# Patient Record
Sex: MALE | Race: WHITE | NOT HISPANIC OR LATINO | Employment: OTHER | URBAN - METROPOLITAN AREA
[De-identification: names, ages, dates, MRNs, and addresses within clinical notes are randomized per-mention and may not be internally consistent; named-entity substitution may affect disease eponyms.]

---

## 2017-10-09 PROCEDURE — 99284 EMERGENCY DEPT VISIT MOD MDM: CPT | Mod: 25

## 2017-10-09 PROCEDURE — 94640 AIRWAY INHALATION TREATMENT: CPT

## 2017-10-10 ENCOUNTER — APPOINTMENT (OUTPATIENT)
Dept: GENERAL RADIOLOGY | Facility: CLINIC | Age: 77
End: 2017-10-10
Attending: EMERGENCY MEDICINE
Payer: MEDICARE

## 2017-10-10 ENCOUNTER — HOSPITAL ENCOUNTER (EMERGENCY)
Facility: CLINIC | Age: 77
Discharge: HOME | End: 2017-10-10
Attending: EMERGENCY MEDICINE | Admitting: EMERGENCY MEDICINE
Payer: MEDICARE

## 2017-10-10 VITALS
DIASTOLIC BLOOD PRESSURE: 64 MMHG | TEMPERATURE: 98.9 F | WEIGHT: 209.8 LBS | SYSTOLIC BLOOD PRESSURE: 142 MMHG | RESPIRATION RATE: 13 BRPM | BODY MASS INDEX: 32.93 KG/M2 | OXYGEN SATURATION: 92 % | HEIGHT: 67 IN | HEART RATE: 93 BPM

## 2017-10-10 DIAGNOSIS — J18.9 PNEUMONIA OF LEFT LUNG DUE TO INFECTIOUS ORGANISM, UNSPECIFIED PART OF LUNG: ICD-10-CM

## 2017-10-10 LAB
ANION GAP SERPL CALCULATED.3IONS-SCNC: 6 MMOL/L (ref 3–14)
BASOPHILS # BLD AUTO: 0 10E9/L (ref 0–0.2)
BASOPHILS NFR BLD AUTO: 0.1 %
BUN SERPL-MCNC: 23 MG/DL (ref 7–30)
CALCIUM SERPL-MCNC: 8.3 MG/DL (ref 8.5–10.1)
CHLORIDE SERPL-SCNC: 110 MMOL/L (ref 94–109)
CO2 SERPL-SCNC: 22 MMOL/L (ref 20–32)
CREAT SERPL-MCNC: 1.1 MG/DL (ref 0.66–1.25)
DIFFERENTIAL METHOD BLD: ABNORMAL
EOSINOPHIL # BLD AUTO: 0 10E9/L (ref 0–0.7)
EOSINOPHIL NFR BLD AUTO: 0.3 %
ERYTHROCYTE [DISTWIDTH] IN BLOOD BY AUTOMATED COUNT: 13.1 % (ref 10–15)
GFR SERPL CREATININE-BSD FRML MDRD: 65 ML/MIN/1.7M2
GLUCOSE SERPL-MCNC: 173 MG/DL (ref 70–99)
HCT VFR BLD AUTO: 40.1 % (ref 40–53)
HGB BLD-MCNC: 14.2 G/DL (ref 13.3–17.7)
IMM GRANULOCYTES # BLD: 0 10E9/L (ref 0–0.4)
IMM GRANULOCYTES NFR BLD: 0.1 %
LYMPHOCYTES # BLD AUTO: 0.4 10E9/L (ref 0.8–5.3)
LYMPHOCYTES NFR BLD AUTO: 3.9 %
MCH RBC QN AUTO: 32.1 PG (ref 26.5–33)
MCHC RBC AUTO-ENTMCNC: 35.4 G/DL (ref 31.5–36.5)
MCV RBC AUTO: 91 FL (ref 78–100)
MONOCYTES # BLD AUTO: 0.1 10E9/L (ref 0–1.3)
MONOCYTES NFR BLD AUTO: 1.3 %
NEUTROPHILS # BLD AUTO: 10.5 10E9/L (ref 1.6–8.3)
NEUTROPHILS NFR BLD AUTO: 94.3 %
NRBC # BLD AUTO: 0 10*3/UL
NRBC BLD AUTO-RTO: 0 /100
PLATELET # BLD AUTO: 136 10E9/L (ref 150–450)
POTASSIUM SERPL-SCNC: 4 MMOL/L (ref 3.4–5.3)
RBC # BLD AUTO: 4.43 10E12/L (ref 4.4–5.9)
SODIUM SERPL-SCNC: 138 MMOL/L (ref 133–144)
WBC # BLD AUTO: 11.1 10E9/L (ref 4–11)

## 2017-10-10 PROCEDURE — 25000132 ZZH RX MED GY IP 250 OP 250 PS 637: Mod: GY | Performed by: EMERGENCY MEDICINE

## 2017-10-10 PROCEDURE — 80048 BASIC METABOLIC PNL TOTAL CA: CPT | Performed by: EMERGENCY MEDICINE

## 2017-10-10 PROCEDURE — 71020 XR CHEST 2 VW: CPT

## 2017-10-10 PROCEDURE — 25000125 ZZHC RX 250: Performed by: EMERGENCY MEDICINE

## 2017-10-10 PROCEDURE — A9270 NON-COVERED ITEM OR SERVICE: HCPCS | Mod: GY | Performed by: EMERGENCY MEDICINE

## 2017-10-10 PROCEDURE — 85025 COMPLETE CBC W/AUTO DIFF WBC: CPT | Performed by: EMERGENCY MEDICINE

## 2017-10-10 RX ORDER — ACETAMINOPHEN 500 MG
1000 TABLET ORAL ONCE
Status: COMPLETED | OUTPATIENT
Start: 2017-10-10 | End: 2017-10-10

## 2017-10-10 RX ORDER — IPRATROPIUM BROMIDE AND ALBUTEROL SULFATE 2.5; .5 MG/3ML; MG/3ML
3 SOLUTION RESPIRATORY (INHALATION) ONCE
Status: COMPLETED | OUTPATIENT
Start: 2017-10-10 | End: 2017-10-10

## 2017-10-10 RX ORDER — DOXYCYCLINE 100 MG/1
100 CAPSULE ORAL 2 TIMES DAILY
Qty: 20 CAPSULE | Refills: 0 | Status: SHIPPED | OUTPATIENT
Start: 2017-10-10 | End: 2017-10-20

## 2017-10-10 RX ORDER — ALBUTEROL SULFATE 90 UG/1
2 AEROSOL, METERED RESPIRATORY (INHALATION) EVERY 6 HOURS PRN
Qty: 1 INHALER | Refills: 0 | Status: SHIPPED | OUTPATIENT
Start: 2017-10-10

## 2017-10-10 RX ADMIN — IPRATROPIUM BROMIDE AND ALBUTEROL SULFATE 3 ML: .5; 3 SOLUTION RESPIRATORY (INHALATION) at 01:01

## 2017-10-10 RX ADMIN — ACETAMINOPHEN 1000 MG: 500 TABLET, FILM COATED ORAL at 01:00

## 2017-10-10 ASSESSMENT — ENCOUNTER SYMPTOMS
SHORTNESS OF BREATH: 1
CHILLS: 1
FEVER: 1
COUGH: 1
RHINORRHEA: 1

## 2017-10-10 NOTE — ED NOTES
Patient states he took cold medicine at 5pm and nose started running after that, took tylenol at 9pm and states he feels like between the 2 his breathing started feeling better.  Patient has congested sound to voice. Patient has congested cough with yellowish mucus produced.

## 2017-10-10 NOTE — ED PROVIDER NOTES
"  History     Chief Complaint:  Shortness of Breath     HPI   Derrick Benitez is a 77 year old male with a history of hypertension who presents for evaluation of shortness of breath. Tonight around 1800, the patient started to develop a fever, chills, rhinorrhea, cough, and shortness of breath. He did take a dose of Tylenol around 2200 tonight with some improvement of his symptoms. Due to these new symptoms, the patient presents to the ED for evaluation. Otherwise, the patient reports that he has been feeling well recently. He is not on oxygen at baseline.     Allergies:  Losartan      Medications:    Ferrous Sulfate (IRON SUPPLEMENT PO)  FINASTERIDE PO  tamsulosin (FLOMAX) 0.4 MG 24 hr capsule  Tadalafil (CIALIS PO)  potassium gluconate 2.5 MEQ TABS  AMLODIPINE BESYLATE PO    Past Medical History:    CKD  Diabetes mellitus   Calcific tendonitis of left hand   Enlarged prostate  Hypertension     Past Surgical History:    History reviewed. No pertinent past surgical history.     Family History:    History reviewed. No pertinent family history.     Social History:  Tobacco use:    Former smoker - 3.00 packs / day for 40 years, quit 2004  Alcohol use:    Negative  Marital status:       Accompanied to ED by:  Alone     Review of Systems   Constitutional: Positive for chills and fever.   HENT: Positive for rhinorrhea.    Respiratory: Positive for cough and shortness of breath.    All other systems reviewed and are negative.    Physical Exam   First Vitals:  BP: 168/68  Pulse: 101  Heart Rate: 89 (placed on 2 L and increased to 92%)  Temp: 97.5  F (36.4  C)  Resp: 20  Height: 170.2 cm (5' 7\")  Weight: 95.2 kg (209 lb 12.8 oz)  SpO2: 90 %        Physical Exam  GENERAL: well developed, pleasant  HEAD: atraumatic  EYES: pupils reactive, extraocular muscles intact, conjunctivae normal  ENT:  mucus membranes moist  NECK:  trachea midline, normal range of motion  RESPIRATORY: no tachypnea, diminished lung sounds, " occasional wheezing   CVS: normal S1/S2, no murmurs, intact distal pulses  ABDOMEN: soft, nontender, nondistention  MUSCULOSKELETAL: no deformities  SKIN: warm and dry, no acute rashes or ulceration  NEURO: GCS 15, cranial nerves intact, alert and oriented x3  PSYCH:  Mood/affect normal    Emergency Department Course     Imaging:  Radiographic findings were communicated with the patient who voiced understanding of the findings.    XR Chest:  IMPRESSION: A small region of patchy opacities in the left lung base is nonspecific, but most likely represents pneumonia. A follow-up chest radiograph could be performed following treatment to ensure  Resolution.  Per radiology.     Laboratory:  CBC: WBC 11.1 high,  low, o/w WNL (HGB 14.2)    BMP: Chloride 110 high, Glucose 173 high, Calcium 8.3 low, o/w WNL (Creatinine 1.10)    Interventions:  0100 Tylenol 1,000 mg PO  0101 Duoneb 3 mL Nebulization     Emergency Department Course:  Nursing notes and vitals reviewed.  0022: I performed an exam of the patient as documented above.     0158: I updated and reassessed the patient.     Findings and plan explained to the Patient. Patient discharged home with instructions regarding supportive care, medications, and reasons to return. The importance of close follow-up was reviewed. The patient was prescribed Doxycycline and an Albuterol inhaler.      Impression & Plan      Medical Decision Making:  Derrick Benitez is a 77 year old male who presents with a cough for the last 1-2 days. Chest X-ray is read as possible pneumonia. Labs were fairly reassuring. Sats are 90-91. The patient was given a duoneb and is feeling improved and would like to go home.     Diagnosis:    ICD-10-CM   1. Pneumonia of left lung due to infectious organism, unspecified part of lung J18.9       Plan:   Albuterol, Vibramycin, and follow up with primary to assure resolution otherwise return if worse.    Discharge Medications:   Details   doxycycline  (VIBRAMYCIN) 100 MG capsule Take 1 capsule (100 mg) by mouth 2 times daily for 10 days, Disp-20 capsule, R-0, Local Print      albuterol (PROAIR HFA/PROVENTIL HFA/VENTOLIN HFA) 108 (90 BASE) MCG/ACT Inhaler Inhale 2 puffs into the lungs every 6 hours as needed for shortness of breath / dyspnea or wheezing, Disp-1 Inhaler, R-0, Local Print          Malik SEVILLA, am serving as a scribe at 12:22 AM on 10/10/2017 to document services personally performed by Dr. Rasheed, based on my observations and the provider's statements to me.     EMERGENCY DEPARTMENT       Stanton Rasheed MD  10/11/17 1584

## 2017-10-10 NOTE — DISCHARGE INSTRUCTIONS

## 2017-10-10 NOTE — ED AVS SNAPSHOT
Emergency Department    [address] [facility] [address]    Phone:  [phone]    Fax:  [phone]                                       Derrick Benitez   MRN: [id number]    Department:   Emergency Department   Date of Visit:  10/9/2017           After Visit Summary Signature Page     I have received my discharge instructions, and my questions have been answered. I have discussed any challenges I see with this plan with the nurse or doctor.    ..........................................................................................................................................  Patient/Patient Representative Signature      ..........................................................................................................................................  Patient Representative Print Name and Relationship to Patient    ..................................................               ................................................  Date                                            Time    ..........................................................................................................................................  Reviewed by Signature/Title    ...................................................              ..............................................  Date                                                            Time

## 2017-10-10 NOTE — ED AVS SNAPSHOT
Emergency Department    [address] [facility] [address]    Phone:  [phone]    Fax:  [phone]                                       Derrick Benitez   MRN: [id number]    Department:   Emergency Department   Date of Visit:  10/9/2017           Patient Information     Date Of Birth          1940        Your diagnoses for this visit were:     Pneumonia of left lung due to infectious organism, unspecified part of lung        You were seen by Stanton Rasheed MD.      Follow-up Information     Follow up with Clarisse Golden.    Specialty:  Family Practice    Contact information:    [facility]  [address]  [facility] [id number] [phone]          Follow up with  Emergency Department.    Specialty:  EMERGENCY MEDICINE    Why:  If symptoms worsen    Contact information:    [address] [facility] [address] [phone]        Discharge Instructions         Pneumonia (Adult)  Pneumonia is an infection deep within the lungs. It is in the small air sacs (alveoli). Pneumonia may be caused by a virus or bacteria. Pneumonia caused by bacteria is usually treated with an antibiotic. Severe cases may need to be treated in the hospital. Milder cases can be treated at home. Symptoms usually start to get better during the first 2 days of treatment.    Home care  Follow these guidelines when caring for yourself at home:    Rest at home for the first 2 to 3 days, or until you feel stronger. Don t let yourself get overly tired when you go back to your activities.    Stay away from cigarette smoke - yours or other people s.    You may use acetaminophen or ibuprofen to control fever or pain, unless another medicine was prescribed. If you have chronic liver or kidney disease, talk with your healthcare provider before using these medicines. Also talk with your provider if you ve had a stomach ulcer or gastrointestinal bleeding. Don t give aspirin to anyone younger than  18 years of age who is ill with a fever. It may cause severe liver damage.    Your appetite may be poor, so a light diet is fine.    Drink 6 to 8 glasses of fluids every day to make sure you are getting enough fluids. Beverages can include water, sport drinks, sodas without caffeine, juices, tea, or soup. Fluids will help loosen secretions in the lung. This will make it easier for you to cough up the phlegm (sputum). If you also have heart or kidney disease, check with your healthcare provider before you drink extra fluids.    Take antibiotic medicine prescribed until it is all gone, even if you are feeling better after a few days.  Follow-up care  Follow up with your healthcare provider in the next 2 to 3 days, or as advised. This is to be sure the medicine is helping you get better.  If you are 65 or older, you should get a pneumococcal vaccine and a yearly flu (influenza) shot. You should also get these vaccines if you have chronic lung disease like asthma, emphysema, or COPD. Recently, a second type of pneumonia vaccine has become available for everyone over 65 years old. This is in addition to the previous vaccine. Ask your provider about this.  When to seek medical advice  Call your healthcare provider right away if any of these occur:    You don t get better within the first 48 hours of treatment    Shortness of breath gets worse    Rapid breathing (more than 25 breaths per minute)    Coughing up blood    Chest pain gets worse with breathing    Fever of 100.4 F (38 C) or higher that doesn t get better with fever medicine    Weakness, dizziness, or fainting that gets worse    Thirst or dry mouth that gets worse    Sinus pain, headache, or a stiff neck    Chest pain not caused by coughing  Date Last Reviewed: 1/1/2017 2000-2017 [employer]. [address] [place] [address], [place] [address]. All rights reserved. This information is not intended as a substitute for professional medical care. Always  follow your healthcare professional's instructions.          24 Hour Appointment Hotline       To make an appointment at any [facility], call [phone] ([phone]). If you don't have a family doctor or clinic, we will help you find one. [facility] are conveniently located to serve the needs of you and your family.             Review of your medicines      START taking        Dose / Directions Last dose taken    albuterol 108 (90 BASE) MCG/ACT Inhaler   Commonly known as:  PROAIR HFA/PROVENTIL HFA/VENTOLIN HFA   Dose:  2 puff   Quantity:  1 Inhaler        Inhale 2 puffs into the lungs every 6 hours as needed for shortness of breath / dyspnea or wheezing   Refills:  0        doxycycline 100 MG capsule   Commonly known as:  VIBRAMYCIN   Dose:  100 mg   Quantity:  20 capsule        Take 1 capsule (100 mg) by mouth 2 times daily for 10 days   Refills:  0          Our records show that you are taking the medicines listed below. If these are incorrect, please call your family doctor or clinic.        Dose / Directions Last dose taken    AMLODIPINE BESYLATE PO   Dose:  10 mg        Take 10 mg by mouth every evening   Refills:  0        CIALIS PO        Take by mouth as needed for erectile dysfunction   Refills:  0        FINASTERIDE PO   Dose:  5 mg        Take 5 mg by mouth every evening   Refills:  0        FLOMAX 0.4 MG capsule   Dose:  0.8 mg   Generic drug:  tamsulosin        Take 0.8 mg by mouth every evening   Refills:  0        IRON SUPPLEMENT PO        Refills:  0        potassium gluconate 2.5 MEQ Tabs   Dose:  2.5 mEq        Take 2.5 mEq by mouth every evening   Refills:  0                Prescriptions were sent or printed at these locations (2 Prescriptions)                   Other Prescriptions                Printed at Department/Unit printer (2 of 2)         doxycycline (VIBRAMYCIN) 100 MG capsule               albuterol (PROAIR HFA/PROVENTIL HFA/VENTOLIN HFA) 108 (90 BASE) MCG/ACT  Inhaler                Procedures and tests performed during your visit     Basic metabolic panel    CBC with platelets differential    XR Chest 2 Views      Orders Needing Specimen Collection     None      Pending Results     No orders found from 10/8/2017 to 10/11/2017.            Pending Culture Results     No orders found from 10/8/2017 to 10/11/2017.            Pending Results Instructions     If you had any lab results that were not finalized at the time of your Discharge, you can call the ED Lab Result RN at [phone]. You will be contacted by this team for any positive Lab results or changes in treatment. The nurses are available 7 days a week from 10A to 6:30P.  You can leave a message 24 hours per day and they will return your call.        Test Results From Your Hospital Stay        10/10/2017 12:56 AM      Component Results     Component Value Ref Range & Units Status    WBC 11.1 (H) 4.0 - 11.0 10e9/L Final    RBC Count 4.43 4.4 - 5.9 10e12/L Final    Hemoglobin 14.2 13.3 - 17.7 g/dL Final    Hematocrit 40.1 40.0 - 53.0 % Final    MCV 91 78 - 100 fl Final    MCH 32.1 26.5 - 33.0 pg Final    MCHC 35.4 31.5 - 36.5 g/dL Final    RDW 13.1 10.0 - 15.0 % Final    Platelet Count 136 (L) 150 - 450 10e9/L Final    Diff Method Automated Method  Final    % Neutrophils 94.3 % Final    % Lymphocytes 3.9 % Final    % Monocytes 1.3 % Final    % Eosinophils 0.3 % Final    % Basophils 0.1 % Final    % Immature Granulocytes 0.1 % Final    Nucleated RBCs 0 0 /100 Final    Absolute Neutrophil 10.5 (H) 1.6 - 8.3 10e9/L Final    Absolute Lymphocytes 0.4 (L) 0.8 - 5.3 10e9/L Final    Absolute Monocytes 0.1 0.0 - 1.3 10e9/L Final    Absolute Eosinophils 0.0 0.0 - 0.7 10e9/L Final    Absolute Basophils 0.0 0.0 - 0.2 10e9/L Final    Abs Immature Granulocytes 0.0 0 - 0.4 10e9/L Final    Absolute Nucleated RBC 0.0  Final         10/10/2017  1:59 AM      Narrative     CHEST 2 VIEWS  10/10/2017 12:53 AM     HISTORY: Cough.  Fever.    COMPARISON: 10/28/2014.    FINDINGS: A small region of ill-defined patchy opacities in the left  lung base. The lungs are otherwise clear. Normal-sized cardiac  silhouette.        Impression     IMPRESSION: A small region of patchy opacities in the left lung base  is nonspecific, but most likely represents pneumonia. A follow-up  chest radiograph could be performed following treatment to ensure  resolution.    ZUNILDA HDEZ MD         10/10/2017  1:06 AM      Component Results     Component Value Ref Range & Units Status    Sodium 138 133 - 144 mmol/L Final    Potassium 4.0 3.4 - 5.3 mmol/L Final    Chloride 110 (H) 94 - 109 mmol/L Final    Carbon Dioxide 22 20 - 32 mmol/L Final    Anion Gap 6 3 - 14 mmol/L Final    Glucose 173 (H) 70 - 99 mg/dL Final    Urea Nitrogen 23 7 - 30 mg/dL Final    Creatinine 1.10 0.66 - 1.25 mg/dL Final    GFR Estimate 65 >60 mL/min/1.7m2 Final    Non  GFR Calc    GFR Estimate If Black 78 >60 mL/min/1.7m2 Final    African American GFR Calc    Calcium 8.3 (L) 8.5 - 10.1 mg/dL Final                Clinical Quality Measure: Blood Pressure Screening     Your blood pressure was checked while you were in the emergency department today. The last reading we obtained was  BP: 142/64 . Please read the guidelines below about what these numbers mean and what you should do about them.  If your systolic blood pressure (the top number) is less than 120 and your diastolic blood pressure (the bottom number) is less than 80, then your blood pressure is normal. There is nothing more that you need to do about it.  If your systolic blood pressure (the top number) is 120-139 or your diastolic blood pressure (the bottom number) is 80-89, your blood pressure may be higher than it should be. You should have your blood pressure rechecked within a year by a primary care provider.  If your systolic blood pressure (the top number) is 140 or greater or your diastolic blood pressure (the  "bottom number) is 90 or greater, you may have high blood pressure. High blood pressure is treatable, but if left untreated over time it can put you at risk for heart attack, stroke, or kidney failure. You should have your blood pressure rechecked by a primary care provider within the next 4 weeks.  If your provider in the emergency department today gave you specific instructions to follow-up with your doctor or provider even sooner than that, you should follow that instruction and not wait for up to 4 weeks for your follow-up visit.        Thank you for choosing [facility]       Thank you for choosing [facility] for your care. Our goal is always to provide you with excellent care. Hearing back from our patients is one way we can continue to improve our services. Please take a few minutes to complete the written survey that you may receive in the mail after you visit with us. Thank you!        [employer] Information     [employer] lets you send messages to your doctor, view your test results, renew your prescriptions, schedule appointments and more. To sign up, go to [email or web address] . Click on \"Log in\" on the left side of the screen, which will take you to the Welcome page. Then click on \"Sign up Now\" on the right side of the page.     You will be asked to enter the access code listed below, as well as some personal information. Please follow the directions to create your username and password.     Your access code is: [email or web address]  Expires: 2018  3:21 AM     Your access code will  in 90 days. If you need help or a new code, please call your [facility] clinic or [phone].        Care EveryWhere ID     This is your Care EveryWhere ID. This could be used by other organizations to access your [facility] medical records  [id number]        Equal Access to Services     HARPER GREGORY : devendra Richards, yannick howe. So sharona " [phone].    ATENCIÓN: Si habla español, tiene a helton disposición servicios gratuitos de asistencia lingüística. Llame al [phone].    We comply with applicable federal civil rights laws and [place] laws. We do not discriminate on the basis of race, color, national origin, age, disability, sex, sexual orientation, or gender identity.            After Visit Summary       This is your record. Keep this with you and show to your community pharmacist(s) and doctor(s) at your next visit.

## 2017-10-10 NOTE — ED NOTES
Patient noticed little change in breathing quality after nebulizer, no change in breath sounds noted.

## 2020-09-07 ENCOUNTER — APPOINTMENT (OUTPATIENT)
Dept: CT IMAGING | Facility: CLINIC | Age: 80
End: 2020-09-07
Attending: EMERGENCY MEDICINE
Payer: MEDICARE

## 2020-09-07 ENCOUNTER — HOSPITAL ENCOUNTER (EMERGENCY)
Facility: CLINIC | Age: 80
Discharge: HOME | End: 2020-09-07
Attending: EMERGENCY MEDICINE | Admitting: EMERGENCY MEDICINE
Payer: MEDICARE

## 2020-09-07 VITALS
TEMPERATURE: 97.9 F | OXYGEN SATURATION: 92 % | HEIGHT: 67 IN | DIASTOLIC BLOOD PRESSURE: 55 MMHG | BODY MASS INDEX: 32.86 KG/M2 | HEART RATE: 57 BPM | SYSTOLIC BLOOD PRESSURE: 147 MMHG

## 2020-09-07 DIAGNOSIS — R91.8 MASS OF RIGHT LUNG: ICD-10-CM

## 2020-09-07 LAB
ALBUMIN SERPL-MCNC: 2.9 G/DL (ref 3.4–5)
ALP SERPL-CCNC: 91 U/L (ref 40–150)
ALT SERPL W P-5'-P-CCNC: 16 U/L (ref 0–70)
ANION GAP SERPL CALCULATED.3IONS-SCNC: 7 MMOL/L (ref 3–14)
AST SERPL W P-5'-P-CCNC: 17 U/L (ref 0–45)
BASOPHILS # BLD AUTO: 0 10E9/L (ref 0–0.2)
BASOPHILS NFR BLD AUTO: 0.1 %
BILIRUB SERPL-MCNC: 0.7 MG/DL (ref 0.2–1.3)
BUN SERPL-MCNC: 17 MG/DL (ref 7–30)
CALCIUM SERPL-MCNC: 8 MG/DL (ref 8.5–10.1)
CHLORIDE SERPL-SCNC: 108 MMOL/L (ref 94–109)
CO2 SERPL-SCNC: 22 MMOL/L (ref 20–32)
CREAT SERPL-MCNC: 1.12 MG/DL (ref 0.66–1.25)
D DIMER PPP FEU-MCNC: 1.3 UG/ML FEU (ref 0–0.5)
DIFFERENTIAL METHOD BLD: ABNORMAL
EOSINOPHIL # BLD AUTO: 0.1 10E9/L (ref 0–0.7)
EOSINOPHIL NFR BLD AUTO: 0.7 %
ERYTHROCYTE [DISTWIDTH] IN BLOOD BY AUTOMATED COUNT: 13.3 % (ref 10–15)
GFR SERPL CREATININE-BSD FRML MDRD: 61 ML/MIN/{1.73_M2}
GLUCOSE SERPL-MCNC: 173 MG/DL (ref 70–99)
HCT VFR BLD AUTO: 42.1 % (ref 40–53)
HGB BLD-MCNC: 14.2 G/DL (ref 13.3–17.7)
IMM GRANULOCYTES # BLD: 0 10E9/L (ref 0–0.4)
IMM GRANULOCYTES NFR BLD: 0.3 %
LYMPHOCYTES # BLD AUTO: 0.7 10E9/L (ref 0.8–5.3)
LYMPHOCYTES NFR BLD AUTO: 10.2 %
MCH RBC QN AUTO: 30.6 PG (ref 26.5–33)
MCHC RBC AUTO-ENTMCNC: 33.7 G/DL (ref 31.5–36.5)
MCV RBC AUTO: 91 FL (ref 78–100)
MONOCYTES # BLD AUTO: 0.5 10E9/L (ref 0–1.3)
MONOCYTES NFR BLD AUTO: 6.3 %
NEUTROPHILS # BLD AUTO: 5.9 10E9/L (ref 1.6–8.3)
NEUTROPHILS NFR BLD AUTO: 82.4 %
NRBC # BLD AUTO: 0 10*3/UL
NRBC BLD AUTO-RTO: 0 /100
PLATELET # BLD AUTO: 161 10E9/L (ref 150–450)
POTASSIUM SERPL-SCNC: 4 MMOL/L (ref 3.4–5.3)
PROT SERPL-MCNC: 6.8 G/DL (ref 6.8–8.8)
RBC # BLD AUTO: 4.64 10E12/L (ref 4.4–5.9)
SARS-COV-2 RNA SPEC QL NAA+PROBE: NOT DETECTED
SODIUM SERPL-SCNC: 137 MMOL/L (ref 133–144)
SPECIMEN SOURCE: NORMAL
WBC # BLD AUTO: 7.2 10E9/L (ref 4–11)

## 2020-09-07 PROCEDURE — 25000128 H RX IP 250 OP 636: Performed by: EMERGENCY MEDICINE

## 2020-09-07 PROCEDURE — 85025 COMPLETE CBC W/AUTO DIFF WBC: CPT | Performed by: EMERGENCY MEDICINE

## 2020-09-07 PROCEDURE — 25000132 ZZH RX MED GY IP 250 OP 250 PS 637: Mod: GY | Performed by: EMERGENCY MEDICINE

## 2020-09-07 PROCEDURE — 99285 EMERGENCY DEPT VISIT HI MDM: CPT | Mod: 25

## 2020-09-07 PROCEDURE — C9803 HOPD COVID-19 SPEC COLLECT: HCPCS

## 2020-09-07 PROCEDURE — 85379 FIBRIN DEGRADATION QUANT: CPT | Performed by: EMERGENCY MEDICINE

## 2020-09-07 PROCEDURE — 71275 CT ANGIOGRAPHY CHEST: CPT

## 2020-09-07 PROCEDURE — U0003 INFECTIOUS AGENT DETECTION BY NUCLEIC ACID (DNA OR RNA); SEVERE ACUTE RESPIRATORY SYNDROME CORONAVIRUS 2 (SARS-COV-2) (CORONAVIRUS DISEASE [COVID-19]), AMPLIFIED PROBE TECHNIQUE, MAKING USE OF HIGH THROUGHPUT TECHNOLOGIES AS DESCRIBED BY CMS-2020-01-R: HCPCS | Performed by: EMERGENCY MEDICINE

## 2020-09-07 PROCEDURE — 25000125 ZZHC RX 250: Performed by: EMERGENCY MEDICINE

## 2020-09-07 PROCEDURE — 80053 COMPREHEN METABOLIC PANEL: CPT | Performed by: EMERGENCY MEDICINE

## 2020-09-07 RX ORDER — IBUPROFEN 600 MG/1
600 TABLET, FILM COATED ORAL ONCE
Status: COMPLETED | OUTPATIENT
Start: 2020-09-07 | End: 2020-09-07

## 2020-09-07 RX ORDER — IOPAMIDOL 755 MG/ML
75 INJECTION, SOLUTION INTRAVASCULAR ONCE
Status: COMPLETED | OUTPATIENT
Start: 2020-09-07 | End: 2020-09-07

## 2020-09-07 RX ORDER — ACETAMINOPHEN 325 MG/1
975 TABLET ORAL ONCE
Status: COMPLETED | OUTPATIENT
Start: 2020-09-07 | End: 2020-09-07

## 2020-09-07 RX ORDER — BENZONATATE 100 MG/1
100 CAPSULE ORAL 3 TIMES DAILY PRN
Qty: 30 CAPSULE | Refills: 0 | Status: SHIPPED | OUTPATIENT
Start: 2020-09-07 | End: 2020-10-07

## 2020-09-07 RX ORDER — HYDROCODONE BITARTRATE AND ACETAMINOPHEN 5; 325 MG/1; MG/1
1 TABLET ORAL EVERY 6 HOURS PRN
Qty: 10 TABLET | Refills: 0 | Status: SHIPPED | OUTPATIENT
Start: 2020-09-07 | End: 2020-09-11

## 2020-09-07 RX ADMIN — ACETAMINOPHEN 975 MG: 325 TABLET, FILM COATED ORAL at 10:16

## 2020-09-07 RX ADMIN — IOPAMIDOL 75 ML: 755 INJECTION, SOLUTION INTRAVENOUS at 11:18

## 2020-09-07 RX ADMIN — SODIUM CHLORIDE 98 ML: 9 INJECTION, SOLUTION INTRAVENOUS at 11:20

## 2020-09-07 RX ADMIN — IBUPROFEN 600 MG: 600 TABLET ORAL at 10:16

## 2020-09-07 ASSESSMENT — ENCOUNTER SYMPTOMS
DIARRHEA: 0
SHORTNESS OF BREATH: 0
FEVER: 0
BACK PAIN: 1
COUGH: 1

## 2020-09-07 NOTE — ED AVS SNAPSHOT
Emergency Department  [address] [facility] [address]  Phone:  [phone]  Fax:  [phone]                                    Derrick Benitez   MRN: [id number]    Department:   Emergency Department   Date of Visit:  9/7/2020           After Visit Summary Signature Page    I have received my discharge instructions, and my questions have been answered. I have discussed any challenges I see with this plan with the nurse or doctor.    ..........................................................................................................................................  Patient/Patient Representative Signature      ..........................................................................................................................................  Patient Representative Print Name and Relationship to Patient    ..................................................               ................................................  Date                                   Time    ..........................................................................................................................................  Reviewed by Signature/Title    ...................................................              ..............................................  Date                                               Time          22EPIC Rev 08/18

## 2020-09-07 NOTE — ED TRIAGE NOTES
Right mid back pain since Saturday. Pain increases with inspiration and coughing. Denies injury. C/o mild and infrequent productive cough.

## 2020-09-07 NOTE — ED PROVIDER NOTES
History     Chief Complaint:  Back Pain and Cough     HPI   Derrick Benitez is a 80 year old male with a history of hypertension and type II diabetes who presents for evaluation of back pain and a cough. The patient reports that he has a somewhat productive cough at baseline due to a longstanding history of previously smoking and frequent sneezing in the morning due to seasonal allergies, although this sneezing has improved somewhat within the last week as the weather has cooled. On 9/5/2020 the patient started to develop new right upper back pain that is worse with coughing. Due to concern for his persistent pain today, the patient came into the ED for evaluation. He denies any recent fever, shortness of breath, chest pain, or diarrhea. His sense of taste and smell are at their baseline. He has no known COVID-19 exposures or sick contacts. He has no personal or family history of blood clots and has not traveled recently.      PE/DVT Risk Factors:   Hx of PE/DVT:   Negative   Hx of clotting disorder:  Negative   Hx of cancer:    Negative   Tobacco use:    Former smoker   Hormone therapy:   Negative   Prolonged immobilization:  Negative   Recent surgery:   Negative   Recent travel:    Negative   Familial Hx of PE/DVT:  Negative      Allergies:  Losartan      Medications:    Albuterol inhaler   Amlodipine besylate   Iron supplement  Finasteride   Potassium gluconate   Cialis  Flomax      Past Medical History:    Diabetes mellitus, type II   Chronic kidney disease   Hypertension     Past Surgical History:    History reviewed. No pertinent past surgical history.     Family History:    Hypertension - Father      Social History:  Tobacco use:    Former smoker - 3.00 packs / day for 40 years, quit 2004   Alcohol use:    Negative   Drug use:    Negative   Marital status:       Accompanied to ED by:  Alone      Review of Systems   Constitutional: Negative for fever.   HENT: Positive for sneezing.    Respiratory:  "Positive for cough. Negative for shortness of breath.    Cardiovascular: Negative for chest pain.   Gastrointestinal: Negative for diarrhea.   Musculoskeletal: Positive for back pain.   All other systems reviewed and are negative.      Physical Exam   First Vitals:  BP: (!) 147/55  Pulse: 57  Temp: 97.9  F (36.6  C)  Height: 170.2 cm (5' 7\")  SpO2: 92 %      Physical Exam  General: Alert, interactive in mild distress  Head:  Scalp is atraumatic  Eyes:  The pupils are equal, round, and reactive to light    EOM's intact    No scleral icterus  ENT:      Nose:  The external nose is normal  Ears:  External ears are normal  Mouth/Throat: The oropharynx is normal    Mucus membranes are moist       Neck:  Normal range of motion.      There is no rigidity.    Trachea is in the midline         CV:  Regular rate and rhythm    No murmur   Resp:  Coarse breath sounds bilaterally.     Non-labored, no retractions or accessory muscle use      GI:  Abdomen is soft, no distension, no tenderness.       MS:  Normal strength in all 4 extremities    No peripheral edema.   Skin:  Warm and dry, No rash or lesions noted.  Neuro: Strength 5/5 x4.  Psych:  Awake. Alert.  Normal affect.      Appropriate interactions.      Emergency Department Course      Imaging:  Radiographic findings were communicated with the patient who voiced understanding of the findings.    CT Chest Pulmonary Embolism w Contrast:  IMPRESSION:   1.  No evidence of pulmonary embolism. Thoracic aorta is unremarkable.   2.  Right lower lobe lung mass causing narrowing of a segmental branch of the right lower lobe pulmonary artery without occlusion. No enlarged lymph nodes.   Per radiology.     Laboratory:  CBC: WNL (WBC 7.2, HGB 14.2, )   CMP: Glucose 173 high, Calcium 8.0 low, Albumin 2.9 low, o/w WNL (Creatinine 1.12)   D dimer quantitative: 1.3 high   Symptomatic COVID-19 Virus by PCR: Pending      Interventions:  1016 Tylenol 975 mg PO   1016 Ibuprofen 600 mg PO "      Emergency Department Course:  Nursing notes and vitals reviewed.  0946: I performed an exam of the patient as documented above.      1155: I updated and reassessed the patient.     Findings and plan explained to the Patient. Patient discharged home with instructions regarding supportive care, medications, and reasons to return. The importance of close follow-up was reviewed. The patient was prescribed Tessalon and [place].       Impression & Plan       Medical Decision Making:  Derrick Benitez is a 80 year old male who was seen and evaluated. The above workup was undertaken. Given his pleuritic right-sided pain, a broad differential including pulmonary embolism, aortic dissection, pneumonia, pneumothorax, and tumor were considered. With his elevated D dimer, CT imaging was performed, unfortunately demonstrating a right lung mass. Given his smoking history, this is very concerning for malignancy. I discussed this at length with the patient and he states understanding and will follow up with [facility] to discuss biopsy for definitive diagnosis. I have also contacted our care coordinator to help facilitate this evaluation. In the meantime, I have placed him on Norco for both pain and cough suppression as well as Tessalon for cough suppression. There is no signs of secondary infection. I think COVID is unlikely but a swab is out pending at this time. The patient was informed of all the findings, was in agreement with this plan of action, and was subsequently discharged to home.     Covid-19  Derrick Benitez was evaluated during a global COVID-19 pandemic, which necessitated consideration that the patient might be at risk for infection with the SARS-CoV-2 virus that causes COVID-19.   Applicable protocols for evaluation were followed during the patient's care.   COVID-19 was considered as part of the patient's evaluation. The plan for testing is:  a test was obtained during this visit.     Impression:      ICD-10-CM   1. Mass of right lung  R91.8      Plan:   Discharged to home with Tessalon and Norco.     Discharge Medications:  New Prescriptions    BENZONATATE (TESSALON) 100 MG CAPSULE    Take 1 capsule (100 mg) by mouth 3 times daily as needed for cough    HYDROCODONE-ACETAMINOPHEN (NORCO) 5-325 MG TABLET    Take 1 tablet by mouth every 6 hours as needed for severe pain         IMalik, am serving as a scribe at 9:46 AM on 9/7/2020 to document services personally performed by Dr. Rafat Mckinley, based on my observations and the provider's statements to me.       EMERGENCY DEPARTMENT       Rafat Mckinley MD  09/07/20 [id number]

## 2020-09-08 ENCOUNTER — TELEPHONE (OUTPATIENT)
Dept: ONCOLOGY | Facility: CLINIC | Age: 80
End: 2020-09-08

## 2020-09-08 NOTE — ED NOTES
I was asked to assist this patient with an Oncology follow up.  I contacted [facility] and spoke with Hallie who will follow up with this patient and get him into the clinic either this week or early next week for new Lung Cancer diagnosis.  I will update the ER provider.

## 2020-09-08 NOTE — TELEPHONE ENCOUNTER
In-person visit scheduled for tomorrow 9-9-20 at 1:00pm with Dr Herlinda Monsivais for New Lung Cancer, follow up from [facility]. Detailed message left for patient and asked patient call back to confirm appointment.

## 2020-09-08 NOTE — ED NOTES
I was asked to assist this patient with Oncology f/u for a possible new lung CA.   I contacted the [facility] clinic and they will see this patient tomorrow 9/9/2020 at 1 pm.   I updated the ER provider.

## 2020-09-08 NOTE — TELEPHONE ENCOUNTER
RECORDS STATUS - ALL OTHER DIAGNOSIS      RECORDS RECEIVED FROM: [facility]   DATE RECEIVED: 9/9/2020    NOTES STATUS DETAILS   OFFICE NOTE from referring provider     OFFICE NOTE from medical oncologist N/A    DISCHARGE SUMMARY from hospital     DISCHARGE REPORT from the ER Complete [facility]- 9/7/2020 Lung Mass   OPERATIVE REPORT N.A    MEDICATION LIST Complete [facility]   CLINICAL TRIAL TREATMENTS TO DATE     LABS     PATHOLOGY REPORTS N/A    ANYTHING RELATED TO DIAGNOSIS Complete Labs last updated on 9/7/2020    GENONOMIC TESTING     TYPE:     IMAGING (NEED IMAGES & REPORT)     CT SCANS Complete CT Chest Pulmonary 9/7/2020    Xray Chest Complete 10/10/2017, 10/28/2014    MRI     MAMMO     ULTRASOUND     PET       Action    Action Taken 9/8/2020 2:16pm     I called pt Derrick -no outside records per pt.

## 2020-09-09 ENCOUNTER — PRE VISIT (OUTPATIENT)
Dept: ONCOLOGY | Facility: CLINIC | Age: 80
End: 2020-09-09

## 2020-09-09 ENCOUNTER — ONCOLOGY VISIT (OUTPATIENT)
Dept: ONCOLOGY | Facility: CLINIC | Age: 80
End: 2020-09-09
Attending: INTERNAL MEDICINE
Payer: MEDICARE

## 2020-09-09 VITALS
SYSTOLIC BLOOD PRESSURE: 127 MMHG | BODY MASS INDEX: 31.77 KG/M2 | TEMPERATURE: 97.9 F | RESPIRATION RATE: 16 BRPM | OXYGEN SATURATION: 95 % | HEART RATE: 81 BPM | HEIGHT: 67 IN | WEIGHT: 202.4 LBS | DIASTOLIC BLOOD PRESSURE: 63 MMHG

## 2020-09-09 DIAGNOSIS — R91.8 RIGHT LOWER LOBE LUNG MASS: Primary | ICD-10-CM

## 2020-09-09 PROCEDURE — G0463 HOSPITAL OUTPT CLINIC VISIT: HCPCS

## 2020-09-09 PROCEDURE — 99204 OFFICE O/P NEW MOD 45 MIN: CPT | Performed by: INTERNAL MEDICINE

## 2020-09-09 ASSESSMENT — MIFFLIN-ST. JEOR: SCORE: 1581.71

## 2020-09-09 ASSESSMENT — PAIN SCALES - GENERAL: PAINLEVEL: SEVERE PAIN (6)

## 2020-09-09 NOTE — PROGRESS NOTES
[facility] PATIENT ONCOLOGY CONSULT        REQUESTING PROVIDER/SERVICE: Rafat Chang from ER    PATIENT NAME: Derrick Benitez   MRN# [id number]     Date of Visit: Sep 9, 2020     YOB: 1940       REASON FOR CONSULTATION/CC:    Newly found RLL mass 4-5 cm in 9/2020 at age 80      HISTORY OF ONCOLOGY ILLNESS:    At age 80, on 9/7/2020 he presented to ER for new right upper back pain for a few days, wheezing on and off for years (has allergy), chronic coughing for years.   The pain is 5-6/10, the pain is on and off.   Denies weight loss, or hemoptysis.     CT in ER found large right lower lobe lung mass 5.0 x 4.1 cm causing narrowing of a segmental branch  of the right lower lobe pulmonary artery without occlusion. No  enlarged lymph nodes.    He is offered Tessalon and Norco.  He has much improved pain and cough.      PAST MEDICAL HISTORY  Hypertension  type II diabetes   CKD    MEDICATIONS/ALLERY:  Reviewed in Epic system.        SOCIAL HISTORY:    He quit smoking 17 yrs ago, he smoked 3 pack per day for 40 years. Deny ETOY use      FAMILY HISTORY:    Denies any FH of cancer.       REVIEW OF SYSTEMS:   A 10-point review of systems was performed. Pertinent findings are noted in the HPI.    GENERAL: pt is in usual state of health.  No B symptoms  NEURO:   No headache, double vision, or focal weakness.  No neuropathy.   SKIN:  No chronic skin rash or skin infection.   CARDIOVASCULAR:  + High blood pressure, no hyperlipidemia. NO WEST  PULMONARY: see HPI  GI:  No abdominal pain, nausea, vomiting, constipation.  No diarrhea.  No bright red blood per rectum.   :  No urgency, frequency.  No recurrent urinary tract infection.  No kidney problems.   RHEUMATOLOGY/MUSCULOSKELETAL SYSTEM:  no arthritic pain, or muscle pain. No muscle ache.   ENDOCRINE:  + diabetes, no thyroid problem.  No complaints of hot flashes.   HEMATOLOGY:  No history of bleeding or thrombosis episode.   Oncology: no hx of  "cancers  IMMUNOLOGY:  No recurrent fever or chills episode.  No recurrent infectious episode.   PSYCHIATRY:  No anxiety or depression.          PHYSICAL EXAMINATION:   VITAL SIGNS:  Blood pressure 127/63, pulse 81, temperature 97.9  F (36.6  C), temperature source Oral, resp. rate 16, height 1.702 m (5' 7\"), weight 91.8 kg (202 lb 6.4 oz), SpO2 95 %.      GENERAL APPEARANCE:  looks like stated age, very pleasant, not in acute distress.     ECO    ENT, MOUTH: Pupils are equally reactive to light.  Sclerae are anicteric.  Moist oral mucosa without lesion or ulcer.   Negative pharynx.  No oral thrush.   NECK:  Supple.  No jugular venous distention.  Thyroid is not palpable.   LYMPH NODES:  Superficial lymphadenopathy is not appreciable in the bilateral cervical, supraclavicular, axillary or inguinal areas.   CARDIOVASCULAR:  S1, S2 regular with no murmurs or gallops.  No carotid or abdominal bruits.   PULMONARY:  Lungs are clear to auscultation and percussion bilaterally.  There is no wheezing or rhonchi.   GASTROINTESTINAL:  Abdomen is soft, nontender.  No hepatosplenomegaly.  No signs of ascites.  No mass appreciable.   MUSCULOSKELETAL/EXTREMITIES:  No edema.  No cyanotic changes.  No signs of joint deformity.  No lymphedema.   NEUROLOGIC:  Cranial nerves II-XII are grossly intact.  Sensation intact.  Muscle strength and muscle tone symmetrical, 5/5 throughout.   BACK  No spinal or paraspinal tenderness.  No CVA tenderness.   SKIN:  No petechiae.  No rash.  No signs of cellulitis.    PSYCHIATRIC: Oriented to time, person, and places. Normal mood and affect. Good memory. Proper insight and judgement.       CURRENT LAB DATA REVIEWED TODAY WITH PATIENT DURING VISIT:  cmp and cbc diff in 2020 are fine        CURRENT IMAGING REVIEWED TODAY WITH PATIENT DURING VISIT:  CT chest 2020 found no PE. Right lower lobe lung mass 5.0 x 4.1 cm causing narrowing of a segmental branch  of the right lower lobe pulmonary " artery without occlusion. No  enlarged lymph nodes.         OLD DATA REVIEWED TODAY WITH SUMMARY  CXR 2017 - A small region of patchy opacities in the left lung base is nonspecific      ASSESSMENT AND PLAN DISCUSSED WITH PATIENT TODAY DURING VISIT   At age 80, he is found to have RLL mass 4-5 cm.  CT findings is very suspicious for primary lung cancer.    He is seen oncology first time today for consultation for his abnormal CT scan.    I dependently reviewed reviewed the films with the patient, indicating he has a very suspicious looking CT scan for lung cancer.    I discussed his CT with radiologist Dr. Carey, who felt CT-guided biopsy is durable.    We need to see him after the biopsy result is available to discuss next step of actions.    Final oncologic recommendation will be based on the above further intervention and result.

## 2020-09-09 NOTE — PATIENT INSTRUCTIONS
Radiology Scheduling contacted for CT Guided Biopsy. They will contact pt once appointment date is determined/Janice

## 2020-09-10 ENCOUNTER — TELEPHONE (OUTPATIENT)
Dept: ONCOLOGY | Facility: CLINIC | Age: 80
End: 2020-09-10

## 2020-09-10 NOTE — TELEPHONE ENCOUNTER
Social Work Progress Note      Data/Intervention:  Patient Name:  Derrick Benitez  /Age:  1940 (80 year old)    Reason for Follow-Up:  Derrick is an 80-year-old gentleman who had consultation with Dr. Monsivais yesterday for newly found RLL mass 4-5 cm. This clinician received referral from schedulers for emotional support.    Intervention:   This clinician called Derrick this morning with goal of introducing psychosocial services and support. Derrick reported that wife had heart valve placed yesterday, and is anticipating that she will be back home today. Derrick reports that it has been an overwhelming few months, but that overall he feels as though he and wife are coping well. Derrick denies resource or emotional concern, and acknowledges that his only concern at present is pain management, which he reports he has been in discussion with PCP about. Derrick appreciative of knowing about SW support available if needed.     Resources Provided:  Onc SW contact information    Plan:  Provided patient/family with writer's contact information and availability. Will continue to be available for ongoing psychosocial support.     Please call or page if needs or concerns arise.     SHAYE Hogue, [facility]view General HospitalSW  Direct Phone: [phone]  Pager: [phone]

## 2020-09-11 ENCOUNTER — TELEPHONE (OUTPATIENT)
Dept: INTERVENTIONAL RADIOLOGY/VASCULAR | Facility: CLINIC | Age: 80
End: 2020-09-11

## 2020-09-11 NOTE — TELEPHONE ENCOUNTER
Patient is approved for CT guided right lower lobe lung biopsy by Dr. Luz.  This is a sedation procedure.  History and physical were done by Dr Monsivais on 9/9/2020.  Patient does need lab, INR, Platelets were 161 on 9/7/2020.  Patient is NOT on blood thinners or aspirin.  Patient needs COVID test to be coordinated by central scheduling.  Chico Culp, RN, BSN

## 2020-09-12 DIAGNOSIS — Z11.59 ENCOUNTER FOR SCREENING FOR OTHER VIRAL DISEASES: Primary | ICD-10-CM

## 2020-09-14 DIAGNOSIS — Z11.59 ENCOUNTER FOR SCREENING FOR OTHER VIRAL DISEASES: ICD-10-CM

## 2020-09-14 PROCEDURE — U0003 INFECTIOUS AGENT DETECTION BY NUCLEIC ACID (DNA OR RNA); SEVERE ACUTE RESPIRATORY SYNDROME CORONAVIRUS 2 (SARS-COV-2) (CORONAVIRUS DISEASE [COVID-19]), AMPLIFIED PROBE TECHNIQUE, MAKING USE OF HIGH THROUGHPUT TECHNOLOGIES AS DESCRIBED BY CMS-2020-01-R: HCPCS | Performed by: INTERNAL MEDICINE

## 2020-09-15 LAB
SARS-COV-2 RNA SPEC QL NAA+PROBE: NOT DETECTED
SPECIMEN SOURCE: NORMAL

## 2020-09-17 ENCOUNTER — TELEPHONE (OUTPATIENT)
Dept: MEDSURG UNIT | Facility: CLINIC | Age: 80
End: 2020-09-17

## 2020-09-17 NOTE — TELEPHONE ENCOUNTER
Pre-Procedure Negative COVID Test     Step 1 Patient Notification  Patient notified of the negative COVID test result    Step 2 Patient Information  Verified the patient remains symptom free   Patient informed to contact the ordering provider if any of the symptoms develop prior to the procedure    Fever/Chills   Cough   Shortness of breath   New loss of taste or smell  Sore throat  Muscle or body aches  Headaches  Fatigue  Vomiting or diarrhea    Step 3 Review Visitor Policy  Patient informed of the updated visitor policy     1 visitor allowed per patient    Visitor name: None   Visitor must screen negative for COVID symptoms   Visitor must wear a mask  Waiting rooms continue to be closed to visitors    Nita Nair RN

## 2020-09-18 ENCOUNTER — APPOINTMENT (OUTPATIENT)
Dept: GENERAL RADIOLOGY | Facility: CLINIC | Age: 80
End: 2020-09-18
Attending: RADIOLOGY
Payer: MEDICARE

## 2020-09-18 ENCOUNTER — HOSPITAL ENCOUNTER (OUTPATIENT)
Facility: CLINIC | Age: 80
Discharge: HOME | End: 2020-09-18
Attending: INTERNAL MEDICINE | Admitting: RADIOLOGY
Payer: MEDICARE

## 2020-09-18 ENCOUNTER — APPOINTMENT (OUTPATIENT)
Dept: GENERAL RADIOLOGY | Facility: CLINIC | Age: 80
End: 2020-09-18
Attending: PHYSICIAN ASSISTANT
Payer: MEDICARE

## 2020-09-18 ENCOUNTER — HOSPITAL ENCOUNTER (OUTPATIENT)
Dept: CT IMAGING | Facility: CLINIC | Age: 80
Discharge: HOME | End: 2020-09-18
Attending: INTERNAL MEDICINE | Admitting: INTERNAL MEDICINE
Payer: MEDICARE

## 2020-09-18 VITALS
TEMPERATURE: 97.9 F | OXYGEN SATURATION: 94 % | SYSTOLIC BLOOD PRESSURE: 144 MMHG | DIASTOLIC BLOOD PRESSURE: 48 MMHG | HEART RATE: 64 BPM | RESPIRATION RATE: 18 BRPM

## 2020-09-18 DIAGNOSIS — R91.8 RIGHT LOWER LOBE LUNG MASS: ICD-10-CM

## 2020-09-18 LAB
INR PPP: 1.13 (ref 0.86–1.14)
PLATELET # BLD AUTO: 192 10E9/L (ref 150–450)

## 2020-09-18 PROCEDURE — 71045 X-RAY EXAM CHEST 1 VIEW: CPT

## 2020-09-18 PROCEDURE — 99152 MOD SED SAME PHYS/QHP 5/>YRS: CPT

## 2020-09-18 PROCEDURE — 88305 TISSUE EXAM BY PATHOLOGIST: CPT | Mod: 26 | Performed by: INTERNAL MEDICINE

## 2020-09-18 PROCEDURE — 85610 PROTHROMBIN TIME: CPT | Performed by: INTERNAL MEDICINE

## 2020-09-18 PROCEDURE — 00000159 ZZHCL STATISTIC H-SEND OUTS PREP: Performed by: INTERNAL MEDICINE

## 2020-09-18 PROCEDURE — 88341 IMHCHEM/IMCYTCHM EA ADD ANTB: CPT | Performed by: INTERNAL MEDICINE

## 2020-09-18 PROCEDURE — 25000128 H RX IP 250 OP 636: Performed by: PHYSICIAN ASSISTANT

## 2020-09-18 PROCEDURE — 85049 AUTOMATED PLATELET COUNT: CPT | Performed by: INTERNAL MEDICINE

## 2020-09-18 PROCEDURE — 40000986 XR CHEST 1 VW

## 2020-09-18 PROCEDURE — 88342 IMHCHEM/IMCYTCHM 1ST ANTB: CPT | Performed by: INTERNAL MEDICINE

## 2020-09-18 PROCEDURE — 88342 IMHCHEM/IMCYTCHM 1ST ANTB: CPT | Mod: 26 | Performed by: INTERNAL MEDICINE

## 2020-09-18 PROCEDURE — 27211108 CT LUNG MEDIASTINUM BIOPSY

## 2020-09-18 PROCEDURE — 88305 TISSUE EXAM BY PATHOLOGIST: CPT | Performed by: INTERNAL MEDICINE

## 2020-09-18 PROCEDURE — 88173 CYTOPATH EVAL FNA REPORT: CPT | Performed by: INTERNAL MEDICINE

## 2020-09-18 PROCEDURE — 88341 IMHCHEM/IMCYTCHM EA ADD ANTB: CPT | Mod: 26,76 | Performed by: INTERNAL MEDICINE

## 2020-09-18 PROCEDURE — 25000125 ZZHC RX 250: Performed by: INTERNAL MEDICINE

## 2020-09-18 PROCEDURE — 40000863 ZZH STATISTIC RADIOLOGY XRAY, US, CT, MAR, NM

## 2020-09-18 PROCEDURE — 88173 CYTOPATH EVAL FNA REPORT: CPT | Mod: 26 | Performed by: INTERNAL MEDICINE

## 2020-09-18 RX ORDER — FENTANYL CITRATE 50 UG/ML
25-50 INJECTION, SOLUTION INTRAMUSCULAR; INTRAVENOUS EVERY 5 MIN PRN
Status: DISCONTINUED | OUTPATIENT
Start: 2020-09-18 | End: 2020-09-18 | Stop reason: HOSPADM

## 2020-09-18 RX ORDER — ACETAMINOPHEN 325 MG/1
650 TABLET ORAL EVERY 4 HOURS PRN
Status: DISCONTINUED | OUTPATIENT
Start: 2020-09-18 | End: 2020-09-18 | Stop reason: HOSPADM

## 2020-09-18 RX ORDER — ACETAMINOPHEN 500 MG
500-1000 TABLET ORAL EVERY 6 HOURS PRN
COMMUNITY
End: 2022-11-29

## 2020-09-18 RX ORDER — NALOXONE HYDROCHLORIDE 0.4 MG/ML
.1-.4 INJECTION, SOLUTION INTRAMUSCULAR; INTRAVENOUS; SUBCUTANEOUS
Status: DISCONTINUED | OUTPATIENT
Start: 2020-09-18 | End: 2020-09-18 | Stop reason: HOSPADM

## 2020-09-18 RX ORDER — LIDOCAINE HYDROCHLORIDE 10 MG/ML
10 INJECTION, SOLUTION EPIDURAL; INFILTRATION; INTRACAUDAL; PERINEURAL ONCE
Status: COMPLETED | OUTPATIENT
Start: 2020-09-18 | End: 2020-09-18

## 2020-09-18 RX ORDER — FLUMAZENIL 0.1 MG/ML
0.2 INJECTION, SOLUTION INTRAVENOUS
Status: DISCONTINUED | OUTPATIENT
Start: 2020-09-18 | End: 2020-09-18 | Stop reason: HOSPADM

## 2020-09-18 RX ORDER — NICOTINE POLACRILEX 4 MG
15-30 LOZENGE BUCCAL
Status: DISCONTINUED | OUTPATIENT
Start: 2020-09-18 | End: 2020-09-18 | Stop reason: HOSPADM

## 2020-09-18 RX ORDER — DEXTROSE MONOHYDRATE 25 G/50ML
25-50 INJECTION, SOLUTION INTRAVENOUS
Status: DISCONTINUED | OUTPATIENT
Start: 2020-09-18 | End: 2020-09-18 | Stop reason: HOSPADM

## 2020-09-18 RX ORDER — LIDOCAINE 40 MG/G
CREAM TOPICAL
Status: DISCONTINUED | OUTPATIENT
Start: 2020-09-18 | End: 2020-09-18 | Stop reason: HOSPADM

## 2020-09-18 RX ADMIN — MIDAZOLAM HYDROCHLORIDE 0.5 MG: 1 INJECTION, SOLUTION INTRAMUSCULAR; INTRAVENOUS at 09:43

## 2020-09-18 RX ADMIN — LIDOCAINE HYDROCHLORIDE 4 ML: 10 INJECTION, SOLUTION EPIDURAL; INFILTRATION; INTRACAUDAL; PERINEURAL at 10:05

## 2020-09-18 RX ADMIN — FENTANYL CITRATE 25 MCG: 50 INJECTION, SOLUTION INTRAMUSCULAR; INTRAVENOUS at 09:43

## 2020-09-18 NOTE — PROGRESS NOTES
Care Suites Procedure Nursing Note    Patient Information  Name: Derrick Benitez  Age: 80 year old    Procedure  Procedure: Lung Biosy  Procedure start time: 0945  Procedure complete time: 1015  Concerns/abnormal assessment: none  If abnormal assessment, provider notified: N/A  Plan/Other: per procedural plan of care    Rebecca Montesinos RN

## 2020-09-18 NOTE — PROGRESS NOTES
Care Suites Admission Nursing Note    Patient Information  Name: Derrick Benitez  Age: 80 year old  Reason for admission: lung biopsy  Care Suites arrival time: 0745        Patient Admission/Assessment   Pre-procedure assessment complete: Yes  If abnormal assessment/labs, provider notified: pending  NPO: Yes  Medications held per instructions/orders: N/A  Consent: MD to obtian  If applicable, pregnancy test status: N/a  Patient oriented to room: Yes  Education/questions answered: Yes  Plan/other: Per procedural plan of care    Discharge Planning  Discharge name/phone number: pt will call neighbor for ride to home  Overnight post sedation caregiver: yes  Discharge location: home    Rebecca Montesinos RN

## 2020-09-18 NOTE — SEDATION DOCUMENTATION
Pt to CT via cart for lung biopsy. Dr Bedoya in to assess and consent pt pre procedure and pre sedation.

## 2020-09-18 NOTE — PROGRESS NOTES
PATIENT/VISITOR WELLNESS SCREENING    Step 1 Patient Screening    1. In the last month, have you been in contact with someone who was confirmed or suspected to have Coronavirus/COVID-19? No    2. Do you have the following symptoms?  Fever/Chills? No   Cough? No   Shortness of breath? No   New loss of taste or smell? No  Sore throat? No  Muscle or body aches? No  Headaches? No  Fatigue? No  Vomiting or diarrhea? No

## 2020-09-18 NOTE — DISCHARGE INSTRUCTIONS
Lung Biopsy Discharge Instructions     After you go home:      You may resume your normal diet    Have an adult stay with you for 6 hours if you received sedation       For 24 hours - due to the sedation you received:    Relax and take it easy    Do NOT make any important or legal decisions    Do NOT drive or operate machines at home or at work    Do NOT drink alcohol    Care of Puncture Site:      For the first 48 hrs, check your puncture site every couple hours while you are awake     You may remove/change the bandaid tomorrow    You may shower tomorrow    No tub baths, whirlpools or swimming until your puncture site has fully healed    Activity       You may go back to normal activity in 24 hours     Wait 48 hours before lifting, straining, exercise or other strenuous activity    Medicines:      You may resume all medications    For minor pain, you may take Acetaminophen (Tylenol) or Ibuprofen (Advil)            Call the provider who ordered this test if:      Increased pain or a large or growing hard lump around the site    Blood or fluid is draining from the site    The site is red, swollen, hot or tender    Chills or a fever greater than 101 F (38 C)    Pain that is getting worse    Shortness of breath    Any questions or concerns    Call  911 or go to the Emergency Room if:      Severe chest pain or trouble breathing    Increased blood in your sputum (phlegm)    Bleeding that you cannot control        If you have questions call:          [facility] Radiology Dept @ [phone]      The provider who performed your procedure was ________Dr Bedoya_________.

## 2020-09-18 NOTE — PROGRESS NOTES
Returned to [facility] at 1020 post biopsy.  Dr Bedoya called and no pneumo reported on first chest  Xray.  Another xray ordered per MD for 1115. Pt stable upon return. No bloody sputum during biopsy.

## 2020-09-18 NOTE — PROCEDURES
[facility]    Procedure: Imaging Procedure Note    Date/Time: 9/18/2020 10:28 AM  Performed by: No Bedoya MD  Authorized by: No Bedoya MD     UNIVERSAL PROTOCOL   Site Marked: Yes  Prior Images Obtained and Reviewed:  Yes  Required items: Required blood products, implants, devices and special equipment available    Patient identity confirmed:  Verbally with patient  Patient was reevaluated immediately before administering moderate or deep sedation or anesthesia  Confirmation Checklist:  Patient's identity using two indicators, relevant allergies, procedure was appropriate and matched the consent or emergent situation and correct equipment/implants were available  Time out: Immediately prior to the procedure a time out was called    Universal Protocol: the Joint Commission Universal Protocol was followed    Preparation: Patient was prepped and draped in usual sterile fashion    ESBL (mL):  10         ANESTHESIA    Anesthesia: Local infiltration      SEDATION    Patient Sedated: Yes    Sedation Type:  Moderate (conscious) sedation  Vital signs: Vital signs monitored during sedation    See dictated procedure note for full details.  PROCEDURE   Patient Tolerance:  Patient tolerated the procedure well with no immediate complications  Describe Procedure: CT-guided right lower lobe lung mass biopsy  Length of time physician/provider present for 1:1 monitoring during sedation: 25

## 2020-09-18 NOTE — DISCHARGE SUMMARY
Care Suites Discharge Nursing Note    Patient Information  Name: Derrick Benitez  Age: 80 year old    Discharge Education:  Discharge instructions reviewed: Yes  Additional education/resources provided: avs  Patient/patient representative verbalizes understanding: Yes  Patient discharging on new medications: N/A  Medication education completed: N/A    Discharge Plans:   Discharge location: home  Discharge ride contacted: Yes  Approximate discharge time: 1:20 pm    Discharge Criteria:  Discharge criteria met and vital signs stable: Yes    Patient Belongs:  Patient belongings returned to patient: Yes    Juan Manuel Yates RN

## 2020-09-22 ENCOUNTER — TELEPHONE (OUTPATIENT)
Dept: ONCOLOGY | Facility: CLINIC | Age: 80
End: 2020-09-22

## 2020-09-22 DIAGNOSIS — C34.31 MALIGNANT NEOPLASM OF LOWER LOBE OF RIGHT LUNG (H): ICD-10-CM

## 2020-09-22 DIAGNOSIS — C34.91 NSCLC OF RIGHT LUNG (H): Primary | ICD-10-CM

## 2020-09-22 NOTE — TELEPHONE ENCOUNTER
Per Dr. Monsivais called patient left message on his voice mail that Dr. Monsivais would like a Pet scan and Brain MRI done prior to his appointment 9/30/20 with Dr. Monsivais. Left message on his voice mail, will send to scheduling and instructed Derrick to call clinic with any questions. Laquita Richards RN,BSN,OCN

## 2020-09-26 ENCOUNTER — HOSPITAL ENCOUNTER (OUTPATIENT)
Dept: MRI IMAGING | Facility: CLINIC | Age: 80
Discharge: HOME | End: 2020-09-26
Attending: INTERNAL MEDICINE | Admitting: INTERNAL MEDICINE
Payer: MEDICARE

## 2020-09-26 PROCEDURE — A9585 GADOBUTROL INJECTION: HCPCS | Performed by: INTERNAL MEDICINE

## 2020-09-26 PROCEDURE — 70553 MRI BRAIN STEM W/O & W/DYE: CPT

## 2020-09-26 PROCEDURE — 25500064 ZZH RX 255 OP 636: Performed by: INTERNAL MEDICINE

## 2020-09-26 RX ORDER — GADOBUTROL 604.72 MG/ML
9 INJECTION INTRAVENOUS ONCE
Status: COMPLETED | OUTPATIENT
Start: 2020-09-26 | End: 2020-09-26

## 2020-09-26 RX ADMIN — GADOBUTROL 9 ML: 604.72 INJECTION INTRAVENOUS at 07:07

## 2020-09-29 NOTE — PROGRESS NOTES
"ONCOLOGY FOLLOW UP VISIT        PATIENT NAME: Derrick Benitez   MRN# [id number]     Date of Visit: Oct 7, 2020     YOB: 1940       REASON FOR VISIT/CC:    Dx RLL NSCLC 2020 at age 80    HISTORY OF ONCOLOGY ILLNESS:    At age 80, on 2020 he presented to ER for new right upper back pain for a few days, wheezing on and off for years (has allergy), chronic coughing for years.   The pain is 5-6/10, the pain is on and off.   Denies weight loss, or hemoptysis.     CT in ER found large right lower lobe lung mass 5.0 x 4.1 cm causing narrowing of a segmental branch of the right lower lobe pulmonary artery without occlusion. No enlarged lymph nodes.    He is offered Tessalon and Norco.  He has much improved pain and cough.      INTERVAL HISTORY:  2020 FNA on RLL mass is Positive for malignancy, favor non-small cell carcinoma, The few remaining tumor   cells in the histologic sections preferentially stain positively for p40, a finding which favors squamous cell carcinoma, although this sampling may not be representative.  Brain MRI 2020 is negative.   PET found bone mets. MRI brain is negative.     He has stage cIV disease.         PAST MEDICAL HISTORY  Hypertension  type II diabetes   CKD    MEDICATIONS/ALLERY:  Reviewed in Epic system.        SOCIAL HISTORY:    He quit smoking 17 yrs ago, he smoked 3 pack per day for 40 years. Deny ETOY use      FAMILY HISTORY:    Denies any FH of cancer.       REVIEW OF SYSTEMS:   He is bearing using cough med, and not on pain med other tylenol prn.  Reports reflux symptoms are more.         PHYSICAL EXAMINATION:   VITAL SIGNS: Blood pressure (!) 144/63, pulse 85, temperature 98.7  F (37.1  C), temperature source Oral, resp. rate 16, height 1.702 m (5' 7\"), weight 90.3 kg (199 lb), SpO2 95 %.        GENERAL APPEARANCE:  looks like stated age, very pleasant, not in acute distress.     ECO    ENT, MOUTH: Pupils are equally reactive to light.  Sclerae are " anicteric.  Moist oral mucosa without lesion or ulcer.   Negative pharynx.  No oral thrush.   NECK:  Supple.  No jugular venous distention.  Thyroid is not palpable.   LYMPH NODES:  Superficial lymphadenopathy is not appreciable in the bilateral cervical, supraclavicular, axillary or inguinal areas.   CARDIOVASCULAR:  S1, S2 regular with no murmurs or gallops.  No carotid or abdominal bruits.   PULMONARY:  Lungs are clear to auscultation and percussion bilaterally.  There is no wheezing or rhonchi.   GASTROINTESTINAL:  Abdomen is soft, nontender.  No hepatosplenomegaly.  No signs of ascites.  No mass appreciable.   MUSCULOSKELETAL/EXTREMITIES:  No edema.  No cyanotic changes.  No signs of joint deformity.  No lymphedema.   NEUROLOGIC:  Cranial nerves II-XII are grossly intact.  Sensation intact.  Muscle strength and muscle tone symmetrical, 5/5 throughout.   BACK  No spinal or paraspinal tenderness.  No CVA tenderness.   SKIN:  No petechiae.  No rash.  No signs of cellulitis.    PSYCHIATRIC: Oriented to time, person, and places. Normal mood and affect. Good memory. Proper insight and judgement.       CURRENT LAB DATA REVIEWED TODAY:  9/18/2020 FNA ON RLL mass is Positive for malignancy, favor non-small cell carcinoma, The few remaining tumor   cells in the histologic sections preferentially stain positively for p40, a finding which favors squamous cell carcinoma, although this sampling may not be representative.    cmp and cbc diff in 9/2020 are fine        CURRENT IMAGING REVIEWED TODAY:  PET 9/2020   1. Hypermetabolic mass in the right lower lobe measures 4.7 x 3.9 cm (series 5, image 210) and demonstrates a maximum SUV of 14.6. No lymphadenopathy.  2. Bone mets: Focal hypermetabolism associated with  subtle area of lysis in the left scapula has a maximum SUV of 14.9. Hypermetabolic right seventh rib activity in a lytic bone lesion posterior to the right lower lobe lung mass (series 5, image 201) demonstrates a  maximum SUV of 12.7 and is associated with fracture. Lytic hypermetabolic lesion at the inferior tip of the right scapula. Another small hypermetabolic lesion present in the superior aspect of the left iliac bone    Brain MRI 9/2020 is negative.    CT chest 9/7/2020 found no PE. Right lower lobe lung mass 5.0 x 4.1 cm causing narrowing of a segmental branch  of the right lower lobe pulmonary artery without occlusion. No  enlarged lymph nodes.         OLD DATA REVIEWED TODAY WITH SUMMARY  CXR 2017 - A small region of patchy opacities in the left lung base is nonspecific      ASSESSMENT AND PLAN:  At age 80, he is found to have RLL mass 4-5 cm.  CT findings is very suspicious for primary lung cancer.    9/18/2020 FNA on RLL mass is Positive for malignancy, favor non-small cell carcinoma, The few remaining tumor   cells in the histologic sections preferentially stain positively for p40, a finding which favors squamous cell carcinoma, although this sampling may not be representative.  Brain MRI 9/2020 is negative.   PET found bone mets. MRI brain is negative.     He has stage cIV disease.  He is informed this is a disease.  For his stage IV is for palliation.  We discussed the treatment options for stage IV non-small cell lung cancer.  The goal of treatment, overall survival without treatment with treatment.  He is open to try first-line systemic treatment.    Commend further discuss tissue testing on the biopsy sample for NexGen sequencing, PD-L1, and MMR.     While the above testings are ongoing, recommend him to consider first-line systemic chemotherapy with carbotaxol plus Keytruda immunotherapy.  We discussed the chemotherapy side effect, which include but not limited to GI toxicity nausea vomiting, lower immunity and bleeding risk from cytopenia, neurotoxicity, cardica toxicity,  infusion related reaction, mortality, etc.  We discussed the side effect from immunotherapy, which include autoimmune associated organ  dysfunction, e.g. skin toxicity, GI toxicity, kidney damage, pneumonitis, endocrinopathy et .    He refuses port placement at this point.    He made informed decision to proceed with the above treatment recommendation.    CODE STATUS is discussed, recommend DNR/DNI.  I recommend patient to creat a Health Care Directive/advanced Directive.    2. Bone meta.   Advice IV zometa on D1 of chemo/immuno therapy.       3. Mild right scapular pain.   He is not no narcotic. We discussed the option of alternating tylenol and NSAID.      4. Cough.   Advice Tessalon capsule.       5. Reflux symptoms.   Advice to start noreen.

## 2020-10-05 ENCOUNTER — HOSPITAL ENCOUNTER (OUTPATIENT)
Dept: PET IMAGING | Facility: CLINIC | Age: 80
Discharge: HOME | End: 2020-10-05
Attending: INTERNAL MEDICINE | Admitting: INTERNAL MEDICINE
Payer: MEDICARE

## 2020-10-05 PROCEDURE — 78816 PET IMAGE W/CT FULL BODY: CPT | Mod: PI

## 2020-10-05 PROCEDURE — 343N000001 HC RX 343: Performed by: INTERNAL MEDICINE

## 2020-10-05 PROCEDURE — A9552 F18 FDG: HCPCS | Performed by: INTERNAL MEDICINE

## 2020-10-05 RX ADMIN — FLUDEOXYGLUCOSE F-18 14.8 MCI.: 500 INJECTION, SOLUTION INTRAVENOUS at 09:56

## 2020-10-07 ENCOUNTER — HOSPITAL ENCOUNTER (OUTPATIENT)
Facility: CLINIC | Age: 80
Setting detail: SPECIMEN
Discharge: HOME | End: 2020-10-07
Attending: INTERNAL MEDICINE | Admitting: INTERNAL MEDICINE
Payer: MEDICARE

## 2020-10-07 ENCOUNTER — ONCOLOGY VISIT (OUTPATIENT)
Dept: ONCOLOGY | Facility: CLINIC | Age: 80
End: 2020-10-07
Attending: INTERNAL MEDICINE
Payer: MEDICARE

## 2020-10-07 ENCOUNTER — TELEPHONE (OUTPATIENT)
Dept: ONCOLOGY | Facility: CLINIC | Age: 80
End: 2020-10-07

## 2020-10-07 VITALS
TEMPERATURE: 98.7 F | SYSTOLIC BLOOD PRESSURE: 144 MMHG | OXYGEN SATURATION: 95 % | HEIGHT: 67 IN | WEIGHT: 199 LBS | RESPIRATION RATE: 16 BRPM | DIASTOLIC BLOOD PRESSURE: 63 MMHG | HEART RATE: 85 BPM | BODY MASS INDEX: 31.23 KG/M2

## 2020-10-07 DIAGNOSIS — C79.51 BONE METASTASES: ICD-10-CM

## 2020-10-07 DIAGNOSIS — Z51.11 ENCOUNTER FOR ANTINEOPLASTIC CHEMOTHERAPY: ICD-10-CM

## 2020-10-07 DIAGNOSIS — K21.9 GASTROESOPHAGEAL REFLUX DISEASE WITHOUT ESOPHAGITIS: ICD-10-CM

## 2020-10-07 DIAGNOSIS — R05.9 COUGH: ICD-10-CM

## 2020-10-07 DIAGNOSIS — C34.31 MALIGNANT NEOPLASM OF LOWER LOBE OF RIGHT LUNG (H): Primary | ICD-10-CM

## 2020-10-07 PROCEDURE — 81445 SO NEO GSAP 5-50DNA/DNA&RNA: CPT | Performed by: INTERNAL MEDICINE

## 2020-10-07 PROCEDURE — 99215 OFFICE O/P EST HI 40 MIN: CPT | Performed by: INTERNAL MEDICINE

## 2020-10-07 PROCEDURE — G0463 HOSPITAL OUTPT CLINIC VISIT: HCPCS

## 2020-10-07 RX ORDER — BENZONATATE 100 MG/1
100 CAPSULE ORAL 3 TIMES DAILY PRN
Qty: 30 CAPSULE | Refills: 0 | Status: SHIPPED | OUTPATIENT
Start: 2020-10-07 | End: 2021-01-14

## 2020-10-07 ASSESSMENT — MIFFLIN-ST. JEOR: SCORE: 1566.29

## 2020-10-07 NOTE — TELEPHONE ENCOUNTER
Left voicemail message for patient requesting a return call regarding scheduling future appointments. Patient needs lab draw prior to chemotherapy infusions also.

## 2020-10-07 NOTE — PROGRESS NOTES
"Oncology Rooming Note    October 7, 2020 8:07 AM   Derrick Benitez is a 80 year old male who presents for:    Chief Complaint   Patient presents with     Oncology Clinic Visit     Initial Vitals: BP (!) 144/63 (BP Location: Left arm, Patient Position: Sitting, Cuff Size: Adult Regular)   Pulse 85   Temp 98.7  F (37.1  C) (Oral)   Resp 16   Ht 1.702 m (5' 7\")   Wt 90.3 kg (199 lb)   SpO2 95%   BMI 31.17 kg/m   Estimated body mass index is 31.17 kg/m  as calculated from the following:    Height as of this encounter: 1.702 m (5' 7\").    Weight as of this encounter: 90.3 kg (199 lb). Body surface area is 2.07 meters squared.  Data Unavailable Comment: Data Unavailable   No LMP for male patient.  Allergies reviewed: Yes  Medications reviewed: Yes    Medications: Medication refills not needed today.  Pharmacy name entered into EPIC:    [place], [place] - [address]  [facility] [address] [place], [place] - [address] [place] [address]    Clinical concerns: no        Zuleyma Dong CMA              "

## 2020-10-07 NOTE — LETTER
10/7/2020         RE: Derrick Benitez  [address]  [facility] [address]        Dear Colleague,    Thank you for referring your patient, Derrick Benitez, to the [facility] [place]. Please see a copy of my visit note below.    ONCOLOGY FOLLOW UP VISIT        PATIENT NAME: Derrick Benitez   MRN# [id number]     Date of Visit: Oct 7, 2020     YOB: 1940       REASON FOR VISIT/CC:    Dx RLL NSCLC 9/2020 at age 80    HISTORY OF ONCOLOGY ILLNESS:    At age 80, on 9/7/2020 he presented to ER for new right upper back pain for a few days, wheezing on and off for years (has allergy), chronic coughing for years.   The pain is 5-6/10, the pain is on and off.   Denies weight loss, or hemoptysis.     CT in ER found large right lower lobe lung mass 5.0 x 4.1 cm causing narrowing of a segmental branch of the right lower lobe pulmonary artery without occlusion. No enlarged lymph nodes.    He is offered Tessalon and Norco.  He has much improved pain and cough.      INTERVAL HISTORY:  9/18/2020 FNA on RLL mass is Positive for malignancy, favor non-small cell carcinoma, The few remaining tumor   cells in the histologic sections preferentially stain positively for p40, a finding which favors squamous cell carcinoma, although this sampling may not be representative.  Brain MRI 9/2020 is negative.   PET found bone mets. MRI brain is negative.     He has stage cIV disease.         PAST MEDICAL HISTORY  Hypertension  type II diabetes   CKD    MEDICATIONS/ALLERY:  Reviewed in Epic system.        SOCIAL HISTORY:    He quit smoking 17 yrs ago, he smoked 3 pack per day for 40 years. Deny ETOY use      FAMILY HISTORY:    Denies any FH of cancer.       REVIEW OF SYSTEMS:   He is bearing using cough med, and not on pain med other tylenol prn.  Reports reflux symptoms are more.         PHYSICAL EXAMINATION:   VITAL SIGNS: Blood pressure (!) 144/63, pulse 85, temperature 98.7  F (37.1  C), temperature  "source Oral, resp. rate 16, height 1.702 m (5' 7\"), weight 90.3 kg (199 lb), SpO2 95 %.        GENERAL APPEARANCE:  looks like stated age, very pleasant, not in acute distress.     ECO    ENT, MOUTH: Pupils are equally reactive to light.  Sclerae are anicteric.  Moist oral mucosa without lesion or ulcer.   Negative pharynx.  No oral thrush.   NECK:  Supple.  No jugular venous distention.  Thyroid is not palpable.   LYMPH NODES:  Superficial lymphadenopathy is not appreciable in the bilateral cervical, supraclavicular, axillary or inguinal areas.   CARDIOVASCULAR:  S1, S2 regular with no murmurs or gallops.  No carotid or abdominal bruits.   PULMONARY:  Lungs are clear to auscultation and percussion bilaterally.  There is no wheezing or rhonchi.   GASTROINTESTINAL:  Abdomen is soft, nontender.  No hepatosplenomegaly.  No signs of ascites.  No mass appreciable.   MUSCULOSKELETAL/EXTREMITIES:  No edema.  No cyanotic changes.  No signs of joint deformity.  No lymphedema.   NEUROLOGIC:  Cranial nerves II-XII are grossly intact.  Sensation intact.  Muscle strength and muscle tone symmetrical, 5/5 throughout.   BACK  No spinal or paraspinal tenderness.  No CVA tenderness.   SKIN:  No petechiae.  No rash.  No signs of cellulitis.    PSYCHIATRIC: Oriented to time, person, and places. Normal mood and affect. Good memory. Proper insight and judgement.       CURRENT LAB DATA REVIEWED TODAY:  2020 FNA ON RLL mass is Positive for malignancy, favor non-small cell carcinoma, The few remaining tumor   cells in the histologic sections preferentially stain positively for p40, a finding which favors squamous cell carcinoma, although this sampling may not be representative.    cmp and cbc diff in 2020 are fine        CURRENT IMAGING REVIEWED TODAY:  PET 2020   1. Hypermetabolic mass in the right lower lobe measures 4.7 x 3.9 cm (series 5, image 210) and demonstrates a maximum SUV of 14.6. No lymphadenopathy.  2. Bone mets: " Focal hypermetabolism associated with  subtle area of lysis in the left scapula has a maximum SUV of 14.9. Hypermetabolic right seventh rib activity in a lytic bone lesion posterior to the right lower lobe lung mass (series 5, image 201) demonstrates a maximum SUV of 12.7 and is associated with fracture. Lytic hypermetabolic lesion at the inferior tip of the right scapula. Another small hypermetabolic lesion present in the superior aspect of the left iliac bone    Brain MRI 9/2020 is negative.    CT chest 9/7/2020 found no PE. Right lower lobe lung mass 5.0 x 4.1 cm causing narrowing of a segmental branch  of the right lower lobe pulmonary artery without occlusion. No  enlarged lymph nodes.         OLD DATA REVIEWED TODAY WITH SUMMARY  CXR 2017 - A small region of patchy opacities in the left lung base is nonspecific      ASSESSMENT AND PLAN:  At age 80, he is found to have RLL mass 4-5 cm.  CT findings is very suspicious for primary lung cancer.    9/18/2020 FNA on RLL mass is Positive for malignancy, favor non-small cell carcinoma, The few remaining tumor   cells in the histologic sections preferentially stain positively for p40, a finding which favors squamous cell carcinoma, although this sampling may not be representative.  Brain MRI 9/2020 is negative.   PET found bone mets. MRI brain is negative.     He has stage cIV disease.  He is informed this is a disease.  For his stage IV is for palliation.  We discussed the treatment options for stage IV non-small cell lung cancer.  The goal of treatment, overall survival without treatment with treatment.  He is open to try first-line systemic treatment.    Commend further discuss tissue testing on the biopsy sample for NexGen sequencing, PD-L1, and MMR.     While the above testings are ongoing, recommend him to consider first-line systemic chemotherapy with carbotaxol plus Keytruda immunotherapy.  We discussed the chemotherapy side effect, which include but not  "limited to GI toxicity nausea vomiting, lower immunity and bleeding risk from cytopenia, neurotoxicity, cardica toxicity,  infusion related reaction, mortality, etc.  We discussed the side effect from immunotherapy, which include autoimmune associated organ dysfunction, e.g. skin toxicity, GI toxicity, kidney damage, pneumonitis, endocrinopathy et .    He refuses port placement at this point.    He made informed decision to proceed with the above treatment recommendation.    CODE STATUS is discussed, recommend DNR/DNI.  I recommend patient to creat a Health Care Directive/advanced Directive.    2. Bone meta.   Advice IV zometa on D1 of chemo/immuno therapy.       3. Mild right scapular pain.   He is not no narcotic. We discussed the option of alternating tylenol and NSAID.      4. Cough.   Advice Tessalon capsule.       5. Reflux symptoms.   Advice to start noreen.         Oncology Rooming Note    October 7, 2020 8:07 AM   Derrick Benitez is a 80 year old male who presents for:    Chief Complaint   Patient presents with     Oncology Clinic Visit     Initial Vitals: BP (!) 144/63 (BP Location: Left arm, Patient Position: Sitting, Cuff Size: Adult Regular)   Pulse 85   Temp 98.7  F (37.1  C) (Oral)   Resp 16   Ht 1.702 m (5' 7\")   Wt 90.3 kg (199 lb)   SpO2 95%   BMI 31.17 kg/m   Estimated body mass index is 31.17 kg/m  as calculated from the following:    Height as of this encounter: 1.702 m (5' 7\").    Weight as of this encounter: 90.3 kg (199 lb). Body surface area is 2.07 meters squared.  Data Unavailable Comment: Data Unavailable   No LMP for male patient.  Allergies reviewed: Yes  Medications reviewed: Yes    Medications: Medication refills not needed today.  Pharmacy name entered into EPIC:    [place], [place] - [address]  [facility] [id number] [place], [place] - [address] [place] [address]    Clinical concerns: no        Zuleyma Dong [facility]                  Again, thank you for " allowing me to participate in the care of your patient.        Sincerely,        Juliana Monsivais MD, MD

## 2020-10-08 PROBLEM — Z51.11 ENCOUNTER FOR ANTINEOPLASTIC CHEMOTHERAPY: Status: ACTIVE | Noted: 2020-10-08

## 2020-10-08 NOTE — PROGRESS NOTES
Chemotherapy Education    Discussed with pt and daughter information regarding Taxol, carboplatin & Keytruda infusions.   Went over side affects of medications, as self care while on chemotherapy.   Informed patient and family when he should call the triage nurse at clinic or seek medical attention if you have chills and/or temperature greater than or equal to 100.2, uncontrolled nausea/vomiting, diarrhea, constipation, dizziness, shortness of breath, chest pain, heart palpitations, weakness or any other new or concerning symptoms, questions or concerns.     If you have an upcoming surgery, medical procedure or dental procedure during treatment, this should be discussed with your ordering physician and your surgeon/dentist.      Derrick did mention he had not been to the dentist in 20 plus years and has several rotting teeth.  Advised that he see a dentist if possible before we begin chemotherapy, Zometa is also in the supportive plan.  Dr. Monsivais updated about this.

## 2020-10-09 ENCOUNTER — TELEPHONE (OUTPATIENT)
Dept: ONCOLOGY | Facility: CLINIC | Age: 80
End: 2020-10-09

## 2020-10-09 LAB — COPATH REPORT: NORMAL

## 2020-10-09 NOTE — TELEPHONE ENCOUNTER
Received a call from Po in lab regarding next generation sequencing lab order. Explained to Po that this is done on pathology not blood sampling. Writer will have RNCC contact pathology for clarification. Laquita Richards RN,BSN,OCN

## 2020-10-09 NOTE — TELEPHONE ENCOUNTER
Called and spoke with Derrick. He had a dental visit Wed at [facility]/ Dr. Ingrid Plascencia- [phone].  Derrick states he needs all his teeth extracted and has an appointment on Tuesday 10/13/20 For an Oral surgery consultation at 3pm.  Plan is to follow up with Derrick on 10/14/20 in the morning to see what the plan/dates are for his oral surgery.  Dr. Monsivais was updated with this.  Chemotherapy appointment previously arranged for next week cancelled for now.

## 2020-10-12 ENCOUNTER — APPOINTMENT (OUTPATIENT)
Dept: LAB | Facility: CLINIC | Age: 80
End: 2020-10-12
Attending: INTERNAL MEDICINE
Payer: MEDICARE

## 2020-10-14 LAB
COPATH REPORT: NORMAL
COPATH REPORT: NORMAL

## 2020-10-14 NOTE — TELEPHONE ENCOUNTER
Juliana Monsivais MD Woods, Carrie, RN             I spoke with oral surgeon.   He will have 2 rounds of teeth extraction in the next 2-3 wks.     Please arrange follow-up visit tel visit Nov 11th to check the status. Likely chemo won't start till late Nov.       Thanks,     LG

## 2020-10-16 NOTE — TELEPHONE ENCOUNTER
Derrick called to update us on oral surgery dates-    1.) 10/19/20  2.) 10/28/20    Will forward to Cassie to arrange an appt for 11/11/20 Virtual with Dr. Monsivais per below note.

## 2020-10-24 NOTE — LETTER
9/9/2020         RE: Derrick Benitez  [address]  [facility] [address]        Dear Colleague,    Thank you for referring your patient, Derrick Benitez, to the [facility]. Please see a copy of my visit note below.    luNEW PATIENT ONCOLOGY CONSULT        REQUESTING PROVIDER/SERVICE: Rafat Chang from ER    PATIENT NAME: Derrick Benitez   MRN# [id number]     Date of Visit: Sep 9, 2020     YOB: 1940       REASON FOR CONSULTATION/CC:    Newly found RLL mass 4-5 cm in 9/2020 at age 80      HISTORY OF ONCOLOGY ILLNESS:    At age 80, on 9/7/2020 he presented to ER for new right upper back pain for a few days, wheezing on and off for years (has allergy), chronic coughing for years.   The pain is 5-6/10, the pain is on and off.   Denies weight loss, or hemoptysis.     CT in ER found large right lower lobe lung mass 5.0 x 4.1 cm causing narrowing of a segmental branch  of the right lower lobe pulmonary artery without occlusion. No  enlarged lymph nodes.    He is offered Tessalon and Norco.  He has much improved pain and cough.      PAST MEDICAL HISTORY  Hypertension  type II diabetes   CKD    MEDICATIONS/ALLERY:  Reviewed in Epic system.        SOCIAL HISTORY:    He quit smoking 17 yrs ago, he smoked 3 pack per day for 40 years. Deny ETOY use      FAMILY HISTORY:    Denies any FH of cancer.       REVIEW OF SYSTEMS:   A 10-point review of systems was performed. Pertinent findings are noted in the HPI.    GENERAL: pt is in usual state of health.  No B symptoms  NEURO:   No headache, double vision, or focal weakness.  No neuropathy.   SKIN:  No chronic skin rash or skin infection.   CARDIOVASCULAR:  + High blood pressure, no hyperlipidemia. NO WEST  PULMONARY: see HPI  GI:  No abdominal pain, nausea, vomiting, constipation.  No diarrhea.  No bright red blood per rectum.   :  No urgency, frequency.  No recurrent urinary tract infection.  No kidney problems.  "  RHEUMATOLOGY/MUSCULOSKELETAL SYSTEM:  no arthritic pain, or muscle pain. No muscle ache.   ENDOCRINE:  + diabetes, no thyroid problem.  No complaints of hot flashes.   HEMATOLOGY:  No history of bleeding or thrombosis episode.   Oncology: no hx of cancers  IMMUNOLOGY:  No recurrent fever or chills episode.  No recurrent infectious episode.   PSYCHIATRY:  No anxiety or depression.          PHYSICAL EXAMINATION:   VITAL SIGNS:  Blood pressure 127/63, pulse 81, temperature 97.9  F (36.6  C), temperature source Oral, resp. rate 16, height 1.702 m (5' 7\"), weight 91.8 kg (202 lb 6.4 oz), SpO2 95 %.      GENERAL APPEARANCE:  looks like stated age, very pleasant, not in acute distress.     ECO    ENT, MOUTH: Pupils are equally reactive to light.  Sclerae are anicteric.  Moist oral mucosa without lesion or ulcer.   Negative pharynx.  No oral thrush.   NECK:  Supple.  No jugular venous distention.  Thyroid is not palpable.   LYMPH NODES:  Superficial lymphadenopathy is not appreciable in the bilateral cervical, supraclavicular, axillary or inguinal areas.   CARDIOVASCULAR:  S1, S2 regular with no murmurs or gallops.  No carotid or abdominal bruits.   PULMONARY:  Lungs are clear to auscultation and percussion bilaterally.  There is no wheezing or rhonchi.   GASTROINTESTINAL:  Abdomen is soft, nontender.  No hepatosplenomegaly.  No signs of ascites.  No mass appreciable.   MUSCULOSKELETAL/EXTREMITIES:  No edema.  No cyanotic changes.  No signs of joint deformity.  No lymphedema.   NEUROLOGIC:  Cranial nerves II-XII are grossly intact.  Sensation intact.  Muscle strength and muscle tone symmetrical, 5/5 throughout.   BACK  No spinal or paraspinal tenderness.  No CVA tenderness.   SKIN:  No petechiae.  No rash.  No signs of cellulitis.    PSYCHIATRIC: Oriented to time, person, and places. Normal mood and affect. Good memory. Proper insight and judgement.       CURRENT LAB DATA REVIEWED TODAY WITH PATIENT DURING VISIT:  josep " and cbc diff in 9/2020 are fine        CURRENT IMAGING REVIEWED TODAY WITH PATIENT DURING VISIT:  CT chest 9/7/2020 found no PE. Right lower lobe lung mass 5.0 x 4.1 cm causing narrowing of a segmental branch  of the right lower lobe pulmonary artery without occlusion. No  enlarged lymph nodes.         OLD DATA REVIEWED TODAY WITH SUMMARY  CXR 2017 - A small region of patchy opacities in the left lung base is nonspecific      ASSESSMENT AND PLAN DISCUSSED WITH PATIENT TODAY DURING VISIT   At age 80, he is found to have RLL mass 4-5 cm.  CT findings is very suspicious for primary lung cancer.    He is seen oncology first time today for consultation for his abnormal CT scan.    I dependently reviewed reviewed the films with the patient, indicating he has a very suspicious looking CT scan for lung cancer.    I discussed his CT with radiologist Dr. Carey, who felt CT-guided biopsy is durable.    We need to see him after the biopsy result is available to discuss next step of actions.    Final oncologic recommendation will be based on the above further intervention and result.            Again, thank you for allowing me to participate in the care of your patient.        Sincerely,        Juliana Monsivais MD, MD     regular

## 2020-11-11 ENCOUNTER — TELEPHONE (OUTPATIENT)
Dept: ONCOLOGY | Facility: CLINIC | Age: 80
End: 2020-11-11

## 2020-11-11 ENCOUNTER — VIRTUAL VISIT (OUTPATIENT)
Dept: ONCOLOGY | Facility: CLINIC | Age: 80
End: 2020-11-11
Attending: INTERNAL MEDICINE
Payer: MEDICARE

## 2020-11-11 DIAGNOSIS — C79.51 BONE METASTASES: ICD-10-CM

## 2020-11-11 DIAGNOSIS — K21.9 GASTROESOPHAGEAL REFLUX DISEASE WITHOUT ESOPHAGITIS: ICD-10-CM

## 2020-11-11 DIAGNOSIS — C34.31 MALIGNANT NEOPLASM OF LOWER LOBE OF RIGHT LUNG (H): Primary | ICD-10-CM

## 2020-11-11 DIAGNOSIS — R05.9 COUGH: ICD-10-CM

## 2020-11-11 PROCEDURE — 99215 OFFICE O/P EST HI 40 MIN: CPT | Mod: 95 | Performed by: INTERNAL MEDICINE

## 2020-11-11 PROCEDURE — 999N001193 HC VIDEO/TELEPHONE VISIT; NO CHARGE

## 2020-11-11 NOTE — PROGRESS NOTES
"Derrick Benitez is a 80 year old male who is being evaluated via a billable telephone visit.      The patient has been notified of following:     \"This telephone visit will be conducted via a call between you and your physician/provider. We have found that certain health care needs can be provided without the need for a physical exam.  This service lets us provide the care you need with a short phone conversation.  If a prescription is necessary we can send it directly to your pharmacy.  If lab work is needed we can place an order for that and you can then stop by our lab to have the test done at a later time.    Telephone visits are billed at different rates depending on your insurance coverage. During this emergency period, for some insurers they may be billed the same as an in-person visit.  Please reach out to your insurance provider with any questions.    If during the course of the call the physician/provider feels a telephone visit is not appropriate, you will not be charged for this service.\"    Patient has given verbal consent for Telephone visit?  Yes    What phone number would you like to be contacted at? [phone]    How would you like to obtain your AVS? Mail a copy    Phone call duration:          Zuleyma Dong CMA        "

## 2020-11-11 NOTE — LETTER
"    11/11/2020         RE: Derrick Benitez  [address]  [facility] [address]        Dear Colleague,    Thank you for referring your patient, Derrick Benitez, to the [facility] [place]. Please see a copy of my visit note below.    Derrick Benitez is a 80 year old male who is being evaluated via a billable telephone visit.      The patient has been notified of following:     \"This telephone visit will be conducted via a call between you and your physician/provider. We have found that certain health care needs can be provided without the need for a physical exam.  This service lets us provide the care you need with a short phone conversation.  If a prescription is necessary we can send it directly to your pharmacy.  If lab work is needed we can place an order for that and you can then stop by our lab to have the test done at a later time.    Telephone visits are billed at different rates depending on your insurance coverage. During this emergency period, for some insurers they may be billed the same as an in-person visit.  Please reach out to your insurance provider with any questions.    If during the course of the call the physician/provider feels a telephone visit is not appropriate, you will not be charged for this service.\"    Patient has given verbal consent for Telephone visit?  Yes    What phone number would you like to be contacted at? [phone]    How would you like to obtain your AVS? Mail a copy    Phone call duration:          Zuleyma Dong [facility]          ONCOLOGY FOLLOW UP VISIT    Tel     PATIENT NAME: Derrick Benitez   MRN# [id number]     Date of Visit: Nov 11, 2020     YOB: 1940       REASON FOR VISIT/CC:    Dx RLL NSCLC 9/2020 at age 80    HISTORY OF ONCOLOGY ILLNESS:    At age 80, on 9/7/2020 he presented to ER for new right upper back pain for a few days, wheezing on and off for years (has allergy), chronic coughing for years.   The pain is 5-6/10, " the pain is on and off.   Denies weight loss, or hemoptysis.     CT in ER found large right lower lobe lung mass 5.0 x 4.1 cm causing narrowing of a segmental branch of the right lower lobe pulmonary artery without occlusion. No enlarged lymph nodes.    He is offered Tessalon and Norco.  He has much improved pain and cough.      INTERVAL HISTORY:  9/18/2020 FNA on RLL mass is Positive for malignancy, favor non-small cell carcinoma, The few remaining tumor   cells in the histologic sections preferentially stain positively for p40, a finding which favors squamous cell carcinoma, although this sampling may not be representative.  Not enough tissue for NGS or further study.       Brain MRI 9/2020 is negative.   PET found bone mets. MRI brain is negative.     He has stage c stage IV disease.       INTERVAL HISTORY:   He had 17 teeth extraction, which delayed the chemo start.       PAST MEDICAL HISTORY  Hypertension  type II diabetes   CKD    MEDICATIONS/ALLERY:  Reviewed in Epic system.        SOCIAL HISTORY:    He quit smoking 17 yrs ago, he smoked 3 pack per day for 40 years. Deny ETOY use      FAMILY HISTORY:    Denies any FH of cancer.       REVIEW OF SYSTEMS:   He recovered smoothly after 17 teeth extraction        PHYSICAL EXAMINATION ATTAINABLE DURING TELEPHONE VISIT:  CONTSTITUAIONL: Sounded  pleasant, not in acute distress.   NEURO: Oriented to time, person, and places. Good memory   RESPIRATORY: talk nl, no sob during conversation. No cough.  PSYCHIATRIC: Normal mood and affect. Good memory. Proper insight and judgement.   THE REST OF A COMPREHENSIVE PHYSICIAL EXAM IS DEFERRED DUE TO COVID-19 PUBLIC HEALTH EMERGENCY RELATED PHONE VISIT RESCTRICTION.      CURRENT LAB DATA REVIEWED TODAY:  9/18/2020 FNA ON RLL mass is Positive for malignancy, favor non-small cell carcinoma, The few remaining tumor   cells in the histologic sections preferentially stain positively for p40, a finding which favors squamous cell  carcinoma, although this sampling may not be representative.    cmp and cbc diff in 9/2020 are fine        CURRENT IMAGING REVIEWED TODAY:  PET 9/2020   1. Hypermetabolic mass in the right lower lobe measures 4.7 x 3.9 cm (series 5, image 210) and demonstrates a maximum SUV of 14.6. No lymphadenopathy.  2. Bone mets: Focal hypermetabolism associated with  subtle area of lysis in the left scapula has a maximum SUV of 14.9. Hypermetabolic right seventh rib activity in a lytic bone lesion posterior to the right lower lobe lung mass (series 5, image 201) demonstrates a maximum SUV of 12.7 and is associated with fracture. Lytic hypermetabolic lesion at the inferior tip of the right scapula. Another small hypermetabolic lesion present in the superior aspect of the left iliac bone    Brain MRI 9/2020 is negative.    CT chest 9/7/2020 found no PE. Right lower lobe lung mass 5.0 x 4.1 cm causing narrowing of a segmental branch  of the right lower lobe pulmonary artery without occlusion. No  enlarged lymph nodes.         OLD DATA REVIEWED TODAY WITH SUMMARY  CXR 2017 - A small region of patchy opacities in the left lung base is nonspecific      ASSESSMENT AND PLAN:  At age 80, he is found to have RLL mass 4-5 cm.  CT findings is very suspicious for primary lung cancer.    9/18/2020 FNA on RLL mass is Positive for malignancy, favor non-small cell carcinoma, The few remaining tumor   cells in the histologic sections preferentially stain positively for p40, a finding which favors squamous cell carcinoma, although this sampling may not be representative.  Brain MRI 9/2020 is negative.   PET found bone mets. MRI brain is negative.     He has stage cIV disease.  Tx if for palliation not for cure.   We discussed the treatment options for stage IV non-small cell lung cancer.  The goal of treatment, overall survival without treatment with treatment.  He is open to try first-line systemic treatment.    Recommend him to consider  first-line systemic chemotherapy with carbotaxol plus Keytruda immunotherapy.  We discussed the chemotherapy side effect, which include but not limited to GI toxicity nausea vomiting, lower immunity and bleeding risk from cytopenia, neurotoxicity, cardica toxicity,  infusion related reaction, mortality, etc.  We discussed the side effect from immunotherapy, which include autoimmune associated organ dysfunction, e.g. skin toxicity, GI toxicity, kidney damage, pneumonitis, endocrinopathy et .    He refuses port placement at this point.    He made informed decision to proceed with the above treatment recommendation after recover from his teeth extraction.       2. Bone meta.   Advice IV zometa on D1 of chemo/immuno therapy.       3. Mild right scapular pain.   He is not no narcotic. We discussed the option of alternating tylenol and NSAID.      4. Cough.   Advice Tessalon capsule.       5. Reflux symptoms.   Advice to start noreen.           Again, thank you for allowing me to participate in the care of your patient.        Sincerely,        Juliana Monsivais MD, MD

## 2020-11-11 NOTE — PROGRESS NOTES
ONCOLOGY FOLLOW UP VISIT    Tel     PATIENT NAME: Derrick Benitez   MRN# [id number]     Date of Visit: Nov 11, 2020     YOB: 1940       REASON FOR VISIT/CC:    Dx RLL NSCLC 9/2020 at age 80    HISTORY OF ONCOLOGY ILLNESS:    At age 80, on 9/7/2020 he presented to ER for new right upper back pain for a few days, wheezing on and off for years (has allergy), chronic coughing for years.   The pain is 5-6/10, the pain is on and off.   Denies weight loss, or hemoptysis.     CT in ER found large right lower lobe lung mass 5.0 x 4.1 cm causing narrowing of a segmental branch of the right lower lobe pulmonary artery without occlusion. No enlarged lymph nodes.    He is offered Tessalon and Norco.  He has much improved pain and cough.      INTERVAL HISTORY:  9/18/2020 FNA on RLL mass is Positive for malignancy, favor non-small cell carcinoma, The few remaining tumor   cells in the histologic sections preferentially stain positively for p40, a finding which favors squamous cell carcinoma, although this sampling may not be representative.  Not enough tissue for NGS or further study.       Brain MRI 9/2020 is negative.   PET found bone mets. MRI brain is negative.     He has stage c stage IV disease.       INTERVAL HISTORY:   He had 17 teeth extraction, which delayed the chemo start.       PAST MEDICAL HISTORY  Hypertension  type II diabetes   CKD    MEDICATIONS/ALLERY:  Reviewed in Epic system.        SOCIAL HISTORY:    He quit smoking 17 yrs ago, he smoked 3 pack per day for 40 years. Deny ETOY use      FAMILY HISTORY:    Denies any FH of cancer.       REVIEW OF SYSTEMS:   He recovered smoothly after 17 teeth extraction        PHYSICAL EXAMINATION ATTAINABLE DURING TELEPHONE VISIT:  CONTSTITUAIONL: Sounded  pleasant, not in acute distress.   NEURO: Oriented to time, person, and places. Good memory   RESPIRATORY: talk nl, no sob during conversation. No cough.  PSYCHIATRIC: Normal mood and affect. Good memory.  Proper insight and judgement.   THE REST OF A COMPREHENSIVE PHYSICIAL EXAM IS DEFERRED DUE TO COVID-19 PUBLIC HEALTH EMERGENCY RELATED PHONE VISIT RESCTRICTION.      CURRENT LAB DATA REVIEWED TODAY:  9/18/2020 FNA ON RLL mass is Positive for malignancy, favor non-small cell carcinoma, The few remaining tumor   cells in the histologic sections preferentially stain positively for p40, a finding which favors squamous cell carcinoma, although this sampling may not be representative.    cmp and cbc diff in 9/2020 are fine        CURRENT IMAGING REVIEWED TODAY:  PET 9/2020   1. Hypermetabolic mass in the right lower lobe measures 4.7 x 3.9 cm (series 5, image 210) and demonstrates a maximum SUV of 14.6. No lymphadenopathy.  2. Bone mets: Focal hypermetabolism associated with  subtle area of lysis in the left scapula has a maximum SUV of 14.9. Hypermetabolic right seventh rib activity in a lytic bone lesion posterior to the right lower lobe lung mass (series 5, image 201) demonstrates a maximum SUV of 12.7 and is associated with fracture. Lytic hypermetabolic lesion at the inferior tip of the right scapula. Another small hypermetabolic lesion present in the superior aspect of the left iliac bone    Brain MRI 9/2020 is negative.    CT chest 9/7/2020 found no PE. Right lower lobe lung mass 5.0 x 4.1 cm causing narrowing of a segmental branch  of the right lower lobe pulmonary artery without occlusion. No  enlarged lymph nodes.         OLD DATA REVIEWED TODAY WITH SUMMARY  CXR 2017 - A small region of patchy opacities in the left lung base is nonspecific      ASSESSMENT AND PLAN:  At age 80, he is found to have RLL mass 4-5 cm.  CT findings is very suspicious for primary lung cancer.    9/18/2020 FNA on RLL mass is Positive for malignancy, favor non-small cell carcinoma, The few remaining tumor   cells in the histologic sections preferentially stain positively for p40, a finding which favors squamous cell carcinoma, although  this sampling may not be representative.  Brain MRI 9/2020 is negative.   PET found bone mets. MRI brain is negative.     He has stage cIV disease.  Tx if for palliation not for cure.   We discussed the treatment options for stage IV non-small cell lung cancer.  The goal of treatment, overall survival without treatment with treatment.  He is open to try first-line systemic treatment.    Recommend him to consider first-line systemic chemotherapy with carbotaxol plus Keytruda immunotherapy.  We discussed the chemotherapy side effect, which include but not limited to GI toxicity nausea vomiting, lower immunity and bleeding risk from cytopenia, neurotoxicity, cardica toxicity,  infusion related reaction, mortality, etc.  We discussed the side effect from immunotherapy, which include autoimmune associated organ dysfunction, e.g. skin toxicity, GI toxicity, kidney damage, pneumonitis, endocrinopathy et .    He refuses port placement at this point.    He made informed decision to proceed with the above treatment recommendation after recover from his teeth extraction.       2. Bone meta.   Advice IV zometa on D1 of chemo/immuno therapy.       3. Mild right scapular pain.   He is not no narcotic. We discussed the option of alternating tylenol and NSAID.      4. Cough.   Advice Tessalon capsule.       5. Reflux symptoms.   Advice to start noreen.

## 2020-11-11 NOTE — TELEPHONE ENCOUNTER
Left voicemail message for patient requesting a return call regarding scheduled future appointments.

## 2020-11-11 NOTE — LETTER
"    11/11/2020         RE: Derrick Benitez  [address]  [facility] [address]        Dear Colleague,    Thank you for referring your patient, Derrick Benitez, to the [facility] [place]. Please see a copy of my visit note below.    Derrick Benitez is a 80 year old male who is being evaluated via a billable telephone visit.      The patient has been notified of following:     \"This telephone visit will be conducted via a call between you and your physician/provider. We have found that certain health care needs can be provided without the need for a physical exam.  This service lets us provide the care you need with a short phone conversation.  If a prescription is necessary we can send it directly to your pharmacy.  If lab work is needed we can place an order for that and you can then stop by our lab to have the test done at a later time.    Telephone visits are billed at different rates depending on your insurance coverage. During this emergency period, for some insurers they may be billed the same as an in-person visit.  Please reach out to your insurance provider with any questions.    If during the course of the call the physician/provider feels a telephone visit is not appropriate, you will not be charged for this service.\"    Patient has given verbal consent for Telephone visit?  Yes    What phone number would you like to be contacted at? [phone]    How would you like to obtain your AVS? Mail a copy    Phone call duration:          Zuleyma Dong [facility]          ONCOLOGY FOLLOW UP VISIT    Tel     PATIENT NAME: Derrick Benitez   MRN# [id number]     Date of Visit: Nov 11, 2020     YOB: 1940       REASON FOR VISIT/CC:    Dx RLL NSCLC 9/2020 at age 80    HISTORY OF ONCOLOGY ILLNESS:    At age 80, on 9/7/2020 he presented to ER for new right upper back pain for a few days, wheezing on and off for years (has allergy), chronic coughing for years.   The pain is 5-6/10, " the pain is on and off.   Denies weight loss, or hemoptysis.     CT in ER found large right lower lobe lung mass 5.0 x 4.1 cm causing narrowing of a segmental branch of the right lower lobe pulmonary artery without occlusion. No enlarged lymph nodes.    He is offered Tessalon and Norco.  He has much improved pain and cough.      INTERVAL HISTORY:  9/18/2020 FNA on RLL mass is Positive for malignancy, favor non-small cell carcinoma, The few remaining tumor   cells in the histologic sections preferentially stain positively for p40, a finding which favors squamous cell carcinoma, although this sampling may not be representative.  Not enough tissue for NGS or further study.       Brain MRI 9/2020 is negative.   PET found bone mets. MRI brain is negative.     He has stage c stage IV disease.       INTERVAL HISTORY:   He had 17 teeth extraction, which delayed the chemo start.       PAST MEDICAL HISTORY  Hypertension  type II diabetes   CKD    MEDICATIONS/ALLERY:  Reviewed in Epic system.        SOCIAL HISTORY:    He quit smoking 17 yrs ago, he smoked 3 pack per day for 40 years. Deny ETOY use      FAMILY HISTORY:    Denies any FH of cancer.       REVIEW OF SYSTEMS:   He recovered smoothly after 17 teeth extraction        PHYSICAL EXAMINATION ATTAINABLE DURING TELEPHONE VISIT:  CONTSTITUAIONL: Sounded  pleasant, not in acute distress.   NEURO: Oriented to time, person, and places. Good memory   RESPIRATORY: talk nl, no sob during conversation. No cough.  PSYCHIATRIC: Normal mood and affect. Good memory. Proper insight and judgement.   THE REST OF A COMPREHENSIVE PHYSICIAL EXAM IS DEFERRED DUE TO COVID-19 PUBLIC HEALTH EMERGENCY RELATED PHONE VISIT RESCTRICTION.      CURRENT LAB DATA REVIEWED TODAY:  9/18/2020 FNA ON RLL mass is Positive for malignancy, favor non-small cell carcinoma, The few remaining tumor   cells in the histologic sections preferentially stain positively for p40, a finding which favors squamous cell  carcinoma, although this sampling may not be representative.    cmp and cbc diff in 9/2020 are fine        CURRENT IMAGING REVIEWED TODAY:  PET 9/2020   1. Hypermetabolic mass in the right lower lobe measures 4.7 x 3.9 cm (series 5, image 210) and demonstrates a maximum SUV of 14.6. No lymphadenopathy.  2. Bone mets: Focal hypermetabolism associated with  subtle area of lysis in the left scapula has a maximum SUV of 14.9. Hypermetabolic right seventh rib activity in a lytic bone lesion posterior to the right lower lobe lung mass (series 5, image 201) demonstrates a maximum SUV of 12.7 and is associated with fracture. Lytic hypermetabolic lesion at the inferior tip of the right scapula. Another small hypermetabolic lesion present in the superior aspect of the left iliac bone    Brain MRI 9/2020 is negative.    CT chest 9/7/2020 found no PE. Right lower lobe lung mass 5.0 x 4.1 cm causing narrowing of a segmental branch  of the right lower lobe pulmonary artery without occlusion. No  enlarged lymph nodes.         OLD DATA REVIEWED TODAY WITH SUMMARY  CXR 2017 - A small region of patchy opacities in the left lung base is nonspecific      ASSESSMENT AND PLAN:  At age 80, he is found to have RLL mass 4-5 cm.  CT findings is very suspicious for primary lung cancer.    9/18/2020 FNA on RLL mass is Positive for malignancy, favor non-small cell carcinoma, The few remaining tumor   cells in the histologic sections preferentially stain positively for p40, a finding which favors squamous cell carcinoma, although this sampling may not be representative.  Brain MRI 9/2020 is negative.   PET found bone mets. MRI brain is negative.     He has stage cIV disease.  Tx if for palliation not for cure.   We discussed the treatment options for stage IV non-small cell lung cancer.  The goal of treatment, overall survival without treatment with treatment.  He is open to try first-line systemic treatment.    Recommend him to consider  first-line systemic chemotherapy with carbotaxol plus Keytruda immunotherapy.  We discussed the chemotherapy side effect, which include but not limited to GI toxicity nausea vomiting, lower immunity and bleeding risk from cytopenia, neurotoxicity, cardica toxicity,  infusion related reaction, mortality, etc.  We discussed the side effect from immunotherapy, which include autoimmune associated organ dysfunction, e.g. skin toxicity, GI toxicity, kidney damage, pneumonitis, endocrinopathy et .    He refuses port placement at this point.    He made informed decision to proceed with the above treatment recommendation after recover from his teeth extraction.       2. Bone meta.   Advice IV zometa on D1 of chemo/immuno therapy.       3. Mild right scapular pain.   He is not no narcotic. We discussed the option of alternating tylenol and NSAID.      4. Cough.   Advice Tessalon capsule.       5. Reflux symptoms.   Advice to start noreen.           Again, thank you for allowing me to participate in the care of your patient.        Sincerely,        Juliana Monsivais MD, MD

## 2020-11-13 ENCOUNTER — TELEPHONE (OUTPATIENT)
Dept: ONCOLOGY | Facility: CLINIC | Age: 80
End: 2020-11-13

## 2020-11-13 DIAGNOSIS — C34.31 MALIGNANT NEOPLASM OF LOWER LOBE OF RIGHT LUNG (H): ICD-10-CM

## 2020-11-13 DIAGNOSIS — Z51.11 ENCOUNTER FOR ANTINEOPLASTIC CHEMOTHERAPY: Primary | ICD-10-CM

## 2020-11-13 RX ORDER — DEXAMETHASONE 4 MG/1
TABLET ORAL
Qty: 40 TABLET | Refills: 1 | Status: CANCELLED | OUTPATIENT
Start: 2020-11-13

## 2020-11-13 RX ORDER — DEXAMETHASONE 4 MG/1
TABLET ORAL
Qty: 40 TABLET | Refills: 1 | Status: SHIPPED | OUTPATIENT
Start: 2020-11-13 | End: 2021-01-06

## 2020-11-13 RX ORDER — PROCHLORPERAZINE MALEATE 10 MG
5 TABLET ORAL EVERY 6 HOURS PRN
Qty: 30 TABLET | Refills: 3 | Status: SHIPPED | OUTPATIENT
Start: 2020-11-13 | End: 2021-03-31

## 2020-11-13 RX ORDER — LORAZEPAM 0.5 MG/1
0.5 TABLET ORAL EVERY 4 HOURS PRN
Qty: 30 TABLET | Refills: 3 | Status: SHIPPED | OUTPATIENT
Start: 2020-11-13 | End: 2021-03-31

## 2020-11-13 RX ORDER — ONDANSETRON 8 MG/1
8 TABLET, FILM COATED ORAL EVERY 8 HOURS PRN
Qty: 10 TABLET | Refills: 3 | Status: SHIPPED | OUTPATIENT
Start: 2020-11-13 | End: 2021-01-27

## 2020-11-13 NOTE — TELEPHONE ENCOUNTER
Spoke with Derrick. Will plan for 11/23 at 1030 for a chemo refresh, Miedcations released to [facility].   He would like a port placement. Will route to Dr. Monsivais for fyi and place order and pre procedure covid test

## 2020-11-17 ENCOUNTER — TRANSFERRED RECORDS (OUTPATIENT)
Dept: HEALTH INFORMATION MANAGEMENT | Facility: CLINIC | Age: 80
End: 2020-11-17

## 2020-11-17 ENCOUNTER — PATIENT OUTREACH (OUTPATIENT)
Dept: ONCOLOGY | Facility: CLINIC | Age: 80
End: 2020-11-17

## 2020-11-17 NOTE — PROGRESS NOTES
Writer received a message from oral surgeon Bossman Perea [phone] that  Approximately 3 weeks ago patient had remaining teeth extracted in preparation for Zometa.  Dr. Perea reports that patient is healing fine and he approves of startign treatment and will fax a letter of his dictation.     If Dr. Monsivais has any questions she can call Dr. Perea at [phone].    Message routed.    Liz Flores RN

## 2020-11-18 ENCOUNTER — TELEPHONE (OUTPATIENT)
Dept: ONCOLOGY | Facility: CLINIC | Age: 80
End: 2020-11-18
Payer: MEDICARE

## 2020-11-18 ENCOUNTER — TELEPHONE (OUTPATIENT)
Dept: ONCOLOGY | Facility: CLINIC | Age: 80
End: 2020-11-18

## 2020-11-18 DIAGNOSIS — Z13.29 SCREENING FOR THYROID DISORDER: ICD-10-CM

## 2020-11-18 DIAGNOSIS — C34.31 MALIGNANT NEOPLASM OF LOWER LOBE OF RIGHT LUNG (H): Primary | ICD-10-CM

## 2020-11-18 DIAGNOSIS — Z11.59 ENCOUNTER FOR SCREENING FOR OTHER VIRAL DISEASES: Primary | ICD-10-CM

## 2020-11-18 NOTE — TELEPHONE ENCOUNTER
Patient called stating that he hadn't heard about when he should have his port placed, and covid testing.  Also, states he did not receive 2 of his prescriptions.  Orders placed for port.   able to schedule appointment for port placement 11/25/2020.  Orders for labs placed.   Decadron and lorazapam prescriptions called to [facility] in [place], [place].

## 2020-11-18 NOTE — TELEPHONE ENCOUNTER
Spoke to patient regarding scheduled Port Placement on 11/24/20. Instructed patient to arrive at 10:30am [facility] for procedure at 12:00pm. Patient verbalized understanding of fasting instructions and need for .  Patient instructed to call clinic with any questions.

## 2020-11-21 DIAGNOSIS — Z11.59 ENCOUNTER FOR SCREENING FOR OTHER VIRAL DISEASES: ICD-10-CM

## 2020-11-21 PROCEDURE — U0003 INFECTIOUS AGENT DETECTION BY NUCLEIC ACID (DNA OR RNA); SEVERE ACUTE RESPIRATORY SYNDROME CORONAVIRUS 2 (SARS-COV-2) (CORONAVIRUS DISEASE [COVID-19]), AMPLIFIED PROBE TECHNIQUE, MAKING USE OF HIGH THROUGHPUT TECHNOLOGIES AS DESCRIBED BY CMS-2020-01-R: HCPCS | Performed by: INTERNAL MEDICINE

## 2020-11-22 LAB
LABORATORY COMMENT REPORT: NORMAL
SARS-COV-2 RNA SPEC QL NAA+PROBE: NEGATIVE
SARS-COV-2 RNA SPEC QL NAA+PROBE: NORMAL
SPECIMEN SOURCE: NORMAL
SPECIMEN SOURCE: NORMAL

## 2020-11-23 ENCOUNTER — DOCUMENTATION ONLY (OUTPATIENT)
Dept: PHARMACY | Facility: CLINIC | Age: 80
End: 2020-11-23

## 2020-11-23 ENCOUNTER — TELEPHONE (OUTPATIENT)
Dept: ONCOLOGY | Facility: CLINIC | Age: 80
End: 2020-11-23

## 2020-11-23 ENCOUNTER — TELEPHONE (OUTPATIENT)
Dept: INTERVENTIONAL RADIOLOGY/VASCULAR | Facility: CLINIC | Age: 80
End: 2020-11-23

## 2020-11-23 ENCOUNTER — HOSPITAL ENCOUNTER (OUTPATIENT)
Facility: CLINIC | Age: 80
Setting detail: SPECIMEN
End: 2020-11-23
Attending: NURSE PRACTITIONER | Admitting: RADIOLOGY
Payer: MEDICARE

## 2020-11-23 ENCOUNTER — OFFICE VISIT (OUTPATIENT)
Dept: INFUSION THERAPY | Facility: CLINIC | Age: 80
End: 2020-11-23
Attending: NURSE PRACTITIONER
Payer: MEDICARE

## 2020-11-23 ENCOUNTER — HOSPITAL ENCOUNTER (OUTPATIENT)
Facility: CLINIC | Age: 80
Setting detail: SPECIMEN
Discharge: HOME | End: 2020-11-23
Attending: NURSE PRACTITIONER | Admitting: INTERNAL MEDICINE
Payer: MEDICARE

## 2020-11-23 DIAGNOSIS — C34.31 MALIGNANT NEOPLASM OF LOWER LOBE OF RIGHT LUNG (H): ICD-10-CM

## 2020-11-23 DIAGNOSIS — Z13.29 SCREENING FOR THYROID DISORDER: ICD-10-CM

## 2020-11-23 LAB
ALBUMIN SERPL-MCNC: 3.1 G/DL (ref 3.4–5)
ALP SERPL-CCNC: 81 U/L (ref 40–150)
ALT SERPL W P-5'-P-CCNC: 18 U/L (ref 0–70)
ANION GAP SERPL CALCULATED.3IONS-SCNC: 3 MMOL/L (ref 3–14)
AST SERPL W P-5'-P-CCNC: 19 U/L (ref 0–45)
BASOPHILS # BLD AUTO: 0 10E9/L (ref 0–0.2)
BASOPHILS NFR BLD AUTO: 0.2 %
BILIRUB SERPL-MCNC: 0.4 MG/DL (ref 0.2–1.3)
BUN SERPL-MCNC: 13 MG/DL (ref 7–30)
CALCIUM SERPL-MCNC: 8.5 MG/DL (ref 8.5–10.1)
CHLORIDE SERPL-SCNC: 114 MMOL/L (ref 94–109)
CO2 SERPL-SCNC: 24 MMOL/L (ref 20–32)
CREAT SERPL-MCNC: 0.87 MG/DL (ref 0.66–1.25)
DIFFERENTIAL METHOD BLD: NORMAL
EOSINOPHIL # BLD AUTO: 0.1 10E9/L (ref 0–0.7)
EOSINOPHIL NFR BLD AUTO: 2.3 %
ERYTHROCYTE [DISTWIDTH] IN BLOOD BY AUTOMATED COUNT: 13.4 % (ref 10–15)
GFR SERPL CREATININE-BSD FRML MDRD: 81 ML/MIN/{1.73_M2}
GLUCOSE SERPL-MCNC: 162 MG/DL (ref 70–99)
HCT VFR BLD AUTO: 41.8 % (ref 40–53)
HGB BLD-MCNC: 14.5 G/DL (ref 13.3–17.7)
IMM GRANULOCYTES # BLD: 0 10E9/L (ref 0–0.4)
IMM GRANULOCYTES NFR BLD: 0.3 %
LYMPHOCYTES # BLD AUTO: 1.4 10E9/L (ref 0.8–5.3)
LYMPHOCYTES NFR BLD AUTO: 22.8 %
MCH RBC QN AUTO: 31.5 PG (ref 26.5–33)
MCHC RBC AUTO-ENTMCNC: 34.7 G/DL (ref 31.5–36.5)
MCV RBC AUTO: 91 FL (ref 78–100)
MONOCYTES # BLD AUTO: 0.4 10E9/L (ref 0–1.3)
MONOCYTES NFR BLD AUTO: 6.7 %
NEUTROPHILS # BLD AUTO: 4.2 10E9/L (ref 1.6–8.3)
NEUTROPHILS NFR BLD AUTO: 67.7 %
NRBC # BLD AUTO: 0 10*3/UL
NRBC BLD AUTO-RTO: 0 /100
PLATELET # BLD AUTO: 162 10E9/L (ref 150–450)
POTASSIUM SERPL-SCNC: 4 MMOL/L (ref 3.4–5.3)
PROT SERPL-MCNC: 6.8 G/DL (ref 6.8–8.8)
RBC # BLD AUTO: 4.6 10E12/L (ref 4.4–5.9)
SODIUM SERPL-SCNC: 141 MMOL/L (ref 133–144)
T4 FREE SERPL-MCNC: 1.21 NG/DL (ref 0.76–1.46)
TSH SERPL DL<=0.005 MIU/L-ACNC: 0.3 MU/L (ref 0.4–4)
WBC # BLD AUTO: 6.1 10E9/L (ref 4–11)

## 2020-11-23 PROCEDURE — 80053 COMPREHEN METABOLIC PANEL: CPT | Performed by: INTERNAL MEDICINE

## 2020-11-23 PROCEDURE — 84443 ASSAY THYROID STIM HORMONE: CPT | Mod: GZ | Performed by: INTERNAL MEDICINE

## 2020-11-23 PROCEDURE — 85025 COMPLETE CBC W/AUTO DIFF WBC: CPT | Performed by: INTERNAL MEDICINE

## 2020-11-23 PROCEDURE — 84439 ASSAY OF FREE THYROXINE: CPT | Performed by: INTERNAL MEDICINE

## 2020-11-23 PROCEDURE — 36415 COLL VENOUS BLD VENIPUNCTURE: CPT

## 2020-11-23 NOTE — LETTER
11/23/2020         RE: Derrick Benitez  [address]  [facility] [address]        Dear Colleague,    Thank you for referring your patient, Derrick Benitez, to the [facility]. Please see a copy of my visit note below.    Medical Assistant Note:  Derrick Benitez presents today for blood draw.    Patient seen by provider today: No.   present during visit today: Not Applicable.    Concerns: No Concerns.    Procedure:  Lab draw site: lac, Needle type: bf, Gauge: 23.    Post Assessment:  Labs drawn without difficulty: Yes.    Discharge Plan:  Departure Mode: Ambulatory.    Face to Face Time: 5 min  .    Corie Vieira [facility]              Again, thank you for allowing me to participate in the care of your patient.        Sincerely,         Lab Draw

## 2020-11-23 NOTE — TELEPHONE ENCOUNTER
Called todd for a chemo refresh today.   He had mentioned since his flu shot about 3 weeks ago hes had left arm pain.   Reviewed with him that any muscular pain from an im injection should be gone by now.     He occasionally has this in is right arm as well and questioned if it is bone pain from his cancer. Denies any other symptoms with this( chest pain Sob)  He is aware to go to the ER if he has chest pain, heart palpitations or Shortness of breath.    Mentions that when he does extra work, movements he is a little more sore in his arms.    He currently takes ibuprofen ( with relief) for any discomfort, not Tylenol and want to know which would be better for him.   Reviewed that if he is currently tasking ibuprofen with relief to take that and if needed can be re- evaluated.,     Will route to Dr. Monsivais on the question about pain in his arms being from his disease.  Starts treatment on Wed.

## 2020-11-23 NOTE — TELEPHONE ENCOUNTER
IR Pre-Call/COVID Results    Called patient with pre-call questions and information. Patients only question was if he could bring his inhaler, this writer told him that was just fine. Wife will be dropping off and picking patient up. COVID-19 test negative. No further questions.    Mabel Rubio RN

## 2020-11-23 NOTE — PROGRESS NOTES
Medical Assistant Note:  Derrick Benitez presents today for blood draw.    Patient seen by provider today: No.   present during visit today: Not Applicable.    Concerns: No Concerns.    Procedure:  Lab draw site: lac, Needle type: bf, Gauge: 23.    Post Assessment:  Labs drawn without difficulty: Yes.    Discharge Plan:  Departure Mode: Ambulatory.    Face to Face Time: 5 min  .    Corie Vieira, CMA

## 2020-11-24 ENCOUNTER — TELEPHONE (OUTPATIENT)
Dept: ONCOLOGY | Facility: CLINIC | Age: 80
End: 2020-11-24

## 2020-11-24 ENCOUNTER — APPOINTMENT (OUTPATIENT)
Dept: INTERVENTIONAL RADIOLOGY/VASCULAR | Facility: CLINIC | Age: 80
End: 2020-11-24
Attending: INTERNAL MEDICINE
Payer: MEDICARE

## 2020-11-24 ENCOUNTER — HOSPITAL ENCOUNTER (OUTPATIENT)
Facility: CLINIC | Age: 80
Discharge: HOME | End: 2020-11-24
Attending: RADIOLOGY | Admitting: RADIOLOGY
Payer: MEDICARE

## 2020-11-24 VITALS
OXYGEN SATURATION: 96 % | SYSTOLIC BLOOD PRESSURE: 159 MMHG | BODY MASS INDEX: 29.82 KG/M2 | HEIGHT: 67 IN | DIASTOLIC BLOOD PRESSURE: 69 MMHG | HEART RATE: 72 BPM | WEIGHT: 190 LBS | RESPIRATION RATE: 18 BRPM | TEMPERATURE: 97.9 F

## 2020-11-24 DIAGNOSIS — Z51.11 ENCOUNTER FOR ANTINEOPLASTIC CHEMOTHERAPY: ICD-10-CM

## 2020-11-24 DIAGNOSIS — C34.31 MALIGNANT NEOPLASM OF LOWER LOBE OF RIGHT LUNG (H): Primary | ICD-10-CM

## 2020-11-24 DIAGNOSIS — C34.31 MALIGNANT NEOPLASM OF LOWER LOBE OF RIGHT LUNG (H): ICD-10-CM

## 2020-11-24 LAB — INR PPP: 1.08 (ref 0.86–1.14)

## 2020-11-24 PROCEDURE — 272N000602 HC WOUND GLUE CR1

## 2020-11-24 PROCEDURE — 250N000011 HC RX IP 250 OP 636: Performed by: NURSE PRACTITIONER

## 2020-11-24 PROCEDURE — 250N000009 HC RX 250: Performed by: RADIOLOGY

## 2020-11-24 PROCEDURE — C1788 PORT, INDWELLING, IMP: HCPCS

## 2020-11-24 PROCEDURE — 250N000009 HC RX 250: Performed by: NURSE PRACTITIONER

## 2020-11-24 PROCEDURE — 36415 COLL VENOUS BLD VENIPUNCTURE: CPT

## 2020-11-24 PROCEDURE — 85610 PROTHROMBIN TIME: CPT | Mod: GZ | Performed by: RADIOLOGY

## 2020-11-24 PROCEDURE — 272N000196 HC ACCESSORY CR5

## 2020-11-24 PROCEDURE — 99152 MOD SED SAME PHYS/QHP 5/>YRS: CPT

## 2020-11-24 PROCEDURE — 999N000163 HC STATISTIC SIMPLE TUBE INSERTION/CHARGE, PORT, CATH, FISTULOGRAM

## 2020-11-24 RX ORDER — ALBUTEROL SULFATE 90 UG/1
1-2 AEROSOL, METERED RESPIRATORY (INHALATION)
Status: CANCELLED
Start: 2020-11-25

## 2020-11-24 RX ORDER — EPINEPHRINE 1 MG/ML
0.3 INJECTION, SOLUTION, CONCENTRATE INTRAVENOUS EVERY 5 MIN PRN
Status: CANCELLED | OUTPATIENT
Start: 2020-11-25

## 2020-11-24 RX ORDER — DIPHENHYDRAMINE HYDROCHLORIDE 50 MG/ML
50 INJECTION INTRAMUSCULAR; INTRAVENOUS
Status: CANCELLED
Start: 2020-11-25

## 2020-11-24 RX ORDER — SODIUM CHLORIDE 9 MG/ML
1000 INJECTION, SOLUTION INTRAVENOUS CONTINUOUS PRN
Status: CANCELLED
Start: 2020-11-25

## 2020-11-24 RX ORDER — NALOXONE HYDROCHLORIDE 0.4 MG/ML
.1-.4 INJECTION, SOLUTION INTRAMUSCULAR; INTRAVENOUS; SUBCUTANEOUS
Status: DISCONTINUED | OUTPATIENT
Start: 2020-11-24 | End: 2020-11-24 | Stop reason: HOSPADM

## 2020-11-24 RX ORDER — LORAZEPAM 2 MG/ML
0.5 INJECTION INTRAMUSCULAR EVERY 4 HOURS PRN
Status: CANCELLED
Start: 2020-11-25

## 2020-11-24 RX ORDER — CEFAZOLIN SODIUM 2 G/100ML
2 INJECTION, SOLUTION INTRAVENOUS
Status: COMPLETED | OUTPATIENT
Start: 2020-11-24 | End: 2020-11-24

## 2020-11-24 RX ORDER — HEPARIN SODIUM,PORCINE 10 UNIT/ML
5 VIAL (ML) INTRAVENOUS
Status: CANCELLED | OUTPATIENT
Start: 2020-11-25

## 2020-11-24 RX ORDER — METHYLPREDNISOLONE SODIUM SUCCINATE 125 MG/2ML
125 INJECTION, POWDER, LYOPHILIZED, FOR SOLUTION INTRAMUSCULAR; INTRAVENOUS
Status: CANCELLED
Start: 2020-11-25

## 2020-11-24 RX ORDER — FLUMAZENIL 0.1 MG/ML
0.2 INJECTION, SOLUTION INTRAVENOUS
Status: DISCONTINUED | OUTPATIENT
Start: 2020-11-24 | End: 2020-11-24 | Stop reason: HOSPADM

## 2020-11-24 RX ORDER — ACETAMINOPHEN 325 MG/1
650-975 TABLET ORAL
Status: DISCONTINUED | OUTPATIENT
Start: 2020-11-24 | End: 2020-11-24 | Stop reason: HOSPADM

## 2020-11-24 RX ORDER — DIPHENHYDRAMINE HCL 50 MG
50 CAPSULE ORAL ONCE
Status: CANCELLED
Start: 2020-11-25

## 2020-11-24 RX ORDER — FENTANYL CITRATE 50 UG/ML
25-50 INJECTION, SOLUTION INTRAMUSCULAR; INTRAVENOUS EVERY 5 MIN PRN
Status: DISCONTINUED | OUTPATIENT
Start: 2020-11-24 | End: 2020-11-24 | Stop reason: HOSPADM

## 2020-11-24 RX ORDER — HEPARIN SODIUM (PORCINE) LOCK FLUSH IV SOLN 100 UNIT/ML 100 UNIT/ML
5 SOLUTION INTRAVENOUS
Status: CANCELLED | OUTPATIENT
Start: 2020-11-25

## 2020-11-24 RX ORDER — DEXAMETHASONE 4 MG/1
TABLET ORAL
Qty: 40 TABLET | Refills: 1 | Status: SHIPPED | OUTPATIENT
Start: 2020-11-24 | End: 2021-05-12

## 2020-11-24 RX ORDER — MEPERIDINE HYDROCHLORIDE 25 MG/ML
25 INJECTION INTRAMUSCULAR; INTRAVENOUS; SUBCUTANEOUS EVERY 30 MIN PRN
Status: CANCELLED | OUTPATIENT
Start: 2020-11-25

## 2020-11-24 RX ORDER — HEPARIN SODIUM (PORCINE) LOCK FLUSH IV SOLN 100 UNIT/ML 100 UNIT/ML
5 SOLUTION INTRAVENOUS
Status: DISCONTINUED | OUTPATIENT
Start: 2020-11-24 | End: 2020-11-24 | Stop reason: HOSPADM

## 2020-11-24 RX ORDER — ALBUTEROL SULFATE 0.83 MG/ML
2.5 SOLUTION RESPIRATORY (INHALATION)
Status: CANCELLED | OUTPATIENT
Start: 2020-11-25

## 2020-11-24 RX ORDER — NALOXONE HYDROCHLORIDE 0.4 MG/ML
.1-.4 INJECTION, SOLUTION INTRAMUSCULAR; INTRAVENOUS; SUBCUTANEOUS
Status: CANCELLED | OUTPATIENT
Start: 2020-11-25

## 2020-11-24 RX ORDER — HEPARIN SODIUM,PORCINE 10 UNIT/ML
5 VIAL (ML) INTRAVENOUS EVERY 24 HOURS
Status: DISCONTINUED | OUTPATIENT
Start: 2020-11-24 | End: 2020-11-24 | Stop reason: HOSPADM

## 2020-11-24 RX ADMIN — LIDOCAINE HYDROCHLORIDE 10 ML: 10 INJECTION, SOLUTION INFILTRATION; PERINEURAL at 13:04

## 2020-11-24 RX ADMIN — LIDOCAINE HYDROCHLORIDE 9 ML: 10; .005 INJECTION, SOLUTION EPIDURAL; INFILTRATION; INTRACAUDAL; PERINEURAL at 13:04

## 2020-11-24 RX ADMIN — CEFAZOLIN SODIUM 2 G: 2 INJECTION, SOLUTION INTRAVENOUS at 12:31

## 2020-11-24 RX ADMIN — MIDAZOLAM HYDROCHLORIDE 0.5 MG: 1 INJECTION, SOLUTION INTRAMUSCULAR; INTRAVENOUS at 13:01

## 2020-11-24 RX ADMIN — FENTANYL CITRATE 25 MCG: 50 INJECTION, SOLUTION INTRAMUSCULAR; INTRAVENOUS at 13:02

## 2020-11-24 RX ADMIN — FENTANYL CITRATE 25 MCG: 50 INJECTION, SOLUTION INTRAMUSCULAR; INTRAVENOUS at 12:54

## 2020-11-24 RX ADMIN — MIDAZOLAM HYDROCHLORIDE 0.5 MG: 1 INJECTION, SOLUTION INTRAMUSCULAR; INTRAVENOUS at 12:54

## 2020-11-24 ASSESSMENT — MIFFLIN-ST. JEOR: SCORE: 1525.46

## 2020-11-24 NOTE — IR NOTE
Interventional Radiology Intra-procedural Nursing Note    Patient Name: Derrick Benitez  Medical Record Number: [id number]  Today's Date: November 24, 2020    Start Time: 1300  End of procedure time: 1322  Procedure: Port placement R chest  Report given to: GREGORIO Disla, Care Suites  Time pt departs:  1329    Other Notes: Pt into IR suite 2 via cart With IVF infusing. Pt awake and alert. To table in supine position prepped and drapped with 2%. VSS. Tele SR. Dr. Angelo in room. Time out and procedure started. Pt tolerated procedure well. Debrief with Dr. Ortega. No complications. Pt transferred back to Care Suites. Report given at bedside    Medications:    Versed 1mg  Fentanyl 50mcg  Lidocaine 1% 10ml  Lidocaine with Epi 9ml  Heparin 500units    Brigitte Bolaños RN

## 2020-11-24 NOTE — TELEPHONE ENCOUNTER
If the arm pain is the site of flu shot injection, no other recommendation other than lukewarm head pad, NSAID, massage.     If it is different site, get a x ray to the area of pain,

## 2020-11-24 NOTE — PRE-PROCEDURE
GENERAL PRE-PROCEDURE:   Procedure:  Port a catheter placement with intravenous moderate sedation   Date/Time:  11/24/2020 12:00 PM    Written consent obtained?: Yes    Risks and benefits: Risks, benefits and alternatives were discussed    Consent given by:  Patient  Patient states understanding of procedure being performed: Yes    Patient's understanding of procedure matches consent: Yes    Procedure consent matches procedure scheduled: Yes    Expected level of sedation:  Moderate  Appropriately NPO:  Yes  ASA Class:  Class 3- Severe systemic disease, definite functional limitations  Mallampati  :  Grade 3- soft palate visible, posterior pharyngeal wall not visible  Lungs:  Lungs clear with good breath sounds bilaterally  Heart:  Normal heart sounds and rate  History & Physical reviewed:  History and physical reviewed and no updates needed  Statement of review:  I have reviewed the lab findings, diagnostic data, medications, and the plan for sedation

## 2020-11-24 NOTE — PROGRESS NOTES
Care Suites Discharge Nursing Note    Patient Information  Name: Derrick Benitez  Age: 80 year old    Discharge Education:  Discharge instructions reviewed: Yes  Additional education/resources provided: given port book and card  Patient/patient representative verbalizes understanding: Yes  Patient discharging on new medications: No  Medication education completed: Yes    Discharge Plans:   Discharge location: home  Discharge ride contacted: Yes  Approximate discharge time: [id number]    Discharge Criteria:  Discharge criteria met and vital signs stable: Yes    Patient Belongs:  Patient belongings returned to patient: Yes    Nighat Carlisle RN

## 2020-11-24 NOTE — DISCHARGE INSTRUCTIONS
Port Insertion Discharge Instructions     After you go home:      Have an adult stay with you for the first 6 hours    You may resume your normal diet       For 24 hours - due to the sedation you received:    Relax and take it easy    Do NOT make any important or legal decisions    Do NOT drive or operate machines at home or at work    Do NOT drink alcohol    Care of Puncture Sites:      Keep the dressings on your sites clean & dry for 3 days. Change it only if it gets wet or dirty.    You may shower after the dressing comes off in 3 days    Do not remove the small white strips of tape, if present. Allow them to fall off on their own.     You may cover the wound with a bandaid after the dressing is removed if needed for comfort      Activity       Avoid heavy lifting (greater than 10 pounds) or the overuse of your shoulder for 3 days    Bleeding:      If you start bleeding from the incision sites in your chest or neck - or have swelling in your neck, sit down and press on the site for 5-10 minutes.     If bleeding has not stopped after 10 minutes, call your provider.        Call 911 right away if you have heavy bleeding or bleeding that does not stop.      Medicines:      You may resume all medications    Resume your Warfarin/Coumadin at your regular dose today. Follow up with your provider to have your INR rechecked    Resume your Platelet Inhibitors and Aspirin tomorrow at your regular dose    For minor pain, you may take Acetaminophen (Tylenol) or Ibuprofen (Advil)    Call the provider who ordered this procedure if:      You have swelling in your neck or over your port site    The incision area is red, swollen, hot or tender    You have chills or a fever greater than 101 F (38 C)    Any questions or concerns    Call  911 or go to the Emergency Room if you have:      Severe chest pain or trouble breathing    Bleeding that you cannot control    Additional Information:      Your port may be accessed right away.      You will need to have your port flushed every 30 days or after each use.      If you have questions call:          [facility] Radiology Dept @ [phone]      The provider who performed your procedure was

## 2020-11-24 NOTE — PROGRESS NOTES
PATIENT/VISITOR WELLNESS SCREENING    Step 1 Patient Screening    1. In the last month, have you been in contact with someone who was confirmed or suspected to have Coronavirus/COVID-19? No    2. Do you have the following symptoms?  Fever/Chills? No   Cough? Yes chronic   Shortness of breath? No   New loss of taste or smell? No  Sore throat? No  Muscle or body aches? No  Headaches? No  Fatigue? No  Vomiting or diarrhea? No    Step 2 Visitor Screening    1. Name of Visitor (1 visitor per patient): none    2. I  NO SYMPTOM(S)    ACTIONS:  1. Standard rooming process  2. Provider to assess per normal protocol  3. Implement precautions as needed and per guidelines     POSITIVE SYMPTOM(S)  If positive for ANY of the following symptoms: fever, cough, shortness of breath, rash    ACTION:  1. Continue to have the patient wear a mask   2. Room patient as soon as possible  3. Don appropriate PPE when entering room  4. Provider evaluation

## 2020-11-24 NOTE — TELEPHONE ENCOUNTER
Called Derrick regarding Dr. Monsivais's staff message :  Please ask him to do oral dex 2 doses, prior to chemo.   Med sent to [facility].     Order signed, C1 no keytruda.     Derrick is aware to take Decadron 20 mg evening prior to chemotherapy and morning of chemotherapy. He stated that he already picked up medication from [facility].Laquita Richards RN,BSN,OCN

## 2020-11-24 NOTE — PROGRESS NOTES
Care Suites Post Procedure Note    Patient Information  Name: Derrick Benitez  Age: 80 year old    Post Procedure  Time patient returned to Care Suites: 1330  Concerns/abnormal assessment: VSS. No pain. Site CDI drsg.  If abnormal assessment, provider notified: N/A  Plan/Other: per orders    Nighat Carlisle RN

## 2020-11-24 NOTE — PROGRESS NOTES
Care Suites Admission Nursing Note    Patient Information  Name: Derrick Benitez  Age: 80 year old  Reason for admission: port  Care Suites arrival time: 1020    Visitor Information  Name: none  Informed of visitor restrictions: N/A  1 visitor allowed per patient   Visitor must screen negative for COVID symptoms   Visitor must wear a mask  Waiting rooms closed to visitors    Patient Admission/Assessment   Pre-procedure assessment complete: Yes  If abnormal assessment/labs, provider notified: N/A  NPO: Yes  Medications held per instructions/orders: Yes  Consent: obtained  If applicable, pregnancy test status: declined  Patient oriented to room: Yes  Education/questions answered: Yes reviewed avs discharge instructions and copy given  Plan/other: per orders    Discharge Planning  Discharge name/phone number: devi clemente [phone]  Overnight post sedation caregiver: bryn quinonez  Discharge location: home    Nighat Carlisle RN

## 2020-11-25 ENCOUNTER — INFUSION THERAPY VISIT (OUTPATIENT)
Dept: INFUSION THERAPY | Facility: CLINIC | Age: 80
End: 2020-11-25
Attending: INTERNAL MEDICINE
Payer: MEDICARE

## 2020-11-25 VITALS
DIASTOLIC BLOOD PRESSURE: 62 MMHG | OXYGEN SATURATION: 96 % | RESPIRATION RATE: 18 BRPM | WEIGHT: 195 LBS | SYSTOLIC BLOOD PRESSURE: 139 MMHG | HEART RATE: 77 BPM | TEMPERATURE: 97.9 F | BODY MASS INDEX: 30.54 KG/M2

## 2020-11-25 DIAGNOSIS — C34.31 MALIGNANT NEOPLASM OF LOWER LOBE OF RIGHT LUNG (H): ICD-10-CM

## 2020-11-25 DIAGNOSIS — Z51.11 ENCOUNTER FOR ANTINEOPLASTIC CHEMOTHERAPY: Primary | ICD-10-CM

## 2020-11-25 PROCEDURE — 96415 CHEMO IV INFUSION ADDL HR: CPT

## 2020-11-25 PROCEDURE — 258N000003 HC RX IP 258 OP 636: Performed by: INTERNAL MEDICINE

## 2020-11-25 PROCEDURE — 250N000011 HC RX IP 250 OP 636: Performed by: INTERNAL MEDICINE

## 2020-11-25 PROCEDURE — 96413 CHEMO IV INFUSION 1 HR: CPT

## 2020-11-25 PROCEDURE — 96367 TX/PROPH/DG ADDL SEQ IV INF: CPT

## 2020-11-25 PROCEDURE — 96377 APPLICATON ON-BODY INJECTOR: CPT | Performed by: INTERNAL MEDICINE

## 2020-11-25 PROCEDURE — 96375 TX/PRO/DX INJ NEW DRUG ADDON: CPT

## 2020-11-25 PROCEDURE — 96417 CHEMO IV INFUS EACH ADDL SEQ: CPT

## 2020-11-25 PROCEDURE — 96372 THER/PROPH/DIAG INJ SC/IM: CPT | Performed by: INTERNAL MEDICINE

## 2020-11-25 RX ORDER — ZOLEDRONIC ACID 0.04 MG/ML
4 INJECTION, SOLUTION INTRAVENOUS ONCE
Status: COMPLETED | OUTPATIENT
Start: 2020-11-25 | End: 2020-11-25

## 2020-11-25 RX ORDER — HEPARIN SODIUM (PORCINE) LOCK FLUSH IV SOLN 100 UNIT/ML 100 UNIT/ML
5 SOLUTION INTRAVENOUS
Status: DISCONTINUED | OUTPATIENT
Start: 2020-11-25 | End: 2020-11-25 | Stop reason: HOSPADM

## 2020-11-25 RX ADMIN — ZOLEDRONIC ACID 4 MG: 0.04 INJECTION, SOLUTION INTRAVENOUS at 08:09

## 2020-11-25 RX ADMIN — FAMOTIDINE 20 MG: 20 INJECTION, SOLUTION INTRAVENOUS at 09:04

## 2020-11-25 RX ADMIN — SODIUM CHLORIDE 250 ML: 9 INJECTION, SOLUTION INTRAVENOUS at 08:11

## 2020-11-25 RX ADMIN — CARBOPLATIN 400 MG: 10 INJECTION, SOLUTION INTRAVENOUS at 12:31

## 2020-11-25 RX ADMIN — DIPHENHYDRAMINE HYDROCHLORIDE 50 MG: 50 INJECTION INTRAMUSCULAR; INTRAVENOUS at 08:49

## 2020-11-25 RX ADMIN — DEXAMETHASONE SODIUM PHOSPHATE: 10 INJECTION, SOLUTION INTRAMUSCULAR; INTRAVENOUS at 08:28

## 2020-11-25 RX ADMIN — PEGFILGRASTIM 6 MG: KIT SUBCUTANEOUS at 13:06

## 2020-11-25 RX ADMIN — PACLITAXEL 311 MG: 6 INJECTION, SOLUTION INTRAVENOUS at 09:30

## 2020-11-25 RX ADMIN — Medication 5 ML: at 13:11

## 2020-11-25 ASSESSMENT — PAIN SCALES - GENERAL: PAINLEVEL: MILD PAIN (3)

## 2020-11-25 NOTE — PATIENT INSTRUCTIONS
Your On-body Neulasta Injector was applied to your abdomen at 1pm.  At approximately 4pm on Thursday, your On-body Injector will beep to let you know your dose delivery will begin in 2 minutes.  Your medication will be delivered over the next 45 minutes.  You can remove your Injector at 5pm.  Please make sure your Injector has a solid green light or has turned off prior to removing the device.  Please contact your provider at [phone] with questions or concerns.

## 2020-11-25 NOTE — PROGRESS NOTES
Infusion Nursing Note:  Derrick Benitez presents today for C1D1 carbo taxol, zometa  Patient seen by provider today: No   present during visit today: Not Applicable.    Note: N/A.    Intravenous Access:  Implanted Port.    Treatment Conditions:  Lab Results   Component Value Date    HGB 14.5 11/23/2020     Lab Results   Component Value Date    WBC 6.1 11/23/2020      Lab Results   Component Value Date    ANEU 4.2 11/23/2020     Lab Results   Component Value Date     11/23/2020      Lab Results   Component Value Date     11/23/2020                   Lab Results   Component Value Date    POTASSIUM 4.0 11/23/2020           Lab Results   Component Value Date    MAG 2.1 10/30/2014            Lab Results   Component Value Date    CR 0.87 11/23/2020                   Lab Results   Component Value Date    ANTHONY 8.5 11/23/2020                Lab Results   Component Value Date    BILITOTAL 0.4 11/23/2020           Lab Results   Component Value Date    ALBUMIN 3.1 11/23/2020                    Lab Results   Component Value Date    ALT 18 11/23/2020           Lab Results   Component Value Date    AST 19 11/23/2020       Results reviewed, labs MET treatment parameters, ok to proceed with treatment.      Post Infusion Assessment:  Patient tolerated infusion without incident.  Blood return noted pre and post infusion.  Site patent and intact, free from redness, edema or discomfort.  No evidence of extravasations.  Access discontinued per protocol.     ONPRO  Was placed on patient's: left side of abdomen.    Was placed at 1 PM    ONPRO injector device Lot number: [id number]    Patient education included: what patient can expect after application, what colored lights mean on the device, when to remove device, when and where to call with questions or issues, all patients questions answered and that Neulasta administration will occur at 4pm11/26.    Patient tolerated administration well.    Discharge Plan:   Discharge  instructions reviewed with: Patient.  Patient and/or family verbalized understanding of discharge instructions and all questions answered.  Patient discharged in stable condition accompanied by: self.  Departure Mode: Ambulatory.    Safia Simms RN

## 2020-11-27 ENCOUNTER — CARE COORDINATION (OUTPATIENT)
Dept: ONCOLOGY | Facility: CLINIC | Age: 80
End: 2020-11-27

## 2020-11-30 ENCOUNTER — TELEPHONE (OUTPATIENT)
Dept: ONCOLOGY | Facility: CLINIC | Age: 80
End: 2020-11-30

## 2020-11-30 NOTE — TELEPHONE ENCOUNTER
No good solution for neuropathy, he can try vit b6 and cryo therapy during future infusion.     Try ca/Mag intake, fluid and massage as start for muscle spasm.

## 2020-11-30 NOTE — TELEPHONE ENCOUNTER
He does cryotherapy.  Reviewed magnesium/ Calcium rich foods with him to help with Muscle cramps, massage and hydration.   He verbalizes understanding.

## 2020-11-30 NOTE — TELEPHONE ENCOUNTER
Derrick called this morning stating he had a rough weekend and started to feel Crummy on Friday evening.   He had a lot of pain allover  Especially in his feet, knees and hands.  Also has muscle spasms in back and thighs.    He had a difficult time sleeping  Thursday and Friday and over the weekend due to muscle spasms.    He is also experiencing neuropathy in his hands and feet. He did ice hands/ feet during Taxol infusion.    He has some constipation. Currently taking 1 docusate a day. Advised him he can take 2 in the am and 2 in the pm and taper back when he is regular. Also advised miralax.    Will route to Dr. Monsivais to advise on neuropathy and muscle spasms.

## 2020-11-30 NOTE — TELEPHONE ENCOUNTER
Social Work Progress Note      Data/Intervention:  Patient Name:  Derrick Benitez  /Age:  1940 (80 year old)    Reason for Follow-Up:  Derrick is an 80-year-old gentleman who is followed by Dr. Monsivais at [facility] [place]. This clinician called Derrick today to follow-up after he received C1D1 carbo taxol, zometa 20.     Intervention:   This clinician called Derrick this morning with goal of re-introducing psychosocial services and support. Derrick reports that he has a call into the [facility] for symptom support at present, denies resource or emotional concerns, but expresses appreciation of knowing resources are available if needed. Knows to reach out in case of distress or need. No concerns reported at present.     Resources Provided:  Onc SW contact information    Plan:  Provided patient/family with writer's contact information and availability. Will continue to be available for ongoing psychosocial support.     Please call or page if needs or concerns arise.     SHAYE Hogue, LICSW  Direct Phone: [phone]  Pager: [phone]

## 2020-12-15 ENCOUNTER — HOSPITAL ENCOUNTER (OUTPATIENT)
Facility: CLINIC | Age: 80
Setting detail: SPECIMEN
Discharge: HOME | End: 2020-12-15
Attending: INTERNAL MEDICINE | Admitting: INTERNAL MEDICINE
Payer: MEDICARE

## 2020-12-15 ENCOUNTER — TELEPHONE (OUTPATIENT)
Dept: ONCOLOGY | Facility: CLINIC | Age: 80
End: 2020-12-15

## 2020-12-15 ENCOUNTER — INFUSION THERAPY VISIT (OUTPATIENT)
Dept: INFUSION THERAPY | Facility: CLINIC | Age: 80
End: 2020-12-15
Attending: INTERNAL MEDICINE
Payer: MEDICARE

## 2020-12-15 DIAGNOSIS — C34.31 MALIGNANT NEOPLASM OF LOWER LOBE OF RIGHT LUNG (H): ICD-10-CM

## 2020-12-15 DIAGNOSIS — Z51.11 ENCOUNTER FOR ANTINEOPLASTIC CHEMOTHERAPY: Primary | ICD-10-CM

## 2020-12-15 LAB
ALBUMIN SERPL-MCNC: 3 G/DL (ref 3.4–5)
ALP SERPL-CCNC: 107 U/L (ref 40–150)
ALT SERPL W P-5'-P-CCNC: 17 U/L (ref 0–70)
ANION GAP SERPL CALCULATED.3IONS-SCNC: 7 MMOL/L (ref 3–14)
AST SERPL W P-5'-P-CCNC: 13 U/L (ref 0–45)
BASOPHILS # BLD AUTO: 0 10E9/L (ref 0–0.2)
BASOPHILS NFR BLD AUTO: 0.5 %
BILIRUB SERPL-MCNC: 0.4 MG/DL (ref 0.2–1.3)
BUN SERPL-MCNC: 13 MG/DL (ref 7–30)
CALCIUM SERPL-MCNC: 8.2 MG/DL (ref 8.5–10.1)
CHLORIDE SERPL-SCNC: 113 MMOL/L (ref 94–109)
CO2 SERPL-SCNC: 23 MMOL/L (ref 20–32)
CREAT SERPL-MCNC: 0.91 MG/DL (ref 0.66–1.25)
DIFFERENTIAL METHOD BLD: ABNORMAL
EOSINOPHIL # BLD AUTO: 0 10E9/L (ref 0–0.7)
EOSINOPHIL NFR BLD AUTO: 0.7 %
ERYTHROCYTE [DISTWIDTH] IN BLOOD BY AUTOMATED COUNT: 13.5 % (ref 10–15)
GFR SERPL CREATININE-BSD FRML MDRD: 78 ML/MIN/{1.73_M2}
GLUCOSE SERPL-MCNC: 111 MG/DL (ref 70–99)
HCT VFR BLD AUTO: 38.5 % (ref 40–53)
HGB BLD-MCNC: 13.1 G/DL (ref 13.3–17.7)
IMM GRANULOCYTES # BLD: 0.1 10E9/L (ref 0–0.4)
IMM GRANULOCYTES NFR BLD: 1 %
LYMPHOCYTES # BLD AUTO: 1.1 10E9/L (ref 0.8–5.3)
LYMPHOCYTES NFR BLD AUTO: 17.9 %
MCH RBC QN AUTO: 31 PG (ref 26.5–33)
MCHC RBC AUTO-ENTMCNC: 34 G/DL (ref 31.5–36.5)
MCV RBC AUTO: 91 FL (ref 78–100)
MONOCYTES # BLD AUTO: 0.6 10E9/L (ref 0–1.3)
MONOCYTES NFR BLD AUTO: 10.4 %
NEUTROPHILS # BLD AUTO: 4.2 10E9/L (ref 1.6–8.3)
NEUTROPHILS NFR BLD AUTO: 69.5 %
NRBC # BLD AUTO: 0 10*3/UL
NRBC BLD AUTO-RTO: 0 /100
PLATELET # BLD AUTO: 232 10E9/L (ref 150–450)
POTASSIUM SERPL-SCNC: 4.3 MMOL/L (ref 3.4–5.3)
PROT SERPL-MCNC: 6.9 G/DL (ref 6.8–8.8)
RBC # BLD AUTO: 4.22 10E12/L (ref 4.4–5.9)
SODIUM SERPL-SCNC: 143 MMOL/L (ref 133–144)
T4 FREE SERPL-MCNC: 1.22 NG/DL (ref 0.76–1.46)
TSH SERPL DL<=0.005 MIU/L-ACNC: 0.33 MU/L (ref 0.4–4)
WBC # BLD AUTO: 6 10E9/L (ref 4–11)

## 2020-12-15 PROCEDURE — 85025 COMPLETE CBC W/AUTO DIFF WBC: CPT | Performed by: INTERNAL MEDICINE

## 2020-12-15 PROCEDURE — 36591 DRAW BLOOD OFF VENOUS DEVICE: CPT

## 2020-12-15 PROCEDURE — 84443 ASSAY THYROID STIM HORMONE: CPT | Performed by: INTERNAL MEDICINE

## 2020-12-15 PROCEDURE — 80053 COMPREHEN METABOLIC PANEL: CPT | Performed by: INTERNAL MEDICINE

## 2020-12-15 PROCEDURE — 84439 ASSAY OF FREE THYROXINE: CPT | Performed by: INTERNAL MEDICINE

## 2020-12-15 PROCEDURE — 250N000011 HC RX IP 250 OP 636: Performed by: INTERNAL MEDICINE

## 2020-12-15 RX ORDER — HEPARIN SODIUM (PORCINE) LOCK FLUSH IV SOLN 100 UNIT/ML 100 UNIT/ML
5 SOLUTION INTRAVENOUS
Status: CANCELLED | OUTPATIENT
Start: 2020-12-15

## 2020-12-15 RX ORDER — HEPARIN SODIUM (PORCINE) LOCK FLUSH IV SOLN 100 UNIT/ML 100 UNIT/ML
5 SOLUTION INTRAVENOUS
Status: DISCONTINUED | OUTPATIENT
Start: 2020-12-15 | End: 2020-12-15 | Stop reason: HOSPADM

## 2020-12-15 RX ORDER — HEPARIN SODIUM,PORCINE 10 UNIT/ML
5 VIAL (ML) INTRAVENOUS
Status: CANCELLED | OUTPATIENT
Start: 2020-12-15

## 2020-12-15 RX ORDER — HEPARIN SODIUM,PORCINE 10 UNIT/ML
5 VIAL (ML) INTRAVENOUS
Status: DISCONTINUED | OUTPATIENT
Start: 2020-12-15 | End: 2020-12-15 | Stop reason: HOSPADM

## 2020-12-15 RX ADMIN — Medication 5 ML: at 15:08

## 2020-12-15 NOTE — TELEPHONE ENCOUNTER
Spoke with Derrick today.   He will take his Decadron at home, Has a supply.   Reviewed with him to take tonight with dinner and tomorrow morning with breakfast.     We spoke about a wig for him. Will review resources with him tomorrow while he is here for treatment.     He is also aware that Dr. Monsivais is leaving the practice. Will transition him to either [facility] or [facility]

## 2020-12-15 NOTE — PROGRESS NOTES
Nursing Note:  Derrick Benitez presents today for port labs.    Patient seen by provider today: No   present during visit today: Not Applicable.    Note: N/A.    Intravenous Access:  Labs drawn without difficulty.  Implanted Port.    Discharge Plan:   Patient was sent to home for tomorrow's appointment.    Archana Hall RN

## 2020-12-16 ENCOUNTER — VIRTUAL VISIT (OUTPATIENT)
Dept: ONCOLOGY | Facility: CLINIC | Age: 80
End: 2020-12-16
Attending: INTERNAL MEDICINE
Payer: MEDICARE

## 2020-12-16 ENCOUNTER — INFUSION THERAPY VISIT (OUTPATIENT)
Dept: INFUSION THERAPY | Facility: CLINIC | Age: 80
End: 2020-12-16
Attending: INTERNAL MEDICINE
Payer: MEDICARE

## 2020-12-16 VITALS
TEMPERATURE: 98.2 F | WEIGHT: 197.6 LBS | RESPIRATION RATE: 18 BRPM | SYSTOLIC BLOOD PRESSURE: 158 MMHG | BODY MASS INDEX: 30.95 KG/M2 | HEART RATE: 96 BPM | OXYGEN SATURATION: 99 % | DIASTOLIC BLOOD PRESSURE: 70 MMHG

## 2020-12-16 VITALS — WEIGHT: 195 LBS | BODY MASS INDEX: 30.54 KG/M2

## 2020-12-16 DIAGNOSIS — C34.31 MALIGNANT NEOPLASM OF LOWER LOBE OF RIGHT LUNG (H): Primary | ICD-10-CM

## 2020-12-16 DIAGNOSIS — R05.9 COUGH: ICD-10-CM

## 2020-12-16 DIAGNOSIS — Z51.11 ENCOUNTER FOR ANTINEOPLASTIC CHEMOTHERAPY: Primary | ICD-10-CM

## 2020-12-16 DIAGNOSIS — C34.31 MALIGNANT NEOPLASM OF LOWER LOBE OF RIGHT LUNG (H): ICD-10-CM

## 2020-12-16 DIAGNOSIS — Z51.11 ENCOUNTER FOR ANTINEOPLASTIC CHEMOTHERAPY: ICD-10-CM

## 2020-12-16 DIAGNOSIS — C79.51 BONE METASTASES: ICD-10-CM

## 2020-12-16 DIAGNOSIS — K21.9 GASTROESOPHAGEAL REFLUX DISEASE WITHOUT ESOPHAGITIS: ICD-10-CM

## 2020-12-16 PROCEDURE — 96367 TX/PROPH/DG ADDL SEQ IV INF: CPT

## 2020-12-16 PROCEDURE — 96415 CHEMO IV INFUSION ADDL HR: CPT

## 2020-12-16 PROCEDURE — 258N000003 HC RX IP 258 OP 636: Performed by: INTERNAL MEDICINE

## 2020-12-16 PROCEDURE — 96413 CHEMO IV INFUSION 1 HR: CPT

## 2020-12-16 PROCEDURE — 96377 APPLICATON ON-BODY INJECTOR: CPT | Mod: XS | Performed by: INTERNAL MEDICINE

## 2020-12-16 PROCEDURE — 96417 CHEMO IV INFUS EACH ADDL SEQ: CPT

## 2020-12-16 PROCEDURE — G0463 HOSPITAL OUTPT CLINIC VISIT: HCPCS | Mod: 25

## 2020-12-16 PROCEDURE — 96375 TX/PRO/DX INJ NEW DRUG ADDON: CPT

## 2020-12-16 PROCEDURE — 96372 THER/PROPH/DIAG INJ SC/IM: CPT | Performed by: INTERNAL MEDICINE

## 2020-12-16 PROCEDURE — 250N000011 HC RX IP 250 OP 636: Performed by: INTERNAL MEDICINE

## 2020-12-16 PROCEDURE — 99443 PR PHYSICIAN TELEPHONE EVALUATION 21-30 MIN: CPT | Mod: 95 | Performed by: INTERNAL MEDICINE

## 2020-12-16 RX ORDER — SODIUM CHLORIDE 9 MG/ML
1000 INJECTION, SOLUTION INTRAVENOUS CONTINUOUS PRN
Status: CANCELLED
Start: 2020-12-16

## 2020-12-16 RX ORDER — HEPARIN SODIUM (PORCINE) LOCK FLUSH IV SOLN 100 UNIT/ML 100 UNIT/ML
5 SOLUTION INTRAVENOUS
Status: DISCONTINUED | OUTPATIENT
Start: 2020-12-16 | End: 2020-12-16 | Stop reason: HOSPADM

## 2020-12-16 RX ORDER — DIPHENHYDRAMINE HYDROCHLORIDE 50 MG/ML
50 INJECTION INTRAMUSCULAR; INTRAVENOUS
Status: CANCELLED
Start: 2020-12-16

## 2020-12-16 RX ORDER — ALBUTEROL SULFATE 0.83 MG/ML
2.5 SOLUTION RESPIRATORY (INHALATION)
Status: CANCELLED | OUTPATIENT
Start: 2020-12-16

## 2020-12-16 RX ORDER — LORAZEPAM 2 MG/ML
0.5 INJECTION INTRAMUSCULAR EVERY 4 HOURS PRN
Status: CANCELLED
Start: 2020-12-16

## 2020-12-16 RX ORDER — EPINEPHRINE 1 MG/ML
0.3 INJECTION, SOLUTION INTRAMUSCULAR; SUBCUTANEOUS EVERY 5 MIN PRN
Status: CANCELLED | OUTPATIENT
Start: 2020-12-16

## 2020-12-16 RX ORDER — HEPARIN SODIUM (PORCINE) LOCK FLUSH IV SOLN 100 UNIT/ML 100 UNIT/ML
5 SOLUTION INTRAVENOUS
Status: CANCELLED | OUTPATIENT
Start: 2020-12-16

## 2020-12-16 RX ORDER — DIPHENHYDRAMINE HCL 25 MG
50 CAPSULE ORAL ONCE
Status: CANCELLED
Start: 2020-12-16

## 2020-12-16 RX ORDER — NALOXONE HYDROCHLORIDE 0.4 MG/ML
.1-.4 INJECTION, SOLUTION INTRAMUSCULAR; INTRAVENOUS; SUBCUTANEOUS
Status: CANCELLED | OUTPATIENT
Start: 2020-12-16

## 2020-12-16 RX ORDER — MEPERIDINE HYDROCHLORIDE 25 MG/ML
25 INJECTION INTRAMUSCULAR; INTRAVENOUS; SUBCUTANEOUS EVERY 30 MIN PRN
Status: CANCELLED | OUTPATIENT
Start: 2020-12-16

## 2020-12-16 RX ORDER — METHYLPREDNISOLONE SODIUM SUCCINATE 125 MG/2ML
125 INJECTION, POWDER, LYOPHILIZED, FOR SOLUTION INTRAMUSCULAR; INTRAVENOUS
Status: CANCELLED
Start: 2020-12-16

## 2020-12-16 RX ORDER — ALBUTEROL SULFATE 90 UG/1
1-2 AEROSOL, METERED RESPIRATORY (INHALATION)
Status: CANCELLED
Start: 2020-12-16

## 2020-12-16 RX ORDER — HEPARIN SODIUM,PORCINE 10 UNIT/ML
5 VIAL (ML) INTRAVENOUS
Status: CANCELLED | OUTPATIENT
Start: 2020-12-16

## 2020-12-16 RX ADMIN — PEGFILGRASTIM 6 MG: KIT SUBCUTANEOUS at 15:49

## 2020-12-16 RX ADMIN — PACLITAXEL 331 MG: 6 INJECTION, SOLUTION INTRAVENOUS at 12:40

## 2020-12-16 RX ADMIN — DIPHENHYDRAMINE HYDROCHLORIDE 50 MG: 50 INJECTION INTRAMUSCULAR; INTRAVENOUS at 11:39

## 2020-12-16 RX ADMIN — DEXAMETHASONE SODIUM PHOSPHATE: 10 INJECTION, SOLUTION INTRAMUSCULAR; INTRAVENOUS at 10:57

## 2020-12-16 RX ADMIN — FAMOTIDINE 20 MG: 20 INJECTION, SOLUTION INTRAVENOUS at 11:20

## 2020-12-16 RX ADMIN — SODIUM CHLORIDE 200 MG: 9 INJECTION, SOLUTION INTRAVENOUS at 11:55

## 2020-12-16 RX ADMIN — Medication 5 ML: at 16:27

## 2020-12-16 RX ADMIN — SODIUM CHLORIDE 250 ML: 9 INJECTION, SOLUTION INTRAVENOUS at 10:57

## 2020-12-16 RX ADMIN — CARBOPLATIN 450 MG: 10 INJECTION, SOLUTION INTRAVENOUS at 15:46

## 2020-12-16 ASSESSMENT — PAIN SCALES - GENERAL
PAINLEVEL: NO PAIN (0)
PAINLEVEL: NO PAIN (0)

## 2020-12-16 NOTE — PROGRESS NOTES
Infusion Nursing Note:  Derrick Benitez presents today for C2D1 Keytruday, Taxol, Carboplatin.    Patient seen by provider today: No   present during visit today: Not Applicable.    Note: See Dr Monsivais note from today-pt will have dose increase with treatment today. .    Intravenous Access:  Implanted Port.    Treatment Conditions:  Lab Results   Component Value Date    HGB 13.1 12/15/2020     Lab Results   Component Value Date    WBC 6.0 12/15/2020      Lab Results   Component Value Date    ANEU 4.2 12/15/2020     Lab Results   Component Value Date     12/15/2020      Lab Results   Component Value Date     12/15/2020                   Lab Results   Component Value Date    POTASSIUM 4.3 12/15/2020           Lab Results   Component Value Date    MAG 2.1 10/30/2014            Lab Results   Component Value Date    CR 0.91 12/15/2020                   Lab Results   Component Value Date    ANTHONY 8.2 12/15/2020                Lab Results   Component Value Date    BILITOTAL 0.4 12/15/2020           Lab Results   Component Value Date    ALBUMIN 3.0 12/15/2020                    Lab Results   Component Value Date    ALT 17 12/15/2020           Lab Results   Component Value Date    AST 13 12/15/2020       Results reviewed, labs MET treatment parameters, ok to proceed with treatment.  BSA 2.06.  ONPRO  Was placed on patient's: left side of abdomen.    Was placed at 4 PM    ONPRO injector device Lot number: [id number]    Patient education included: what patient can expect after application, what colored lights mean on the device, when to remove device, when and where to call with questions or issues and all patients questions answered.    Patient tolerated administration well.      Post Infusion Assessment:  Patient tolerated infusion without incident.  Blood return noted pre and post infusion.  Site patent and intact, free from redness, edema or discomfort.  No evidence of extravasations.  Access discontinued per  protocol.       Discharge Plan:   Discharge instructions reviewed with: Patient.  Patient and/or family verbalized understanding of discharge instructions and all questions answered.  Copy of AVS reviewed with patient and/or family.  Patient will return as scheduled for next appointment.  Patient discharged in stable condition accompanied by: self.  Departure Mode: Ambulatory.    Zohreh Edward RN

## 2020-12-16 NOTE — LETTER
"    12/16/2020         RE: Derrick Benitez  [address]  [place] [address]        Dear Colleague,    Thank you for referring your patient, Derrick Benitez, to the [facility] [place]. Please see a copy of my visit note below.    Derrick Benitez is a 80 year old male who is being evaluated via a billable telephone visit.      The patient has been notified of following:     \"This telephone visit will be conducted via a call between you and your physician/provider. We have found that certain health care needs can be provided without the need for a physical exam.  This service lets us provide the care you need with a short phone conversation.  If a prescription is necessary we can send it directly to your pharmacy.  If lab work is needed we can place an order for that and you can then stop by our lab to have the test done at a later time.    Telephone visits are billed at different rates depending on your insurance coverage. During this emergency period, for some insurers they may be billed the same as an in-person visit.  Please reach out to your insurance provider with any questions.    If during the course of the call the physician/provider feels a telephone visit is not appropriate, you will not be charged for this service.\"    Patient has given verbal consent for Telephone visit?  Yes    What phone number would you like to be contacted at? # [phone]    How [facility]    Phone call duration:        Zuleyma Dong CMA          Tel Visit  Virtual VISIT time is 25 minutes, spent in dicussion and review patient's cancer  and treatment history, labs and images results, symptom managment, further treatment recommendations, follow up plan.           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   The pain is 5-6/10, the pain is on and off.   Denies weight loss, or hemoptysis.     CT in ER found large right lower lobe lung mass 5.0 x 4.1 cm causing narrowing of a segmental branch of the right lower lobe pulmonary artery without occlusion. No enlarged lymph nodes.    He was offered Tessalon and Norco.  He has much improved pain and cough.    9/18/2020 FNA on RLL mass is Positive for malignancy, favor non-small cell carcinoma, The few remaining tumor cells in the histologic sections preferentially stain positively for p40, a finding which favors squamous cell carcinoma, although this sampling may not be representative.  Not enough tissue for NGS or further study.     Brain MRI 9/2020 is negative.   PET found bone mets. MRI brain is negative.     He has stage c stage IV disease.         INTERVAL HISTORY:   He had 17 teeth extraction, which delayed the chemo start. He made informed decision to proceed with first line chemo immno therapy with carbo /taxol/jkeytruda end of Nov 2020.       PAST MEDICAL HISTORY  Hypertension  type II diabetes   CKD    MEDICATIONS/ALLERY:  Reviewed in Epic system.        SOCIAL HISTORY:    He quit smoking 17 yrs ago, he smoked 3 pack per day for 40 years. Deny ETOY use      FAMILY HISTORY:    Denies any FH of cancer.       REVIEW OF SYSTEMS:   He has minor heart burn post C1.  Wheezing is less, off inhaler, for which he is happy about.   Scapular pain, no not narcotic.   Minimal cough with clear secretion.        PHYSICAL EXAMINATION ATTAINABLE DURING TELEPHONE VISIT:  CONTSTITUAIONL: Sounded  pleasant, not in acute distress.   NEURO: Oriented to time, person, and places. Good memory   RESPIRATORY: talk nl, no sob during conversation. No cough.  PSYCHIATRIC: Normal mood and affect. Good memory. Proper  insight and judgement.   THE REST OF A COMPREHENSIVE PHYSICIAL EXAM IS DEFERRED DUE TO COVID-19 PUBLIC HEALTH EMERGENCY RELATED PHONE VISIT RESCTRICTION.      CURRENT LAB DATA REVIEWED TODAY:  Cbc diff/cmp are fine. TSH slight high, T4 nl.     9/18/2020 FNA ON RLL mass is Positive for malignancy, favor non-small cell carcinoma, The few remaining tumor   cells in the histologic sections preferentially stain positively for p40, a finding which favors squamous cell carcinoma, although this sampling may not be representative.    cmp and cbc diff in 9/2020 are fine        CURRENT IMAGING REVIEWED TODAY:  PET 9/2020   1. Hypermetabolic mass in the right lower lobe measures 4.7 x 3.9 cm (series 5, image 210) and demonstrates a maximum SUV of 14.6. No lymphadenopathy.  2. Bone mets: Focal hypermetabolism associated with  subtle area of lysis in the left scapula has a maximum SUV of 14.9. Hypermetabolic right seventh rib activity in a lytic bone lesion posterior to the right lower lobe lung mass (series 5, image 201) demonstrates a maximum SUV of 12.7 and is associated with fracture. Lytic hypermetabolic lesion at the inferior tip of the right scapula. Another small hypermetabolic lesion present in the superior aspect of the left iliac bone    Brain MRI 9/2020 is negative.    CT chest 9/7/2020 found no PE. Right lower lobe lung mass 5.0 x 4.1 cm causing narrowing of a segmental branch  of the right lower lobe pulmonary artery without occlusion. No  enlarged lymph nodes.         OLD DATA REVIEWED TODAY WITH SUMMARY  CXR 2017 - A small region of patchy opacities in the left lung base is nonspecific      ASSESSMENT AND PLAN:  1. At age 80, he is found to have RLL mass 4-5 cm.  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  Tx if for palliation not for cure.     He had 17 teeth extraction, which delayed the chemo start. He made informed decision to proceed with first line chemo immno therapy with carbo /taxol/jkeytruda end of Nov 2020. C1 started  out lower dose due to age to test tolerability.   Today C2 will dose up carbo to AUC 4.5, and taxol to 160 mg/m2    He needs to be monitored closely during this intense tx.     Plan 4 cycles of chemo and restaging, likely immuno maintenance therapy after.     He is informed to follow-up with Dr. Reed after. He would like to keep Sabi as his nurse.       2. Bone meta.   Due to his extensive dental issues with recent 17 teeth extraction, will hold off zometa.        3. Mild right scapular pain.   He is not no narcotic. We discussed the option of alternating tylenol and NSAID.      4. Cough.   Advice Tessalon capsule. He rarely uses it.   He uses claritin at night.       5. Reflux symptoms.   Advice to start noreen.           Again, thank you for allowing me to participate in the care of your patient.        Sincerely,        Juliana Monsivais MD, MD

## 2020-12-16 NOTE — PROGRESS NOTES
"Derrick Benitez is a 80 year old male who is being evaluated via a billable telephone visit.      The patient has been notified of following:     \"This telephone visit will be conducted via a call between you and your physician/provider. We have found that certain health care needs can be provided without the need for a physical exam.  This service lets us provide the care you need with a short phone conversation.  If a prescription is necessary we can send it directly to your pharmacy.  If lab work is needed we can place an order for that and you can then stop by our lab to have the test done at a later time.    Telephone visits are billed at different rates depending on your insurance coverage. During this emergency period, for some insurers they may be billed the same as an in-person visit.  Please reach out to your insurance provider with any questions.    If during the course of the call the physician/provider feels a telephone visit is not appropriate, you will not be charged for this service.\"    Patient has given verbal consent for Telephone visit?  Yes    What phone number would you like to be contacted at? # [phone]    How would you like to obtain your AVS? MyChart    Phone call duration:        Zuleyma Dong CMA        "

## 2020-12-16 NOTE — PROGRESS NOTES
Tel Visit  Virtual VISIT time is 25 minutes, spent in dicussion and review patient's cancer  and treatment history, labs and images results, symptom managment, further treatment recommendations, follow up plan.           ONCOLOGY FOLLOW UP VISIT      PATIENT NAME: Derrick Benitez   MRN# [id number]     Date of Visit: Dec 16, 2020     YOB: 1940       REASON FOR VISIT/CC:    Dx RLL NSCLC 9/2020 at age 80    HISTORY OF ONCOLOGY ILLNESS:    At age 80, on 9/7/2020 he presented to ER for new right upper back pain for a few days, wheezing on and off for years (has allergy), chronic coughing for years.   The pain is 5-6/10, the pain is on and off.   Denies weight loss, or hemoptysis.     CT in ER found large right lower lobe lung mass 5.0 x 4.1 cm causing narrowing of a segmental branch of the right lower lobe pulmonary artery without occlusion. No enlarged lymph nodes.    He was offered Tessalon and Norco.  He has much improved pain and cough.    9/18/2020 FNA on RLL mass is Positive for malignancy, favor non-small cell carcinoma, The few remaining tumor cells in the histologic sections preferentially stain positively for p40, a finding which favors squamous cell carcinoma, although this sampling may not be representative.  Not enough tissue for NGS or further study.     Brain MRI 9/2020 is negative.   PET found bone mets. MRI brain is negative.     He has stage c stage IV disease.         INTERVAL HISTORY:   He had 17 teeth extraction, which delayed the chemo start. He made informed decision to proceed with first line chemo immno therapy with carbo /taxol/jkeytruda end of Nov 2020.       PAST MEDICAL HISTORY  Hypertension  type II diabetes   CKD    MEDICATIONS/ALLERY:  Reviewed in Epic system.        SOCIAL HISTORY:    He quit smoking 17 yrs ago, he smoked 3 pack per day for 40 years. Deny ETOY use      FAMILY HISTORY:    Denies any FH of cancer.       REVIEW OF SYSTEMS:   He has minor heart burn post  C1.  Wheezing is less, off inhaler, for which he is happy about.   Scapular pain, no not narcotic.   Minimal cough with clear secretion.        PHYSICAL EXAMINATION ATTAINABLE DURING TELEPHONE VISIT:  CONTSTITUAIONL: Sounded  pleasant, not in acute distress.   NEURO: Oriented to time, person, and places. Good memory   RESPIRATORY: talk nl, no sob during conversation. No cough.  PSYCHIATRIC: Normal mood and affect. Good memory. Proper insight and judgement.   THE REST OF A COMPREHENSIVE PHYSICIAL EXAM IS DEFERRED DUE TO COVID-19 PUBLIC HEALTH EMERGENCY RELATED PHONE VISIT RESCTRICTION.      CURRENT LAB DATA REVIEWED TODAY:  Cbc diff/cmp are fine. TSH slight high, T4 nl.     9/18/2020 FNA ON RLL mass is Positive for malignancy, favor non-small cell carcinoma, The few remaining tumor   cells in the histologic sections preferentially stain positively for p40, a finding which favors squamous cell carcinoma, although this sampling may not be representative.    cmp and cbc diff in 9/2020 are fine        CURRENT IMAGING REVIEWED TODAY:  PET 9/2020   1. Hypermetabolic mass in the right lower lobe measures 4.7 x 3.9 cm (series 5, image 210) and demonstrates a maximum SUV of 14.6. No lymphadenopathy.  2. Bone mets: Focal hypermetabolism associated with  subtle area of lysis in the left scapula has a maximum SUV of 14.9. Hypermetabolic right seventh rib activity in a lytic bone lesion posterior to the right lower lobe lung mass (series 5, image 201) demonstrates a maximum SUV of 12.7 and is associated with fracture. Lytic hypermetabolic lesion at the inferior tip of the right scapula. Another small hypermetabolic lesion present in the superior aspect of the left iliac bone    Brain MRI 9/2020 is negative.    CT chest 9/7/2020 found no PE. Right lower lobe lung mass 5.0 x 4.1 cm causing narrowing of a segmental branch  of the right lower lobe pulmonary artery without occlusion. No  enlarged lymph nodes.         OLD DATA REVIEWED  TODAY WITH SUMMARY  CXR 2017 - A small region of patchy opacities in the left lung base is nonspecific      ASSESSMENT AND PLAN:  1. At age 80, he is found to have RLL mass 4-5 cm.  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  Tx if for palliation not for cure.     He had 17 teeth extraction, which delayed the chemo start. He made informed decision to proceed with first line chemo immno therapy with carbo /taxol/jkeytruda end of Nov 2020. C1 started out lower dose due to age to test tolerability.   Today C2 will dose up carbo to AUC 4.5, and taxol to 160 mg/m2    He needs to be monitored closely during this intense tx.     Plan 4 cycles of chemo and restaging, likely immuno maintenance therapy after.     He is informed to follow-up with Dr. Reed after. He would like to keep Sabi as his nurse.       2. Bone meta.   Due to his extensive dental issues with recent 17 teeth extraction, will hold off zometa.        3. Mild right scapular pain.   He is not no narcotic. We discussed the option of alternating tylenol and NSAID.      4. Cough.   Advice Tessalon capsule. He rarely uses it.   He uses claritin at night.       5. Reflux symptoms.   Advice to start noreen.

## 2020-12-16 NOTE — PATIENT INSTRUCTIONS
Your On-body Neulasta Injector was applied to your abdomen at 4:00pm.  At approximately 7:00pm on 12/17, your On-body Injector will beep to let you know your dose delivery will begin in 2 minutes.  Your medication will be delivered over the next 45 minutes.  You can remove your Injector at 8:00pm.  Please make sure your Injector has a solid green light or has turned off prior to removing the device.  Please contact your provider at [phone] with questions or concerns.

## 2020-12-16 NOTE — LETTER
"    12/16/2020         RE: Derrick Benitez  [address]  [facility] [address]        Dear Colleague,    Thank you for referring your patient, Derrick Benitez, to the [facility] [place]. Please see a copy of my visit note below.    Derrick Benitez is a 80 year old male who is being evaluated via a billable telephone visit.      The patient has been notified of following:     \"This telephone visit will be conducted via a call between you and your physician/provider. We have found that certain health care needs can be provided without the need for a physical exam.  This service lets us provide the care you need with a short phone conversation.  If a prescription is necessary we can send it directly to your pharmacy.  If lab work is needed we can place an order for that and you can then stop by our lab to have the test done at a later time.    Telephone visits are billed at different rates depending on your insurance coverage. During this emergency period, for some insurers they may be billed the same as an in-person visit.  Please reach out to your insurance provider with any questions.    If during the course of the call the physician/provider feels a telephone visit is not appropriate, you will not be charged for this service.\"    Patient has given verbal consent for Telephone visit?  Yes    What phone number would you like to be contacted at? # [phone]    How would you like to obtain your [facility]? [facility]    Phone call duration:        Zuleyma Dong CMA          Tel Visit  Virtual VISIT time is 25 minutes, spent in dicussion and review patient's cancer  and treatment history, labs and images results, symptom managment, further treatment recommendations, follow up plan.           ONCOLOGY FOLLOW UP VISIT      PATIENT NAME: Derrick Benitez   MRN# [id number]     Date of Visit: Dec 16, 2020     YOB: 1940       REASON FOR VISIT/CC:    Dx RLL NSCLC 9/2020 at age 80    HISTORY OF " ONCOLOGY ILLNESS:    At age 80, on 9/7/2020 he presented to ER for new right upper back pain for a few days, wheezing on and off for years (has allergy), chronic coughing for years.   The pain is 5-6/10, the pain is on and off.   Denies weight loss, or hemoptysis.     CT in ER found large right lower lobe lung mass 5.0 x 4.1 cm causing narrowing of a segmental branch of the right lower lobe pulmonary artery without occlusion. No enlarged lymph nodes.    He was offered Tessalon and Norco.  He has much improved pain and cough.    9/18/2020 FNA on RLL mass is Positive for malignancy, favor non-small cell carcinoma, The few remaining tumor cells in the histologic sections preferentially stain positively for p40, a finding which favors squamous cell carcinoma, although this sampling may not be representative.  Not enough tissue for NGS or further study.     Brain MRI 9/2020 is negative.   PET found bone mets. MRI brain is negative.     He has stage c stage IV disease.         INTERVAL HISTORY:   He had 17 teeth extraction, which delayed the chemo start. He made informed decision to proceed with first line chemo immno therapy with carbo /taxol/jkeytruda end of Nov 2020.       PAST MEDICAL HISTORY  Hypertension  type II diabetes   CKD    MEDICATIONS/ALLERY:  Reviewed in Epic system.        SOCIAL HISTORY:    He quit smoking 17 yrs ago, he smoked 3 pack per day for 40 years. Deny ETOY use      FAMILY HISTORY:    Denies any FH of cancer.       REVIEW OF SYSTEMS:   He has minor heart burn post C1.  Wheezing is less, off inhaler, for which he is happy about.   Scapular pain, no not narcotic.   Minimal cough with clear secretion.        PHYSICAL EXAMINATION ATTAINABLE DURING TELEPHONE VISIT:  CONTSTITUAIONL: Sounded  pleasant, not in acute distress.   NEURO: Oriented to time, person, and places. Good memory   RESPIRATORY: talk nl, no sob during conversation. No cough.  PSYCHIATRIC: Normal mood and affect. Good memory. Proper  insight and judgement.   THE REST OF A COMPREHENSIVE PHYSICIAL EXAM IS DEFERRED DUE TO COVID-19 PUBLIC HEALTH EMERGENCY RELATED PHONE VISIT RESCTRICTION.      CURRENT LAB DATA REVIEWED TODAY:  Cbc diff/cmp are fine. TSH slight high, T4 nl.     9/18/2020 FNA ON RLL mass is Positive for malignancy, favor non-small cell carcinoma, The few remaining tumor   cells in the histologic sections preferentially stain positively for p40, a finding which favors squamous cell carcinoma, although this sampling may not be representative.    cmp and cbc diff in 9/2020 are fine        CURRENT IMAGING REVIEWED TODAY:  PET 9/2020   1. Hypermetabolic mass in the right lower lobe measures 4.7 x 3.9 cm (series 5, image 210) and demonstrates a maximum SUV of 14.6. No lymphadenopathy.  2. Bone mets: Focal hypermetabolism associated with  subtle area of lysis in the left scapula has a maximum SUV of 14.9. Hypermetabolic right seventh rib activity in a lytic bone lesion posterior to the right lower lobe lung mass (series 5, image 201) demonstrates a maximum SUV of 12.7 and is associated with fracture. Lytic hypermetabolic lesion at the inferior tip of the right scapula. Another small hypermetabolic lesion present in the superior aspect of the left iliac bone    Brain MRI 9/2020 is negative.    CT chest 9/7/2020 found no PE. Right lower lobe lung mass 5.0 x 4.1 cm causing narrowing of a segmental branch  of the right lower lobe pulmonary artery without occlusion. No  enlarged lymph nodes.         OLD DATA REVIEWED TODAY WITH SUMMARY  CXR 2017 - A small region of patchy opacities in the left lung base is nonspecific      ASSESSMENT AND PLAN:  1. At age 80, he is found to have RLL mass 4-5 cm.  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  Tx if for palliation not for cure.     He had 17 teeth extraction, which delayed the chemo start. He made informed decision to proceed with first line chemo immno therapy with carbo /taxol/jkeytruda end of Nov 2020. C1 started  out lower dose due to age to test tolerability.   Today C2 will dose up carbo to AUC 4.5, and taxol to 160 mg/m2    He needs to be monitored closely during this intense tx.     Plan 4 cycles of chemo and restaging, likely immuno maintenance therapy after.     He is informed to follow-up with Dr. Reed after. He would like to keep Sabi as his nurse.       2. Bone meta.   Due to his extensive dental issues with recent 17 teeth extraction, will hold off zometa.        3. Mild right scapular pain.   He is not no narcotic. We discussed the option of alternating tylenol and NSAID.      4. Cough.   Advice Tessalon capsule. He rarely uses it.   He uses claritin at night.       5. Reflux symptoms.   Advice to start noreen.           Again, thank you for allowing me to participate in the care of your patient.        Sincerely,        Juliana Monsivais MD, MD

## 2020-12-17 ENCOUNTER — CARE COORDINATION (OUTPATIENT)
Dept: ONCOLOGY | Facility: CLINIC | Age: 80
End: 2020-12-17

## 2020-12-17 NOTE — PROGRESS NOTES
Late Entry-  Met with Derrick 12/16/20 During chemotherapy infusion and discussed options for Hair pieces for him.   Provided him a printed list of options for him from Susie in [place].

## 2020-12-18 ENCOUNTER — PATIENT OUTREACH (OUTPATIENT)
Dept: ONCOLOGY | Facility: CLINIC | Age: 80
End: 2020-12-18

## 2020-12-18 NOTE — PROGRESS NOTES
Pt was in clinic 12/16 for C2D1 Keytruday, Taxol, Carboplatin.     Writer contacted patient to see how he's feeling after his infusion appointment.     Patient reports he was drinking too much Gatorade and that was causing elevated BG. He decreased Gatorade intake, but then felt dizzy. He is now feeling good with BG within normal range.     Encouraged patient to continue good fluid intake. Denies fever, N/V, or any other symptoms. Will call if any new questions or concerns.     Maddison Hutchison, DADAN, RN, PHN, OCN  Oncology Care Coordinator  [facility]

## 2020-12-22 ENCOUNTER — TELEPHONE (OUTPATIENT)
Dept: ONCOLOGY | Facility: CLINIC | Age: 80
End: 2020-12-22

## 2020-12-22 DIAGNOSIS — C34.31 MALIGNANT NEOPLASM OF LOWER LOBE OF RIGHT LUNG (H): Primary | ICD-10-CM

## 2020-12-22 RX ORDER — OXYCODONE HYDROCHLORIDE 5 MG/1
5 TABLET ORAL EVERY 6 HOURS PRN
Qty: 12 TABLET | Refills: 0 | Status: SHIPPED | OUTPATIENT
Start: 2020-12-22 | End: 2020-12-25

## 2020-12-22 NOTE — TELEPHONE ENCOUNTER
Reviewed with Derrick. Palliative care order placed for pain control.   Message sent to schedulers, palliative care RNCC to help arrange.

## 2020-12-22 NOTE — TELEPHONE ENCOUNTER
"Derrick called stating after his chemo last Wednesday he had a tougher time with Neuropathy and bone/ muscle aches and pains.   Pain control- We had previously spoken about Alternating tylenol / Advil. He stated that did not help this time around and he took 5 mg oxycodone that he had from when he had all of his teeth extracted.   This worked well and his wanting a refill.\"5 mg oxycodone - 1/2-1 tab every 6 hours as needed for pain\"  Per Bottle instructions.    Constipation- He is currently taking 1 stool softener / day. Advised him to take 1-2 in the morning and 1-2 in the evening and taper back when he has some regularity. Also advised him on Miralax.     Will route to Dr. Kc to see if he can fill the oxycodone until he establishes care with Dr. Larsen on 1/6/21.  [facility] [employer]/ Alex  "

## 2020-12-23 ENCOUNTER — TELEPHONE (OUTPATIENT)
Dept: ONCOLOGY | Facility: CLINIC | Age: 80
End: 2020-12-23

## 2020-12-23 NOTE — TELEPHONE ENCOUNTER
----- Message from Sabi Tan RN sent at 12/22/2020  4:06 PM CST -----  Regarding: palliative  Hello ,  I placed an order for palliative care ( pain control) for Derrick. - Per De    He is aware.   Can someone please help him arrange this?  Thanks  Sabi

## 2020-12-28 NOTE — TELEPHONE ENCOUNTER
RECORDS STATUS - ALL OTHER DIAGNOSIS      RECORDS RECEIVED FROM: [facility]   DATE RECEIVED: 1/14/2021   NOTES STATUS DETAILS   OFFICE NOTE from referring provider     OFFICE NOTE from medical oncologist Complete 12/16/2020, 11/11/2020 Virtual Visit Malignant Neoplasm of lower lobe of lungs    Oncology Visit 10/7/2020   Visit Malignant Neoplasm of lower lobe of lungs    Oncology Visit- Right Lower Lung Mass   DISCHARGE SUMMARY from hospital Complete Epic- 11/24/2020 Malignant Neoplasm of lower lobe of right lung     DISCHARGE REPORT from the ER     OPERATIVE REPORT Complete 9/18/2020 Lung Biopsy (See notes below)    MEDICATION LIST Complete [facility]   CLINICAL TRIAL TREATMENTS TO DATE     LABS     PATHOLOGY REPORTS Complete Internal Lung Biopsy in [facility] 9/18/2020   A,B: Lung, right, fine needle aspiration (FNA):   - Positive for malignancy, favor non-small cell carcinoma.    ANYTHING RELATED TO DIAGNOSIS Complete Labs last updated on 12/15/2020     Next Generation Sequencing 10/7/2020    GENONOMIC TESTING     TYPE:     IMAGING (NEED IMAGES & REPORT)     CT SCANS Complete CT Lung 9/18/2020  CT Chest Pulmonary 9/7/2020    MRI Complete MRI Brain 9/26/2020   Xray Chest Complete 9/18/2020, 10/10/2017, 10/28/2014   IR Chest Complete 11/24/2020   MAMMO     ULTRASOUND     PET Complete PET Oncology 10/5/2020

## 2021-01-05 ENCOUNTER — OFFICE VISIT (OUTPATIENT)
Dept: INFUSION THERAPY | Facility: CLINIC | Age: 81
End: 2021-01-05
Attending: INTERNAL MEDICINE
Payer: MEDICARE

## 2021-01-05 ENCOUNTER — HOSPITAL ENCOUNTER (OUTPATIENT)
Facility: CLINIC | Age: 81
Setting detail: SPECIMEN
Discharge: HOME | End: 2021-01-05
Attending: INTERNAL MEDICINE | Admitting: INTERNAL MEDICINE
Payer: MEDICARE

## 2021-01-05 DIAGNOSIS — Z51.11 ENCOUNTER FOR ANTINEOPLASTIC CHEMOTHERAPY: Primary | ICD-10-CM

## 2021-01-05 DIAGNOSIS — C34.31 MALIGNANT NEOPLASM OF LOWER LOBE OF RIGHT LUNG (H): ICD-10-CM

## 2021-01-05 LAB
ALBUMIN SERPL-MCNC: 3.1 G/DL (ref 3.4–5)
ALP SERPL-CCNC: 128 U/L (ref 40–150)
ALT SERPL W P-5'-P-CCNC: 19 U/L (ref 0–70)
ANION GAP SERPL CALCULATED.3IONS-SCNC: 4 MMOL/L (ref 3–14)
AST SERPL W P-5'-P-CCNC: 14 U/L (ref 0–45)
BASOPHILS # BLD AUTO: 0 10E9/L (ref 0–0.2)
BASOPHILS NFR BLD AUTO: 0.3 %
BILIRUB SERPL-MCNC: 0.4 MG/DL (ref 0.2–1.3)
BUN SERPL-MCNC: 12 MG/DL (ref 7–30)
CALCIUM SERPL-MCNC: 7.6 MG/DL (ref 8.5–10.1)
CHLORIDE SERPL-SCNC: 111 MMOL/L (ref 94–109)
CO2 SERPL-SCNC: 23 MMOL/L (ref 20–32)
CREAT SERPL-MCNC: 0.8 MG/DL (ref 0.66–1.25)
DIFFERENTIAL METHOD BLD: ABNORMAL
EOSINOPHIL # BLD AUTO: 0.1 10E9/L (ref 0–0.7)
EOSINOPHIL NFR BLD AUTO: 0.8 %
ERYTHROCYTE [DISTWIDTH] IN BLOOD BY AUTOMATED COUNT: 14.4 % (ref 10–15)
GFR SERPL CREATININE-BSD FRML MDRD: 84 ML/MIN/{1.73_M2}
GLUCOSE SERPL-MCNC: 111 MG/DL (ref 70–99)
HCT VFR BLD AUTO: 37.9 % (ref 40–53)
HGB BLD-MCNC: 12.9 G/DL (ref 13.3–17.7)
IMM GRANULOCYTES # BLD: 0.1 10E9/L (ref 0–0.4)
IMM GRANULOCYTES NFR BLD: 0.8 %
LYMPHOCYTES # BLD AUTO: 1.1 10E9/L (ref 0.8–5.3)
LYMPHOCYTES NFR BLD AUTO: 16.7 %
MCH RBC QN AUTO: 31.2 PG (ref 26.5–33)
MCHC RBC AUTO-ENTMCNC: 34 G/DL (ref 31.5–36.5)
MCV RBC AUTO: 92 FL (ref 78–100)
MONOCYTES # BLD AUTO: 0.5 10E9/L (ref 0–1.3)
MONOCYTES NFR BLD AUTO: 8.3 %
NEUTROPHILS # BLD AUTO: 4.6 10E9/L (ref 1.6–8.3)
NEUTROPHILS NFR BLD AUTO: 73.1 %
NRBC # BLD AUTO: 0 10*3/UL
NRBC BLD AUTO-RTO: 0 /100
PLATELET # BLD AUTO: 132 10E9/L (ref 150–450)
POTASSIUM SERPL-SCNC: 4.3 MMOL/L (ref 3.4–5.3)
PROT SERPL-MCNC: 6.7 G/DL (ref 6.8–8.8)
RBC # BLD AUTO: 4.14 10E12/L (ref 4.4–5.9)
SODIUM SERPL-SCNC: 138 MMOL/L (ref 133–144)
TSH SERPL DL<=0.005 MIU/L-ACNC: 0.56 MU/L (ref 0.4–4)
WBC # BLD AUTO: 6.3 10E9/L (ref 4–11)

## 2021-01-05 PROCEDURE — 250N000011 HC RX IP 250 OP 636: Performed by: INTERNAL MEDICINE

## 2021-01-05 PROCEDURE — 36591 DRAW BLOOD OFF VENOUS DEVICE: CPT

## 2021-01-05 PROCEDURE — 80053 COMPREHEN METABOLIC PANEL: CPT | Performed by: INTERNAL MEDICINE

## 2021-01-05 PROCEDURE — 85025 COMPLETE CBC W/AUTO DIFF WBC: CPT | Performed by: INTERNAL MEDICINE

## 2021-01-05 PROCEDURE — 84443 ASSAY THYROID STIM HORMONE: CPT | Performed by: INTERNAL MEDICINE

## 2021-01-05 RX ORDER — HEPARIN SODIUM (PORCINE) LOCK FLUSH IV SOLN 100 UNIT/ML 100 UNIT/ML
5 SOLUTION INTRAVENOUS
Status: CANCELLED | OUTPATIENT
Start: 2021-01-05

## 2021-01-05 RX ORDER — HEPARIN SODIUM (PORCINE) LOCK FLUSH IV SOLN 100 UNIT/ML 100 UNIT/ML
5 SOLUTION INTRAVENOUS
Status: DISCONTINUED | OUTPATIENT
Start: 2021-01-05 | End: 2021-01-05 | Stop reason: HOSPADM

## 2021-01-05 RX ORDER — HEPARIN SODIUM,PORCINE 10 UNIT/ML
5 VIAL (ML) INTRAVENOUS
Status: CANCELLED | OUTPATIENT
Start: 2021-01-05

## 2021-01-05 RX ADMIN — Medication 5 ML: at 14:42

## 2021-01-05 NOTE — PROGRESS NOTES
Nursing Note:  Derrick Benitez presents today for port labs  Patient seen by provider today: No   present during visit today: Not Applicable.    Note: N/A.    Intravenous Access:  Labs drawn without difficulty.  Implanted Port.    Discharge Plan:   Patient was sent to lobby for discharge.    Alberto Merida RN

## 2021-01-05 NOTE — LETTER
1/5/2021         RE: Derrick Benietz  [address]  [facility] [address]        Dear Colleague,    Thank you for referring your patient, Derrick Benitez, to the [facility]. Please see a copy of my visit note below.    Nursing Note:  Derrick Benitez presents today for port labs  Patient seen by provider today: No   present during visit today: Not Applicable.    Note: N/A.    Intravenous Access:  Labs drawn without difficulty.  Implanted Port.    Discharge Plan:   Patient was sent to [facility] for discharge.    Alberto Merida, GREGORIO                Again, thank you for allowing me to participate in the care of your patient.        Sincerely,         [facility]

## 2021-01-06 ENCOUNTER — ONCOLOGY VISIT (OUTPATIENT)
Dept: ONCOLOGY | Facility: CLINIC | Age: 81
End: 2021-01-06
Attending: INTERNAL MEDICINE
Payer: MEDICARE

## 2021-01-06 ENCOUNTER — HOSPITAL ENCOUNTER (OUTPATIENT)
Facility: CLINIC | Age: 81
Setting detail: SPECIMEN
Discharge: HOME | End: 2021-01-06
Attending: INTERNAL MEDICINE | Admitting: INTERNAL MEDICINE
Payer: MEDICARE

## 2021-01-06 ENCOUNTER — INFUSION THERAPY VISIT (OUTPATIENT)
Dept: INFUSION THERAPY | Facility: CLINIC | Age: 81
End: 2021-01-06
Attending: INTERNAL MEDICINE
Payer: MEDICARE

## 2021-01-06 VITALS
RESPIRATION RATE: 16 BRPM | HEIGHT: 67 IN | DIASTOLIC BLOOD PRESSURE: 66 MMHG | WEIGHT: 195.38 LBS | SYSTOLIC BLOOD PRESSURE: 112 MMHG | HEART RATE: 92 BPM | OXYGEN SATURATION: 96 % | TEMPERATURE: 97.5 F | BODY MASS INDEX: 30.66 KG/M2

## 2021-01-06 VITALS
RESPIRATION RATE: 16 BRPM | WEIGHT: 195.38 LBS | TEMPERATURE: 97.5 F | OXYGEN SATURATION: 96 % | BODY MASS INDEX: 30.6 KG/M2 | HEART RATE: 92 BPM | SYSTOLIC BLOOD PRESSURE: 112 MMHG | DIASTOLIC BLOOD PRESSURE: 66 MMHG

## 2021-01-06 DIAGNOSIS — Z51.11 ENCOUNTER FOR ANTINEOPLASTIC CHEMOTHERAPY: ICD-10-CM

## 2021-01-06 DIAGNOSIS — C34.31 MALIGNANT NEOPLASM OF LOWER LOBE OF RIGHT LUNG (H): Primary | ICD-10-CM

## 2021-01-06 DIAGNOSIS — C34.31 MALIGNANT NEOPLASM OF LOWER LOBE OF RIGHT LUNG (H): ICD-10-CM

## 2021-01-06 DIAGNOSIS — Z51.11 ENCOUNTER FOR ANTINEOPLASTIC CHEMOTHERAPY: Primary | ICD-10-CM

## 2021-01-06 PROCEDURE — 96413 CHEMO IV INFUSION 1 HR: CPT

## 2021-01-06 PROCEDURE — 999N001149 HC STATISTIC GUARDANT360 TUMOR SEQ: Performed by: INTERNAL MEDICINE

## 2021-01-06 PROCEDURE — 96372 THER/PROPH/DIAG INJ SC/IM: CPT | Performed by: INTERNAL MEDICINE

## 2021-01-06 PROCEDURE — 96415 CHEMO IV INFUSION ADDL HR: CPT

## 2021-01-06 PROCEDURE — 250N000011 HC RX IP 250 OP 636: Performed by: INTERNAL MEDICINE

## 2021-01-06 PROCEDURE — U0003 INFECTIOUS AGENT DETECTION BY NUCLEIC ACID (DNA OR RNA); SEVERE ACUTE RESPIRATORY SYNDROME CORONAVIRUS 2 (SARS-COV-2) (CORONAVIRUS DISEASE [COVID-19]), AMPLIFIED PROBE TECHNIQUE, MAKING USE OF HIGH THROUGHPUT TECHNOLOGIES AS DESCRIBED BY CMS-2020-01-R: HCPCS | Performed by: INTERNAL MEDICINE

## 2021-01-06 PROCEDURE — U0005 INFEC AGEN DETEC AMPLI PROBE: HCPCS | Performed by: INTERNAL MEDICINE

## 2021-01-06 PROCEDURE — 258N000003 HC RX IP 258 OP 636: Performed by: INTERNAL MEDICINE

## 2021-01-06 PROCEDURE — 96417 CHEMO IV INFUS EACH ADDL SEQ: CPT

## 2021-01-06 PROCEDURE — 96377 APPLICATON ON-BODY INJECTOR: CPT | Mod: XS | Performed by: INTERNAL MEDICINE

## 2021-01-06 PROCEDURE — 99215 OFFICE O/P EST HI 40 MIN: CPT | Performed by: INTERNAL MEDICINE

## 2021-01-06 PROCEDURE — G0463 HOSPITAL OUTPT CLINIC VISIT: HCPCS | Mod: 25

## 2021-01-06 PROCEDURE — 96367 TX/PROPH/DG ADDL SEQ IV INF: CPT

## 2021-01-06 RX ORDER — ZOLEDRONIC ACID 0.04 MG/ML
4 INJECTION, SOLUTION INTRAVENOUS ONCE
Status: COMPLETED | OUTPATIENT
Start: 2021-01-06 | End: 2021-01-06

## 2021-01-06 RX ORDER — ZOLEDRONIC ACID 0.04 MG/ML
4 INJECTION, SOLUTION INTRAVENOUS ONCE
Status: CANCELLED
Start: 2021-01-06 | End: 2021-01-06

## 2021-01-06 RX ORDER — DIPHENHYDRAMINE HCL 25 MG
50 CAPSULE ORAL ONCE
Status: CANCELLED
Start: 2021-01-06

## 2021-01-06 RX ORDER — ALBUTEROL SULFATE 90 UG/1
1-2 AEROSOL, METERED RESPIRATORY (INHALATION)
Status: CANCELLED
Start: 2021-01-06

## 2021-01-06 RX ORDER — HEPARIN SODIUM (PORCINE) LOCK FLUSH IV SOLN 100 UNIT/ML 100 UNIT/ML
5 SOLUTION INTRAVENOUS
Status: DISCONTINUED | OUTPATIENT
Start: 2021-01-06 | End: 2021-01-06 | Stop reason: HOSPADM

## 2021-01-06 RX ORDER — METHYLPREDNISOLONE SODIUM SUCCINATE 125 MG/2ML
125 INJECTION, POWDER, LYOPHILIZED, FOR SOLUTION INTRAMUSCULAR; INTRAVENOUS
Status: CANCELLED
Start: 2021-01-06

## 2021-01-06 RX ORDER — DIPHENHYDRAMINE HYDROCHLORIDE 50 MG/ML
50 INJECTION INTRAMUSCULAR; INTRAVENOUS
Status: CANCELLED
Start: 2021-01-06

## 2021-01-06 RX ORDER — ALBUTEROL SULFATE 0.83 MG/ML
2.5 SOLUTION RESPIRATORY (INHALATION)
Status: CANCELLED | OUTPATIENT
Start: 2021-01-06

## 2021-01-06 RX ORDER — EPINEPHRINE 1 MG/ML
0.3 INJECTION, SOLUTION INTRAMUSCULAR; SUBCUTANEOUS EVERY 5 MIN PRN
Status: CANCELLED | OUTPATIENT
Start: 2021-01-06

## 2021-01-06 RX ORDER — HEPARIN SODIUM,PORCINE 10 UNIT/ML
5 VIAL (ML) INTRAVENOUS
Status: CANCELLED | OUTPATIENT
Start: 2021-01-06

## 2021-01-06 RX ORDER — MEPERIDINE HYDROCHLORIDE 25 MG/ML
25 INJECTION INTRAMUSCULAR; INTRAVENOUS; SUBCUTANEOUS EVERY 30 MIN PRN
Status: CANCELLED | OUTPATIENT
Start: 2021-01-06

## 2021-01-06 RX ORDER — LORAZEPAM 2 MG/ML
0.5 INJECTION INTRAMUSCULAR EVERY 4 HOURS PRN
Status: CANCELLED
Start: 2021-01-06

## 2021-01-06 RX ORDER — HEPARIN SODIUM (PORCINE) LOCK FLUSH IV SOLN 100 UNIT/ML 100 UNIT/ML
5 SOLUTION INTRAVENOUS
Status: CANCELLED | OUTPATIENT
Start: 2021-01-06

## 2021-01-06 RX ORDER — SODIUM CHLORIDE 9 MG/ML
1000 INJECTION, SOLUTION INTRAVENOUS CONTINUOUS PRN
Status: CANCELLED
Start: 2021-01-06

## 2021-01-06 RX ORDER — NALOXONE HYDROCHLORIDE 0.4 MG/ML
.1-.4 INJECTION, SOLUTION INTRAMUSCULAR; INTRAVENOUS; SUBCUTANEOUS
Status: CANCELLED | OUTPATIENT
Start: 2021-01-06

## 2021-01-06 RX ADMIN — DEXAMETHASONE SODIUM PHOSPHATE: 10 INJECTION, SOLUTION INTRAMUSCULAR; INTRAVENOUS at 10:15

## 2021-01-06 RX ADMIN — PEGFILGRASTIM 6 MG: KIT SUBCUTANEOUS at 14:56

## 2021-01-06 RX ADMIN — FAMOTIDINE 20 MG: 20 INJECTION, SOLUTION INTRAVENOUS at 10:32

## 2021-01-06 RX ADMIN — DIPHENHYDRAMINE HYDROCHLORIDE 50 MG: 50 INJECTION INTRAMUSCULAR; INTRAVENOUS at 10:49

## 2021-01-06 RX ADMIN — ZOLEDRONIC ACID 4 MG: 0.04 INJECTION, SOLUTION INTRAVENOUS at 09:56

## 2021-01-06 RX ADMIN — CARBOPLATIN 400 MG: 10 INJECTION, SOLUTION INTRAVENOUS at 14:52

## 2021-01-06 RX ADMIN — PACLITAXEL 311 MG: 6 INJECTION, SOLUTION INTRAVENOUS at 11:48

## 2021-01-06 RX ADMIN — SODIUM CHLORIDE 200 MG: 9 INJECTION, SOLUTION INTRAVENOUS at 11:09

## 2021-01-06 RX ADMIN — Medication 5 ML: at 15:27

## 2021-01-06 RX ADMIN — SODIUM CHLORIDE 250 ML: 9 INJECTION, SOLUTION INTRAVENOUS at 09:56

## 2021-01-06 ASSESSMENT — PAIN SCALES - GENERAL
PAINLEVEL: NO PAIN (0)
PAINLEVEL: NO PAIN (0)

## 2021-01-06 ASSESSMENT — MIFFLIN-ST. JEOR: SCORE: 1549.99

## 2021-01-06 NOTE — PROGRESS NOTES
Infusion Nursing Note:  Derrick Benitez presents today for C3D1 keytruda, zometa, taxol and carboplatin  Patient seen by provider today: Yes: Dr. Larsen   present during visit today: Not Applicable.    Note: N/A.    Intravenous Access:  Implanted Port.    Treatment Conditions:  Lab Results   Component Value Date    HGB 12.9 01/05/2021     Lab Results   Component Value Date    WBC 6.3 01/05/2021      Lab Results   Component Value Date    ANEU 4.6 01/05/2021     Lab Results   Component Value Date     01/05/2021      Lab Results   Component Value Date     01/05/2021                   Lab Results   Component Value Date    POTASSIUM 4.3 01/05/2021           Lab Results   Component Value Date    MAG 2.1 10/30/2014            Lab Results   Component Value Date    CR 0.80 01/05/2021                   Lab Results   Component Value Date    ANTHONY 7.6 01/05/2021                Lab Results   Component Value Date    BILITOTAL 0.4 01/05/2021           Lab Results   Component Value Date    ALBUMIN 3.1 01/05/2021                    Lab Results   Component Value Date    ALT 19 01/05/2021           Lab Results   Component Value Date    AST 14 01/05/2021       Results reviewed, labs MET treatment parameters, ok to proceed with treatment.      Post Infusion Assessment:  Patient tolerated infusion without incident.  Blood return noted pre and post infusion.  Site patent and intact, free from redness, edema or discomfort.  No evidence of extravasations.  Access discontinued per protocol.     ONPRO  Was placed on patient's: left side of abdomen.    Was placed at 3 PM    ONPRO injector device Lot number:     Patient education included: all patients questions answered and that Neulasta administration will occur at 6pm on 1/7.    Patient tolerated administration well.        Discharge Plan:   Copy of AVS reviewed with patient and/or family.  Patient will return as prev ginette for next appointment.  Patient discharged in  stable condition accompanied by: self.  Departure Mode: Ambulatory.    Alberto Merida RN

## 2021-01-06 NOTE — PATIENT INSTRUCTIONS
Your On-body Neulasta Injector was applied to your left upper abdomen at 3:00 pm.  At approximately 6:00 pm on 1/7, your On-body Injector will beep to let you know your dose delivery will begin in 2 minutes.  Your medication will be delivered over the next 45 minutes.  You can remove your Injector at 7:00 pm.  Please make sure your Injector has a solid green light or has turned off prior to removing the device.  Please contact your provider at [phone] with questions or concerns.

## 2021-01-06 NOTE — LETTER
"    1/6/2021         RE: Derrick Benitez  [address]  [facility] [address]        Dear Colleague,    Thank you for referring your patient, Derrick Benitez, to the [facility]. Please see a copy of my visit note below.    Oncology Rooming Note    January 6, 2021 8:56 AM   Derrick Benitez is a 80 year old male who presents for:    Chief Complaint   Patient presents with     Oncology Clinic Visit     Initial Vitals: /66   Pulse 92   Temp 97.5  F (36.4  C) (Oral)   Resp 16   Wt 88.6 kg (195 lb 6.1 oz)   SpO2 96%   BMI 30.60 kg/m   Estimated body mass index is 30.6 kg/m  as calculated from the following:    Height as of 11/24/20: 1.702 m (5' 7\").    Weight as of this encounter: 88.6 kg (195 lb 6.1 oz). Body surface area is 2.05 meters squared.  No Pain (0) Comment: Data Unavailable   No LMP for male patient.  Allergies reviewed: Yes  Medications reviewed: Yes    Medications: Medication refills not needed today.  Pharmacy name entered into EPIC:    [facility] [place], [place] - [address]  [facility] [address] [place], [place] - [address] [place] [address]    Clinical concerns: no      Elke Bravo, [facility]              Visit Date:   01/06/2021      ONCOLOGIC HISTORY:  Mr. Benitez is a gentleman with non-small cell lung cancer, favor squamous cell carcinoma.   -NGS and PDL staining could not be done because of small sample.   1.  CT chest angiogram on 09/07/2020 revealed right lower lobe mass.   2.  CT-guided biopsy was done on 09/18/2020.  Pathology revealed non-small cell lung cancer, favor squamous cell carcinoma.  Sample size was small.  PDL staining and NGS panel could not be done.   3.  Brain MRI on 09/26/2020 did not reveal any brain metastasis.   4.  PET scan on 10/05/2020 revealed hypermetabolic right lung mass and multiple hypermetabolic bone lesions.   5.  The patient started on carboplatin and Taxol on 11/25/2020.   -Pembrolizumab added with cycle 2.    "   SUBJECTIVE:  Mr. Benitez is an 80-year-old gentleman with metastatic non-small cell lung cancer.  He is here for cycle 3 of carboplatin, Taxol and pembrolizumab.      Overall, he is tolerating treatment well.  Denies any headache.  No dizziness.  No neck pain.  No chest pain.  No shortness of breath at rest.  Gets short of breath on exertion.  No cough.  No hemoptysis.  No abdominal pain.  No nausea or vomiting.  Appetite has been good.  No urinary complaints.  No bowel problem.  No diarrhea.  No fever or chills.  No night sweats.  After each chemo, he gets some pain in his feet, which resolves within a few days.      All other review of systems negative.      PHYSICAL EXAMINATION:   GENERAL:  Alert and oriented x 3.   VITAL SIGNS:  Reviewed.  ECOG PS of 1.     EYES:  No icterus.   THROAT:  No ulcer. No thrush.   NECK:  Supple. No lymphadenopathy. No thyromegaly.   AXILLAE:  No lymphadenopathy.   LUNGS:  Good air entry bilaterally.  No crackles or wheezing.   HEART:  Regular.  No murmur.   GI: Abdomen is soft.  Nontender. No mass.   EXTREMITIES:  No pedal edema.  No calf swelling or tenderness.   SKIN:  No rash.      LABORATORY DATA:  Reviewed.      ASSESSMENT:   1.  An 80-year-old gentleman with metastatic non-small cell lung cancer.   2.  Thrombocytopenia from chemotherapy.   3.  Normocytic anemia from chemotherapy.   4.  Low protein and albumin.   5.  Hypocalcemia from Zometa   6.  Pain in the feet after chemotherapy. It is secondary to chemotherapy.      PLAN:   1. Patient had been seeing Dr. Monsivais.  He is transferring care to me.       I had a long discussion with the patient.  All the investigations including CT scan, MRI and PET scan reviewed.  Biopsy report was reviewed in detail.  I explained to him that he has metastatic non-small cell lung cancer.  Because of the small size biopsy specimen, further testing could not be done.      2.  Discussed regarding doing a [id number] on the blood.  He is agreeable for  it.       3.  Discussed regarding treatment.  Clinically, he is responding.  The patient says that his shortness of breath is better.  He does not have any pain.  He will continue on carboplatin, Taxol and pembrolizumab.  Side effects reviewed.      Discussed regarding neuropathy.  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  He will let us know if it gets worse.  At this time, it does not bother him.  He can walk without any problem.      4.  The patient will continue on Zometa for bone metastasis.  He is tolerating it well.  He has been seen by a dentist      5.  CBC reveals anemia and thrombocytopenia.  I told him it can get worse with further chemotherapy. We will continue to monitor it.      6.  I will see him with next cycle of chemotherapy.  Advised him to call with any questions or concerns.  We will plan on getting CT scan after cycle 4.         ANETA MCKEON MD             D: 2021   T: 2021   MT: ALLISON      Name:     ASAF WRIGHT   MRN:      [id number]        Account:      [id number]   :      1940           Visit Date:   2021      Document: [id number]      This office note has been dictated.          Again, thank you for allowing me to participate in the care of your patient.        Sincerely,        Aneta Mckeon MD

## 2021-01-06 NOTE — PATIENT INSTRUCTIONS
1. Continue chemotherapy, immunotherapy and zometa.  2. Send blood for guardant 360.  3. See me with next chemotherapy.

## 2021-01-06 NOTE — PROGRESS NOTES
"Oncology Rooming Note    January 6, 2021 8:56 AM   Derrick Benitez is a 80 year old male who presents for:    Chief Complaint   Patient presents with     Oncology Clinic Visit     Initial Vitals: /66   Pulse 92   Temp 97.5  F (36.4  C) (Oral)   Resp 16   Wt 88.6 kg (195 lb 6.1 oz)   SpO2 96%   BMI 30.60 kg/m   Estimated body mass index is 30.6 kg/m  as calculated from the following:    Height as of 11/24/20: 1.702 m (5' 7\").    Weight as of this encounter: 88.6 kg (195 lb 6.1 oz). Body surface area is 2.05 meters squared.  No Pain (0) Comment: Data Unavailable   No LMP for male patient.  Allergies reviewed: Yes  Medications reviewed: Yes    Medications: Medication refills not needed today.  Pharmacy name entered into EPIC:    [place], [place] - [address]  [facility] [address] [place] [facility] [address] [place] [address]    Clinical concerns: no      Elke Bravo CMA            "

## 2021-01-07 LAB
SARS-COV-2 RNA RESP QL NAA+PROBE: NOT DETECTED
SPECIMEN SOURCE: NORMAL

## 2021-01-10 ENCOUNTER — HEALTH MAINTENANCE LETTER (OUTPATIENT)
Age: 81
End: 2021-01-10

## 2021-01-10 NOTE — PROGRESS NOTES
Visit Date:   01/06/2021      ONCOLOGIC HISTORY:  Mr. Benitez is a gentleman with non-small cell lung cancer, favor squamous cell carcinoma.   -NGS and PDL staining could not be done because of small sample.   1.  CT chest angiogram on 09/07/2020 revealed right lower lobe mass.   2.  CT-guided biopsy was done on 09/18/2020.  Pathology revealed non-small cell lung cancer, favor squamous cell carcinoma.  Sample size was small.  PDL staining and NGS panel could not be done.   3.  Brain MRI on 09/26/2020 did not reveal any brain metastasis.   4.  PET scan on 10/05/2020 revealed hypermetabolic right lung mass and multiple hypermetabolic bone lesions.   5.  The patient started on carboplatin and Taxol on 11/25/2020.   -Pembrolizumab added with cycle 2.      SUBJECTIVE:  Mr. Benitez is an 80-year-old gentleman with metastatic non-small cell lung cancer.  He is here for cycle 3 of carboplatin, Taxol and pembrolizumab.      Overall, he is tolerating treatment well.  Denies any headache.  No dizziness.  No neck pain.  No chest pain.  No shortness of breath at rest.  Gets short of breath on exertion.  No cough.  No hemoptysis.  No abdominal pain.  No nausea or vomiting.  Appetite has been good.  No urinary complaints.  No bowel problem.  No diarrhea.  No fever or chills.  No night sweats.  After each chemo, he gets some pain in his feet, which resolves within a few days.      All other review of systems negative.      PHYSICAL EXAMINATION:   GENERAL:  Alert and oriented x 3.   VITAL SIGNS:  Reviewed.  ECOG PS of 1.     EYES:  No icterus.   THROAT:  No ulcer. No thrush.   NECK:  Supple. No lymphadenopathy. No thyromegaly.   AXILLAE:  No lymphadenopathy.   LUNGS:  Good air entry bilaterally.  No crackles or wheezing.   HEART:  Regular.  No murmur.   GI: Abdomen is soft.  Nontender. No mass.   EXTREMITIES:  No pedal edema.  No calf swelling or tenderness.   SKIN:  No rash.      LABORATORY DATA:  Reviewed.      ASSESSMENT:   1.  An  80-year-old gentleman with metastatic non-small cell lung cancer.   2.  Thrombocytopenia from chemotherapy.   3.  Normocytic anemia from chemotherapy.   4.  Low protein and albumin.   5.  Hypocalcemia from Zometa   6.  Pain in the feet after chemotherapy. It is secondary to chemotherapy.      PLAN:   1. Patient had been seeing Dr. Monsivais.  He is transferring care to me.       I had a long discussion with the patient.  All the investigations including CT scan, MRI and PET scan reviewed.  Biopsy report was reviewed in detail.  I explained to him that he has metastatic non-small cell lung cancer.  Because of the small size biopsy specimen, further testing could not be done.      2.  Discussed regarding doing a [id number] on the blood.  He is agreeable for it.       3.  Discussed regarding treatment.  Clinically, he is responding.  The patient says that his shortness of breath is better.  He does not have any pain.  He will continue on carboplatin, Taxol and pembrolizumab.  Side effects reviewed.      Discussed regarding neuropathy.  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  He will let us know if it gets worse.  At this time, it does not bother him.  He can walk without any problem.      4.  The patient will continue on Zometa for bone metastasis.  He is tolerating it well.  He has been seen by a dentist      5.  CBC reveals anemia and thrombocytopenia.  I told him it can get worse with further chemotherapy. We will continue to monitor it.      6.  I will see him with next cycle of chemotherapy.  Advised him to call with any questions or concerns.  We will plan on getting CT scan after cycle 4.         ANETA MCKEON MD             D: 2021   T: 2021   MT: ALLISON      Name:     ASAF WRIGHT   MRN:      [id number]        Account:      [id number]   :      1940           Visit Date:   2021      Document: [id number]

## 2021-01-14 ENCOUNTER — ONCOLOGY VISIT (OUTPATIENT)
Dept: ONCOLOGY | Facility: CLINIC | Age: 81
End: 2021-01-14
Attending: HOSPITALIST
Payer: MEDICARE

## 2021-01-14 ENCOUNTER — PRE VISIT (OUTPATIENT)
Dept: ONCOLOGY | Facility: CLINIC | Age: 81
End: 2021-01-14

## 2021-01-14 ENCOUNTER — DOCUMENTATION ONLY (OUTPATIENT)
Dept: ONCOLOGY | Facility: CLINIC | Age: 81
End: 2021-01-14

## 2021-01-14 VITALS
BODY MASS INDEX: 30.3 KG/M2 | WEIGHT: 193.5 LBS | SYSTOLIC BLOOD PRESSURE: 131 MMHG | OXYGEN SATURATION: 95 % | RESPIRATION RATE: 16 BRPM | HEART RATE: 93 BPM | DIASTOLIC BLOOD PRESSURE: 68 MMHG | TEMPERATURE: 98.1 F

## 2021-01-14 DIAGNOSIS — C34.31 MALIGNANT NEOPLASM OF LOWER LOBE OF RIGHT LUNG (H): Primary | ICD-10-CM

## 2021-01-14 DIAGNOSIS — Z71.89 ADVANCE CARE PLANNING: ICD-10-CM

## 2021-01-14 DIAGNOSIS — G89.3 NEOPLASM RELATED PAIN: ICD-10-CM

## 2021-01-14 PROCEDURE — G0463 HOSPITAL OUTPT CLINIC VISIT: HCPCS

## 2021-01-14 PROCEDURE — 99205 OFFICE O/P NEW HI 60 MIN: CPT | Performed by: INTERNAL MEDICINE

## 2021-01-14 RX ORDER — CALCIUM CARBONATE 750 MG/1
750 TABLET, CHEWABLE ORAL DAILY
COMMUNITY
End: 2021-10-06

## 2021-01-14 ASSESSMENT — PAIN SCALES - GENERAL: PAINLEVEL: NO PAIN (0)

## 2021-01-14 NOTE — PROGRESS NOTES
"Oncology Rooming Note    January 14, 2021 9:22 AM   Derrick Benitez is a 80 year old male who presents for:    Chief Complaint   Patient presents with     Oncology Clinic Visit     Initial Vitals: /68   Pulse 93   Temp 98.1  F (36.7  C) (Oral)   Resp 16   Wt 87.8 kg (193 lb 8 oz)   SpO2 95%   BMI 30.30 kg/m   Estimated body mass index is 30.3 kg/m  as calculated from the following:    Height as of 1/6/21: 1.702 m (5' 7.01\").    Weight as of this encounter: 87.8 kg (193 lb 8 oz). Body surface area is 2.04 meters squared.  No Pain (0) Comment: Data Unavailable   No LMP for male patient.  Allergies reviewed: Yes  Medications reviewed: Yes    Medications: Medication refills not needed today.  Pharmacy name entered into [employer]:    [facility] [place] [address]    Clinical concerns: sleeping issues, eating issues      Elke Bravo, [facility]            "

## 2021-01-14 NOTE — LETTER
1/14/2021         RE: Derrick Benitez  [address]  [facility] [address]        Dear Colleague,    Thank you for referring your patient, Derrick Benitez, to the [facility] [place]. Please see a copy of my visit note below.    Palliative Care Outpatient Clinic    (This note was transcribed using voice recognition software. While I review and edit the transcription, I may miss errors, and the software sometimes does unexpected capitalizations and formatting that I miss. Please let me know of any serious mistranscriptions and I will addend this note.)    Patient ID:  Medical - He has NSCLC  - RLL mass dx 9/20, no PDL staining could be done on bx. Bone mets at time of dx.  Carbo, taxol, pembro x 11/2020.     Social - Lives with wife. Adult son in area; dtr out state; grandson in area. 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 No ANGIE hx. Low risk for harm from opioid therapy (rORT zero).     Care Planning - Reports he has HCD documents, not on our chart. Discussed prognosis and care planning 1/14/21 palliative visit.       History:    Hx from patient, Dr Larsen notes, Dr Monsivais notes, chart review    His major concern is pain    Pain: Has minimal tumor related pain.  Has though jonatan days 3-7 post chemo infusion painful cold neuropathy sx on bottom of feet going to knees bilat; feelings of tightness in knees. Has had some widespread joint pains but not after the 2nd cycle.   Tylenol - no help  Takes advil 1-2 x a day on the bad days which helps  Takes oxyIR 5mg at HS days ~3-5 post chemo which helps.     No side effects from opioids except constipation; now ok if he takes PEG which he does.     Reviewed other sx, all pretty mild. Long term poor sleep he reports, lifelong.     Mood and spirits good.    Lives with his wife who has chronic medical problems, frailty it sounds like.  He is indepdent in ADLs and IADLs. Has a sort of 'hobby' lawn mowing and clearing sidewalks of  snow which he still keeps up but has realized this season he can't lift the blower into his truck anymore.     He wants to discuss prognosis and reports not knowing the goals of his chemotherapy.     PE: /68   Pulse 93   Temp 98.1  F (36.7  C) (Oral)   Resp 16   Wt 87.8 kg (193 lb 8 oz)   SpO2 95%   BMI 30.30 kg/m     Wt Readings from Last 3 Encounters:   01/14/21 87.8 kg (193 lb 8 oz)   01/06/21 88.6 kg (195 lb 6.1 oz)   01/06/21 88.6 kg (195 lb 6.1 oz)     Gen alert, comfortable appearing, NAD.   Head NCAT.  Eyes anicteric without injection  Face symmetric, eyes conjugate  Mouth pink, moist appearing  Lungs unlabored, no cough, speaking full sentences  Skin no rashes or lesions evident on face/neck  Neuro Face symmetric, eyes conjugate; speech fluent.  Neuropsych exam normal including affect, sensorium, gross memory, thought processes, and fund of knowledge.       Data reviewed:  I reviewed recent labs and imaging, my comments:  Cr 0.8, Alb 3.1, Hgb 13    PET Oct - 5cm RLL mass, rib and bone mets; images reviewed today     database reviewed: as expected. #12 oxyIR on 12/22 last rx.      Impression & Recommendations:    81 yo man with metastatic NSCLCA complicated by pain, mostly CIPN/acute taxane pain syndrome.     He is managing pain adequately with advil and occasional oxycodone. Continue this. Dw him it may worsen and he needs to let Dr Larsen know if it's worsening. If it does worsen we can try SNRIs but holding off now given the general mildness of his sx and advanced age. I am happy to give him refills if he wants and gave him our number.    We discussed ACP a lot.  He asked about prognosis: I dw him treatments for NSCLC have improved significantly the last decade and there are now some patients living many years, however most still survive in the 1-2 year range and from a practical basis he should make preparations for that probability, although there's some chance he's in the group that  "respond to the newer treatments well and live for several years. He has completed a will, HCD, etc and does not think he needs to do anymore with that and feels his family is prepared, etc.     Follow-up 2 months. I let him know I am at [facility] covering for a colleague who's on leave; he wants to keep his care consolidated here and he will continue pal care follow-up at [facility] regardless of who he sees. He needs in person visits.     65 minutes spent on the date of the encounter doing chart review, history and exam, documentation and further activities as noted above.    Thank you for involving us in the patient's care.   Williams Kent MD / Palliative Medicine / Text me via [facility].        Oncology Rooming Note    January 14, 2021 9:22 AM   Derrick Benitez is a 80 year old male who presents for:    Chief Complaint   Patient presents with     Oncology Clinic Visit     Initial Vitals: /68   Pulse 93   Temp 98.1  F (36.7  C) (Oral)   Resp 16   Wt 87.8 kg (193 lb 8 oz)   SpO2 95%   BMI 30.30 kg/m   Estimated body mass index is 30.3 kg/m  as calculated from the following:    Height as of 1/6/21: 1.702 m (5' 7.01\").    Weight as of this encounter: 87.8 kg (193 lb 8 oz). Body surface area is 2.04 meters squared.  No Pain (0) Comment: Data Unavailable   No LMP for male patient.  Allergies reviewed: Yes  Medications reviewed: Yes    Medications: Medication refills not needed today.  Pharmacy name entered into [employer]:    [facility] [place] [address]    Clinical concerns: sleeping issues, eating issues      Elke Bravo, [facility]                Again, thank you for allowing me to participate in the care of your patient.        Sincerely,        Williams Kent MD    "

## 2021-01-14 NOTE — PROGRESS NOTES
Palliative Care Outpatient Clinic    (This note was transcribed using voice recognition software. While I review and edit the transcription, I may miss errors, and the software sometimes does unexpected capitalizations and formatting that I miss. Please let me know of any serious mistranscriptions and I will addend this note.)    Patient ID:  Medical - He has NSCLC  - RLL mass dx 9/20, no PDL staining could be done on bx. Bone mets at time of dx.  Carbo, taxol, pembro x 11/2020.     Social - Lives with wife. Adult son in area; dtr out state; grandson in area. 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 No ANGIE hx. Low risk for harm from opioid therapy (rORT zero).     Care Planning - Reports he has HCD documents, not on our chart. Discussed prognosis and care planning 1/14/21 palliative visit.       History:    Hx from patient, Dr Larsen notes, Dr Monsivais notes, chart review    His major concern is pain    Pain: Has minimal tumor related pain.  Has though jonatan days 3-7 post chemo infusion painful cold neuropathy sx on bottom of feet going to knees bilat; feelings of tightness in knees. Has had some widespread joint pains but not after the 2nd cycle.   Tylenol - no help  Takes advil 1-2 x a day on the bad days which helps  Takes oxyIR 5mg at HS days ~3-5 post chemo which helps.     No side effects from opioids except constipation; now ok if he takes PEG which he does.     Reviewed other sx, all pretty mild. Long term poor sleep he reports, lifelong.     Mood and spirits good.    Lives with his wife who has chronic medical problems, frailty it sounds like.  He is indepdent in ADLs and IADLs. 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     He wants to discuss prognosis and reports not knowing the goals of his chemotherapy.     PE: /68   Pulse 93   Temp 98.1  F (36.7  C)  (Oral)   Resp 16   Wt 87.8 kg (193 lb 8 oz)   SpO2 95%   BMI 30.30 kg/m     Wt Readings from Last 3 Encounters:   01/14/21 87.8 kg (193 lb 8 oz)   01/06/21 88.6 kg (195 lb 6.1 oz)   01/06/21 88.6 kg (195 lb 6.1 oz)     Gen alert, comfortable appearing, NAD.   Head NCAT.  Eyes anicteric without injection  Face symmetric, eyes conjugate  Mouth pink, moist appearing  Lungs unlabored, no cough, speaking full sentences  Skin no rashes or lesions evident on face/neck  Neuro Face symmetric, eyes conjugate; speech fluent.  Neuropsych exam normal including affect, sensorium, gross memory, thought processes, and fund of knowledge.       Data reviewed:  I reviewed recent labs and imaging, my comments:  Cr 0.8, Alb 3.1, Hgb 13    PET Oct - 5cm RLL mass, rib and bone mets; images reviewed today     database reviewed: as expected. #12 oxyIR on 12/22 last rx.      Impression & Recommendations:    79 yo man with metastatic NSCLCA complicated by pain, mostly CIPN/acute taxane pain syndrome.     He is managing pain adequately with advil and occasional oxycodone. Continue this. Dw him it may worsen and he needs to let Dr Larsen know if it's worsening. If it does worsen we can try SNRIs but holding off now given the general mildness of his sx and advanced age. I am happy to give him refills if he wants and gave him our number.    We discussed ACP a lot.  He asked about prognosis: I dw him treatments for NSCLC have improved significantly the last decade and there are now some patients living many years, however most still survive in the 1-2 year range and from a practical basis he should make preparations for that probability, although there's some chance he's in the group that respond to the newer treatments well and live for several years. He has completed a will, HCD, etc and does not think he needs to do anymore with that and feels his family is prepared, etc.     Follow-up 2 months. I let him know I am at [facility]  covering for a colleague who's on leave; he wants to keep his care consolidated here and he will continue pal care follow-up at [facility] regardless of who he sees. He needs in person visits.     65 minutes spent on the date of the encounter doing chart review, history and exam, documentation and further activities as noted above.    Thank you for involving us in the patient's care.   Williams Kent MD / Palliative Medicine / Text me via [facility].

## 2021-01-20 ENCOUNTER — TELEPHONE (OUTPATIENT)
Dept: ONCOLOGY | Facility: CLINIC | Age: 81
End: 2021-01-20

## 2021-01-20 NOTE — TELEPHONE ENCOUNTER
Derrick called wanting to know if he can get the covid shot at his pcp, wanted Dr. Araujo in put.     Also he did not take the dex pre-med before his last chemo. He did just fine. Wondering if he can continue without taking them in the future .   Will route to Dr. Trent and RNCC to get back to Derrick

## 2021-01-21 NOTE — TELEPHONE ENCOUNTER
Writer called and spoke with patient and informed him to take the dexamethasone evening prior to Taxotere and that it was ok to have the COVID19 vaccine.    Patient verbalized understanding.     Liz Flores RN

## 2021-01-25 LAB — LAB SCANNED RESULT: NORMAL

## 2021-01-26 ENCOUNTER — HOSPITAL ENCOUNTER (OUTPATIENT)
Facility: CLINIC | Age: 81
Setting detail: SPECIMEN
Discharge: HOME | End: 2021-01-26
Attending: INTERNAL MEDICINE | Admitting: INTERNAL MEDICINE
Payer: MEDICARE

## 2021-01-26 ENCOUNTER — OFFICE VISIT (OUTPATIENT)
Dept: INFUSION THERAPY | Facility: CLINIC | Age: 81
End: 2021-01-26
Attending: INTERNAL MEDICINE
Payer: MEDICARE

## 2021-01-26 DIAGNOSIS — C34.31 MALIGNANT NEOPLASM OF LOWER LOBE OF RIGHT LUNG (H): ICD-10-CM

## 2021-01-26 DIAGNOSIS — Z51.11 ENCOUNTER FOR ANTINEOPLASTIC CHEMOTHERAPY: Primary | ICD-10-CM

## 2021-01-26 LAB
ALBUMIN SERPL-MCNC: 3.1 G/DL (ref 3.4–5)
ALP SERPL-CCNC: 94 U/L (ref 40–150)
ALT SERPL W P-5'-P-CCNC: 22 U/L (ref 0–70)
ANION GAP SERPL CALCULATED.3IONS-SCNC: 3 MMOL/L (ref 3–14)
AST SERPL W P-5'-P-CCNC: 20 U/L (ref 0–45)
BASOPHILS # BLD AUTO: 0 10E9/L (ref 0–0.2)
BASOPHILS NFR BLD AUTO: 0.2 %
BILIRUB SERPL-MCNC: 0.3 MG/DL (ref 0.2–1.3)
BUN SERPL-MCNC: 15 MG/DL (ref 7–30)
CALCIUM SERPL-MCNC: 7.3 MG/DL (ref 8.5–10.1)
CHLORIDE SERPL-SCNC: 113 MMOL/L (ref 94–109)
CO2 SERPL-SCNC: 25 MMOL/L (ref 20–32)
CREAT SERPL-MCNC: 1.01 MG/DL (ref 0.66–1.25)
DIFFERENTIAL METHOD BLD: ABNORMAL
EOSINOPHIL # BLD AUTO: 0 10E9/L (ref 0–0.7)
EOSINOPHIL NFR BLD AUTO: 0.5 %
ERYTHROCYTE [DISTWIDTH] IN BLOOD BY AUTOMATED COUNT: 15.2 % (ref 10–15)
GFR SERPL CREATININE-BSD FRML MDRD: 69 ML/MIN/{1.73_M2}
GLUCOSE SERPL-MCNC: 108 MG/DL (ref 70–99)
HCT VFR BLD AUTO: 36.5 % (ref 40–53)
HGB BLD-MCNC: 12.2 G/DL (ref 13.3–17.7)
IMM GRANULOCYTES # BLD: 0 10E9/L (ref 0–0.4)
IMM GRANULOCYTES NFR BLD: 0.7 %
LYMPHOCYTES # BLD AUTO: 1.1 10E9/L (ref 0.8–5.3)
LYMPHOCYTES NFR BLD AUTO: 24.8 %
MCH RBC QN AUTO: 31.1 PG (ref 26.5–33)
MCHC RBC AUTO-ENTMCNC: 33.4 G/DL (ref 31.5–36.5)
MCV RBC AUTO: 93 FL (ref 78–100)
MONOCYTES # BLD AUTO: 0.4 10E9/L (ref 0–1.3)
MONOCYTES NFR BLD AUTO: 10 %
NEUTROPHILS # BLD AUTO: 2.8 10E9/L (ref 1.6–8.3)
NEUTROPHILS NFR BLD AUTO: 63.8 %
NRBC # BLD AUTO: 0 10*3/UL
NRBC BLD AUTO-RTO: 0 /100
PLATELET # BLD AUTO: 154 10E9/L (ref 150–450)
POTASSIUM SERPL-SCNC: 4.4 MMOL/L (ref 3.4–5.3)
PROT SERPL-MCNC: 6.3 G/DL (ref 6.8–8.8)
RBC # BLD AUTO: 3.92 10E12/L (ref 4.4–5.9)
SODIUM SERPL-SCNC: 141 MMOL/L (ref 133–144)
TSH SERPL DL<=0.005 MIU/L-ACNC: 0.44 MU/L (ref 0.4–4)
WBC # BLD AUTO: 4.3 10E9/L (ref 4–11)

## 2021-01-26 PROCEDURE — 80053 COMPREHEN METABOLIC PANEL: CPT | Performed by: INTERNAL MEDICINE

## 2021-01-26 PROCEDURE — 84443 ASSAY THYROID STIM HORMONE: CPT | Mod: GZ | Performed by: INTERNAL MEDICINE

## 2021-01-26 PROCEDURE — 36591 DRAW BLOOD OFF VENOUS DEVICE: CPT

## 2021-01-26 PROCEDURE — 250N000011 HC RX IP 250 OP 636: Performed by: INTERNAL MEDICINE

## 2021-01-26 PROCEDURE — 85025 COMPLETE CBC W/AUTO DIFF WBC: CPT | Performed by: INTERNAL MEDICINE

## 2021-01-26 RX ORDER — HEPARIN SODIUM,PORCINE 10 UNIT/ML
5 VIAL (ML) INTRAVENOUS
Status: CANCELLED | OUTPATIENT
Start: 2021-01-26

## 2021-01-26 RX ORDER — HEPARIN SODIUM (PORCINE) LOCK FLUSH IV SOLN 100 UNIT/ML 100 UNIT/ML
5 SOLUTION INTRAVENOUS
Status: CANCELLED | OUTPATIENT
Start: 2021-01-26

## 2021-01-26 RX ORDER — HEPARIN SODIUM (PORCINE) LOCK FLUSH IV SOLN 100 UNIT/ML 100 UNIT/ML
5 SOLUTION INTRAVENOUS
Status: DISCONTINUED | OUTPATIENT
Start: 2021-01-26 | End: 2021-01-26 | Stop reason: HOSPADM

## 2021-01-26 RX ADMIN — Medication 5 ML: at 14:26

## 2021-01-26 NOTE — PROGRESS NOTES
Infusion Nursing Note:  Derrick Benitez presents today for port labs.    Patient seen by provider today: No   present during visit today: Not Applicable.    Note: N/A.    Intravenous Access:  Labs drawn without difficulty.  Implanted Port.    Treatment Conditions:  Not Applicable. Labs pending at time of discharge.      Post Infusion Assessment:  Site patent and intact, free from redness, edema or discomfort.  No evidence of extravasations.  Access discontinued per protocol.       Discharge Plan:   Patient discharged in stable condition accompanied by: self.  Departure Mode: Ambulatory.    Jenny Manzo RN

## 2021-01-26 NOTE — LETTER
1/26/2021         RE: Derrick Benitez  [address]  [facility] [address]        Dear Colleague,    Thank you for referring your patient, Derrick Benitez, to the [facility]. Please see a copy of my visit note below.    Infusion Nursing Note:  Derrick Benitez presents today for port labs.    Patient seen by provider today: No   present during visit today: Not Applicable.    Note: N/A.    Intravenous Access:  Labs drawn without difficulty.  Implanted Port.    Treatment Conditions:  Not Applicable. Labs pending at time of discharge.      Post Infusion Assessment:  Site patent and intact, free from redness, edema or discomfort.  No evidence of extravasations.  Access discontinued per protocol.       Discharge Plan:   Patient discharged in stable condition accompanied by: self.  Departure Mode: Ambulatory.    Jenny Manzo RN          Again, thank you for allowing me to participate in the care of your patient.        Sincerely,         [facility]

## 2021-01-27 ENCOUNTER — ONCOLOGY VISIT (OUTPATIENT)
Dept: ONCOLOGY | Facility: CLINIC | Age: 81
End: 2021-01-27
Attending: INTERNAL MEDICINE
Payer: MEDICARE

## 2021-01-27 ENCOUNTER — INFUSION THERAPY VISIT (OUTPATIENT)
Dept: INFUSION THERAPY | Facility: CLINIC | Age: 81
End: 2021-01-27
Attending: INTERNAL MEDICINE
Payer: MEDICARE

## 2021-01-27 VITALS
OXYGEN SATURATION: 97 % | BODY MASS INDEX: 30.41 KG/M2 | DIASTOLIC BLOOD PRESSURE: 72 MMHG | TEMPERATURE: 97.7 F | SYSTOLIC BLOOD PRESSURE: 137 MMHG | RESPIRATION RATE: 16 BRPM | WEIGHT: 194.2 LBS | HEART RATE: 96 BPM

## 2021-01-27 DIAGNOSIS — C34.31 MALIGNANT NEOPLASM OF LOWER LOBE OF RIGHT LUNG (H): ICD-10-CM

## 2021-01-27 DIAGNOSIS — Z51.11 ENCOUNTER FOR ANTINEOPLASTIC CHEMOTHERAPY: Primary | ICD-10-CM

## 2021-01-27 PROCEDURE — 99215 OFFICE O/P EST HI 40 MIN: CPT | Performed by: INTERNAL MEDICINE

## 2021-01-27 PROCEDURE — 96377 APPLICATON ON-BODY INJECTOR: CPT | Mod: XS | Performed by: INTERNAL MEDICINE

## 2021-01-27 PROCEDURE — 96413 CHEMO IV INFUSION 1 HR: CPT

## 2021-01-27 PROCEDURE — 96372 THER/PROPH/DIAG INJ SC/IM: CPT | Performed by: INTERNAL MEDICINE

## 2021-01-27 PROCEDURE — 258N000003 HC RX IP 258 OP 636: Performed by: INTERNAL MEDICINE

## 2021-01-27 PROCEDURE — 96367 TX/PROPH/DG ADDL SEQ IV INF: CPT

## 2021-01-27 PROCEDURE — 250N000011 HC RX IP 250 OP 636: Performed by: INTERNAL MEDICINE

## 2021-01-27 PROCEDURE — 96375 TX/PRO/DX INJ NEW DRUG ADDON: CPT

## 2021-01-27 PROCEDURE — 96417 CHEMO IV INFUS EACH ADDL SEQ: CPT

## 2021-01-27 PROCEDURE — G0463 HOSPITAL OUTPT CLINIC VISIT: HCPCS | Mod: 25

## 2021-01-27 PROCEDURE — 96415 CHEMO IV INFUSION ADDL HR: CPT

## 2021-01-27 RX ORDER — HEPARIN SODIUM (PORCINE) LOCK FLUSH IV SOLN 100 UNIT/ML 100 UNIT/ML
5 SOLUTION INTRAVENOUS
Status: DISCONTINUED | OUTPATIENT
Start: 2021-01-27 | End: 2021-01-27 | Stop reason: HOSPADM

## 2021-01-27 RX ORDER — OXYCODONE HYDROCHLORIDE 5 MG/1
5 TABLET ORAL EVERY 6 HOURS PRN
Qty: 12 TABLET | Refills: 0 | COMMUNITY
Start: 2021-01-27 | End: 2021-02-10

## 2021-01-27 RX ORDER — MEPERIDINE HYDROCHLORIDE 25 MG/ML
25 INJECTION INTRAMUSCULAR; INTRAVENOUS; SUBCUTANEOUS EVERY 30 MIN PRN
Status: CANCELLED | OUTPATIENT
Start: 2021-01-27

## 2021-01-27 RX ORDER — HEPARIN SODIUM (PORCINE) LOCK FLUSH IV SOLN 100 UNIT/ML 100 UNIT/ML
5 SOLUTION INTRAVENOUS
Status: CANCELLED | OUTPATIENT
Start: 2021-01-27

## 2021-01-27 RX ORDER — EPINEPHRINE 1 MG/ML
0.3 INJECTION, SOLUTION INTRAMUSCULAR; SUBCUTANEOUS EVERY 5 MIN PRN
Status: CANCELLED | OUTPATIENT
Start: 2021-01-27

## 2021-01-27 RX ORDER — DIPHENHYDRAMINE HYDROCHLORIDE 50 MG/ML
50 INJECTION INTRAMUSCULAR; INTRAVENOUS
Status: CANCELLED
Start: 2021-01-27

## 2021-01-27 RX ORDER — LORAZEPAM 2 MG/ML
0.5 INJECTION INTRAMUSCULAR EVERY 4 HOURS PRN
Status: CANCELLED
Start: 2021-01-27

## 2021-01-27 RX ORDER — METHYLPREDNISOLONE SODIUM SUCCINATE 125 MG/2ML
125 INJECTION, POWDER, LYOPHILIZED, FOR SOLUTION INTRAMUSCULAR; INTRAVENOUS
Status: CANCELLED
Start: 2021-01-27

## 2021-01-27 RX ORDER — ALBUTEROL SULFATE 90 UG/1
1-2 AEROSOL, METERED RESPIRATORY (INHALATION)
Status: CANCELLED
Start: 2021-01-27

## 2021-01-27 RX ORDER — DIPHENHYDRAMINE HCL 25 MG
50 CAPSULE ORAL ONCE
Status: CANCELLED
Start: 2021-01-27

## 2021-01-27 RX ORDER — HEPARIN SODIUM,PORCINE 10 UNIT/ML
5 VIAL (ML) INTRAVENOUS
Status: CANCELLED | OUTPATIENT
Start: 2021-01-27

## 2021-01-27 RX ORDER — NALOXONE HYDROCHLORIDE 0.4 MG/ML
.1-.4 INJECTION, SOLUTION INTRAMUSCULAR; INTRAVENOUS; SUBCUTANEOUS
Status: CANCELLED | OUTPATIENT
Start: 2021-01-27

## 2021-01-27 RX ORDER — ONDANSETRON 8 MG/1
8 TABLET, FILM COATED ORAL EVERY 8 HOURS PRN
Qty: 20 TABLET | Refills: 3 | Status: SHIPPED | OUTPATIENT
Start: 2021-01-27 | End: 2021-03-31

## 2021-01-27 RX ORDER — DIPHENHYDRAMINE HCL 25 MG
50 CAPSULE ORAL ONCE
Status: DISCONTINUED | OUTPATIENT
Start: 2021-01-27 | End: 2021-01-27 | Stop reason: CLARIF

## 2021-01-27 RX ORDER — SODIUM CHLORIDE 9 MG/ML
1000 INJECTION, SOLUTION INTRAVENOUS CONTINUOUS PRN
Status: CANCELLED
Start: 2021-01-27

## 2021-01-27 RX ORDER — ALBUTEROL SULFATE 0.83 MG/ML
2.5 SOLUTION RESPIRATORY (INHALATION)
Status: CANCELLED | OUTPATIENT
Start: 2021-01-27

## 2021-01-27 RX ADMIN — CARBOPLATIN 350 MG: 10 INJECTION, SOLUTION INTRAVENOUS at 15:57

## 2021-01-27 RX ADMIN — DEXAMETHASONE SODIUM PHOSPHATE: 10 INJECTION, SOLUTION INTRAMUSCULAR; INTRAVENOUS at 11:04

## 2021-01-27 RX ADMIN — SODIUM CHLORIDE 200 MG: 9 INJECTION, SOLUTION INTRAVENOUS at 12:12

## 2021-01-27 RX ADMIN — FAMOTIDINE 20 MG: 20 INJECTION, SOLUTION INTRAVENOUS at 11:44

## 2021-01-27 RX ADMIN — Medication 5 ML: at 16:36

## 2021-01-27 RX ADMIN — DIPHENHYDRAMINE HYDROCHLORIDE 50 MG: 50 INJECTION INTRAMUSCULAR; INTRAVENOUS at 11:27

## 2021-01-27 RX ADMIN — SODIUM CHLORIDE 250 ML: 9 INJECTION, SOLUTION INTRAVENOUS at 11:04

## 2021-01-27 RX ADMIN — PACLITAXEL 311 MG: 6 INJECTION, SOLUTION INTRAVENOUS at 12:54

## 2021-01-27 RX ADMIN — PEGFILGRASTIM 6 MG: KIT SUBCUTANEOUS at 16:01

## 2021-01-27 ASSESSMENT — PAIN SCALES - GENERAL: PAINLEVEL: NO PAIN (0)

## 2021-01-27 NOTE — PATIENT INSTRUCTIONS
1. Continue chemotherapy, immunotherapy and zometa.   2. CT scan in 2 weeks.  3. See me with next chemotherapy.        No CT Order Yet, 1/27/21. Tm    No Ct order as of 1/28/21 tm

## 2021-01-27 NOTE — PROGRESS NOTES
"Oncology Rooming Note    January 27, 2021 9:55 AM   Derrick Benitez is a 80 year old male who presents for:    Chief Complaint   Patient presents with     Oncology Clinic Visit     Initial Vitals: /72   Pulse 96   Temp 97.7  F (36.5  C) (Oral)   Resp 16   Wt 88.1 kg (194 lb 3.2 oz)   SpO2 97%   BMI 30.41 kg/m   Estimated body mass index is 30.41 kg/m  as calculated from the following:    Height as of 1/6/21: 1.702 m (5' 7.01\").    Weight as of this encounter: 88.1 kg (194 lb 3.2 oz). Body surface area is 2.04 meters squared.  No Pain (0) Comment: Data Unavailable   No LMP for male patient.  Allergies reviewed: Yes  Medications reviewed: Yes    Medications: Medication refills not needed today.  Pharmacy name entered into EPIC:    [place], [place] - [address]  [facility] [address] [place], [place] - [address] [place], [place] [address]Y    Clinical concerns: no      Elke Bravo CMA            "

## 2021-01-27 NOTE — PROGRESS NOTES
Infusion Nursing Note:  Derrick Benitez presents today for C4D1 Keytruda, Taxol, Carboplatin.    Patient seen by provider today: Yes: Dr Larsen   present during visit today: Not Applicable.    Note: NA    Intravenous Access:  Implanted Port.    Treatment Conditions:  Lab Results   Component Value Date    HGB 12.2 01/26/2021     Lab Results   Component Value Date    WBC 4.3 01/26/2021      Lab Results   Component Value Date    ANEU 2.8 01/26/2021     Lab Results   Component Value Date     01/26/2021      Lab Results   Component Value Date     01/26/2021                   Lab Results   Component Value Date    POTASSIUM 4.4 01/26/2021           Lab Results   Component Value Date    MAG 2.1 10/30/2014            Lab Results   Component Value Date    CR 1.01 01/26/2021                   Lab Results   Component Value Date    ANTHONY 7.3 01/26/2021                Lab Results   Component Value Date    BILITOTAL 0.3 01/26/2021           Lab Results   Component Value Date    ALBUMIN 3.1 01/26/2021                    Lab Results   Component Value Date    ALT 22 01/26/2021           Lab Results   Component Value Date    AST 20 01/26/2021       Results reviewed, labs MET treatment parameters, ok to proceed with treatment.  2.04 BSA  Zometa due at next visit    Post Infusion Assessment:  Patient tolerated infusion without incident.  Blood return noted pre and post infusion.  Site patent and intact, free from redness, edema or discomfort.  No evidence of extravasations.  Access discontinued per protocol.     ONPRO  Was placed on patient's: left side of abdomen.    Was placed at 405 PM    ONPRO injector device Lot number: [id number]    Patient education included: what patient can expect after application, what colored lights mean on the device, when to remove device, when and where to call with questions or issues and all patients questions answered.    Patient tolerated administration well.    Discharge Plan:    Discharge instructions reviewed with: Patient.  Patient and/or family verbalized understanding of discharge instructions and all questions answered.  Copy of AVS reviewed with patient and/or family.  Patient will return as scheduled for next appointment.  Patient discharged in stable condition accompanied by: self.  Departure Mode: Ambulatory.    Zohreh Edward RN

## 2021-01-27 NOTE — PATIENT INSTRUCTIONS
Your On-body Neulasta Injector was applied to your abdomen at 4:05pm.  At approximately 7:05pm on 1/28/21, your On-body Injector will beep to let you know your dose delivery will begin in 2 minutes.  Your medication will be delivered over the next 45 minutes.  You can remove your Injector at 8:05pm.  Please make sure your Injector has a solid green light or has turned off prior to removing the device.  Please contact your provider at [phone] with questions or concerns.

## 2021-01-27 NOTE — Clinical Note
"    1/27/2021         RE: Derrick Benitez  [address]  [facility] [address]        Dear Colleague,    Thank you for referring your patient, Derrick Benitez, to the [facility]CER [facility]. Please see a copy of my visit note below.    Oncology Rooming Note    January 27, 2021 9:55 AM   Derrick Benitez is a 80 year old male who presents for:    Chief Complaint   Patient presents with     Oncology Clinic Visit     Initial Vitals: /72   Pulse 96   Temp 97.7  F (36.5  C) (Oral)   Resp 16   Wt 88.1 kg (194 lb 3.2 oz)   SpO2 97%   BMI 30.41 kg/m   Estimated body mass index is 30.41 kg/m  as calculated from the following:    Height as of 1/6/21: 1.702 m (5' 7.01\").    Weight as of this encounter: 88.1 kg (194 lb 3.2 oz). Body surface area is 2.04 meters squared.  No Pain (0) Comment: Data Unavailable   No LMP for male patient.  Allergies reviewed: Yes  Medications reviewed: Yes    Medications: Medication refills not needed today.  Pharmacy name entered into EPIC:    [facility] [place], [place] - [address]  [facility] [address] [place], [place] - [address] [place] [address] [address]    Clinical concerns: no      Elke Bravo, [facility]              Visit Date:   01/27/2021     ONCOLOGIC HISTORY:  Mr. Benitez is a gentleman with non-small cell lung cancer, favor squamous cell carcinoma.   -NGS and PDL staining could not be done because of small sample.   -Guardant 360 does not reveal any actionable alteration.  1.  CT chest angiogram on 09/07/2020 revealed right lower lobe mass.   2.  CT-guided biopsy was done on 09/18/2020.  Pathology revealed non-small cell lung cancer, favor squamous cell carcinoma.  Sample size was small.  PDL staining and NGS panel could not be done.   3.  Brain MRI on 09/26/2020 did not reveal any brain metastasis.   4.  PET scan on 10/05/2020 revealed hypermetabolic right lung mass and multiple hypermetabolic bone lesions.   5.  The patient started on carboplatin and " Taxol on 11/25/2020.   -Pembrolizumab added with cycle 2.      SUBJECTIVE:  Mr. 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  He is on carboplatin, Taxol and pembrolizumab.  He is also on Zometa.  He is overall tolerating it well.      For about 5-7 days after each treatment, he is more fatigued.  He also gets pain and numbness in his fingers and toes.  They resolve after a few days.  The patient takes some oxycodone and ibuprofen when he has more pain and neuropathy.      He has some fatigue, which is not getting worse.  He is able to do everything.  No headache.  No dizziness.  No neck pain.  No chest pain.  No shortness of breath.  No cough.  He gets some nausea. Zofran helps.  No urinary complaint.  He has some constipation, MiraLax helps.  No bleeding.  No fever or chills.  He has not been falling.      All other review of systems negative.      PHYSICAL EXAMINATION:   GENERAL:  Alert and oriented x 3.   VITAL SIGNS:  Reviewed.  ECOG PS of 1.     EYES:  No icterus.   THROAT:  No ulcer. No thrush.   NECK:  Supple. No lymphadenopathy. No thyromegaly.   AXILLAE:  No lymphadenopathy.   LUNGS:  Good air entry bilaterally.  No crackles or wheezing.   HEART:  Regular.  No murmur.   GI: Abdomen is soft.  Nontender. No mass.   EXTREMITIES:  No pedal edema.  No calf swelling or tenderness.   SKIN:  No rash.      LABORATORY DATA:  Reviewed.      ASSESSMENT:   1.  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   2.  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   3.  Normocytic anemia from chemotherapy.   4.  Low protein and albumin.   5.  Mild hypocalcemia from Zometa.   6.  Constipation secondary to oxycodone.      PLAN:   1.  The patient clinically is stable. He has few symptoms which are not getting worse.      Labs were all reviewed.  I told him there are a few minor abnormalities.  Overall, labs are good for treatment.      2.  The patient will continue on  carboplatin, Taxol and pembrolizumab.  Side effects reviewed.  He is also continued on Zometa.  He does not have any dental or jaw-related symptoms.      3.  Discussed regarding scans.  We will get CT chest, abdomen and pelvis in 2 weeks.  He is agreeable for it.      4.  If his disease is stable or improved, plan will be to give him the 6 cycle of carboplatin, Taxol and pembrolizumab.  After that, he will be on maintenance pembrolizumab.      5.  We checked [id number].  There was no actionable mutation.      6.  The patient has neuropathy.  I told him it is secondary to Taxol.  It likely will get worse.  He will let us know when it gets worse.  In future, we will consider gabapentin.      7.  He had a few questions, which were all answered.  I will see him after CT scan.         ANETA MCKEON MD             D: 2021   T: 2021   MT: ALLISON      Name:     ASAF BENITEZ   MRN:      [id number]        Account:      [id number]   :      1940           Visit Date:   2021      Document: [id number]      Visit Date:   2021      SUBJECTIVE:  Mr. 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  He is on carboplatin, Taxol and pembrolizumab.  He is also on Zometa.  He is overall tolerating it well.      For about 5-7 days after each treatment, he is more fatigued.  He also gets pain and numbness in his fingers and toes.  They resolve after a few days.  The patient takes some oxycodone and ibuprofen when he has more pain and neuropathy.      He has some fatigue, which is not getting worse.  He is able to do everything.  No headache.  No dizziness.  No neck pain.  No chest pain.  No shortness of breath.  No cough.  He gets some nausea, Zofran helps.  No urinary complaint.  He has some constipation, MiraLax helps.  No bleeding.  No fever or chills.  He has not been falling.      All other review of systems negative.      PHYSICAL EXAMINATION:  Unchanged.       LABORATORY DATA:  Reviewed.      ASSESSMENT:   1.  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   2.  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   3.  Normocytic anemia from chemotherapy.   4.  Low protein and albumin.   5.  Mild hypocalcemia from Zometa.   6.  Constipation secondary to oxycodone.      PLAN:   1.  The patient clinically is stable.  If he has few symptoms which are not getting worse.      Labs were all reviewed.  I told him there are a few minor abnormalities.  Overall, labs are good for treatment.      2.  The patient will continue on carboplatin, Taxol and pembrolizumab.  Side effects reviewed.  He is also continued on Zometa.  He does not have any dental or jaw-related symptoms.      3.  Discussed regarding scans.  We will get CT chest, abdomen and pelvis in 2 weeks.  He is agreeable for it.      4.  If his disease is stable or improved, plan will be to give him the 6th cycle of carboplatin, Taxol and pembrolizumab.  After that, he will be on maintenance pembrolizumab.      5.  We checked [id number].  There was no actionable mutation.      6.  The patient has neuropathy.  I told him it is secondary to Taxol.  It likely will get worse.  He will let us know when it gets worse.  In future, we will consider gabapentin.      7.  He had a few questions, which were all answered.  I will see him after CT scan.         ANETA MCKEON MD             D: 2021   T: 2021   MT: ALLISON      Name:     ASAF WRIGHT   MRN:      [id number]        Account:      [id number]   :      1940           Visit Date:   2021      Document: [id number]        Again, thank you for allowing me to participate in the care of your patient.        Sincerely,        Aneta Mckeon MD

## 2021-02-01 NOTE — PROGRESS NOTES
Visit Date:   01/27/2021     ONCOLOGIC HISTORY:  Mr. Benitez is a gentleman with non-small cell lung cancer, favor squamous cell carcinoma.   -NGS and PDL staining could not be done because of small sample.   -Guardant 360 does not reveal any actionable alteration.  1.  CT chest angiogram on 09/07/2020 revealed right lower lobe mass.   2.  CT-guided biopsy was done on 09/18/2020.  Pathology revealed non-small cell lung cancer, favor squamous cell carcinoma.  Sample size was small.  PDL staining and NGS panel could not be done.   3.  Brain MRI on 09/26/2020 did not reveal any brain metastasis.   4.  PET scan on 10/05/2020 revealed hypermetabolic right lung mass and multiple hypermetabolic bone lesions.   5.  The patient started on carboplatin and Taxol on 11/25/2020.   -Pembrolizumab added with cycle 2.      SUBJECTIVE:  Mr. 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  He is on carboplatin, Taxol and pembrolizumab.  He is also on Zometa.  He is overall tolerating it well.      For about 5-7 days after each treatment, he is more fatigued.  He also gets pain and numbness in his fingers and toes.  They resolve after a few days.  The patient takes some oxycodone and ibuprofen when he has more pain and neuropathy.      He has some fatigue, which is not getting worse.  He is able to do everything.  No headache.  No dizziness.  No neck pain.  No chest pain.  No shortness of breath.  No cough.  He gets some nausea. Zofran helps.  No urinary complaint.  He has some constipation, MiraLax helps.  No bleeding.  No fever or chills.  He has not been falling.      All other review of systems negative.      PHYSICAL EXAMINATION:   GENERAL:  Alert and oriented x 3.   VITAL SIGNS:  Reviewed.  ECOG PS of 1.     EYES:  No icterus.   THROAT:  No ulcer. No thrush.   NECK:  Supple. No lymphadenopathy. No thyromegaly.   AXILLAE:  No lymphadenopathy.   LUNGS:  Good air entry bilaterally.  No crackles or  wheezing.   HEART:  Regular.  No murmur.   GI: Abdomen is soft.  Nontender. No mass.   EXTREMITIES:  No pedal edema.  No calf swelling or tenderness.   SKIN:  No rash.      LABORATORY DATA:  Reviewed.      ASSESSMENT:   1.  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   2.  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   3.  Normocytic anemia from chemotherapy.   4.  Low protein and albumin.   5.  Mild hypocalcemia from Zometa.   6.  Constipation secondary to oxycodone.      PLAN:   1.  The patient clinically is stable. He has few symptoms which are not getting worse.      Labs were all reviewed.  I told him there are a few minor abnormalities.  Overall, labs are good for treatment.      2.  The patient will continue on carboplatin, Taxol and pembrolizumab.  Side effects reviewed.  He is also continued on Zometa.  He does not have any dental or jaw-related symptoms.      3.  Discussed regarding scans.  We will get CT chest, abdomen and pelvis in 2 weeks.  He is agreeable for it.      4.  If his disease is stable or improved, plan will be to give him the 6 cycle of carboplatin, Taxol and pembrolizumab.  After that, he will be on maintenance pembrolizumab.      5.  We checked [id number].  There was no actionable mutation.      6.  The patient has neuropathy.  I told him it is secondary to Taxol.  It likely will get worse.  He will let us know when it gets worse.  In future, we will consider gabapentin.      7.  He had a few questions, which were all answered.  I will see him after CT scan.         ANETA MCKEON MD             D: 2021   T: 2021   MT: ALLISON      Name:     ASAF WRIGHT   MRN:      [id number]        Account:      [id number]   :      1940           Visit Date:   2021      Document: [id number]

## 2021-02-10 DIAGNOSIS — C34.31 MALIGNANT NEOPLASM OF LOWER LOBE OF RIGHT LUNG (H): Primary | ICD-10-CM

## 2021-02-10 DIAGNOSIS — G89.3 CANCER ASSOCIATED PAIN: ICD-10-CM

## 2021-02-10 RX ORDER — OXYCODONE HYDROCHLORIDE 5 MG/1
5 TABLET ORAL EVERY 6 HOURS PRN
Qty: 12 TABLET | Refills: 0 | Status: SHIPPED | OUTPATIENT
Start: 2021-02-10 | End: 2021-03-31

## 2021-02-11 ENCOUNTER — HOSPITAL ENCOUNTER (OUTPATIENT)
Facility: CLINIC | Age: 81
Discharge: HOME | End: 2021-02-11
Admitting: INTERNAL MEDICINE
Payer: MEDICARE

## 2021-02-11 ENCOUNTER — HOSPITAL ENCOUNTER (OUTPATIENT)
Dept: CT IMAGING | Facility: CLINIC | Age: 81
End: 2021-02-11
Attending: INTERNAL MEDICINE
Payer: MEDICARE

## 2021-02-11 DIAGNOSIS — C34.31 MALIGNANT NEOPLASM OF LOWER LOBE OF RIGHT LUNG (H): ICD-10-CM

## 2021-02-11 DIAGNOSIS — C34.31 MALIGNANT NEOPLASM OF LOWER LOBE OF RIGHT LUNG (H): Primary | ICD-10-CM

## 2021-02-11 PROCEDURE — 250N000009 HC RX 250: Performed by: INTERNAL MEDICINE

## 2021-02-11 PROCEDURE — 71260 CT THORAX DX C+: CPT | Mod: MG

## 2021-02-11 PROCEDURE — 250N000011 HC RX IP 250 OP 636: Performed by: INTERNAL MEDICINE

## 2021-02-11 PROCEDURE — 999N000154 HC STATISTIC RADIOLOGY XRAY, US, CT, MAR, NM

## 2021-02-11 RX ORDER — HEPARIN SODIUM (PORCINE) LOCK FLUSH IV SOLN 100 UNIT/ML 100 UNIT/ML
5 SOLUTION INTRAVENOUS
Status: CANCELLED | OUTPATIENT
Start: 2021-02-11

## 2021-02-11 RX ORDER — HEPARIN SODIUM,PORCINE 10 UNIT/ML
5 VIAL (ML) INTRAVENOUS
Status: CANCELLED | OUTPATIENT
Start: 2021-02-11

## 2021-02-11 RX ORDER — HEPARIN SODIUM,PORCINE 10 UNIT/ML
5 VIAL (ML) INTRAVENOUS
Status: DISCONTINUED | OUTPATIENT
Start: 2021-02-11 | End: 2021-02-11 | Stop reason: HOSPADM

## 2021-02-11 RX ORDER — HEPARIN SODIUM (PORCINE) LOCK FLUSH IV SOLN 100 UNIT/ML 100 UNIT/ML
5 SOLUTION INTRAVENOUS
Status: DISCONTINUED | OUTPATIENT
Start: 2021-02-11 | End: 2021-02-11 | Stop reason: HOSPADM

## 2021-02-11 RX ORDER — IOPAMIDOL 755 MG/ML
95 INJECTION, SOLUTION INTRAVASCULAR ONCE
Status: COMPLETED | OUTPATIENT
Start: 2021-02-11 | End: 2021-02-11

## 2021-02-11 RX ADMIN — SODIUM CHLORIDE 67 ML: 9 INJECTION, SOLUTION INTRAVENOUS at 16:13

## 2021-02-11 RX ADMIN — IOPAMIDOL 95 ML: 755 INJECTION, SOLUTION INTRAVENOUS at 16:13

## 2021-02-11 RX ADMIN — HEPARIN SODIUM (PORCINE) LOCK FLUSH IV SOLN 100 UNIT/ML 5 ML: 100 SOLUTION at 16:11

## 2021-02-12 NOTE — RESULT ENCOUNTER NOTE
Dear Mr. Benitez,    CT scan reveals improvement in cancer. This is good.    Please, call me with any questions.    Buzz Larsen MD

## 2021-02-16 ENCOUNTER — HOSPITAL ENCOUNTER (OUTPATIENT)
Facility: CLINIC | Age: 81
Setting detail: SPECIMEN
Discharge: HOME | End: 2021-02-16
Attending: INTERNAL MEDICINE | Admitting: INTERNAL MEDICINE
Payer: MEDICARE

## 2021-02-16 ENCOUNTER — OFFICE VISIT (OUTPATIENT)
Dept: INFUSION THERAPY | Facility: CLINIC | Age: 81
End: 2021-02-16
Attending: INTERNAL MEDICINE
Payer: MEDICARE

## 2021-02-16 DIAGNOSIS — Z51.11 ENCOUNTER FOR ANTINEOPLASTIC CHEMOTHERAPY: Primary | ICD-10-CM

## 2021-02-16 DIAGNOSIS — C34.31 MALIGNANT NEOPLASM OF LOWER LOBE OF RIGHT LUNG (H): ICD-10-CM

## 2021-02-16 LAB
ALBUMIN SERPL-MCNC: 3.2 G/DL (ref 3.4–5)
ALP SERPL-CCNC: 72 U/L (ref 40–150)
ALT SERPL W P-5'-P-CCNC: 18 U/L (ref 0–70)
ANION GAP SERPL CALCULATED.3IONS-SCNC: 5 MMOL/L (ref 3–14)
AST SERPL W P-5'-P-CCNC: 14 U/L (ref 0–45)
BASOPHILS # BLD AUTO: 0 10E9/L (ref 0–0.2)
BASOPHILS NFR BLD AUTO: 0.2 %
BILIRUB SERPL-MCNC: 0.4 MG/DL (ref 0.2–1.3)
BUN SERPL-MCNC: 16 MG/DL (ref 7–30)
CALCIUM SERPL-MCNC: 8.3 MG/DL (ref 8.5–10.1)
CHLORIDE SERPL-SCNC: 110 MMOL/L (ref 94–109)
CO2 SERPL-SCNC: 26 MMOL/L (ref 20–32)
CREAT SERPL-MCNC: 0.94 MG/DL (ref 0.66–1.25)
DIFFERENTIAL METHOD BLD: ABNORMAL
EOSINOPHIL # BLD AUTO: 0 10E9/L (ref 0–0.7)
EOSINOPHIL NFR BLD AUTO: 0.6 %
ERYTHROCYTE [DISTWIDTH] IN BLOOD BY AUTOMATED COUNT: 14.7 % (ref 10–15)
GFR SERPL CREATININE-BSD FRML MDRD: 75 ML/MIN/{1.73_M2}
GLUCOSE SERPL-MCNC: 134 MG/DL (ref 70–99)
HCT VFR BLD AUTO: 37.2 % (ref 40–53)
HGB BLD-MCNC: 12.5 G/DL (ref 13.3–17.7)
IMM GRANULOCYTES # BLD: 0 10E9/L (ref 0–0.4)
IMM GRANULOCYTES NFR BLD: 0.8 %
LYMPHOCYTES # BLD AUTO: 0.9 10E9/L (ref 0.8–5.3)
LYMPHOCYTES NFR BLD AUTO: 18.3 %
MCH RBC QN AUTO: 31.2 PG (ref 26.5–33)
MCHC RBC AUTO-ENTMCNC: 33.6 G/DL (ref 31.5–36.5)
MCV RBC AUTO: 93 FL (ref 78–100)
MONOCYTES # BLD AUTO: 0.5 10E9/L (ref 0–1.3)
MONOCYTES NFR BLD AUTO: 9.6 %
NEUTROPHILS # BLD AUTO: 3.5 10E9/L (ref 1.6–8.3)
NEUTROPHILS NFR BLD AUTO: 70.5 %
NRBC # BLD AUTO: 0 10*3/UL
NRBC BLD AUTO-RTO: 0 /100
PLATELET # BLD AUTO: 128 10E9/L (ref 150–450)
POTASSIUM SERPL-SCNC: 4.4 MMOL/L (ref 3.4–5.3)
PROT SERPL-MCNC: 6.3 G/DL (ref 6.8–8.8)
RBC # BLD AUTO: 4.01 10E12/L (ref 4.4–5.9)
SODIUM SERPL-SCNC: 141 MMOL/L (ref 133–144)
TSH SERPL DL<=0.005 MIU/L-ACNC: 1.43 MU/L (ref 0.4–4)
WBC # BLD AUTO: 5 10E9/L (ref 4–11)

## 2021-02-16 PROCEDURE — 250N000011 HC RX IP 250 OP 636: Performed by: INTERNAL MEDICINE

## 2021-02-16 PROCEDURE — 84443 ASSAY THYROID STIM HORMONE: CPT | Performed by: INTERNAL MEDICINE

## 2021-02-16 PROCEDURE — 80053 COMPREHEN METABOLIC PANEL: CPT | Performed by: INTERNAL MEDICINE

## 2021-02-16 PROCEDURE — 36591 DRAW BLOOD OFF VENOUS DEVICE: CPT

## 2021-02-16 PROCEDURE — 85025 COMPLETE CBC W/AUTO DIFF WBC: CPT | Performed by: INTERNAL MEDICINE

## 2021-02-16 RX ORDER — HEPARIN SODIUM,PORCINE 10 UNIT/ML
5 VIAL (ML) INTRAVENOUS
Status: CANCELLED | OUTPATIENT
Start: 2021-02-16

## 2021-02-16 RX ORDER — HEPARIN SODIUM (PORCINE) LOCK FLUSH IV SOLN 100 UNIT/ML 100 UNIT/ML
5 SOLUTION INTRAVENOUS
Status: DISCONTINUED | OUTPATIENT
Start: 2021-02-16 | End: 2021-02-16 | Stop reason: HOSPADM

## 2021-02-16 RX ORDER — HEPARIN SODIUM (PORCINE) LOCK FLUSH IV SOLN 100 UNIT/ML 100 UNIT/ML
5 SOLUTION INTRAVENOUS
Status: CANCELLED | OUTPATIENT
Start: 2021-02-16

## 2021-02-16 RX ADMIN — Medication 5 ML: at 09:47

## 2021-02-16 NOTE — LETTER
2/16/2021         RE: Derrick Benitez  [address]  [facility] [address]        Dear Colleague,    Thank you for referring your patient, Derrick Benitez, to the [facility]CER [facility]. Please see a copy of my visit note below.    Infusion Nursing Note:  Derrick Benitez presents today for port labs.    Patient seen by provider today: No   present during visit today: Not Applicable.    Note: N/A.    Intravenous Access:  Labs drawn without difficulty.  Implanted Port.    Treatment Conditions:  Not Applicable. Labs pending at time of discharge.      Post Infusion Assessment:  Site patent and intact, free from redness, edema or discomfort.  No evidence of extravasations.  Access discontinued per protocol.       Discharge Plan:   Patient discharged in stable condition accompanied by: self.  Departure Mode: Ambulatory.    Jenny Manzo RN        Again, thank you for allowing me to participate in the care of your patient.        Sincerely,         [facility]

## 2021-02-17 ENCOUNTER — ONCOLOGY VISIT (OUTPATIENT)
Dept: ONCOLOGY | Facility: CLINIC | Age: 81
End: 2021-02-17
Attending: INTERNAL MEDICINE
Payer: MEDICARE

## 2021-02-17 ENCOUNTER — INFUSION THERAPY VISIT (OUTPATIENT)
Dept: INFUSION THERAPY | Facility: CLINIC | Age: 81
End: 2021-02-17
Attending: INTERNAL MEDICINE
Payer: MEDICARE

## 2021-02-17 ENCOUNTER — HOSPITAL ENCOUNTER (OUTPATIENT)
Facility: CLINIC | Age: 81
Setting detail: SPECIMEN
Discharge: HOME | End: 2021-02-17
Attending: INTERNAL MEDICINE | Admitting: INTERNAL MEDICINE
Payer: MEDICARE

## 2021-02-17 VITALS
WEIGHT: 194.6 LBS | RESPIRATION RATE: 16 BRPM | TEMPERATURE: 98.3 F | BODY MASS INDEX: 30.47 KG/M2 | SYSTOLIC BLOOD PRESSURE: 114 MMHG | OXYGEN SATURATION: 96 % | DIASTOLIC BLOOD PRESSURE: 69 MMHG | HEART RATE: 89 BPM

## 2021-02-17 VITALS — WEIGHT: 194.6 LBS | BODY MASS INDEX: 30.54 KG/M2 | HEIGHT: 67 IN

## 2021-02-17 DIAGNOSIS — C34.31 MALIGNANT NEOPLASM OF LOWER LOBE OF RIGHT LUNG (H): Primary | ICD-10-CM

## 2021-02-17 DIAGNOSIS — Z11.59 SPECIAL SCREENING EXAMINATION FOR VIRAL DISEASE: ICD-10-CM

## 2021-02-17 DIAGNOSIS — Z51.11 ENCOUNTER FOR ANTINEOPLASTIC CHEMOTHERAPY: ICD-10-CM

## 2021-02-17 DIAGNOSIS — Z51.11 ENCOUNTER FOR ANTINEOPLASTIC CHEMOTHERAPY: Primary | ICD-10-CM

## 2021-02-17 DIAGNOSIS — C34.31 MALIGNANT NEOPLASM OF LOWER LOBE OF RIGHT LUNG (H): ICD-10-CM

## 2021-02-17 LAB
LABORATORY COMMENT REPORT: NORMAL
SARS-COV-2 RNA RESP QL NAA+PROBE: NEGATIVE
SARS-COV-2 RNA RESP QL NAA+PROBE: NORMAL
SPECIMEN SOURCE: NORMAL
SPECIMEN SOURCE: NORMAL

## 2021-02-17 PROCEDURE — 96415 CHEMO IV INFUSION ADDL HR: CPT

## 2021-02-17 PROCEDURE — 96417 CHEMO IV INFUS EACH ADDL SEQ: CPT

## 2021-02-17 PROCEDURE — U0003 INFECTIOUS AGENT DETECTION BY NUCLEIC ACID (DNA OR RNA); SEVERE ACUTE RESPIRATORY SYNDROME CORONAVIRUS 2 (SARS-COV-2) (CORONAVIRUS DISEASE [COVID-19]), AMPLIFIED PROBE TECHNIQUE, MAKING USE OF HIGH THROUGHPUT TECHNOLOGIES AS DESCRIBED BY CMS-2020-01-R: HCPCS | Performed by: INTERNAL MEDICINE

## 2021-02-17 PROCEDURE — 258N000003 HC RX IP 258 OP 636: Performed by: INTERNAL MEDICINE

## 2021-02-17 PROCEDURE — 96413 CHEMO IV INFUSION 1 HR: CPT

## 2021-02-17 PROCEDURE — 96367 TX/PROPH/DG ADDL SEQ IV INF: CPT

## 2021-02-17 PROCEDURE — 99215 OFFICE O/P EST HI 40 MIN: CPT | Performed by: INTERNAL MEDICINE

## 2021-02-17 PROCEDURE — 250N000011 HC RX IP 250 OP 636: Performed by: INTERNAL MEDICINE

## 2021-02-17 PROCEDURE — 96377 APPLICATON ON-BODY INJECTOR: CPT | Performed by: INTERNAL MEDICINE

## 2021-02-17 PROCEDURE — U0005 INFEC AGEN DETEC AMPLI PROBE: HCPCS | Performed by: INTERNAL MEDICINE

## 2021-02-17 PROCEDURE — G0463 HOSPITAL OUTPT CLINIC VISIT: HCPCS | Mod: 25

## 2021-02-17 PROCEDURE — 96372 THER/PROPH/DIAG INJ SC/IM: CPT | Performed by: INTERNAL MEDICINE

## 2021-02-17 RX ORDER — SODIUM CHLORIDE 9 MG/ML
1000 INJECTION, SOLUTION INTRAVENOUS CONTINUOUS PRN
Status: CANCELLED
Start: 2021-02-17

## 2021-02-17 RX ORDER — NALOXONE HYDROCHLORIDE 0.4 MG/ML
.1-.4 INJECTION, SOLUTION INTRAMUSCULAR; INTRAVENOUS; SUBCUTANEOUS
Status: CANCELLED | OUTPATIENT
Start: 2021-02-17

## 2021-02-17 RX ORDER — METHYLPREDNISOLONE SODIUM SUCCINATE 125 MG/2ML
125 INJECTION, POWDER, LYOPHILIZED, FOR SOLUTION INTRAMUSCULAR; INTRAVENOUS
Status: CANCELLED
Start: 2021-02-17

## 2021-02-17 RX ORDER — HEPARIN SODIUM (PORCINE) LOCK FLUSH IV SOLN 100 UNIT/ML 100 UNIT/ML
5 SOLUTION INTRAVENOUS
Status: CANCELLED | OUTPATIENT
Start: 2021-02-17

## 2021-02-17 RX ORDER — DIPHENHYDRAMINE HCL 25 MG
50 CAPSULE ORAL ONCE
Status: CANCELLED
Start: 2021-02-17

## 2021-02-17 RX ORDER — HEPARIN SODIUM (PORCINE) LOCK FLUSH IV SOLN 100 UNIT/ML 100 UNIT/ML
5 SOLUTION INTRAVENOUS
Status: DISCONTINUED | OUTPATIENT
Start: 2021-02-17 | End: 2021-02-17 | Stop reason: HOSPADM

## 2021-02-17 RX ORDER — DIPHENHYDRAMINE HYDROCHLORIDE 50 MG/ML
50 INJECTION INTRAMUSCULAR; INTRAVENOUS
Status: CANCELLED
Start: 2021-02-17

## 2021-02-17 RX ORDER — ALBUTEROL SULFATE 90 UG/1
1-2 AEROSOL, METERED RESPIRATORY (INHALATION)
Status: CANCELLED
Start: 2021-02-17

## 2021-02-17 RX ORDER — LORAZEPAM 2 MG/ML
0.5 INJECTION INTRAMUSCULAR EVERY 4 HOURS PRN
Status: CANCELLED
Start: 2021-02-17

## 2021-02-17 RX ORDER — ZOLEDRONIC ACID 0.04 MG/ML
4 INJECTION, SOLUTION INTRAVENOUS ONCE
Status: COMPLETED | OUTPATIENT
Start: 2021-02-17 | End: 2021-02-17

## 2021-02-17 RX ORDER — ALBUTEROL SULFATE 0.83 MG/ML
2.5 SOLUTION RESPIRATORY (INHALATION)
Status: CANCELLED | OUTPATIENT
Start: 2021-02-17

## 2021-02-17 RX ORDER — EPINEPHRINE 1 MG/ML
0.3 INJECTION, SOLUTION INTRAMUSCULAR; SUBCUTANEOUS EVERY 5 MIN PRN
Status: CANCELLED | OUTPATIENT
Start: 2021-02-17

## 2021-02-17 RX ORDER — MEPERIDINE HYDROCHLORIDE 25 MG/ML
25 INJECTION INTRAMUSCULAR; INTRAVENOUS; SUBCUTANEOUS EVERY 30 MIN PRN
Status: CANCELLED | OUTPATIENT
Start: 2021-02-17

## 2021-02-17 RX ORDER — ZOLEDRONIC ACID 0.04 MG/ML
4 INJECTION, SOLUTION INTRAVENOUS ONCE
Status: CANCELLED | OUTPATIENT
Start: 2021-02-17 | End: 2021-02-17

## 2021-02-17 RX ORDER — HEPARIN SODIUM,PORCINE 10 UNIT/ML
5 VIAL (ML) INTRAVENOUS
Status: CANCELLED | OUTPATIENT
Start: 2021-02-17

## 2021-02-17 RX ADMIN — Medication 5 ML: at 15:15

## 2021-02-17 RX ADMIN — FAMOTIDINE 20 MG: 20 INJECTION, SOLUTION INTRAVENOUS at 10:22

## 2021-02-17 RX ADMIN — CARBOPLATIN 350 MG: 10 INJECTION, SOLUTION INTRAVENOUS at 14:43

## 2021-02-17 RX ADMIN — PACLITAXEL 311 MG: 6 INJECTION, SOLUTION INTRAVENOUS at 11:45

## 2021-02-17 RX ADMIN — SODIUM CHLORIDE 250 ML: 9 INJECTION, SOLUTION INTRAVENOUS at 09:44

## 2021-02-17 RX ADMIN — ZOLEDRONIC ACID 4 MG: 0.04 INJECTION, SOLUTION INTRAVENOUS at 09:43

## 2021-02-17 RX ADMIN — DEXAMETHASONE SODIUM PHOSPHATE: 10 INJECTION, SOLUTION INTRAMUSCULAR; INTRAVENOUS at 10:03

## 2021-02-17 RX ADMIN — SODIUM CHLORIDE 200 MG: 9 INJECTION, SOLUTION INTRAVENOUS at 10:55

## 2021-02-17 RX ADMIN — DIPHENHYDRAMINE HYDROCHLORIDE 50 MG: 50 INJECTION INTRAMUSCULAR; INTRAVENOUS at 10:39

## 2021-02-17 RX ADMIN — PEGFILGRASTIM 6 MG: KIT SUBCUTANEOUS at 14:40

## 2021-02-17 ASSESSMENT — PAIN SCALES - GENERAL: PAINLEVEL: NO PAIN (0)

## 2021-02-17 ASSESSMENT — MIFFLIN-ST. JEOR: SCORE: 1546.45

## 2021-02-17 NOTE — PATIENT INSTRUCTIONS
Your On-body Neulasta Injector was applied to your abdomen at 2:45pm.  At approximately 5:45pm on 2/18, your On-body Injector will beep to let you know your dose delivery will begin in 2 minutes.  Your medication will be delivered over the next 45 minutes.  You can remove your Injector at 7:30pm.  Please make sure your Injector has a solid green light or has turned off prior to removing the device.  Please contact your provider at [phone] with questions or concerns.

## 2021-02-17 NOTE — PROGRESS NOTES
"Oncology Rooming Note    February 17, 2021 9:09 AM   Derrick Benitez is a 80 year old male who presents for:    Chief Complaint   Patient presents with     Oncology Clinic Visit     Initial Vitals: There were no vitals taken for this visit. Estimated body mass index is 30.41 kg/m  as calculated from the following:    Height as of 1/6/21: 1.702 m (5' 7.01\").    Weight as of 1/27/21: 88.1 kg (194 lb 3.2 oz). There is no height or weight on file to calculate BSA.  Data Unavailable Comment: Data Unavailable   No LMP for male patient.  Allergies reviewed: Yes  Medications reviewed: Yes    Medications: Medication refills not needed today.  Pharmacy name entered into EPIC:    [place], [place] - [address]  [facility] [address] [place], [place] - [address] [place] [address]    Clinical concerns:  doctor was notified.      Corie Vieira [facility]            "

## 2021-02-17 NOTE — LETTER
"    2/17/2021         RE: Derrick Benitez  [address]  [facility] [address]        Dear Colleague,    Thank you for referring your patient, Derrick Benitez, to the [facility]. Please see a copy of my visit note below.    Oncology Rooming Note    February 17, 2021 9:09 AM   Derrick Benitez is a 80 year old male who presents for:    Chief Complaint   Patient presents with     Oncology Clinic Visit     Initial Vitals: There were no vitals taken for this visit. Estimated body mass index is 30.41 kg/m  as calculated from the following:    Height as of 1/6/21: 1.702 m (5' 7.01\").    Weight as of 1/27/21: 88.1 kg (194 lb 3.2 oz). There is no height or weight on file to calculate BSA.  Data Unavailable Comment: Data Unavailable   No LMP for male patient.  Allergies reviewed: Yes  Medications reviewed: Yes    Medications: Medication refills not needed today.  Pharm[facility] name entered into EPIC:    [facility] [place], [place] - [address]  [facility] [place], [place] - [address]    Clinical concerns:  doctor was notified.      Corie Vieira, [facility]              Visit Date:   02/17/2021     ONCOLOGIC HISTORY:  Mr. Benitez is a gentleman with non-small cell lung cancer, favor squamous cell carcinoma.   -NGS and PDL staining could not be done because of small sample.   -Guardant 360 does not reveal any actionable alteration.  1.  CT chest angiogram on 09/07/2020 revealed right lower lobe mass.   2.  CT-guided biopsy was done on 09/18/2020.  Pathology revealed non-small cell lung cancer, favor squamous cell carcinoma.  Sample size was small.  PDL staining and NGS panel could not be done.   3.  Brain MRI on 09/26/2020 did not reveal any brain metastasis.   4.  PET scan on 10/05/2020 revealed hypermetabolic right lung mass and multiple hypermetabolic bone lesions.   5.  The patient started on carboplatin and Taxol on 11/25/2020.   -Pembrolizumab added with cycle 2. "      SUBJECTIVE:  Mr. Benitez is an 80-year-old gentleman with metastatic non-small cell lung cancer, favor squamous cell carcinoma.  He has completed 4 cycles of carboplatin, Taxol and pembrolizumab.  Overall, he has been tolerating treatment well.      No headache.  No dizziness.  No neck pain.  No chest pain.  No shortness of breath.  No abdominal pain.  No nausea.  No vomiting.  Appetite is good.  No urinary complaints.  No bowel problem.  No fever or chills.      After each chemotherapy, he gets increased pain.  He takes about 4 oxycodone over next couple of days.  He does not have to take pain medicine after that.      CT chest, abdomen and pelvis on 02/11/2021 reveals improvement in his malignancy.      PHYSICAL EXAMINATION:   GENERAL:  He is alert, oriented x 3.   VITAL SIGNS:  Reviewed.  Not in any distress.  ECOG PS of 1.   The rest of the systems not examined.      LABORATORY DATA:  CBC reveals anemia and thrombocytopenia.  CMP reveals low protein and albumin.  TSH is normal.      ASSESSMENT:   1.  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   2.  Normocytic anemia from chemotherapy, stable.   3.  Thrombocytopenia from chemotherapy.   4.  Fatigue secondary to disease and chemotherapy.     5.  Grade 1 peripheral neuropathy.   6.  Generalized pain after chemotherapy.  This is from Taxol.      PLAN:   1.  The patient is clinically stable.  Does not have any new symptoms.  He has some fatigue, which would go along with his age and chemotherapy and metastatic disease.  He has mild peripheral neuropathy, which is stable.      CT scan was personally reviewed by me.  Pictures were shown to the patient.  I explained to him that his malignancy is improving.  He was happy to know that.     2.  Discussed regarding treatment for lung cancer.  He is on carboplatin, Taxol and pembrolizumab.  We will give him total of 6 cycles.  He has 2 more cycles left.   After that, he will be on maintenance pembrolizumab.  He is agreeable for it.  Side effects of treatment reviewed.  So far, he has been tolerating it well without any major side effects.      The patient has bone metastasis, which is also improving.  He is getting Zometa.  He has been tolerating it well.  Denies any toothache, gum infection, jaw pain or problem chewing.  The patient encouraged to follow-up with a dentist periodically.     3.  Labs were reviewed with him.  He has mild anemia from chemotherapy. He also has thrombocytopenia.  I told him this is secondary to chemotherapy.  It can get worse.  Advised him to see a physician if he has bleeding or easy bruising.  His cytopenias are mild.  No need for any dose modification of chemotherapy.     4.  The patient's blood pressure is mildly elevated.  I told him that his diabetes can get worse with chemotherapy as he gets premedication with steroid.     5.  The patient had multiple questions, which were all answered.  I will see him with next chemotherapy.  Advised him to call us with any questions or concerns.         ANETA MCKEON MD             D: 2021   T: 2021   MT: MARCOS      Name:     ASAF WRIGHT   MRN:      [id number]        Account:      [id number]   :      1940           Visit Date:   2021      Document: [id number]      This office note has been dictated.          Again, thank you for allowing me to participate in the care of your patient.        Sincerely,        Aneta Mckeon MD

## 2021-02-17 NOTE — PROGRESS NOTES
Infusion Nursing Note:  Derrick Benitez presents today for C5D1 Carbo/Taxol/Keytruda/Zometa.    Patient seen by provider today: Yes: Chava   present during visit today: Not Applicable.    Note: N/A.    Patient did meet criteria for an asymptomatic covid-19 PCR test in infusion today. Patient accepted the covid-19 test.    Intravenous Access:  Implanted Port.    Treatment Conditions:  Lab Results   Component Value Date    HGB 12.5 02/16/2021     Lab Results   Component Value Date    WBC 5.0 02/16/2021      Lab Results   Component Value Date    ANEU 3.5 02/16/2021     Lab Results   Component Value Date     02/16/2021      Lab Results   Component Value Date     02/16/2021                   Lab Results   Component Value Date    POTASSIUM 4.4 02/16/2021           Lab Results   Component Value Date    MAG 2.1 10/30/2014            Lab Results   Component Value Date    CR 0.94 02/16/2021                   Lab Results   Component Value Date    ANTHONY 8.3 02/16/2021                Lab Results   Component Value Date    BILITOTAL 0.4 02/16/2021           Lab Results   Component Value Date    ALBUMIN 3.2 02/16/2021                    Lab Results   Component Value Date    ALT 18 02/16/2021           Lab Results   Component Value Date    AST 14 02/16/2021       Results reviewed, labs MET treatment parameters, ok to proceed with treatment.      Post Infusion Assessment:  Patient tolerated infusion without incident.  Blood return noted pre and post infusion.  Site patent and intact, free from redness, edema or discomfort.  No evidence of extravasations.  Access discontinued per protocol.     ONPRO  Was placed on patient's: left side of abdomen.    Was placed at 1445 PM    ONPRO injector device Lot number: [id number]    Patient education included: what patient can expect after application, what colored lights mean on the device, when to remove device, when and where to call with questions or issues, all patients  questions answered and that Neulasta administration will occur at 5:45pm on 2/18.    Patient tolerated administration well.      Discharge Plan:   Discharge instructions reviewed with: Patient.  Patient and/or family verbalized understanding of discharge instructions and all questions answered.  AVS to patient via [employer].  Patient will return 3/9/21 for next appointment.   Patient discharged in stable condition accompanied by: self.  Departure Mode: Ambulatory.    Musa Taylor RN

## 2021-02-27 NOTE — PROGRESS NOTES
Visit Date:   02/17/2021     ONCOLOGIC HISTORY:  Mr. Benitez is a gentleman with non-small cell lung cancer, favor squamous cell carcinoma.   -NGS and PDL staining could not be done because of small sample.   -Guardant 360 does not reveal any actionable alteration.  1.  CT chest angiogram on 09/07/2020 revealed right lower lobe mass.   2.  CT-guided biopsy was done on 09/18/2020.  Pathology revealed non-small cell lung cancer, favor squamous cell carcinoma.  Sample size was small.  PDL staining and NGS panel could not be done.   3.  Brain MRI on 09/26/2020 did not reveal any brain metastasis.   4.  PET scan on 10/05/2020 revealed hypermetabolic right lung mass and multiple hypermetabolic bone lesions.   5.  The patient started on carboplatin and Taxol on 11/25/2020.   -Pembrolizumab added with cycle 2.      SUBJECTIVE:  Mr. Benitez is an 80-year-old gentleman with metastatic non-small cell lung cancer, favor squamous cell carcinoma.  He has completed 4 cycles of carboplatin, Taxol and pembrolizumab.  Overall, he has been tolerating treatment well.      No headache.  No dizziness.  No neck pain.  No chest pain.  No shortness of breath.  No abdominal pain.  No nausea.  No vomiting.  Appetite is good.  No urinary complaints.  No bowel problem.  No fever or chills.      After each chemotherapy, he gets increased pain.  He takes about 4 oxycodone over next couple of days.  He does not have to take pain medicine after that.      CT chest, abdomen and pelvis on 02/11/2021 reveals improvement in his malignancy.      PHYSICAL EXAMINATION:   GENERAL:  He is alert, oriented x 3.   VITAL SIGNS:  Reviewed.  Not in any distress.  ECOG PS of 1.   The rest of the systems not examined.      LABORATORY DATA:  CBC reveals anemia and thrombocytopenia.  CMP reveals low protein and albumin.  TSH is normal.      ASSESSMENT:   1.  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   2.   Normocytic anemia from chemotherapy, stable.   3.  Thrombocytopenia from chemotherapy.   4.  Fatigue secondary to disease and chemotherapy.     5.  Grade 1 peripheral neuropathy.   6.  Generalized pain after chemotherapy.  This is from Taxol.      PLAN:   1.  The patient is clinically stable.  Does not have any new symptoms.  He has some fatigue, which would go along with his age and chemotherapy and metastatic disease.  He has mild peripheral neuropathy, which is stable.      CT scan was personally reviewed by me.  Pictures were shown to the patient.  I explained to him that his malignancy is improving.  He was happy to know that.     2.  Discussed regarding treatment for lung cancer.  He is on carboplatin, Taxol and pembrolizumab.  We will give him total of 6 cycles.  He has 2 more cycles left.  After that, he will be on maintenance pembrolizumab.  He is agreeable for it.  Side effects of treatment reviewed.  So far, he has been tolerating it well without any major side effects.      The patient has bone metastasis, which is also improving.  He is getting Zometa.  He has been tolerating it well.  Denies any toothache, gum infection, jaw pain or problem chewing.  The patient encouraged to follow-up with a dentist periodically.     3.  Labs were reviewed with him.  He has mild anemia from chemotherapy. He also has thrombocytopenia.  I told him this is secondary to chemotherapy.  It can get worse.  Advised him to see a physician if he has bleeding or easy bruising.  His cytopenias are mild.  No need for any dose modification of chemotherapy.     4.  The patient's blood pressure is mildly elevated.  I told him that his diabetes can get worse with chemotherapy as he gets premedication with steroid.     5.  The patient had multiple questions, which were all answered.  I will see him with next chemotherapy.  Advised him to call us with any questions or concerns.         ANETA MCKEON MD             D: 02/17/2021   T:  2021   MT: MARCOS      Name:     ASAF WRIGHT   MRN:      [id number]        Account:      [id number]   :      1940           Visit Date:   2021      Document: [id number]

## 2021-03-03 NOTE — TELEPHONE ENCOUNTER
Taxol, Carboplatin, Keytruda, Neulasta Education/ Refresher completed. ( Originally done 10/7/20 with daughter prior to extensive dental work)    Reviewed medications, Side effects and when to connect with MD office with patient and wife.    He is aware to take the dexamethasone( with food) pre medication the evening prior to chemo and the morning of ( with food) every cycle.     Reviewed take home anti-emetics with him, when to take and what order.    1.) Zofran  2.) compazine  3.) ativan  
210.3

## 2021-03-09 ENCOUNTER — HOSPITAL ENCOUNTER (OUTPATIENT)
Facility: CLINIC | Age: 81
Setting detail: SPECIMEN
Discharge: HOME | End: 2021-03-09
Attending: INTERNAL MEDICINE | Admitting: INTERNAL MEDICINE
Payer: MEDICARE

## 2021-03-09 ENCOUNTER — OFFICE VISIT (OUTPATIENT)
Dept: INFUSION THERAPY | Facility: CLINIC | Age: 81
End: 2021-03-09
Attending: INTERNAL MEDICINE
Payer: MEDICARE

## 2021-03-09 DIAGNOSIS — C34.31 MALIGNANT NEOPLASM OF LOWER LOBE OF RIGHT LUNG (H): ICD-10-CM

## 2021-03-09 DIAGNOSIS — Z51.11 ENCOUNTER FOR ANTINEOPLASTIC CHEMOTHERAPY: Primary | ICD-10-CM

## 2021-03-09 LAB
ALBUMIN SERPL-MCNC: 3.3 G/DL (ref 3.4–5)
ALP SERPL-CCNC: 66 U/L (ref 40–150)
ALT SERPL W P-5'-P-CCNC: 19 U/L (ref 0–70)
ANION GAP SERPL CALCULATED.3IONS-SCNC: 5 MMOL/L (ref 3–14)
AST SERPL W P-5'-P-CCNC: 16 U/L (ref 0–45)
BASOPHILS # BLD AUTO: 0 10E9/L (ref 0–0.2)
BASOPHILS NFR BLD AUTO: 0.3 %
BILIRUB SERPL-MCNC: 0.3 MG/DL (ref 0.2–1.3)
BUN SERPL-MCNC: 13 MG/DL (ref 7–30)
CALCIUM SERPL-MCNC: 7.7 MG/DL (ref 8.5–10.1)
CHLORIDE SERPL-SCNC: 113 MMOL/L (ref 94–109)
CO2 SERPL-SCNC: 23 MMOL/L (ref 20–32)
CREAT SERPL-MCNC: 0.9 MG/DL (ref 0.66–1.25)
DIFFERENTIAL METHOD BLD: ABNORMAL
EOSINOPHIL # BLD AUTO: 0 10E9/L (ref 0–0.7)
EOSINOPHIL NFR BLD AUTO: 0.5 %
ERYTHROCYTE [DISTWIDTH] IN BLOOD BY AUTOMATED COUNT: 15.4 % (ref 10–15)
GFR SERPL CREATININE-BSD FRML MDRD: 79 ML/MIN/{1.73_M2}
GLUCOSE SERPL-MCNC: 140 MG/DL (ref 70–99)
HCT VFR BLD AUTO: 36.1 % (ref 40–53)
HGB BLD-MCNC: 11.9 G/DL (ref 13.3–17.7)
IMM GRANULOCYTES # BLD: 0 10E9/L (ref 0–0.4)
IMM GRANULOCYTES NFR BLD: 0.5 %
LYMPHOCYTES # BLD AUTO: 0.9 10E9/L (ref 0.8–5.3)
LYMPHOCYTES NFR BLD AUTO: 24.7 %
MCH RBC QN AUTO: 31.1 PG (ref 26.5–33)
MCHC RBC AUTO-ENTMCNC: 33 G/DL (ref 31.5–36.5)
MCV RBC AUTO: 94 FL (ref 78–100)
MONOCYTES # BLD AUTO: 0.4 10E9/L (ref 0–1.3)
MONOCYTES NFR BLD AUTO: 11 %
NEUTROPHILS # BLD AUTO: 2.4 10E9/L (ref 1.6–8.3)
NEUTROPHILS NFR BLD AUTO: 63 %
NRBC # BLD AUTO: 0 10*3/UL
NRBC BLD AUTO-RTO: 0 /100
PLATELET # BLD AUTO: 131 10E9/L (ref 150–450)
POTASSIUM SERPL-SCNC: 4.2 MMOL/L (ref 3.4–5.3)
PROT SERPL-MCNC: 6.2 G/DL (ref 6.8–8.8)
RBC # BLD AUTO: 3.83 10E12/L (ref 4.4–5.9)
SODIUM SERPL-SCNC: 141 MMOL/L (ref 133–144)
TSH SERPL DL<=0.005 MIU/L-ACNC: 0.41 MU/L (ref 0.4–4)
WBC # BLD AUTO: 3.8 10E9/L (ref 4–11)

## 2021-03-09 PROCEDURE — 84443 ASSAY THYROID STIM HORMONE: CPT | Performed by: INTERNAL MEDICINE

## 2021-03-09 PROCEDURE — 80053 COMPREHEN METABOLIC PANEL: CPT | Performed by: INTERNAL MEDICINE

## 2021-03-09 PROCEDURE — 85025 COMPLETE CBC W/AUTO DIFF WBC: CPT | Performed by: INTERNAL MEDICINE

## 2021-03-09 PROCEDURE — 250N000011 HC RX IP 250 OP 636: Performed by: INTERNAL MEDICINE

## 2021-03-09 PROCEDURE — 36591 DRAW BLOOD OFF VENOUS DEVICE: CPT

## 2021-03-09 RX ORDER — HEPARIN SODIUM,PORCINE 10 UNIT/ML
5 VIAL (ML) INTRAVENOUS
Status: CANCELLED | OUTPATIENT
Start: 2021-03-10

## 2021-03-09 RX ORDER — SODIUM CHLORIDE 9 MG/ML
1000 INJECTION, SOLUTION INTRAVENOUS CONTINUOUS PRN
Status: CANCELLED
Start: 2021-03-10

## 2021-03-09 RX ORDER — ALBUTEROL SULFATE 90 UG/1
1-2 AEROSOL, METERED RESPIRATORY (INHALATION)
Status: CANCELLED
Start: 2021-03-10

## 2021-03-09 RX ORDER — EPINEPHRINE 1 MG/ML
0.3 INJECTION, SOLUTION INTRAMUSCULAR; SUBCUTANEOUS EVERY 5 MIN PRN
Status: CANCELLED | OUTPATIENT
Start: 2021-03-10

## 2021-03-09 RX ORDER — ALBUTEROL SULFATE 0.83 MG/ML
2.5 SOLUTION RESPIRATORY (INHALATION)
Status: CANCELLED | OUTPATIENT
Start: 2021-03-10

## 2021-03-09 RX ORDER — NALOXONE HYDROCHLORIDE 0.4 MG/ML
.1-.4 INJECTION, SOLUTION INTRAMUSCULAR; INTRAVENOUS; SUBCUTANEOUS
Status: CANCELLED | OUTPATIENT
Start: 2021-03-10

## 2021-03-09 RX ORDER — HEPARIN SODIUM (PORCINE) LOCK FLUSH IV SOLN 100 UNIT/ML 100 UNIT/ML
5 SOLUTION INTRAVENOUS
Status: CANCELLED | OUTPATIENT
Start: 2021-03-09

## 2021-03-09 RX ORDER — LORAZEPAM 2 MG/ML
0.5 INJECTION INTRAMUSCULAR EVERY 4 HOURS PRN
Status: CANCELLED
Start: 2021-03-10

## 2021-03-09 RX ORDER — DIPHENHYDRAMINE HYDROCHLORIDE 50 MG/ML
50 INJECTION INTRAMUSCULAR; INTRAVENOUS
Status: CANCELLED
Start: 2021-03-10

## 2021-03-09 RX ORDER — METHYLPREDNISOLONE SODIUM SUCCINATE 125 MG/2ML
125 INJECTION, POWDER, LYOPHILIZED, FOR SOLUTION INTRAMUSCULAR; INTRAVENOUS
Status: CANCELLED
Start: 2021-03-10

## 2021-03-09 RX ORDER — HEPARIN SODIUM (PORCINE) LOCK FLUSH IV SOLN 100 UNIT/ML 100 UNIT/ML
5 SOLUTION INTRAVENOUS
Status: DISCONTINUED | OUTPATIENT
Start: 2021-03-09 | End: 2021-03-09 | Stop reason: HOSPADM

## 2021-03-09 RX ORDER — HEPARIN SODIUM,PORCINE 10 UNIT/ML
5 VIAL (ML) INTRAVENOUS
Status: CANCELLED | OUTPATIENT
Start: 2021-03-09

## 2021-03-09 RX ORDER — HEPARIN SODIUM (PORCINE) LOCK FLUSH IV SOLN 100 UNIT/ML 100 UNIT/ML
5 SOLUTION INTRAVENOUS
Status: CANCELLED | OUTPATIENT
Start: 2021-03-10

## 2021-03-09 RX ORDER — MEPERIDINE HYDROCHLORIDE 25 MG/ML
25 INJECTION INTRAMUSCULAR; INTRAVENOUS; SUBCUTANEOUS EVERY 30 MIN PRN
Status: CANCELLED | OUTPATIENT
Start: 2021-03-10

## 2021-03-09 RX ORDER — DIPHENHYDRAMINE HCL 25 MG
50 CAPSULE ORAL ONCE
Status: CANCELLED
Start: 2021-03-10

## 2021-03-09 RX ADMIN — Medication 5 ML: at 10:40

## 2021-03-09 NOTE — LETTER
3/9/2021         RE: Derrick Benitez  [address]  [facility] [address]        Dear Colleague,    Thank you for referring your patient, Derrick Benitez, to the [facility]. Please see a copy of my visit note below.    Nursing Note:  Derrick Benitez presents today for port labs.    Patient seen by provider today: No   present during visit today: Not Applicable.    Note: N/A.    Intravenous Access:  Implanted Port.    Discharge Plan:   Patient was discharged    Safia Simms RN                Again, thank you for allowing me to participate in the care of your patient.        Sincerely,         [facility]

## 2021-03-09 NOTE — PROGRESS NOTES
Nursing Note:  Derrick Benitez presents today for port labs.    Patient seen by provider today: No   present during visit today: Not Applicable.    Note: N/A.    Intravenous Access:  Implanted Port.    Discharge Plan:   Patient was discharged    Safia Simms RN

## 2021-03-10 ENCOUNTER — ONCOLOGY VISIT (OUTPATIENT)
Dept: ONCOLOGY | Facility: CLINIC | Age: 81
End: 2021-03-10
Attending: NURSE PRACTITIONER
Payer: MEDICARE

## 2021-03-10 ENCOUNTER — INFUSION THERAPY VISIT (OUTPATIENT)
Dept: INFUSION THERAPY | Facility: CLINIC | Age: 81
End: 2021-03-10
Attending: INTERNAL MEDICINE
Payer: MEDICARE

## 2021-03-10 VITALS — BODY MASS INDEX: 30.17 KG/M2 | HEIGHT: 67 IN | WEIGHT: 192.2 LBS

## 2021-03-10 VITALS
RESPIRATION RATE: 16 BRPM | DIASTOLIC BLOOD PRESSURE: 75 MMHG | HEART RATE: 103 BPM | SYSTOLIC BLOOD PRESSURE: 142 MMHG | WEIGHT: 192.2 LBS | BODY MASS INDEX: 30.1 KG/M2 | OXYGEN SATURATION: 95 % | TEMPERATURE: 98.3 F

## 2021-03-10 DIAGNOSIS — Z51.11 ENCOUNTER FOR ANTINEOPLASTIC CHEMOTHERAPY: ICD-10-CM

## 2021-03-10 DIAGNOSIS — Z51.11 ENCOUNTER FOR ANTINEOPLASTIC CHEMOTHERAPY: Primary | ICD-10-CM

## 2021-03-10 DIAGNOSIS — C34.31 MALIGNANT NEOPLASM OF LOWER LOBE OF RIGHT LUNG (H): ICD-10-CM

## 2021-03-10 DIAGNOSIS — C34.31 MALIGNANT NEOPLASM OF LOWER LOBE OF RIGHT LUNG (H): Primary | ICD-10-CM

## 2021-03-10 PROCEDURE — 99214 OFFICE O/P EST MOD 30 MIN: CPT | Performed by: NURSE PRACTITIONER

## 2021-03-10 PROCEDURE — 96372 THER/PROPH/DIAG INJ SC/IM: CPT | Performed by: NURSE PRACTITIONER

## 2021-03-10 PROCEDURE — 96377 APPLICATON ON-BODY INJECTOR: CPT | Performed by: NURSE PRACTITIONER

## 2021-03-10 PROCEDURE — 96413 CHEMO IV INFUSION 1 HR: CPT

## 2021-03-10 PROCEDURE — 96415 CHEMO IV INFUSION ADDL HR: CPT

## 2021-03-10 PROCEDURE — 96367 TX/PROPH/DG ADDL SEQ IV INF: CPT

## 2021-03-10 PROCEDURE — 96375 TX/PRO/DX INJ NEW DRUG ADDON: CPT

## 2021-03-10 PROCEDURE — 258N000003 HC RX IP 258 OP 636: Performed by: NURSE PRACTITIONER

## 2021-03-10 PROCEDURE — 250N000011 HC RX IP 250 OP 636: Performed by: NURSE PRACTITIONER

## 2021-03-10 PROCEDURE — G0463 HOSPITAL OUTPT CLINIC VISIT: HCPCS | Mod: 25

## 2021-03-10 PROCEDURE — 96417 CHEMO IV INFUS EACH ADDL SEQ: CPT

## 2021-03-10 RX ORDER — HEPARIN SODIUM (PORCINE) LOCK FLUSH IV SOLN 100 UNIT/ML 100 UNIT/ML
5 SOLUTION INTRAVENOUS
Status: DISCONTINUED | OUTPATIENT
Start: 2021-03-10 | End: 2021-03-10 | Stop reason: HOSPADM

## 2021-03-10 RX ADMIN — PACLITAXEL 311 MG: 6 INJECTION, SOLUTION INTRAVENOUS at 12:16

## 2021-03-10 RX ADMIN — DEXAMETHASONE SODIUM PHOSPHATE: 10 INJECTION, SOLUTION INTRAMUSCULAR; INTRAVENOUS at 10:44

## 2021-03-10 RX ADMIN — SODIUM CHLORIDE 250 ML: 9 INJECTION, SOLUTION INTRAVENOUS at 10:44

## 2021-03-10 RX ADMIN — FAMOTIDINE 20 MG: 20 INJECTION, SOLUTION INTRAVENOUS at 11:02

## 2021-03-10 RX ADMIN — DIPHENHYDRAMINE HYDROCHLORIDE 50 MG: 50 INJECTION INTRAMUSCULAR; INTRAVENOUS at 11:17

## 2021-03-10 RX ADMIN — Medication 5 ML: at 15:58

## 2021-03-10 RX ADMIN — SODIUM CHLORIDE 200 MG: 9 INJECTION, SOLUTION INTRAVENOUS at 11:36

## 2021-03-10 RX ADMIN — PEGFILGRASTIM 6 MG: KIT SUBCUTANEOUS at 15:18

## 2021-03-10 RX ADMIN — CARBOPLATIN 350 MG: 10 INJECTION, SOLUTION INTRAVENOUS at 15:26

## 2021-03-10 ASSESSMENT — MIFFLIN-ST. JEOR: SCORE: 1535.56

## 2021-03-10 ASSESSMENT — PAIN SCALES - GENERAL: PAINLEVEL: NO PAIN (0)

## 2021-03-10 NOTE — PROGRESS NOTES
"Oncology Rooming Note    March 10, 2021 9:56 AM   Derrick Benitez is a 80 year old male who presents for:    Chief Complaint   Patient presents with     Oncology Clinic Visit     Initial Vitals: BP (!) 142/75   Pulse 103   Temp 98.3  F (36.8  C) (Oral)   Resp 16   Wt 87.2 kg (192 lb 3.2 oz)   SpO2 95%   BMI 30.10 kg/m   Estimated body mass index is 30.1 kg/m  as calculated from the following:    Height as of 2/17/21: 1.702 m (5' 7.01\").    Weight as of this encounter: 87.2 kg (192 lb 3.2 oz). Body surface area is 2.03 meters squared.  No Pain (0) Comment: Data Unavailable   No LMP for male patient.  Allergies reviewed: Yes  Medications reviewed: Yes    Medications: Medication refills not needed today.  Pharmacy name entered into EPIC:    [place], [place] - [address]  [facility] [address] [place], [place] - [address] [facility]    Clinical concerns: no      Elke Bravo CMA            "

## 2021-03-10 NOTE — LETTER
"    3/10/2021         RE: Derrick Benitez  [address]  [facility] [address]        Dear Colleague,    Thank you for referring your patient, Derrick Benitez, to the [facility]. Please see a copy of my visit note below.    Oncology Rooming Note    March 10, 2021 9:56 AM   Derrick Benitez is a 80 year old male who presents for:    Chief Complaint   Patient presents with     Oncology Clinic Visit     Initial Vitals: BP (!) 142/75   Pulse 103   Temp 98.3  F (36.8  C) (Oral)   Resp 16   Wt 87.2 kg (192 lb 3.2 oz)   SpO2 95%   BMI 30.10 kg/m   Estimated body mass index is 30.1 kg/m  as calculated from the following:    Height as of 2/17/21: 1.702 m (5' 7.01\").    Weight as of this encounter: 87.2 kg (192 lb 3.2 oz). Body surface area is 2.03 meters squared.  No Pain (0) Comment: Data Unavailable   No LMP for male patient.  Allergies reviewed: Yes  Medications reviewed: Yes    Medications: Medication refills not needed today.  Pharmacy name entered into EPIC:    [place], [place] - [address]  [facility] [id number] [place], [place] - [address] [place] [address] [address]    Clinical concerns: no      Elke Bravo CMA              Oncology/Hematology Visit Note  Mar 10, 2021    Reason for Visit: follow up of non-small cell cancer    Patient currently getting treatment with carboplatin Taxol and pembrolizumab      Interval History:  Patient reports overall he has been tolerating treatment he does report some mild neuropathy in bilateral lower extremity .  He denies swelling of the extremity he denies excessive fatigue he is able to walk  Patient denies fever chills sweats cough shortness of breath chest pain.  Denies bone pain    Review of Systems:  14 point ROS of systems including Constitutional, Eyes, Respiratory, Cardiovascular, Gastroenterology, Genitourinary, Integumentary, Muscularskeletal, Psychiatric were all negative except for pertinent positives noted in my " HPI.        Physical Examination:  General: The patient is a pleasant male in no acute distress.  BP (!) 142/75   Pulse 103   Temp 98.3  F (36.8  C) (Oral)   Resp 16   Wt 87.2 kg (192 lb 3.2 oz)   SpO2 95%   BMI 30.10 kg/m    HEENT: EOMI, PERRL. Sclerae are anicteric. Oral mucosa is pink and moist with no lesions or thrush.   Lymph: Neck is supple with no lymphadenopathy in the cervical or supraclavicular areas.   Heart: Regular rate and rhythm.   Lungs: Clear to auscultation bilaterally.   GI: Bowel sounds present, soft, nontender with no palpable hepatosplenomegaly or masses.   Extremities: No lower extremity edema noted bilaterally.   Skin: No rashes, petechiae, or bruising noted on exposed skin.    Laboratory Data:  Results for orders placed or performed in visit on 03/09/21 (from the past 24 hour(s))   CBC with platelets differential   Result Value Ref Range    WBC 3.8 (L) 4.0 - 11.0 10e9/L    RBC Count 3.83 (L) 4.4 - 5.9 10e12/L    Hemoglobin 11.9 (L) 13.3 - 17.7 g/dL    Hematocrit 36.1 (L) 40.0 - 53.0 %    MCV 94 78 - 100 fl    MCH 31.1 26.5 - 33.0 pg    MCHC 33.0 31.5 - 36.5 g/dL    RDW 15.4 (H) 10.0 - 15.0 %    Platelet Count 131 (L) 150 - 450 10e9/L    Diff Method Automated Method     % Neutrophils 63.0 %    % Lymphocytes 24.7 %    % Monocytes 11.0 %    % Eosinophils 0.5 %    % Basophils 0.3 %    % Immature Granulocytes 0.5 %    Nucleated RBCs 0 0 /100    Absolute Neutrophil 2.4 1.6 - 8.3 10e9/L    Absolute Lymphocytes 0.9 0.8 - 5.3 10e9/L    Absolute Monocytes 0.4 0.0 - 1.3 10e9/L    Absolute Eosinophils 0.0 0.0 - 0.7 10e9/L    Absolute Basophils 0.0 0.0 - 0.2 10e9/L    Abs Immature Granulocytes 0.0 0 - 0.4 10e9/L    Absolute Nucleated RBC 0.0    Comprehensive metabolic panel   Result Value Ref Range    Sodium 141 133 - 144 mmol/L    Potassium 4.2 3.4 - 5.3 mmol/L    Chloride 113 (H) 94 - 109 mmol/L    Carbon Dioxide 23 20 - 32 mmol/L    Anion Gap 5 3 - 14 mmol/L    Glucose 140 (H) 70 - 99 mg/dL     Urea Nitrogen 13 7 - 30 mg/dL    Creatinine 0.90 0.66 - 1.25 mg/dL    GFR Estimate 79 >60 mL/min/[1.73_m2]    GFR Estimate If Black >90 >60 mL/min/[1.73_m2]    Calcium 7.7 (L) 8.5 - 10.1 mg/dL    Bilirubin Total 0.3 0.2 - 1.3 mg/dL    Albumin 3.3 (L) 3.4 - 5.0 g/dL    Protein Total 6.2 (L) 6.8 - 8.8 g/dL    Alkaline Phosphatase 66 40 - 150 U/L    ALT 19 0 - 70 U/L    AST 16 0 - 45 U/L   TSH with free T4 reflex   Result Value Ref Range    TSH 0.41 0.40 - 4.00 mU/L         Assessment and Plan:  This is a 80-year-old male with    non-small cell cancer  Patient currently getting treatment with carboplatinTaxol and pembrolizumab  Patient is completing 6 cycles today  For Dr. Larsen's note plan for maintenance pembrolizumab  -CBC CMP results reviewed with patient abnormalities discussed.  He meets parameters for treatment today  -Schedule with Dr. Larsen in 3 weeks and labs and pembrolizumab      Bone mets  Schedule for Zometa next week  Call our clinic in the event of dental or jaw pain    Thrombocytopenia secondary to chemo  Call our clinic in the event of bleeding for injury  Overall stable platelet count    Peripheral neuropathy  Secondary to chemotherapy  Mild symptoms for now.  This is his last cycle of chemo  We will monitor closely advised patient to call us if worsening of symptoms    Anemia  Patient is asymptomatic  Stable hemoglobin patient denies bleeding       Patient advised to call our clinic in the event of fever chills sweats cough shortness of breath chest pain nausea vomiting diarrhea abdominal pain bleeding or any changes in health condition    DARSHANA Meza CNP  [facility]     Chart documentation with Dragon Voice recognition Software. Although reviewed after completion, some words and grammatical errors may remain.            Again, thank you for allowing me to participate in the care of your patient.        Sincerely,        DARSHANA Meza CNP

## 2021-03-10 NOTE — PROGRESS NOTES
Infusion Nursing Note:  Derrick Benitez presents today for C6D1 Taxol/Carbo/Keytruda.    Patient seen by provider today: Yes: De   present during visit today: Not Applicable.    Note: Zometa every 6 weeks--De is aware.  Disregard notes to schedule next week.  Patient will get zometa in 3 weeks with next treatment.    Patient did meet criteria for an asymptomatic covid-19 PCR test in infusion today. Patient declined the covid-19 test.    Intravenous Access:  Implanted Port.    Treatment Conditions:  Lab Results   Component Value Date    HGB 11.9 03/09/2021     Lab Results   Component Value Date    WBC 3.8 03/09/2021      Lab Results   Component Value Date    ANEU 2.4 03/09/2021     Lab Results   Component Value Date     03/09/2021      Lab Results   Component Value Date     03/09/2021                   Lab Results   Component Value Date    POTASSIUM 4.2 03/09/2021           Lab Results   Component Value Date    MAG 2.1 10/30/2014            Lab Results   Component Value Date    CR 0.90 03/09/2021                   Lab Results   Component Value Date    ANTHONY 7.7 03/09/2021                Lab Results   Component Value Date    BILITOTAL 0.3 03/09/2021           Lab Results   Component Value Date    ALBUMIN 3.3 03/09/2021                    Lab Results   Component Value Date    ALT 19 03/09/2021           Lab Results   Component Value Date    AST 16 03/09/2021       Results reviewed, labs MET treatment parameters, ok to proceed with treatment.  TSH=0.41.      Post Infusion Assessment:  Patient tolerated infusion without incident.  Blood return noted pre and post infusion.  Site patent and intact, free from redness, edema or discomfort.  No evidence of extravasations.  Access discontinued per protocol.     ONPRO  Was placed on patient's: left side of abdomen.    Was placed at 3:25 PM    ONPRO injector device Lot number: [id number]    Patient education included: what patient can expect after  application, what colored lights mean on the device, when to remove device, when and where to call with questions or issues, all patients questions answered and that Neulasta administration will occur at 6:25pm on 3/11/21.    Patient tolerated administration well.      Discharge Plan:   Discharge instructions reviewed with: Patient.  Patient and/or family verbalized understanding of discharge instructions and all questions answered.  AVS to patient via [facility].  Patient will return 3/30/21 for next appointment.   Patient discharged in stable condition accompanied by: self.  Departure Mode: Ambulatory.    Musa Taylor RN

## 2021-03-10 NOTE — PROGRESS NOTES
Oncology/Hematology Visit Note  Mar 10, 2021    Reason for Visit: follow up of non-small cell cancer    Patient currently getting treatment with carboplatin Taxol and pembrolizumab      Interval History:  Patient reports overall he has been tolerating treatment he does report some mild neuropathy in bilateral lower extremity .  He denies swelling of the extremity he denies excessive fatigue he is able to walk  Patient denies fever chills sweats cough shortness of breath chest pain.  Denies bone pain    Review of Systems:  14 point ROS of systems including Constitutional, Eyes, Respiratory, Cardiovascular, Gastroenterology, Genitourinary, Integumentary, Muscularskeletal, Psychiatric were all negative except for pertinent positives noted in my HPI.        Physical Examination:  General: The patient is a pleasant male in no acute distress.  BP (!) 142/75   Pulse 103   Temp 98.3  F (36.8  C) (Oral)   Resp 16   Wt 87.2 kg (192 lb 3.2 oz)   SpO2 95%   BMI 30.10 kg/m    HEENT: EOMI, PERRL. Sclerae are anicteric. Oral mucosa is pink and moist with no lesions or thrush.   Lymph: Neck is supple with no lymphadenopathy in the cervical or supraclavicular areas.   Heart: Regular rate and rhythm.   Lungs: Clear to auscultation bilaterally.   GI: Bowel sounds present, soft, nontender with no palpable hepatosplenomegaly or masses.   Extremities: No lower extremity edema noted bilaterally.   Skin: No rashes, petechiae, or bruising noted on exposed skin.    Laboratory Data:  Results for orders placed or performed in visit on 03/09/21 (from the past 24 hour(s))   CBC with platelets differential   Result Value Ref Range    WBC 3.8 (L) 4.0 - 11.0 10e9/L    RBC Count 3.83 (L) 4.4 - 5.9 10e12/L    Hemoglobin 11.9 (L) 13.3 - 17.7 g/dL    Hematocrit 36.1 (L) 40.0 - 53.0 %    MCV 94 78 - 100 fl    MCH 31.1 26.5 - 33.0 pg    MCHC 33.0 31.5 - 36.5 g/dL    RDW 15.4 (H) 10.0 - 15.0 %    Platelet Count 131 (L) 150 - 450 10e9/L    Diff  Method Automated Method     % Neutrophils 63.0 %    % Lymphocytes 24.7 %    % Monocytes 11.0 %    % Eosinophils 0.5 %    % Basophils 0.3 %    % Immature Granulocytes 0.5 %    Nucleated RBCs 0 0 /100    Absolute Neutrophil 2.4 1.6 - 8.3 10e9/L    Absolute Lymphocytes 0.9 0.8 - 5.3 10e9/L    Absolute Monocytes 0.4 0.0 - 1.3 10e9/L    Absolute Eosinophils 0.0 0.0 - 0.7 10e9/L    Absolute Basophils 0.0 0.0 - 0.2 10e9/L    Abs Immature Granulocytes 0.0 0 - 0.4 10e9/L    Absolute Nucleated RBC 0.0    Comprehensive metabolic panel   Result Value Ref Range    Sodium 141 133 - 144 mmol/L    Potassium 4.2 3.4 - 5.3 mmol/L    Chloride 113 (H) 94 - 109 mmol/L    Carbon Dioxide 23 20 - 32 mmol/L    Anion Gap 5 3 - 14 mmol/L    Glucose 140 (H) 70 - 99 mg/dL    Urea Nitrogen 13 7 - 30 mg/dL    Creatinine 0.90 0.66 - 1.25 mg/dL    GFR Estimate 79 >60 mL/min/[1.73_m2]    GFR Estimate If Black >90 >60 mL/min/[1.73_m2]    Calcium 7.7 (L) 8.5 - 10.1 mg/dL    Bilirubin Total 0.3 0.2 - 1.3 mg/dL    Albumin 3.3 (L) 3.4 - 5.0 g/dL    Protein Total 6.2 (L) 6.8 - 8.8 g/dL    Alkaline Phosphatase 66 40 - 150 U/L    ALT 19 0 - 70 U/L    AST 16 0 - 45 U/L   TSH with free T4 reflex   Result Value Ref Range    TSH 0.41 0.40 - 4.00 mU/L         Assessment and Plan:  This is a 80-year-old male with    non-small cell cancer  Patient currently getting treatment with carboplatinTaxol and pembrolizumab  Patient is completing 6 cycles today  For Dr. Larsen's note plan for maintenance pembrolizumab  -CBC CMP results reviewed with patient abnormalities discussed.  He meets parameters for treatment today  -Schedule with Dr. Larsen in 3 weeks and labs and pembrolizumab  I will ask pharmacy to enter  order for pembrolizumab every 3 weeks    Bone mets  Schedule for Zometa with next treatment   Call our clinic in the event of dental or jaw pain    Thrombocytopenia secondary to chemo  Call our clinic in the event of bleeding for injury  Overall stable platelet  count    Peripheral neuropathy  Secondary to chemotherapy  Mild symptoms for now.  This is his last cycle of chemo  We will monitor closely advised patient to call us if worsening of symptoms    Anemia  Patient is asymptomatic  Stable hemoglobin patient denies bleeding       Patient advised to call our clinic in the event of fever chills sweats cough shortness of breath chest pain nausea vomiting diarrhea abdominal pain bleeding or any changes in health condition    DARSHANA Meza [facility]     Chart documentation with Dragon Voice recognition Software. Although reviewed after completion, some words and grammatical errors may remain.

## 2021-03-10 NOTE — PATIENT INSTRUCTIONS
Your On-body Neulasta Injector was applied to your abdomen at 3:25pm.  At approximately 6:25pm on 3/11/21, your On-body Injector will beep to let you know your dose delivery will begin in 2 minutes.  Your medication will be delivered over the next 45 minutes.  You can remove your Injector at 8:00pm.  Please make sure your Injector has a solid green light or has turned off prior to removing the device.  Please contact your provider at [phone] with questions or concerns.

## 2021-03-30 ENCOUNTER — HOSPITAL ENCOUNTER (OUTPATIENT)
Facility: CLINIC | Age: 81
Setting detail: SPECIMEN
Discharge: HOME | End: 2021-03-30
Attending: INTERNAL MEDICINE | Admitting: INTERNAL MEDICINE
Payer: MEDICARE

## 2021-03-30 ENCOUNTER — VIRTUAL VISIT (OUTPATIENT)
Dept: ONCOLOGY | Facility: CLINIC | Age: 81
End: 2021-03-30
Attending: NURSE PRACTITIONER
Payer: MEDICARE

## 2021-03-30 ENCOUNTER — INFUSION THERAPY VISIT (OUTPATIENT)
Dept: INFUSION THERAPY | Facility: CLINIC | Age: 81
End: 2021-03-30
Attending: INTERNAL MEDICINE
Payer: MEDICARE

## 2021-03-30 DIAGNOSIS — C79.51 BONE METASTASES: ICD-10-CM

## 2021-03-30 DIAGNOSIS — C34.31 MALIGNANT NEOPLASM OF LOWER LOBE OF RIGHT LUNG (H): Primary | ICD-10-CM

## 2021-03-30 DIAGNOSIS — Z51.11 ENCOUNTER FOR ANTINEOPLASTIC CHEMOTHERAPY: ICD-10-CM

## 2021-03-30 DIAGNOSIS — E11.9 DIABETES MELLITUS, TYPE 2 (H): ICD-10-CM

## 2021-03-30 LAB
ALBUMIN SERPL-MCNC: 3.2 G/DL (ref 3.4–5)
ALP SERPL-CCNC: 69 U/L (ref 40–150)
ALT SERPL W P-5'-P-CCNC: 20 U/L (ref 0–70)
ANION GAP SERPL CALCULATED.3IONS-SCNC: 3 MMOL/L (ref 3–14)
AST SERPL W P-5'-P-CCNC: 13 U/L (ref 0–45)
BILIRUB SERPL-MCNC: 0.4 MG/DL (ref 0.2–1.3)
BUN SERPL-MCNC: 12 MG/DL (ref 7–30)
CALCIUM SERPL-MCNC: 7.7 MG/DL (ref 8.5–10.1)
CHLORIDE SERPL-SCNC: 111 MMOL/L (ref 94–109)
CO2 SERPL-SCNC: 25 MMOL/L (ref 20–32)
CREAT SERPL-MCNC: 0.81 MG/DL (ref 0.66–1.25)
GFR SERPL CREATININE-BSD FRML MDRD: 83 ML/MIN/{1.73_M2}
GLUCOSE SERPL-MCNC: 139 MG/DL (ref 70–99)
POTASSIUM SERPL-SCNC: 3.9 MMOL/L (ref 3.4–5.3)
PROT SERPL-MCNC: 6 G/DL (ref 6.8–8.8)
SODIUM SERPL-SCNC: 139 MMOL/L (ref 133–144)
TSH SERPL DL<=0.005 MIU/L-ACNC: 0.81 MU/L (ref 0.4–4)

## 2021-03-30 PROCEDURE — 80053 COMPREHEN METABOLIC PANEL: CPT | Performed by: INTERNAL MEDICINE

## 2021-03-30 PROCEDURE — 99442 PR PHYSICIAN TELEPHONE EVALUATION 11-20 MIN: CPT | Mod: 95 | Performed by: INTERNAL MEDICINE

## 2021-03-30 PROCEDURE — G0463 HOSPITAL OUTPT CLINIC VISIT: HCPCS

## 2021-03-30 PROCEDURE — 250N000011 HC RX IP 250 OP 636: Performed by: INTERNAL MEDICINE

## 2021-03-30 PROCEDURE — 84443 ASSAY THYROID STIM HORMONE: CPT | Performed by: INTERNAL MEDICINE

## 2021-03-30 RX ORDER — HEPARIN SODIUM (PORCINE) LOCK FLUSH IV SOLN 100 UNIT/ML 100 UNIT/ML
5 SOLUTION INTRAVENOUS
Status: DISCONTINUED | OUTPATIENT
Start: 2021-03-30 | End: 2021-03-30 | Stop reason: HOSPADM

## 2021-03-30 RX ORDER — HEPARIN SODIUM,PORCINE 10 UNIT/ML
5 VIAL (ML) INTRAVENOUS
Status: CANCELLED | OUTPATIENT
Start: 2021-03-30

## 2021-03-30 RX ORDER — HEPARIN SODIUM (PORCINE) LOCK FLUSH IV SOLN 100 UNIT/ML 100 UNIT/ML
5 SOLUTION INTRAVENOUS
Status: CANCELLED | OUTPATIENT
Start: 2021-03-30

## 2021-03-30 RX ADMIN — Medication 5 ML: at 09:50

## 2021-03-30 NOTE — PATIENT INSTRUCTIONS
1. Continue pembrolizumab and zometa.  2. See De Frausto next chemotherapy.  3. CT scan in 6 weeks.  4. See me in 6 weeks.

## 2021-03-30 NOTE — LETTER
3/30/2021         RE: Derrick Benitez  [address]  [facility] [address]        Dear Colleague,    Thank you for referring your patient, Derrick Benitez, to the [facility] CANCER [facility]. Please see a copy of my visit note below.    Derrick is a 80 year old who is being evaluated via a billable telephone visit.      What phone number would you like to be contacted at? [phone]  How would you like to obtain your AVS? [employer]art  Phone call duration: 5 minutes    Can he get a letter stating that he needs a hair piece for insurance to pay. Will be here tomorrow for treatment 3/31/21    Refills on oxycodone    Visit Date:   03/30/2021      SUBJECTIVE:  Mr. Benitez is an 80-year-old gentleman with metastatic lung squamous cell carcinoma.  He has completed 6 cycles of carboplatin, Taxol and pembrolizumab.  Plan is to continue now on maintenance pembrolizumab.      Overall, he is doing good.  Occasionally he gets some pain in his neck.  No shoulder pain.  No numbness or tingling in the upper extremities.      No headache.  No dizziness.  No chest pain.  No shortness of breath.  No abdominal pain, nausea or vomiting.  No urinary or bowel complaints.  No abnormal bleeding.  No fever, chills or night sweats.      REVIEW OF SYSTEM:  All other review of systems negative.      PHYSICAL EXAMINATION:   GENERAL:  The patient is alert, oriented x 3.   This is a telephone visit.      LABORATORY DATA:  Reviewed.  CMP reveals mildly low protein and albumin, otherwise normal.  Blood sugar mildly elevated.      ASSESSMENT:   1.  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  No clinical suspicion of progression.   2.  Pancytopenia from chemotherapy.   3.  Mild neck pain.      PLAN:   1.  He is clinically stable from lung cancer.  No suspicion of progression.      He will continue on maintenance pembrolizumab.  He will get it every 3 weeks.  In future, we will consider changing to every 6  weeks.  He has been tolerating it well.   2.  He has bone metastasis.  He will continue on Zometa every 4-6 weeks.  He is not having dental or jaw-related symptoms.   3.  Discussed regarding the scans.  We will get CT chest, abdomen and pelvis in 6 weeks.  I will see him after that.  In between, he will see our nurse practitioner.   4.  He had multiple questions, which were all answered.  I explained to him that so far he is doing well.  I am hoping he will continue to do well on maintenance pembrolizumab.   5.  He has anemia and thrombocytopenia.  We will monitor his CBC every 4-6 weeks.      TELEPHONE VISIT TIME:  11 minutes.         ANETA MCKEON MD             D: 2021   T: 2021   MT: BALJEET      Name:     ASAF WRIGHT   MRN:      [id number]        Account:      [id number]   :      1940           Visit Date:   2021      Document: [id number]        Again, thank you for allowing me to participate in the care of your patient.        Sincerely,        Aneta Mckeon MD

## 2021-03-30 NOTE — PROGRESS NOTES
Nursing Note:  Derrick Benitez presents today for port labs.    Patient seen by provider today: No   present during visit today: Not Applicable.    Note: N/A.    Intravenous Access:  Labs drawn without difficulty.  Implanted Port.    Discharge Plan:   Patient was sent to lobby for discharge.     Alberto Merida RN

## 2021-03-30 NOTE — LETTER
3/30/2021         RE: Derrick Benitez  [address]  [facility] [address]        Dear Colleague,    Thank you for referring your patient, Derrick Benitez, to the [facility]. Please see a copy of my visit note below.    Derrick is a 80 year old who is being evaluated via a billable telephone visit.      What phone number would you like to be contacted at? [phone]  How would you like to obtain your AVS? [employer]  Phone call duration: 5 minutes    Can he get a letter stating that he needs a hair piece for insurance to pay. Will be here tomorrow for treatment 3/31/21    Refills on oxycodone    Visit Date:   03/30/2021      SUBJECTIVE:  Mr. Benitez is an 80-year-old gentleman with metastatic lung squamous cell carcinoma.  He has completed 6 cycles of carboplatin, Taxol and pembrolizumab.  Plan is to continue now on maintenance pembrolizumab.      Overall, he is doing good.  Occasionally he gets some pain in his neck.  No shoulder pain.  No numbness or tingling in the upper extremities.      No headache.  No dizziness.  No chest pain.  No shortness of breath.  No abdominal pain, nausea or vomiting.  No urinary or bowel complaints.  No abnormal bleeding.  No fever, chills or night sweats.      REVIEW OF SYSTEM:  All other review of systems negative.      PHYSICAL EXAMINATION:   GENERAL:  The patient is alert, oriented x 3.   This is a telephone visit.      LABORATORY DATA:  Reviewed.  CMP reveals mildly low protein and albumin, otherwise normal.  Blood sugar mildly elevated.      ASSESSMENT:   1.  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  No clinical suspicion of progression.   2.  Pancytopenia from chemotherapy.   3.  Mild neck pain.      PLAN:   1.  He is clinically stable from lung cancer.  No suspicion of progression.      He will continue on maintenance pembrolizumab.  He will get it every 3 weeks.  In future, we will consider changing to every 6  weeks.  He has been tolerating it well.   2.  He has bone metastasis.  He will continue on Zometa every 4-6 weeks.  He is not having dental or jaw-related symptoms.   3.  Discussed regarding the scans.  We will get CT chest, abdomen and pelvis in 6 weeks.  I will see him after that.  In between, he will see our nurse practitioner.   4.  He had multiple questions, which were all answered.  I explained to him that so far he is doing well.  I am hoping he will continue to do well on maintenance pembrolizumab.   5.  He has anemia and thrombocytopenia.  We will monitor his CBC every 4-6 weeks.      TELEPHONE VISIT TIME:  11 minutes.         ANETA MCKEON MD             D: 2021   T: 2021   MT: BALJEET      Name:     ASAF WRIGHT   MRN:      [id number]        Account:      [id number]   :      1940           Visit Date:   2021      Document: [id number]        Again, thank you for allowing me to participate in the care of your patient.        Sincerely,        Aneta Mckeon MD

## 2021-03-30 NOTE — PROGRESS NOTES
Derrick is a 80 year old who is being evaluated via a billable telephone visit.      What phone number would you like to be contacted at? [phone]  How would you like to obtain your AVS? Juan  Phone call duration: 5 minutes    Can he get a letter stating that he needs a hair piece for insurance to pay. Will be here tomorrow for treatment 3/31/21    Refills on oxycodone

## 2021-03-31 ENCOUNTER — INFUSION THERAPY VISIT (OUTPATIENT)
Dept: INFUSION THERAPY | Facility: CLINIC | Age: 81
End: 2021-03-31
Attending: INTERNAL MEDICINE
Payer: MEDICARE

## 2021-03-31 ENCOUNTER — HOSPITAL ENCOUNTER (OUTPATIENT)
Facility: CLINIC | Age: 81
Setting detail: SPECIMEN
Discharge: HOME | End: 2021-03-31
Attending: INTERNAL MEDICINE | Admitting: INTERNAL MEDICINE
Payer: MEDICARE

## 2021-03-31 VITALS
RESPIRATION RATE: 18 BRPM | SYSTOLIC BLOOD PRESSURE: 126 MMHG | HEART RATE: 82 BPM | DIASTOLIC BLOOD PRESSURE: 70 MMHG | TEMPERATURE: 98 F

## 2021-03-31 DIAGNOSIS — G89.3 CANCER ASSOCIATED PAIN: ICD-10-CM

## 2021-03-31 DIAGNOSIS — C34.31 MALIGNANT NEOPLASM OF LOWER LOBE OF RIGHT LUNG (H): ICD-10-CM

## 2021-03-31 DIAGNOSIS — Z51.11 ENCOUNTER FOR ANTINEOPLASTIC CHEMOTHERAPY: Primary | ICD-10-CM

## 2021-03-31 DIAGNOSIS — C79.51 BONE METASTASES: ICD-10-CM

## 2021-03-31 LAB
BASOPHILS # BLD AUTO: 0 10E9/L (ref 0–0.2)
BASOPHILS NFR BLD AUTO: 0.5 %
DIFFERENTIAL METHOD BLD: ABNORMAL
EOSINOPHIL # BLD AUTO: 0 10E9/L (ref 0–0.7)
EOSINOPHIL NFR BLD AUTO: 0.7 %
ERYTHROCYTE [DISTWIDTH] IN BLOOD BY AUTOMATED COUNT: 15.2 % (ref 10–15)
HCT VFR BLD AUTO: 35.8 % (ref 40–53)
HGB BLD-MCNC: 11.8 G/DL (ref 13.3–17.7)
IMM GRANULOCYTES # BLD: 0 10E9/L (ref 0–0.4)
IMM GRANULOCYTES NFR BLD: 0.7 %
LYMPHOCYTES # BLD AUTO: 0.8 10E9/L (ref 0.8–5.3)
LYMPHOCYTES NFR BLD AUTO: 19.2 %
MCH RBC QN AUTO: 31.6 PG (ref 26.5–33)
MCHC RBC AUTO-ENTMCNC: 33 G/DL (ref 31.5–36.5)
MCV RBC AUTO: 96 FL (ref 78–100)
MONOCYTES # BLD AUTO: 0.5 10E9/L (ref 0–1.3)
MONOCYTES NFR BLD AUTO: 11.4 %
NEUTROPHILS # BLD AUTO: 2.8 10E9/L (ref 1.6–8.3)
NEUTROPHILS NFR BLD AUTO: 67.5 %
NRBC # BLD AUTO: 0 10*3/UL
NRBC BLD AUTO-RTO: 0 /100
PLATELET # BLD AUTO: 130 10E9/L (ref 150–450)
RBC # BLD AUTO: 3.73 10E12/L (ref 4.4–5.9)
WBC # BLD AUTO: 4.1 10E9/L (ref 4–11)

## 2021-03-31 PROCEDURE — 96367 TX/PROPH/DG ADDL SEQ IV INF: CPT

## 2021-03-31 PROCEDURE — 250N000011 HC RX IP 250 OP 636: Performed by: INTERNAL MEDICINE

## 2021-03-31 PROCEDURE — 85025 COMPLETE CBC W/AUTO DIFF WBC: CPT | Performed by: INTERNAL MEDICINE

## 2021-03-31 PROCEDURE — 96413 CHEMO IV INFUSION 1 HR: CPT

## 2021-03-31 PROCEDURE — 258N000003 HC RX IP 258 OP 636: Performed by: INTERNAL MEDICINE

## 2021-03-31 RX ORDER — OXYCODONE HYDROCHLORIDE 5 MG/1
5 TABLET ORAL EVERY 6 HOURS PRN
Qty: 40 TABLET | Refills: 0 | Status: SHIPPED | OUTPATIENT
Start: 2021-03-31 | End: 2021-04-30

## 2021-03-31 RX ORDER — HEPARIN SODIUM,PORCINE 10 UNIT/ML
5 VIAL (ML) INTRAVENOUS
Status: CANCELLED | OUTPATIENT
Start: 2021-03-31

## 2021-03-31 RX ORDER — ALBUTEROL SULFATE 90 UG/1
1-2 AEROSOL, METERED RESPIRATORY (INHALATION)
Status: CANCELLED
Start: 2021-03-31

## 2021-03-31 RX ORDER — HEPARIN SODIUM (PORCINE) LOCK FLUSH IV SOLN 100 UNIT/ML 100 UNIT/ML
5 SOLUTION INTRAVENOUS
Status: CANCELLED | OUTPATIENT
Start: 2021-03-31

## 2021-03-31 RX ORDER — EPINEPHRINE 1 MG/ML
0.3 INJECTION, SOLUTION INTRAMUSCULAR; SUBCUTANEOUS EVERY 5 MIN PRN
Status: CANCELLED | OUTPATIENT
Start: 2021-03-31

## 2021-03-31 RX ORDER — HEPARIN SODIUM (PORCINE) LOCK FLUSH IV SOLN 100 UNIT/ML 100 UNIT/ML
5 SOLUTION INTRAVENOUS EVERY 8 HOURS PRN
Status: CANCELLED | OUTPATIENT
Start: 2021-03-31

## 2021-03-31 RX ORDER — DIPHENHYDRAMINE HYDROCHLORIDE 50 MG/ML
50 INJECTION INTRAMUSCULAR; INTRAVENOUS
Status: CANCELLED
Start: 2021-03-31

## 2021-03-31 RX ORDER — METHYLPREDNISOLONE SODIUM SUCCINATE 125 MG/2ML
125 INJECTION, POWDER, LYOPHILIZED, FOR SOLUTION INTRAMUSCULAR; INTRAVENOUS
Status: CANCELLED
Start: 2021-03-31

## 2021-03-31 RX ORDER — ALBUTEROL SULFATE 0.83 MG/ML
2.5 SOLUTION RESPIRATORY (INHALATION)
Status: CANCELLED | OUTPATIENT
Start: 2021-03-31

## 2021-03-31 RX ORDER — MEPERIDINE HYDROCHLORIDE 25 MG/ML
25 INJECTION INTRAMUSCULAR; INTRAVENOUS; SUBCUTANEOUS EVERY 30 MIN PRN
Status: CANCELLED | OUTPATIENT
Start: 2021-03-31

## 2021-03-31 RX ORDER — ZOLEDRONIC ACID 0.04 MG/ML
4 INJECTION, SOLUTION INTRAVENOUS ONCE
Status: CANCELLED | OUTPATIENT
Start: 2021-03-31 | End: 2021-03-31

## 2021-03-31 RX ORDER — HEPARIN SODIUM (PORCINE) LOCK FLUSH IV SOLN 100 UNIT/ML 100 UNIT/ML
5 SOLUTION INTRAVENOUS
Status: DISCONTINUED | OUTPATIENT
Start: 2021-03-31 | End: 2021-03-31 | Stop reason: HOSPADM

## 2021-03-31 RX ORDER — ZOLEDRONIC ACID 0.04 MG/ML
4 INJECTION, SOLUTION INTRAVENOUS ONCE
Status: COMPLETED | OUTPATIENT
Start: 2021-03-31 | End: 2021-03-31

## 2021-03-31 RX ORDER — NALOXONE HYDROCHLORIDE 0.4 MG/ML
.1-.4 INJECTION, SOLUTION INTRAMUSCULAR; INTRAVENOUS; SUBCUTANEOUS
Status: CANCELLED | OUTPATIENT
Start: 2021-03-31

## 2021-03-31 RX ORDER — LORAZEPAM 2 MG/ML
0.5 INJECTION INTRAMUSCULAR EVERY 4 HOURS PRN
Status: CANCELLED | OUTPATIENT
Start: 2021-03-31

## 2021-03-31 RX ORDER — SODIUM CHLORIDE 9 MG/ML
1000 INJECTION, SOLUTION INTRAVENOUS CONTINUOUS PRN
Status: CANCELLED
Start: 2021-03-31

## 2021-03-31 RX ADMIN — SODIUM CHLORIDE 250 ML: 9 INJECTION, SOLUTION INTRAVENOUS at 12:12

## 2021-03-31 RX ADMIN — SODIUM CHLORIDE 200 MG: 9 INJECTION, SOLUTION INTRAVENOUS at 12:44

## 2021-03-31 RX ADMIN — ZOLEDRONIC ACID 4 MG: 0.04 INJECTION, SOLUTION INTRAVENOUS at 12:12

## 2021-03-31 RX ADMIN — Medication 5 ML: at 13:20

## 2021-03-31 ASSESSMENT — PAIN SCALES - GENERAL: PAINLEVEL: SEVERE PAIN (6)

## 2021-03-31 NOTE — PROGRESS NOTES
Infusion Nursing Note:  Derrick Benitez presents today for keytruda, zometa.    Patient seen by provider today: No V V yesterday.   present during visit today: Not Applicable.    Note: N/A.    Intravenous Access:  Implanted Port.    Treatment Conditions:  Results reviewed, labs MET treatment parameters, ok to proceed with treatment.      Post Infusion Assessment:  Patient tolerated infusion without incident.  Blood return noted pre and post infusion.  Site patent and intact, free from redness, edema or discomfort.  No evidence of extravasations.  Access discontinued per protocol.       Discharge Plan:   Discharge instructions reviewed with: Patient.  Patient and/or family verbalized understanding of discharge instructions and all questions answered.  Copy of AVS reviewed with patient and/or family.  Schedulers working on next appts.  Patient discharged in stable condition accompanied by: self.  Departure Mode: Ambulatory.    Veronica Acosta RN

## 2021-04-02 ENCOUNTER — TELEPHONE (OUTPATIENT)
Dept: ONCOLOGY | Facility: CLINIC | Age: 81
End: 2021-04-02

## 2021-04-02 NOTE — TELEPHONE ENCOUNTER
Writer called and spoke with patient and informed him that I would check with Taryn Waldron on Monday.     Patient verbalized understanding.    Liz Flores RN

## 2021-04-02 NOTE — TELEPHONE ENCOUNTER
Writer has no received paper work from patient but will check with infusion nurse to see if I can obtain the form on Monday.    Liz Flores RN

## 2021-04-02 NOTE — TELEPHONE ENCOUNTER
Patient calling for an update on paperwork he gave to infusion RN Deborah. Forms were from [facility] re: Wig.    Will route to MOE [place] to review/advise on status of paperwork.     Maddison Hutchison, DADAN, RN, PHN, OCN  Oncology Care Coordinator  [facility]

## 2021-04-05 NOTE — TELEPHONE ENCOUNTER
Writer received claim information for wig DOS 12/18.    Office notes faxed Attn: Taryn Reynoso [phone] [facility] federal program.    Patient is aware.    Liz Flores RN

## 2021-04-07 DIAGNOSIS — Z13.29 SCREENING FOR THYROID DISORDER: ICD-10-CM

## 2021-04-07 DIAGNOSIS — C34.31 MALIGNANT NEOPLASM OF LOWER LOBE OF RIGHT LUNG (H): Primary | ICD-10-CM

## 2021-04-07 DIAGNOSIS — E11.9 DIABETES MELLITUS, TYPE 2 (H): ICD-10-CM

## 2021-04-07 DIAGNOSIS — Z51.11 ENCOUNTER FOR ANTINEOPLASTIC CHEMOTHERAPY: ICD-10-CM

## 2021-04-08 ENCOUNTER — VIRTUAL VISIT (OUTPATIENT)
Dept: ONCOLOGY | Facility: CLINIC | Age: 81
End: 2021-04-08
Attending: HOSPITALIST
Payer: MEDICARE

## 2021-04-08 DIAGNOSIS — C79.51 BONE METASTASES: ICD-10-CM

## 2021-04-08 DIAGNOSIS — Z51.5 ENCOUNTER FOR PALLIATIVE CARE: ICD-10-CM

## 2021-04-08 DIAGNOSIS — G62.0 PERIPHERAL NEUROPATHY DUE TO CHEMOTHERAPY (H): ICD-10-CM

## 2021-04-08 DIAGNOSIS — T45.1X5A PERIPHERAL NEUROPATHY DUE TO CHEMOTHERAPY (H): ICD-10-CM

## 2021-04-08 DIAGNOSIS — G89.3 CANCER ASSOCIATED PAIN: ICD-10-CM

## 2021-04-08 DIAGNOSIS — C34.31 MALIGNANT NEOPLASM OF LOWER LOBE OF RIGHT LUNG (H): Primary | ICD-10-CM

## 2021-04-08 PROCEDURE — 99442 PR PHYSICIAN TELEPHONE EVALUATION 11-20 MIN: CPT | Mod: 95 | Performed by: STUDENT IN AN ORGANIZED HEALTH CARE EDUCATION/TRAINING PROGRAM

## 2021-04-08 ASSESSMENT — PAIN SCALES - GENERAL: PAINLEVEL: MODERATE PAIN (4)

## 2021-04-08 NOTE — PROGRESS NOTES
"Derrick is a 80 year old who is being evaluated via a billable telephone visit.      What phone number would you like to be contacted at? [phone]    How would you like to obtain your AVS? [employer]stephanie  Phone call duration: 5 minutes    Palliative Care Clinic Progress Note    Patient Name: Derrick Benitez  Primary Provider: Clarisse Gloden    Chief Complaint/Patient ID:   Medical - He has NSCLC  - RLL mass dx , no PDL staining could be done on bx. Bone mets at time of dx.  Carbo, taxol, pembro x 2020.     Last Palliative care appointment: 21 with Dr. Kent. Discussed ACP. Possibility of SNRI in future if his painful neuropathy symptoms worsen.     Reviewed: Yes, #40 tabs of 5mg oxycodone filled 3/31 by Oncology.    Interim History:  Derrick Benitez 80 year old male returns to Palliative Care today for follow up via billable telephone encounter.      Reviewed Oncology note from 3/30/2021.  No evidence of cancer progression on recent scans.  Planning to continue maintenance pembrolizumab.    Pain: Pain in feet significantly improved. Using oxycodone 5mg at bedtime about a week after each infusion. Occasionally takes 2 in a day. Does have some pain in neck that feels muscular.    Appetite/Nausea: Appetite more affected by being \"cooped up\".  Is looking forward to being able to get out of the house, go for a walk bilaterally, and maybe have lunch out as things are improving with the Covid 19 pandemic.     Bowels: No issues with constipation, BM every couple days.     Social History:  Lives with his wife.  Has a hobby [employer]/yard business.  Social History     Tobacco Use     Smoking status: Former Smoker     Packs/day: 3.00     Years: 40.00     Pack years: 120.00     Quit date: 10/28/2004     Years since quittin.4     Smokeless tobacco: Never Used   Substance Use Topics     Alcohol use: No     Comment: states he quit 30 years ago     Drug use: No       Family History- Reviewed in Epic.    Allergies "   Allergen Reactions     Losartan Other (See Comments)     Fatigue, weakness in legs     Advanced Care Planning: Discussed at visit on 1/14/2021.  Has completed a healthcare directive, though not scanned in our chart.    Medications- Reviewed in Epic.    Past Medical History- Reviewed in [facility].    Past Surgical History- Reviewed in Epic.    Review of Systems:   ROS: 10 point ROS neg other than the symptoms noted above in the HPI.    Key Data Reviewed:  LABS: 3/30/2021- Cr 0.81, Albumin 3.2, LFTs WNL.   3/31/2021- WBC 4.1, Hgb 11.8, Plts 130.     IMAGING: CT CAP 2/11/2021- IMPRESSION:  1.  Marked interval decrease in right lower lobe pulmonary lesion since the comparison study.  2.  Multiple bony lesions demonstrate interval healing compatible with improved disease.    Impression & Recommendations & Counseling:  Derrick Benitez is a 80 year old male with history of metastatic NSCLCA complicated by pain, mostly CIPN/acute taxane pain syndrome.  Now on maintenance pembrolizumab treatment.  Most recent scans have shown improvement in disease.    Pain  Peripheral neuropathy -states this is currently improved.  -Continue with oxycodone at bedtime for the few days around treatment.  -At this point, no need for SNRI, but could consider if symptoms worsen.  -Discussed applying heat or ice or topical agents to his neck.    Follow-up in 4 months.      Telephone Visit Details  Total length of phone call: 11 mins, 11:23-11:34am    Total time spent on day of encounter is 25 mins, including reviewing record, review of above studies, above visit with patient, and documentation.     Zulema Montes DO  Palliative Medicine   Pager [phone], AMCOM ID [id number]

## 2021-04-08 NOTE — LETTER
"    4/8/2021         RE: Derrick Benitez  [address]  [facility] [address]        Dear Colleague,    Thank you for referring your patient, Derrick Benitez, to the [facility]CER CENTER [place]. Please see a copy of my visit note below.    Derrick is a 80 year old who is being evaluated via a billable telephone visit.      What phone number would you like to be contacted at? [phone]    How would you like to obtain your AVS? [employer]shart  Phone call duration: 5 minutes    Palliative Care Clinic Progress Note    Patient Name: Derrick Benitez  Primary Provider: Clarisse Golden    Chief Complaint/Patient ID:   Medical - He has NSCLC  - RLL mass dx 9/20, no PDL staining could be done on bx. Bone mets at time of dx.  Carbo, taxol, pembro x 11/2020.     Last Palliative care appointment: 1/14/21 with Dr. Kent. Discussed ACP. Possibility of SNRI in future if his painful neuropathy symptoms worsen.     Reviewed: Yes, #40 tabs of 5mg oxycodone filled 3/31 by Oncology.    Interim History:  Derrick Benitez 80 year old male returns to Palliative Care today for follow up via billable telephone encounter.      Reviewed Oncology note from 3/30/2021.  No evidence of cancer progression on recent scans.  Planning to continue maintenance pembrolizumab.    Pain: Pain in feet significantly improved. Using oxycodone 5mg at bedtime about a week after each infusion. Occasionally takes 2 in a day. Does have some pain in neck that feels muscular.    Appetite/Nausea: Appetite more affected by being \"cooped up\".  Is looking forward to being able to get out of the house, go for a walk bilaterally, and maybe have lunch out as things are improving with the Covid 19 pandemic.     Bowels: No issues with constipation, BM every couple days.     Social History:  Lives with his wife.  Has a hobby snowblower/yard business.  Social History     Tobacco Use     Smoking status: Former Smoker     Packs/day: 3.00     Years: 40.00     Pack years: 120.00     " Quit date: 10/28/2004     Years since quittin.4     Smokeless tobacco: Never Used   Substance Use Topics     Alcohol use: No     Comment: states he quit 30 years ago     Drug use: No       Family History- Reviewed in Epic.    Allergies   Allergen Reactions     Losartan Other (See Comments)     Fatigue, weakness in legs     Advanced Care Planning: Discussed at visit on 2021.  Has completed a healthcare directive, though not scanned in our chart.    Medications- Reviewed in Epic.    Past Medical History- Reviewed in [facility].    Past Surgical History- Reviewed in Epic.    Review of Systems:   ROS: 10 point ROS neg other than the symptoms noted above in the HPI.    Key Data Reviewed:  LABS: 3/30/2021- Cr 0.81, Albumin 3.2, LFTs WNL.   3/31/2021- WBC 4.1, Hgb 11.8, Plts 130.     IMAGING: CT CAP 2021- IMPRESSION:  1.  Marked interval decrease in right lower lobe pulmonary lesion since the comparison study.  2.  Multiple bony lesions demonstrate interval healing compatible with improved disease.    Impression & Recommendations & Counseling:  Derrick Benitez is a 80 year old male with history of metastatic NSCLCA complicated by pain, mostly CIPN/acute taxane pain syndrome.  Now on maintenance pembrolizumab treatment.  Most recent scans have shown improvement in disease.    Pain  Peripheral neuropathy -states this is currently improved.  -Continue with oxycodone at bedtime for the few days around treatment.  -At this point, no need for SNRI, but could consider if symptoms worsen.  -Discussed applying heat or ice or topical agents to his neck.    Follow-up in 4 months.      Telephone Visit Details  Total length of phone call: 11 mins, 11:23-11:34am    Total time spent on day of encounter is 25 mins, including reviewing record, review of above studies, above visit with patient, and documentation.     Zulema Montes DO  Palliative Medicine   Pager [phone], AMCOM ID [id number]          Again, thank you for  allowing me to participate in the care of your patient.        Sincerely,        Zulema Montes, DO

## 2021-04-08 NOTE — LETTER
"    4/8/2021         RE: Derrick Benitez  [address]  [facility] [address]        Dear Colleague,    Thank you for referring your patient, Derrick Benitez, to the [facility] CENTER [place]. Please see a copy of my visit note below.    Derrick is a 80 year old who is being evaluated via a billable telephone visit.      What phone number would you like to be contacted at? [phone]    How would you like to obtain your AVS? [employer]em[employer]hart  Phone call duration: 5 minutes    Palliative Care Clinic Progress Note    Patient Name: Derrick Benitez  Primary Provider: Clarisse Golden    Chief Complaint/Patient ID:   Medical - He has NSCLC  - RLL mass dx 9/20, no PDL staining could be done on bx. Bone mets at time of dx.  Carbo, taxol, pembro x 11/2020.     Last Palliative care appointment: 1/14/21 with Dr. Kent. Discussed ACP. Possibility of SNRI in future if his painful neuropathy symptoms worsen.     Reviewed: Yes, #40 tabs of 5mg oxycodone filled 3/31 by Oncology.    Interim History:  Derrick Benitez 80 year old male returns to Palliative Care today for follow up via billable telephone encounter.      Reviewed Oncology note from 3/30/2021.  No evidence of cancer progression on recent scans.  Planning to continue maintenance pembrolizumab.    Pain: Pain in feet significantly improved. Using oxycodone 5mg at bedtime about a week after each infusion. Occasionally takes 2 in a day. Does have some pain in neck that feels muscular.    Appetite/Nausea: Appetite more affected by being \"cooped up\".  Is looking forward to being able to get out of the house, go for a walk bilaterally, and maybe have lunch out as things are improving with the Covid 19 pandemic.     Bowels: No issues with constipation, BM every couple days.     Social History:  Lives with his wife.  Has a hobby snowblower/yard business.  Social History     Tobacco Use     Smoking status: Former Smoker     Packs/day: 3.00     Years: 40.00     Pack years: 120.00     " Quit date: 10/28/2004     Years since quittin.4     Smokeless tobacco: Never Used   Substance Use Topics     Alcohol use: No     Comment: states he quit 30 years ago     Drug use: No       Family History- Reviewed in Epic.    Allergies   Allergen Reactions     Losartan Other (See Comments)     Fatigue, weakness in legs     Advanced Care Planning: Discussed at visit on 2021.  Has completed a healthcare directive, though not scanned in our chart.    Medications- Reviewed in Epic.    Past Medical History- Reviewed in [facility].    Past Surgical History- Reviewed in Epic.    Review of Systems:   ROS: 10 point ROS neg other than the symptoms noted above in the HPI.    Key Data Reviewed:  LABS: 3/30/2021- Cr 0.81, Albumin 3.2, LFTs WNL.   3/31/2021- WBC 4.1, Hgb 11.8, Plts 130.     IMAGING: CT CAP 2021- IMPRESSION:  1.  Marked interval decrease in right lower lobe pulmonary lesion since the comparison study.  2.  Multiple bony lesions demonstrate interval healing compatible with improved disease.    Impression & Recommendations & Counseling:  Derrick Benitez is a 80 year old male with history of metastatic NSCLCA complicated by pain, mostly CIPN/acute taxane pain syndrome.  Now on maintenance pembrolizumab treatment.  Most recent scans have shown improvement in disease.    Pain  Peripheral neuropathy -states this is currently improved.  -Continue with oxycodone at bedtime for the few days around treatment.  -At this point, no need for SNRI, but could consider if symptoms worsen.  -Discussed applying heat or ice or topical agents to his neck.    Follow-up in 4 months.      Telephone Visit Details  Total length of phone call: 11 mins, 11:23-11:34am    Total time spent on day of encounter is 25 mins, including reviewing record, review of above studies, above visit with patient, and documentation.     Zulema Montes DO  Palliative Medicine   Pager [phone], AMCOM ID [id number]          Again, thank you for  allowing me to participate in the care of your patient.        Sincerely,        Zulema Montes, DO

## 2021-04-08 NOTE — PATIENT INSTRUCTIONS
Recommendations:  - No medication changes recommended today.   - If the pain in your legs/feet worsens, please let us know.    Follow up: 4 months    Reasons to Call    If you are having worsening/uncontrolled symptoms we want you to call!    You or your other physicians make any changes to medications we have prescribed.    Important Phone Numbers  Thelma PERKINS palliative care nurse clinician [phone]    Xochitl Mattson. Palliative Care RN. Answered 8 am to 4 pm . [phone]    Appointment Line.[phone]   *After hours or on weekends. Will connect you with on call MD. [phone]

## 2021-04-10 NOTE — PROGRESS NOTES
Visit Date:   03/30/2021     ONCOLOGIC HISTORY:  Mr. Benitez is a gentleman with non-small cell lung cancer, favor squamous cell carcinoma.   -NGS and PDL staining could not be done because of small sample.   -Guardant 360 does not reveal any actionable alteration.  1.  CT chest angiogram on 09/07/2020 revealed right lower lobe mass.   2.  CT-guided biopsy was done on 09/18/2020.  Pathology revealed non-small cell lung cancer, favor squamous cell carcinoma.  Sample size was small.  PDL staining and NGS panel could not be done.   3.  Brain MRI on 09/26/2020 did not reveal any brain metastasis.   4.  PET scan on 10/05/2020 revealed hypermetabolic right lung mass and multiple hypermetabolic bone lesions.   5.  The patient started on carboplatin and Taxol on 11/25/2020.   -Pembrolizumab added with cycle 2.      SUBJECTIVE:  Mr. Benitez is an 80-year-old gentleman with metastatic lung squamous cell carcinoma.  He has completed 6 cycles of carboplatin, Taxol and pembrolizumab.  Plan is to continue him on maintenance pembrolizumab.      Overall, he is doing good.  Occasionally he gets some pain in his neck.  No shoulder pain.  No numbness or tingling in the upper extremities.      No headache.  No dizziness.  No chest pain.  No shortness of breath.  No abdominal pain, nausea or vomiting.  No urinary or bowel complaints.  No abnormal bleeding.  No fever, chills or night sweats.      All other review of systems negative.      PHYSICAL EXAMINATION:   GENERAL:  The patient is alert, oriented x 3.   This is a telephone visit.      LABORATORY DATA:  Reviewed.  CMP reveals mildly low protein and albumin, otherwise normal.  Blood sugar mildly elevated.      ASSESSMENT:   1.  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  No clinical suspicion of progression.   2.  Pancytopenia from chemotherapy.   3.  Mild neck pain.      PLAN:   1.  He is clinically stable from lung cancer.  No suspicion of progression.  He will continue on maintenance pembrolizumab.  He will get it every 3 weeks.  In future, we will consider changing to every 6 weeks.  He has been tolerating it well.   2.  He has bone metastasis.  He will continue on Zometa every 4-6 weeks.  He is not having dental or jaw-related symptoms.   3.  Discussed regarding the scans.  We will get CT chest, abdomen and pelvis in 6 weeks.  I will see him after that.  In between, he will see our nurse practitioner.   4.  He had multiple questions, which were all answered.  I explained to him that so far he is doing well.  I am hoping he will continue to do well on maintenance pembrolizumab.   5.  He has anemia and thrombocytopenia.  We will monitor his CBC every 4-6 weeks.      TELEPHONE VISIT TIME:  11 minutes.         ANETA MCKEON MD             D: 2021   T: 2021   MT: BALJEET      Name:     ASAF WRIGHT   MRN:      [id number]        Account:      [id number]   :      1940           Visit Date:   2021      Document: [id number]

## 2021-04-20 ENCOUNTER — INFUSION THERAPY VISIT (OUTPATIENT)
Dept: INFUSION THERAPY | Facility: CLINIC | Age: 81
End: 2021-04-20
Attending: INTERNAL MEDICINE
Payer: MEDICARE

## 2021-04-20 ENCOUNTER — HOSPITAL ENCOUNTER (OUTPATIENT)
Facility: CLINIC | Age: 81
Setting detail: SPECIMEN
Discharge: HOME | End: 2021-04-20
Attending: INTERNAL MEDICINE | Admitting: INTERNAL MEDICINE
Payer: MEDICARE

## 2021-04-20 DIAGNOSIS — Z51.11 ENCOUNTER FOR ANTINEOPLASTIC CHEMOTHERAPY: Primary | ICD-10-CM

## 2021-04-20 DIAGNOSIS — C34.31 MALIGNANT NEOPLASM OF LOWER LOBE OF RIGHT LUNG (H): ICD-10-CM

## 2021-04-20 LAB
ALBUMIN SERPL-MCNC: 3.2 G/DL (ref 3.4–5)
ALP SERPL-CCNC: 66 U/L (ref 40–150)
ALT SERPL W P-5'-P-CCNC: 15 U/L (ref 0–70)
ANION GAP SERPL CALCULATED.3IONS-SCNC: 4 MMOL/L (ref 3–14)
AST SERPL W P-5'-P-CCNC: 11 U/L (ref 0–45)
BASOPHILS # BLD AUTO: 0 10E9/L (ref 0–0.2)
BASOPHILS NFR BLD AUTO: 0.3 %
BILIRUB SERPL-MCNC: 0.7 MG/DL (ref 0.2–1.3)
BUN SERPL-MCNC: 17 MG/DL (ref 7–30)
CALCIUM SERPL-MCNC: 7.5 MG/DL (ref 8.5–10.1)
CHLORIDE SERPL-SCNC: 111 MMOL/L (ref 94–109)
CO2 SERPL-SCNC: 25 MMOL/L (ref 20–32)
CREAT SERPL-MCNC: 0.83 MG/DL (ref 0.66–1.25)
DIFFERENTIAL METHOD BLD: ABNORMAL
EOSINOPHIL # BLD AUTO: 0.1 10E9/L (ref 0–0.7)
EOSINOPHIL NFR BLD AUTO: 1.7 %
ERYTHROCYTE [DISTWIDTH] IN BLOOD BY AUTOMATED COUNT: 13.6 % (ref 10–15)
GFR SERPL CREATININE-BSD FRML MDRD: 82 ML/MIN/{1.73_M2}
GLUCOSE SERPL-MCNC: 134 MG/DL (ref 70–99)
HCT VFR BLD AUTO: 36.4 % (ref 40–53)
HGB BLD-MCNC: 12.3 G/DL (ref 13.3–17.7)
IMM GRANULOCYTES # BLD: 0 10E9/L (ref 0–0.4)
IMM GRANULOCYTES NFR BLD: 0.5 %
LYMPHOCYTES # BLD AUTO: 1.1 10E9/L (ref 0.8–5.3)
LYMPHOCYTES NFR BLD AUTO: 18.1 %
MCH RBC QN AUTO: 32.2 PG (ref 26.5–33)
MCHC RBC AUTO-ENTMCNC: 33.8 G/DL (ref 31.5–36.5)
MCV RBC AUTO: 95 FL (ref 78–100)
MONOCYTES # BLD AUTO: 0.5 10E9/L (ref 0–1.3)
MONOCYTES NFR BLD AUTO: 7.5 %
NEUTROPHILS # BLD AUTO: 4.3 10E9/L (ref 1.6–8.3)
NEUTROPHILS NFR BLD AUTO: 71.9 %
NRBC # BLD AUTO: 0 10*3/UL
NRBC BLD AUTO-RTO: 0 /100
PLATELET # BLD AUTO: 131 10E9/L (ref 150–450)
POTASSIUM SERPL-SCNC: 3.9 MMOL/L (ref 3.4–5.3)
PROT SERPL-MCNC: 6.3 G/DL (ref 6.8–8.8)
RBC # BLD AUTO: 3.82 10E12/L (ref 4.4–5.9)
SODIUM SERPL-SCNC: 140 MMOL/L (ref 133–144)
TSH SERPL DL<=0.005 MIU/L-ACNC: 0.4 MU/L (ref 0.4–4)
WBC # BLD AUTO: 6 10E9/L (ref 4–11)

## 2021-04-20 PROCEDURE — 84443 ASSAY THYROID STIM HORMONE: CPT | Performed by: INTERNAL MEDICINE

## 2021-04-20 PROCEDURE — 80053 COMPREHEN METABOLIC PANEL: CPT | Performed by: INTERNAL MEDICINE

## 2021-04-20 PROCEDURE — 250N000011 HC RX IP 250 OP 636: Performed by: INTERNAL MEDICINE

## 2021-04-20 PROCEDURE — 36591 DRAW BLOOD OFF VENOUS DEVICE: CPT

## 2021-04-20 PROCEDURE — 85025 COMPLETE CBC W/AUTO DIFF WBC: CPT | Performed by: INTERNAL MEDICINE

## 2021-04-20 RX ORDER — HEPARIN SODIUM (PORCINE) LOCK FLUSH IV SOLN 100 UNIT/ML 100 UNIT/ML
5 SOLUTION INTRAVENOUS
Status: CANCELLED | OUTPATIENT
Start: 2021-04-20

## 2021-04-20 RX ORDER — HEPARIN SODIUM,PORCINE 10 UNIT/ML
5 VIAL (ML) INTRAVENOUS
Status: CANCELLED | OUTPATIENT
Start: 2021-04-20

## 2021-04-20 RX ORDER — HEPARIN SODIUM (PORCINE) LOCK FLUSH IV SOLN 100 UNIT/ML 100 UNIT/ML
5 SOLUTION INTRAVENOUS
Status: DISCONTINUED | OUTPATIENT
Start: 2021-04-20 | End: 2021-04-20 | Stop reason: HOSPADM

## 2021-04-20 RX ADMIN — Medication 5 ML: at 11:02

## 2021-04-21 ENCOUNTER — INFUSION THERAPY VISIT (OUTPATIENT)
Dept: INFUSION THERAPY | Facility: CLINIC | Age: 81
End: 2021-04-21
Attending: INTERNAL MEDICINE
Payer: MEDICARE

## 2021-04-21 ENCOUNTER — ONCOLOGY VISIT (OUTPATIENT)
Dept: ONCOLOGY | Facility: CLINIC | Age: 81
End: 2021-04-21
Attending: INTERNAL MEDICINE
Payer: MEDICARE

## 2021-04-21 VITALS
TEMPERATURE: 98.5 F | DIASTOLIC BLOOD PRESSURE: 76 MMHG | SYSTOLIC BLOOD PRESSURE: 123 MMHG | OXYGEN SATURATION: 97 % | HEART RATE: 87 BPM | WEIGHT: 194 LBS | BODY MASS INDEX: 30.38 KG/M2 | RESPIRATION RATE: 16 BRPM

## 2021-04-21 DIAGNOSIS — Z51.11 ENCOUNTER FOR ANTINEOPLASTIC CHEMOTHERAPY: ICD-10-CM

## 2021-04-21 DIAGNOSIS — C34.31 MALIGNANT NEOPLASM OF LOWER LOBE OF RIGHT LUNG (H): Primary | ICD-10-CM

## 2021-04-21 DIAGNOSIS — C79.51 BONE METASTASES: ICD-10-CM

## 2021-04-21 DIAGNOSIS — Z51.11 ENCOUNTER FOR ANTINEOPLASTIC CHEMOTHERAPY: Primary | ICD-10-CM

## 2021-04-21 DIAGNOSIS — C34.31 MALIGNANT NEOPLASM OF LOWER LOBE OF RIGHT LUNG (H): ICD-10-CM

## 2021-04-21 PROCEDURE — 250N000011 HC RX IP 250 OP 636: Performed by: NURSE PRACTITIONER

## 2021-04-21 PROCEDURE — 96413 CHEMO IV INFUSION 1 HR: CPT

## 2021-04-21 PROCEDURE — 99214 OFFICE O/P EST MOD 30 MIN: CPT | Performed by: NURSE PRACTITIONER

## 2021-04-21 PROCEDURE — 258N000003 HC RX IP 258 OP 636: Performed by: NURSE PRACTITIONER

## 2021-04-21 PROCEDURE — G0463 HOSPITAL OUTPT CLINIC VISIT: HCPCS | Mod: 25

## 2021-04-21 RX ORDER — ZOLEDRONIC ACID 0.04 MG/ML
4 INJECTION, SOLUTION INTRAVENOUS ONCE
Status: DISCONTINUED | OUTPATIENT
Start: 2021-04-21 | End: 2021-04-21 | Stop reason: CLARIF

## 2021-04-21 RX ORDER — MEPERIDINE HYDROCHLORIDE 25 MG/ML
25 INJECTION INTRAMUSCULAR; INTRAVENOUS; SUBCUTANEOUS EVERY 30 MIN PRN
Status: CANCELLED | OUTPATIENT
Start: 2021-04-21

## 2021-04-21 RX ORDER — HEPARIN SODIUM (PORCINE) LOCK FLUSH IV SOLN 100 UNIT/ML 100 UNIT/ML
5 SOLUTION INTRAVENOUS EVERY 8 HOURS PRN
Status: CANCELLED | OUTPATIENT
Start: 2021-04-21

## 2021-04-21 RX ORDER — HEPARIN SODIUM (PORCINE) LOCK FLUSH IV SOLN 100 UNIT/ML 100 UNIT/ML
5 SOLUTION INTRAVENOUS
Status: CANCELLED | OUTPATIENT
Start: 2021-04-28

## 2021-04-21 RX ORDER — ALBUTEROL SULFATE 0.83 MG/ML
2.5 SOLUTION RESPIRATORY (INHALATION)
Status: CANCELLED | OUTPATIENT
Start: 2021-04-21

## 2021-04-21 RX ORDER — LORAZEPAM 2 MG/ML
0.5 INJECTION INTRAMUSCULAR EVERY 4 HOURS PRN
Status: CANCELLED | OUTPATIENT
Start: 2021-04-21

## 2021-04-21 RX ORDER — METHYLPREDNISOLONE SODIUM SUCCINATE 125 MG/2ML
125 INJECTION, POWDER, LYOPHILIZED, FOR SOLUTION INTRAMUSCULAR; INTRAVENOUS
Status: CANCELLED
Start: 2021-04-21

## 2021-04-21 RX ORDER — HEPARIN SODIUM (PORCINE) LOCK FLUSH IV SOLN 100 UNIT/ML 100 UNIT/ML
5 SOLUTION INTRAVENOUS EVERY 8 HOURS PRN
Status: DISCONTINUED | OUTPATIENT
Start: 2021-04-21 | End: 2021-04-21 | Stop reason: HOSPADM

## 2021-04-21 RX ORDER — HEPARIN SODIUM,PORCINE 10 UNIT/ML
5 VIAL (ML) INTRAVENOUS
Status: CANCELLED | OUTPATIENT
Start: 2021-04-28

## 2021-04-21 RX ORDER — HEPARIN SODIUM,PORCINE 10 UNIT/ML
5 VIAL (ML) INTRAVENOUS
Status: CANCELLED | OUTPATIENT
Start: 2021-04-21

## 2021-04-21 RX ORDER — EPINEPHRINE 1 MG/ML
0.3 INJECTION, SOLUTION INTRAMUSCULAR; SUBCUTANEOUS EVERY 5 MIN PRN
Status: CANCELLED | OUTPATIENT
Start: 2021-04-21

## 2021-04-21 RX ORDER — DIPHENHYDRAMINE HYDROCHLORIDE 50 MG/ML
50 INJECTION INTRAMUSCULAR; INTRAVENOUS
Status: CANCELLED
Start: 2021-04-21

## 2021-04-21 RX ORDER — SODIUM CHLORIDE 9 MG/ML
1000 INJECTION, SOLUTION INTRAVENOUS CONTINUOUS PRN
Status: CANCELLED
Start: 2021-04-21

## 2021-04-21 RX ORDER — ALBUTEROL SULFATE 90 UG/1
1-2 AEROSOL, METERED RESPIRATORY (INHALATION)
Status: CANCELLED
Start: 2021-04-21

## 2021-04-21 RX ORDER — ZOLEDRONIC ACID 0.04 MG/ML
4 INJECTION, SOLUTION INTRAVENOUS ONCE
Status: CANCELLED
Start: 2021-04-21 | End: 2021-04-21

## 2021-04-21 RX ORDER — NALOXONE HYDROCHLORIDE 0.4 MG/ML
.1-.4 INJECTION, SOLUTION INTRAMUSCULAR; INTRAVENOUS; SUBCUTANEOUS
Status: CANCELLED | OUTPATIENT
Start: 2021-04-21

## 2021-04-21 RX ORDER — ZOLEDRONIC ACID 0.04 MG/ML
4 INJECTION, SOLUTION INTRAVENOUS ONCE
Status: CANCELLED | OUTPATIENT
Start: 2021-04-28 | End: 2021-04-28

## 2021-04-21 RX ADMIN — SODIUM CHLORIDE 200 MG: 9 INJECTION, SOLUTION INTRAVENOUS at 10:02

## 2021-04-21 RX ADMIN — Medication 5 ML: at 10:53

## 2021-04-21 RX ADMIN — SODIUM CHLORIDE 250 ML: 9 INJECTION, SOLUTION INTRAVENOUS at 10:02

## 2021-04-21 ASSESSMENT — PAIN SCALES - GENERAL: PAINLEVEL: MODERATE PAIN (4)

## 2021-04-21 NOTE — PROGRESS NOTES
"Oncology Rooming Note    April 21, 2021 9:09 AM   Derrick Benitez is a 80 year old male who presents for:    Chief Complaint   Patient presents with     Oncology Clinic Visit     Initial Vitals: /76   Pulse 87   Temp 98.5  F (36.9  C) (Oral)   Resp 16   Wt 88 kg (194 lb)   SpO2 97%   BMI 30.38 kg/m   Estimated body mass index is 30.38 kg/m  as calculated from the following:    Height as of 3/10/21: 1.702 m (5' 7.01\").    Weight as of this encounter: 88 kg (194 lb). Body surface area is 2.04 meters squared.  Moderate Pain (4) Comment: Data Unavailable   No LMP for male patient.  Allergies reviewed: Yes  Medications reviewed: Yes    Medications: Medication refills not needed today.  Pharmacy name entered into EPIC:    [place], [place] - [address]  [facility] [id number] [place], [place] - [address] [place] [address]    Clinical concerns: no      Elke Bravo CMA            "

## 2021-04-21 NOTE — LETTER
"    4/21/2021         RE: Derrick Benitez  [address]  [facility] [address]        Dear Colleague,    Thank you for referring your patient, Derrick Benitez, to the [facility]CER [facility]. Please see a copy of my visit note below.    Oncology Rooming Note    April 21, 2021 9:09 AM   Derrick Benitez is a 80 year old male who presents for:    Chief Complaint   Patient presents with     Oncology Clinic Visit     Initial Vitals: /76   Pulse 87   Temp 98.5  F (36.9  C) (Oral)   Resp 16   Wt 88 kg (194 lb)   SpO2 97%   BMI 30.38 kg/m   Estimated body mass index is 30.38 kg/m  as calculated from the following:    Height as of 3/10/21: 1.702 m (5' 7.01\").    Weight as of this encounter: 88 kg (194 lb). Body surface area is 2.04 meters squared.  Moderate Pain (4) Comment: Data Unavailable   No LMP for male patient.  Allergies reviewed: Yes  Medications reviewed: Yes    Medications: Medication refills not needed today.  Pharm[facility] entered into EPIC:    [facility] [place] [place] - [address]  [facility] [id number] [place], [place] - [address] [place] [address] [address]    Clinical concerns: no      Elke Bravo CMA              Oncology/Hematology Visit Note  Apr 21, 2021    Reason for Visit: follow up of non-small cell cancer    Patient currently getting treatment with carboplatin Taxol and pembrolizumab      Interval History:  Reports he has been tolerating treatment well.  He reports occasionally he gets mild neck pain in the back.  He reports he did not have pain for the last 7 days.  He reports he has a some mild pain this morning.  He is not taking any pain pills denies swelling denies neuropathy.  Denies adenopathy.  Denies fever chills sweats cough shortness of breath chest pain nausea vomiting diarrhea abdominal pain bleeding    Review of Systems:  14 point ROS of systems including Constitutional, Eyes, Respiratory, Cardiovascular, Gastroenterology, Genitourinary, " Integumentary, Muscularskeletal, Psychiatric were all negative except for pertinent positives noted in my HPI.        Physical Examination:  General: The patient is a pleasant male in no acute distress.  /76   Pulse 87   Temp 98.5  F (36.9  C) (Oral)   Resp 16   Wt 88 kg (194 lb)   SpO2 97%   BMI 30.38 kg/m    HEENT: EOMI, PERRL. Sclerae are anicteric. Oral mucosa is pink and moist with no lesions or thrush.   Lymph: Neck is supple with no lymphadenopathy in the cervical or supraclavicular areas.   Heart: Regular rate and rhythm.   Lungs: Clear to auscultation bilaterally.   GI: Bowel sounds present, soft, nontender with no palpable hepatosplenomegaly or masses.   Extremities: No lower extremity edema noted bilaterally.   Skin: No rashes, petechiae, or bruising noted on exposed skin.  Neck-no swelling erythremia no adenopathy normal range of motion  Laboratory Data:  Results for orders placed or performed in visit on 04/20/21 (from the past 24 hour(s))   CBC with platelets differential   Result Value Ref Range    WBC 6.0 4.0 - 11.0 10e9/L    RBC Count 3.82 (L) 4.4 - 5.9 10e12/L    Hemoglobin 12.3 (L) 13.3 - 17.7 g/dL    Hematocrit 36.4 (L) 40.0 - 53.0 %    MCV 95 78 - 100 fl    MCH 32.2 26.5 - 33.0 pg    MCHC 33.8 31.5 - 36.5 g/dL    RDW 13.6 10.0 - 15.0 %    Platelet Count 131 (L) 150 - 450 10e9/L    Diff Method Automated Method     % Neutrophils 71.9 %    % Lymphocytes 18.1 %    % Monocytes 7.5 %    % Eosinophils 1.7 %    % Basophils 0.3 %    % Immature Granulocytes 0.5 %    Nucleated RBCs 0 0 /100    Absolute Neutrophil 4.3 1.6 - 8.3 10e9/L    Absolute Lymphocytes 1.1 0.8 - 5.3 10e9/L    Absolute Monocytes 0.5 0.0 - 1.3 10e9/L    Absolute Eosinophils 0.1 0.0 - 0.7 10e9/L    Absolute Basophils 0.0 0.0 - 0.2 10e9/L    Abs Immature Granulocytes 0.0 0 - 0.4 10e9/L    Absolute Nucleated RBC 0.0    Comprehensive metabolic panel   Result Value Ref Range    Sodium 140 133 - 144 mmol/L    Potassium 3.9 3.4 -  5.3 mmol/L    Chloride 111 (H) 94 - 109 mmol/L    Carbon Dioxide 25 20 - 32 mmol/L    Anion Gap 4 3 - 14 mmol/L    Glucose 134 (H) 70 - 99 mg/dL    Urea Nitrogen 17 7 - 30 mg/dL    Creatinine 0.83 0.66 - 1.25 mg/dL    GFR Estimate 82 >60 mL/min/[1.73_m2]    GFR Estimate If Black >90 >60 mL/min/[1.73_m2]    Calcium 7.5 (L) 8.5 - 10.1 mg/dL    Bilirubin Total 0.7 0.2 - 1.3 mg/dL    Albumin 3.2 (L) 3.4 - 5.0 g/dL    Protein Total 6.3 (L) 6.8 - 8.8 g/dL    Alkaline Phosphatase 66 40 - 150 U/L    ALT 15 0 - 70 U/L    AST 11 0 - 45 U/L   TSH with free T4 reflex   Result Value Ref Range    TSH 0.40 0.40 - 4.00 mU/L         Assessment and Plan:  This is a 80-year-old male with    non-small cell cancer  Patient currently getting treatment with carboplatinTaxol and pembrolizumab-completed 6 cycles  -Currently on maintenance immunotherapy  So far patient reports he has been tolerating treatment well  Labs reviewed  Okay to proceed with treatment      Bone mets  -Patient currently on Zometa every 4 to 6 weeks-Next treatment is scheduled in May  Continue with vitamin D and calcium      Thrombocytopenia secondary to previous treatment with chemo  Call our clinic in the event of bleeding for injury  Overall stable platelet count    Peripheral neuropathy  Secondary to chemotherapy  Mild symptoms for now.  This is his last cycle of chemo  We will monitor closely advised patient to call us if worsening of symptoms    Anemia  Patient is asymptomatic  Stable hemoglobin patient denies bleeding    Posterior neck pain  -Mild intermittent-  He reports no pain for the last 7 to 10 days  Advised patient to call our clinic if any changes in health condition, neck pain swelling numbness tingling in arms cough shortness of breath       Patient advised to call our clinic in the event of fever chills sweats cough shortness of breath chest pain nausea vomiting diarrhea abdominal pain bleeding or any changes in health condition    DARSHANA Meza  ZENON  [facility]     Chart documentation with Dragon Voice recognition Software. Although reviewed after completion, some words and grammatical errors may remain.            Again, thank you for allowing me to participate in the care of your patient.        Sincerely,        DARSHANA Meza CNP

## 2021-04-21 NOTE — PROGRESS NOTES
Oncology/Hematology Visit Note  Apr 21, 2021    Reason for Visit: follow up of non-small cell cancer    Patient currently getting treatment with carboplatin Taxol and pembrolizumab      Interval History:  Reports he has been tolerating treatment well.  He reports occasionally he gets mild neck pain in the back.  He reports he did not have pain for the last 7 days.  He reports he has a some mild pain this morning.  He is not taking any pain pills denies swelling denies neuropathy.  Denies adenopathy.  Denies fever chills sweats cough shortness of breath chest pain nausea vomiting diarrhea abdominal pain bleeding    Review of Systems:  14 point ROS of systems including Constitutional, Eyes, Respiratory, Cardiovascular, Gastroenterology, Genitourinary, Integumentary, Muscularskeletal, Psychiatric were all negative except for pertinent positives noted in my HPI.        Physical Examination:  General: The patient is a pleasant male in no acute distress.  /76   Pulse 87   Temp 98.5  F (36.9  C) (Oral)   Resp 16   Wt 88 kg (194 lb)   SpO2 97%   BMI 30.38 kg/m    HEENT: EOMI, PERRL. Sclerae are anicteric. Oral mucosa is pink and moist with no lesions or thrush.   Lymph: Neck is supple with no lymphadenopathy in the cervical or supraclavicular areas.   Heart: Regular rate and rhythm.   Lungs: Clear to auscultation bilaterally.   GI: Bowel sounds present, soft, nontender with no palpable hepatosplenomegaly or masses.   Extremities: No lower extremity edema noted bilaterally.   Skin: No rashes, petechiae, or bruising noted on exposed skin.  Neck-no swelling erythremia no adenopathy normal range of motion  Laboratory Data:  Results for orders placed or performed in visit on 04/20/21 (from the past 24 hour(s))   CBC with platelets differential   Result Value Ref Range    WBC 6.0 4.0 - 11.0 10e9/L    RBC Count 3.82 (L) 4.4 - 5.9 10e12/L    Hemoglobin 12.3 (L) 13.3 - 17.7 g/dL    Hematocrit 36.4 (L) 40.0 - 53.0 %     MCV 95 78 - 100 fl    MCH 32.2 26.5 - 33.0 pg    MCHC 33.8 31.5 - 36.5 g/dL    RDW 13.6 10.0 - 15.0 %    Platelet Count 131 (L) 150 - 450 10e9/L    Diff Method Automated Method     % Neutrophils 71.9 %    % Lymphocytes 18.1 %    % Monocytes 7.5 %    % Eosinophils 1.7 %    % Basophils 0.3 %    % Immature Granulocytes 0.5 %    Nucleated RBCs 0 0 /100    Absolute Neutrophil 4.3 1.6 - 8.3 10e9/L    Absolute Lymphocytes 1.1 0.8 - 5.3 10e9/L    Absolute Monocytes 0.5 0.0 - 1.3 10e9/L    Absolute Eosinophils 0.1 0.0 - 0.7 10e9/L    Absolute Basophils 0.0 0.0 - 0.2 10e9/L    Abs Immature Granulocytes 0.0 0 - 0.4 10e9/L    Absolute Nucleated RBC 0.0    Comprehensive metabolic panel   Result Value Ref Range    Sodium 140 133 - 144 mmol/L    Potassium 3.9 3.4 - 5.3 mmol/L    Chloride 111 (H) 94 - 109 mmol/L    Carbon Dioxide 25 20 - 32 mmol/L    Anion Gap 4 3 - 14 mmol/L    Glucose 134 (H) 70 - 99 mg/dL    Urea Nitrogen 17 7 - 30 mg/dL    Creatinine 0.83 0.66 - 1.25 mg/dL    GFR Estimate 82 >60 mL/min/[1.73_m2]    GFR Estimate If Black >90 >60 mL/min/[1.73_m2]    Calcium 7.5 (L) 8.5 - 10.1 mg/dL    Bilirubin Total 0.7 0.2 - 1.3 mg/dL    Albumin 3.2 (L) 3.4 - 5.0 g/dL    Protein Total 6.3 (L) 6.8 - 8.8 g/dL    Alkaline Phosphatase 66 40 - 150 U/L    ALT 15 0 - 70 U/L    AST 11 0 - 45 U/L   TSH with free T4 reflex   Result Value Ref Range    TSH 0.40 0.40 - 4.00 mU/L         Assessment and Plan:  This is a 80-year-old male with    non-small cell cancer  Patient currently getting treatment with carboplatinTaxol and pembrolizumab-completed 6 cycles  -Currently on maintenance immunotherapy  So far patient reports he has been tolerating treatment well  Labs reviewed  Okay to proceed with treatment      Bone mets  -Patient currently on Zometa every 4 to 6 weeks-Next treatment is scheduled in May  Continue with vitamin D and calcium      Thrombocytopenia secondary to previous treatment with chemo  Call our clinic in the event of  bleeding for injury  Overall stable platelet count    Peripheral neuropathy  Secondary to chemotherapy  Mild symptoms for now.  This is his last cycle of chemo  We will monitor closely advised patient to call us if worsening of symptoms    Anemia  Patient is asymptomatic  Stable hemoglobin patient denies bleeding    Posterior neck pain  -Mild intermittent-  He reports no pain for the last 7 to 10 days  Advised patient to call our clinic if any changes in health condition, neck pain swelling numbness tingling in arms cough shortness of breath       Patient advised to call our clinic in the event of fever chills sweats cough shortness of breath chest pain nausea vomiting diarrhea abdominal pain bleeding or any changes in health condition    DARSHANA Meza [facility] [place]     Chart documentation with Dragon Voice recognition Software. Although reviewed after completion, some words and grammatical errors may remain.

## 2021-04-21 NOTE — PROGRESS NOTES
Infusion Nursing Note:  Derrick Benitez presents today for Cycle 2 Day 1 Keytruda.    Patient seen by provider today: Yes: De Frausto NP   present during visit today: Not Applicable.    Note: Per Safia Gomez, Pharmacist, Zometa is not due today. Zometa is to be given with every other Keytruda infusion and will next be due on 5/12/21.    Patient did meet criteria for an asymptomatic covid-19 PCR test in infusion today. Patient declined the covid-19 test.    Intravenous Access:  Implanted Port.    Treatment Conditions:  Lab Results   Component Value Date    HGB 12.3 04/20/2021     Lab Results   Component Value Date    WBC 6.0 04/20/2021      Lab Results   Component Value Date    ANEU 4.3 04/20/2021     Lab Results   Component Value Date     04/20/2021      Lab Results   Component Value Date     04/20/2021                   Lab Results   Component Value Date    POTASSIUM 3.9 04/20/2021           Lab Results   Component Value Date    MAG 2.1 10/30/2014            Lab Results   Component Value Date    CR 0.83 04/20/2021                   Lab Results   Component Value Date    ANTHONY 7.5 04/20/2021                Lab Results   Component Value Date    BILITOTAL 0.7 04/20/2021           Lab Results   Component Value Date    ALBUMIN 3.2 04/20/2021                    Lab Results   Component Value Date    ALT 15 04/20/2021           Lab Results   Component Value Date    AST 11 04/20/2021       Results reviewed, labs MET treatment parameters, ok to proceed with treatment.      Post Infusion Assessment:  Patient tolerated infusion without incident.  Blood return noted pre and post infusion.  Site patent and intact, free from redness, edema or discomfort.  No evidence of extravasations.  Access discontinued per protocol.       Discharge Plan:   Patient declined prescription refills.  Discharge instructions reviewed with: Patient.  Patient verbalized understanding of discharge instructions and all questions  answered.  AVS to patient via [employer].  Patient will return 5/12/21 for next appointment.   Patient discharged in stable condition accompanied by: self.  Departure Mode: Ambulatory.    Debra Baird RN

## 2021-05-08 ENCOUNTER — HEALTH MAINTENANCE LETTER (OUTPATIENT)
Age: 81
End: 2021-05-08

## 2021-05-10 ENCOUNTER — HOSPITAL ENCOUNTER (OUTPATIENT)
Dept: LAB | Facility: CLINIC | Age: 81
End: 2021-05-10
Attending: INTERNAL MEDICINE
Payer: MEDICARE

## 2021-05-10 ENCOUNTER — HOSPITAL ENCOUNTER (OUTPATIENT)
Dept: CT IMAGING | Facility: CLINIC | Age: 81
End: 2021-05-10
Attending: INTERNAL MEDICINE
Payer: MEDICARE

## 2021-05-10 ENCOUNTER — HOSPITAL ENCOUNTER (OUTPATIENT)
Facility: CLINIC | Age: 81
Discharge: HOME | End: 2021-05-10
Admitting: RADIOLOGY
Payer: MEDICARE

## 2021-05-10 DIAGNOSIS — C34.31 MALIGNANT NEOPLASM OF LOWER LOBE OF RIGHT LUNG (H): ICD-10-CM

## 2021-05-10 DIAGNOSIS — Z51.11 ENCOUNTER FOR ANTINEOPLASTIC CHEMOTHERAPY: ICD-10-CM

## 2021-05-10 DIAGNOSIS — Z13.29 SCREENING FOR THYROID DISORDER: ICD-10-CM

## 2021-05-10 DIAGNOSIS — E11.9 DIABETES MELLITUS, TYPE 2 (H): ICD-10-CM

## 2021-05-10 LAB
ALBUMIN SERPL-MCNC: 3.1 G/DL (ref 3.4–5)
ALP SERPL-CCNC: 64 U/L (ref 40–150)
ALT SERPL W P-5'-P-CCNC: 15 U/L (ref 0–70)
ANION GAP SERPL CALCULATED.3IONS-SCNC: 3 MMOL/L (ref 3–14)
AST SERPL W P-5'-P-CCNC: 15 U/L (ref 0–45)
BASOPHILS # BLD AUTO: 0 10E9/L (ref 0–0.2)
BASOPHILS NFR BLD AUTO: 0.2 %
BILIRUB SERPL-MCNC: 0.4 MG/DL (ref 0.2–1.3)
BUN SERPL-MCNC: 18 MG/DL (ref 7–30)
CALCIUM SERPL-MCNC: 8.3 MG/DL (ref 8.5–10.1)
CHLORIDE SERPL-SCNC: 111 MMOL/L (ref 94–109)
CO2 SERPL-SCNC: 26 MMOL/L (ref 20–32)
CREAT SERPL-MCNC: 0.92 MG/DL (ref 0.66–1.25)
DIFFERENTIAL METHOD BLD: ABNORMAL
EOSINOPHIL # BLD AUTO: 0.1 10E9/L (ref 0–0.7)
EOSINOPHIL NFR BLD AUTO: 2.3 %
ERYTHROCYTE [DISTWIDTH] IN BLOOD BY AUTOMATED COUNT: 12.8 % (ref 10–15)
GFR SERPL CREATININE-BSD FRML MDRD: 77 ML/MIN/{1.73_M2}
GLUCOSE SERPL-MCNC: 139 MG/DL (ref 70–99)
HCT VFR BLD AUTO: 37.3 % (ref 40–53)
HGB BLD-MCNC: 12.4 G/DL (ref 13.3–17.7)
IMM GRANULOCYTES # BLD: 0 10E9/L (ref 0–0.4)
IMM GRANULOCYTES NFR BLD: 0.4 %
LYMPHOCYTES # BLD AUTO: 1 10E9/L (ref 0.8–5.3)
LYMPHOCYTES NFR BLD AUTO: 20.5 %
MCH RBC QN AUTO: 31.6 PG (ref 26.5–33)
MCHC RBC AUTO-ENTMCNC: 33.2 G/DL (ref 31.5–36.5)
MCV RBC AUTO: 95 FL (ref 78–100)
MONOCYTES # BLD AUTO: 0.4 10E9/L (ref 0–1.3)
MONOCYTES NFR BLD AUTO: 7.2 %
NEUTROPHILS # BLD AUTO: 3.4 10E9/L (ref 1.6–8.3)
NEUTROPHILS NFR BLD AUTO: 69.4 %
NRBC # BLD AUTO: 0 10*3/UL
NRBC BLD AUTO-RTO: 0 /100
PLATELET # BLD AUTO: 127 10E9/L (ref 150–450)
POTASSIUM SERPL-SCNC: 4.5 MMOL/L (ref 3.4–5.3)
PROT SERPL-MCNC: 6.4 G/DL (ref 6.8–8.8)
RBC # BLD AUTO: 3.93 10E12/L (ref 4.4–5.9)
SODIUM SERPL-SCNC: 140 MMOL/L (ref 133–144)
T4 FREE SERPL-MCNC: 1.22 NG/DL (ref 0.76–1.46)
TSH SERPL DL<=0.005 MIU/L-ACNC: 0.33 MU/L (ref 0.4–4)
WBC # BLD AUTO: 4.9 10E9/L (ref 4–11)

## 2021-05-10 PROCEDURE — 250N000009 HC RX 250: Performed by: INTERNAL MEDICINE

## 2021-05-10 PROCEDURE — 999N000154 HC STATISTIC RADIOLOGY XRAY, US, CT, MAR, NM

## 2021-05-10 PROCEDURE — 250N000011 HC RX IP 250 OP 636: Performed by: INTERNAL MEDICINE

## 2021-05-10 PROCEDURE — 80053 COMPREHEN METABOLIC PANEL: CPT | Performed by: INTERNAL MEDICINE

## 2021-05-10 PROCEDURE — 71260 CT THORAX DX C+: CPT | Mod: ME

## 2021-05-10 PROCEDURE — 85025 COMPLETE CBC W/AUTO DIFF WBC: CPT | Performed by: INTERNAL MEDICINE

## 2021-05-10 PROCEDURE — 84439 ASSAY OF FREE THYROXINE: CPT | Performed by: INTERNAL MEDICINE

## 2021-05-10 PROCEDURE — 84443 ASSAY THYROID STIM HORMONE: CPT | Performed by: INTERNAL MEDICINE

## 2021-05-10 RX ORDER — IOPAMIDOL 755 MG/ML
95 INJECTION, SOLUTION INTRAVASCULAR ONCE
Status: COMPLETED | OUTPATIENT
Start: 2021-05-10 | End: 2021-05-10

## 2021-05-10 RX ADMIN — SODIUM CHLORIDE 67 ML: 9 INJECTION, SOLUTION INTRAVENOUS at 12:10

## 2021-05-10 RX ADMIN — IOPAMIDOL 95 ML: 755 INJECTION, SOLUTION INTRAVENOUS at 12:10

## 2021-05-12 ENCOUNTER — INFUSION THERAPY VISIT (OUTPATIENT)
Dept: INFUSION THERAPY | Facility: CLINIC | Age: 81
End: 2021-05-12
Attending: INTERNAL MEDICINE
Payer: MEDICARE

## 2021-05-12 ENCOUNTER — ONCOLOGY VISIT (OUTPATIENT)
Dept: ONCOLOGY | Facility: CLINIC | Age: 81
End: 2021-05-12
Attending: INTERNAL MEDICINE
Payer: MEDICARE

## 2021-05-12 VITALS
RESPIRATION RATE: 16 BRPM | DIASTOLIC BLOOD PRESSURE: 71 MMHG | TEMPERATURE: 98.5 F | BODY MASS INDEX: 30.63 KG/M2 | HEART RATE: 92 BPM | WEIGHT: 195.6 LBS | OXYGEN SATURATION: 94 % | SYSTOLIC BLOOD PRESSURE: 133 MMHG

## 2021-05-12 DIAGNOSIS — C79.51 BONE METASTASES: ICD-10-CM

## 2021-05-12 DIAGNOSIS — Z51.11 ENCOUNTER FOR ANTINEOPLASTIC CHEMOTHERAPY: ICD-10-CM

## 2021-05-12 DIAGNOSIS — C34.31 MALIGNANT NEOPLASM OF LOWER LOBE OF RIGHT LUNG (H): ICD-10-CM

## 2021-05-12 DIAGNOSIS — C34.31 MALIGNANT NEOPLASM OF LOWER LOBE OF RIGHT LUNG (H): Primary | ICD-10-CM

## 2021-05-12 DIAGNOSIS — Z51.11 ENCOUNTER FOR ANTINEOPLASTIC CHEMOTHERAPY: Primary | ICD-10-CM

## 2021-05-12 PROCEDURE — 250N000011 HC RX IP 250 OP 636: Performed by: INTERNAL MEDICINE

## 2021-05-12 PROCEDURE — 250N000011 HC RX IP 250 OP 636: Performed by: NURSE PRACTITIONER

## 2021-05-12 PROCEDURE — 96413 CHEMO IV INFUSION 1 HR: CPT

## 2021-05-12 PROCEDURE — 258N000003 HC RX IP 258 OP 636: Performed by: INTERNAL MEDICINE

## 2021-05-12 PROCEDURE — 99214 OFFICE O/P EST MOD 30 MIN: CPT | Performed by: INTERNAL MEDICINE

## 2021-05-12 PROCEDURE — 96367 TX/PROPH/DG ADDL SEQ IV INF: CPT

## 2021-05-12 PROCEDURE — G0463 HOSPITAL OUTPT CLINIC VISIT: HCPCS | Mod: 25

## 2021-05-12 RX ORDER — LORAZEPAM 2 MG/ML
0.5 INJECTION INTRAMUSCULAR EVERY 4 HOURS PRN
Status: CANCELLED | OUTPATIENT
Start: 2021-05-12

## 2021-05-12 RX ORDER — NALOXONE HYDROCHLORIDE 0.4 MG/ML
.1-.4 INJECTION, SOLUTION INTRAMUSCULAR; INTRAVENOUS; SUBCUTANEOUS
Status: CANCELLED | OUTPATIENT
Start: 2021-05-12

## 2021-05-12 RX ORDER — METHYLPREDNISOLONE SODIUM SUCCINATE 125 MG/2ML
125 INJECTION, POWDER, LYOPHILIZED, FOR SOLUTION INTRAMUSCULAR; INTRAVENOUS
Status: CANCELLED
Start: 2021-05-12

## 2021-05-12 RX ORDER — EPINEPHRINE 1 MG/ML
0.3 INJECTION, SOLUTION INTRAMUSCULAR; SUBCUTANEOUS EVERY 5 MIN PRN
Status: CANCELLED | OUTPATIENT
Start: 2021-05-12

## 2021-05-12 RX ORDER — ZOLEDRONIC ACID 0.04 MG/ML
4 INJECTION, SOLUTION INTRAVENOUS ONCE
Status: COMPLETED | OUTPATIENT
Start: 2021-05-12 | End: 2021-05-12

## 2021-05-12 RX ORDER — HEPARIN SODIUM (PORCINE) LOCK FLUSH IV SOLN 100 UNIT/ML 100 UNIT/ML
5 SOLUTION INTRAVENOUS EVERY 8 HOURS PRN
Status: CANCELLED | OUTPATIENT
Start: 2021-05-12

## 2021-05-12 RX ORDER — ALBUTEROL SULFATE 90 UG/1
1-2 AEROSOL, METERED RESPIRATORY (INHALATION)
Status: CANCELLED
Start: 2021-05-12

## 2021-05-12 RX ORDER — DIPHENHYDRAMINE HYDROCHLORIDE 50 MG/ML
50 INJECTION INTRAMUSCULAR; INTRAVENOUS
Status: CANCELLED
Start: 2021-05-12

## 2021-05-12 RX ORDER — HEPARIN SODIUM,PORCINE 10 UNIT/ML
5 VIAL (ML) INTRAVENOUS
Status: CANCELLED | OUTPATIENT
Start: 2021-05-12

## 2021-05-12 RX ORDER — HEPARIN SODIUM (PORCINE) LOCK FLUSH IV SOLN 100 UNIT/ML 100 UNIT/ML
5 SOLUTION INTRAVENOUS
Status: CANCELLED | OUTPATIENT
Start: 2021-06-23

## 2021-05-12 RX ORDER — ZOLEDRONIC ACID 0.04 MG/ML
4 INJECTION, SOLUTION INTRAVENOUS ONCE
Status: CANCELLED | OUTPATIENT
Start: 2021-06-23 | End: 2021-05-26

## 2021-05-12 RX ORDER — SODIUM CHLORIDE 9 MG/ML
1000 INJECTION, SOLUTION INTRAVENOUS CONTINUOUS PRN
Status: CANCELLED
Start: 2021-05-12

## 2021-05-12 RX ORDER — ALBUTEROL SULFATE 0.83 MG/ML
2.5 SOLUTION RESPIRATORY (INHALATION)
Status: CANCELLED | OUTPATIENT
Start: 2021-05-12

## 2021-05-12 RX ORDER — MEPERIDINE HYDROCHLORIDE 25 MG/ML
25 INJECTION INTRAMUSCULAR; INTRAVENOUS; SUBCUTANEOUS EVERY 30 MIN PRN
Status: CANCELLED | OUTPATIENT
Start: 2021-05-12

## 2021-05-12 RX ORDER — HEPARIN SODIUM,PORCINE 10 UNIT/ML
5 VIAL (ML) INTRAVENOUS
Status: CANCELLED | OUTPATIENT
Start: 2021-06-23

## 2021-05-12 RX ORDER — HEPARIN SODIUM (PORCINE) LOCK FLUSH IV SOLN 100 UNIT/ML 100 UNIT/ML
5 SOLUTION INTRAVENOUS EVERY 8 HOURS PRN
Status: DISCONTINUED | OUTPATIENT
Start: 2021-05-12 | End: 2021-05-12 | Stop reason: HOSPADM

## 2021-05-12 RX ADMIN — Medication 5 ML: at 13:04

## 2021-05-12 RX ADMIN — SODIUM CHLORIDE 250 ML: 9 INJECTION, SOLUTION INTRAVENOUS at 12:14

## 2021-05-12 RX ADMIN — SODIUM CHLORIDE 200 MG: 9 INJECTION, SOLUTION INTRAVENOUS at 12:36

## 2021-05-12 RX ADMIN — ZOLEDRONIC ACID 4 MG: 0.04 INJECTION, SOLUTION INTRAVENOUS at 12:13

## 2021-05-12 ASSESSMENT — PAIN SCALES - GENERAL: PAINLEVEL: NO PAIN (0)

## 2021-05-12 NOTE — PROGRESS NOTES
Infusion Nursing Note:  Derrick Benitez presents today for C3D1 Keytruda/Zometa.    Patient seen by provider today: Yes: Dr. Larsen   present during visit today: Not Applicable.    Note: N/A.  Patient did meet criteria for an asymptomatic covid-19 PCR test in infusion today. Patient declined the covid-19 test.    Intravenous Access:  Implanted Port.    Treatment Conditions:  Lab Results   Component Value Date    HGB 12.4 05/10/2021     Lab Results   Component Value Date    WBC 4.9 05/10/2021      Lab Results   Component Value Date    ANEU 3.4 05/10/2021     Lab Results   Component Value Date     05/10/2021      Lab Results   Component Value Date     05/10/2021                   Lab Results   Component Value Date    POTASSIUM 4.5 05/10/2021           Lab Results   Component Value Date    MAG 2.1 10/30/2014            Lab Results   Component Value Date    CR 0.92 05/10/2021                   Lab Results   Component Value Date    ANTHONY 8.3 05/10/2021                Lab Results   Component Value Date    BILITOTAL 0.4 05/10/2021           Lab Results   Component Value Date    ALBUMIN 3.1 05/10/2021                    Lab Results   Component Value Date    ALT 15 05/10/2021           Lab Results   Component Value Date    AST 15 05/10/2021       Results reviewed, labs MET treatment parameters, ok to proceed with treatment.      Post Infusion Assessment:  Patient tolerated infusion without incident.  Blood return noted pre and post infusion.  Site patent and intact, free from redness, edema or discomfort.  No evidence of extravasations.  Access discontinued per protocol.       Discharge Plan:   Patient declined prescription refills.  Discharge instructions reviewed with: Patient.  Patient and/or family verbalized understanding of discharge instructions and all questions answered.  AVS to patient via [employer].  Patient will have MD order for next appointment.   Patient discharged in stable condition  accompanied by: self.  Departure Mode: Ambulatory.    Flii Luz RN

## 2021-05-12 NOTE — LETTER
"    5/12/2021         RE: Derrick Benitez  [address]  [facility] [address]        Dear Colleague,    Thank you for referring your patient, Derrick Benitez, to the [facility]. Please see a copy of my visit note below.    Oncology Rooming Note    May 12, 2021 10:42 AM   Derrick Benitez is a 80 year old male who presents for:    Chief Complaint   Patient presents with     Oncology Clinic Visit     Initial Vitals: /71   Pulse 92   Temp 98.5  F (36.9  C) (Oral)   Resp 16   Wt 88.7 kg (195 lb 9.6 oz)   SpO2 94%   BMI 30.63 kg/m   Estimated body mass index is 30.63 kg/m  as calculated from the following:    Height as of 3/10/21: 1.702 m (5' 7.01\").    Weight as of this encounter: 88.7 kg (195 lb 9.6 oz). Body surface area is 2.05 meters squared.  No Pain (0) Comment: Data Unavailable   No LMP for male patient.  Allergies reviewed: Yes  Medications reviewed: Yes    Medications: Medication refills not needed today.  Pharmacy name entered into EPIC:    [facility] [place], [place] - [address]  [facility] [address] [place], [place] - [address] [place] [address] [address]    Clinical concerns:  doctor was notified.      Corie Vieira, [facility]              ONCOLOGIC HISTORY:  Mr. Benitez is a gentleman with non-small cell lung cancer, favor squamous cell carcinoma.   -NGS and PDL staining could not be done because of small sample.   -Guardant 360 does not reveal any actionable alteration.  1.  CT chest angiogram on 09/07/2020 revealed right lower lobe mass.   2.  CT-guided biopsy was done on 09/18/2020.  Pathology revealed non-small cell lung cancer, favor squamous cell carcinoma.  Sample size was small.  PDL staining and NGS panel could not be done.   3.  Brain MRI on 09/26/2020 did not reveal any brain metastasis.   4.  PET scan on 10/05/2020 revealed hypermetabolic right lung mass and multiple hypermetabolic bone lesions.   5.  The patient started on carboplatin and Taxol on " 11/25/2020.   -Pembrolizumab added with cycle 2.     SUBJECTIVE:  Mr. Benitez is an 80-year-old gentleman with lung squamous cell carcinoma.  He is currently on pembrolizumab.  He is tolerating it well.     REVIEW OF SYSTEMS:  He has some fatigue.  No worsening.  No headache.  No dizziness.  No chest pain.  No shortness of breath.  No abdominal pain, nausea or vomiting.  Appetite is fairly good.  No urinary or bowel complaints.     All other review of systems negative.     PHYSICAL EXAMINATION:    GENERAL:  Alert, oriented x 3.  VITAL SIGNS:  Reviewed.  Not in distress.   The rest of the systems were not examined.     ASSESSMENT:    1.  An 80-year-old gentleman with metastatic lung squamous cell carcinoma, which is responding to pembrolizumab.  2.  Normocytic anemia.  3.  Thrombocytopenia.  4.  Low TSH with normal free T4.     PLAN:    1.  CT scan was personally reviewed by me.  Images were shown to the patient.  I explained to him that his metastatic lung cancer continues to improve.  He was happy to know that. He is on pembrolizumab.  That will be continued.  He is tolerating it well.  Side effects were reviewed.     2.  Labs were reviewed.  TSH is low but free T4 is normal.  This is from pembrolizumab.  We will continue to monitor it.  If things get worse, further evaluation will be done.     3.  He is mildly anemic and thrombocytopenic.  Not having any bleeding or bruising complications.  We will continue to monitor CBC.     4.  He gets Zometa for bone metastases.  That will be continued. He does not have any dental or jaw related side-effects.    5.  The patient had a few questions, which were all answered.  I reassured the patient that he is doing well from lung cancer. I will see him in 6 weeks' time.  In between, he will see our nurse practitioner.     Total face-to-face time spent was 25 minutes, more than 50% of the time spent in counseling and coordination of care.     Buzz Larsen MD           D:  2021   T: 2021   MT: FLORENTIN     Name:     ASAF WRIGHT  MRN:      [id number]        Account:    [id number]   :      1940           Visit Date: 2021      Document: [id number]    This office note has been dictated.          Again, thank you for allowing me to participate in the care of your patient.        Sincerely,        Buzz Larsen MD

## 2021-05-12 NOTE — PROGRESS NOTES
"Oncology Rooming Note    May 12, 2021 10:42 AM   Derrick Benitez is a 80 year old male who presents for:    Chief Complaint   Patient presents with     Oncology Clinic Visit     Initial Vitals: /71   Pulse 92   Temp 98.5  F (36.9  C) (Oral)   Resp 16   Wt 88.7 kg (195 lb 9.6 oz)   SpO2 94%   BMI 30.63 kg/m   Estimated body mass index is 30.63 kg/m  as calculated from the following:    Height as of 3/10/21: 1.702 m (5' 7.01\").    Weight as of this encounter: 88.7 kg (195 lb 9.6 oz). Body surface area is 2.05 meters squared.  No Pain (0) Comment: Data Unavailable   No LMP for male patient.  Allergies reviewed: Yes  Medications reviewed: Yes    Medications: Medication refills not needed today.  Pharmacy name entered into EPIC:    [place], [place] - [address]  [facility] [address] [place], [place] - [address] [place] [address]    Clinical concerns:  doctor was notified.      Corie Vieira [facility]            "

## 2021-05-12 NOTE — PATIENT INSTRUCTIONS
1. Continue pembrolizumab and zometa.  2. See De Frausto with next chemotherapy.  3. See me in 6 weeks.    *Please call to schedule

## 2021-05-16 NOTE — PROGRESS NOTES
ONCOLOGIC HISTORY:  Mr. Benitez is a gentleman with non-small cell lung cancer, favor squamous cell carcinoma.   -NGS and PDL staining could not be done because of small sample.   -Guardant 360 does not reveal any actionable alteration.  1.  CT chest angiogram on 09/07/2020 revealed right lower lobe mass.   2.  CT-guided biopsy was done on 09/18/2020.  Pathology revealed non-small cell lung cancer, favor squamous cell carcinoma.  Sample size was small.  PDL staining and NGS panel could not be done.   3.  Brain MRI on 09/26/2020 did not reveal any brain metastasis.   4.  PET scan on 10/05/2020 revealed hypermetabolic right lung mass and multiple hypermetabolic bone lesions.   5.  The patient started on carboplatin and Taxol on 11/25/2020.   -Pembrolizumab added with cycle 2.     SUBJECTIVE:  Mr. eBnitez is an 80-year-old gentleman with lung squamous cell carcinoma.  He is currently on pembrolizumab.  He is tolerating it well.     REVIEW OF SYSTEMS:  He has some fatigue.  No worsening.  No headache.  No dizziness.  No chest pain.  No shortness of breath.  No abdominal pain, nausea or vomiting.  Appetite is fairly good.  No urinary or bowel complaints.     All other review of systems negative.     PHYSICAL EXAMINATION:    GENERAL:  Alert, oriented x 3.  VITAL SIGNS:  Reviewed.  Not in distress.   The rest of the systems were not examined.     ASSESSMENT:    1.  An 80-year-old gentleman with metastatic lung squamous cell carcinoma, which is responding to pembrolizumab.  2.  Normocytic anemia.  3.  Thrombocytopenia.  4.  Low TSH with normal free T4.     PLAN:    1.  CT scan was personally reviewed by me.  Images were shown to the patient.  I explained to him that his metastatic lung cancer continues to improve.  He was happy to know that. He is on pembrolizumab.  That will be continued.  He is tolerating it well.  Side effects were reviewed.     2.  Labs were reviewed.  TSH is low but free T4 is normal.  This is from  pembrolizumab.  We will continue to monitor it.  If things get worse, further evaluation will be done.     3.  He is mildly anemic and thrombocytopenic.  Not having any bleeding or bruising complications.  We will continue to monitor CBC.     4.  He gets Zometa for bone metastases.  That will be continued. He does not have any dental or jaw related side-effects.    5.  The patient had a few questions, which were all answered.  I reassured the patient that he is doing well from lung cancer. I will see him in 6 weeks' time.  In between, he will see our nurse practitioner.     Total face-to-face time spent was 25 minutes, more than 50% of the time spent in counseling and coordination of care.     Buzz Larsen MD           D: 2021   T: 2021   MT: FLORENTIN     Name:     ASAF WRIGHT  MRN:      [id number]        Account:    [id number]   :      1940           Visit Date: 2021      Document: [id number]

## 2021-05-28 RX ORDER — METHYLPREDNISOLONE SODIUM SUCCINATE 125 MG/2ML
125 INJECTION, POWDER, LYOPHILIZED, FOR SOLUTION INTRAMUSCULAR; INTRAVENOUS
Status: CANCELLED
Start: 2021-06-02

## 2021-05-28 RX ORDER — DIPHENHYDRAMINE HYDROCHLORIDE 50 MG/ML
50 INJECTION INTRAMUSCULAR; INTRAVENOUS
Status: CANCELLED
Start: 2021-06-02

## 2021-05-28 RX ORDER — EPINEPHRINE 1 MG/ML
0.3 INJECTION, SOLUTION INTRAMUSCULAR; SUBCUTANEOUS EVERY 5 MIN PRN
Status: CANCELLED | OUTPATIENT
Start: 2021-06-02

## 2021-05-28 RX ORDER — SODIUM CHLORIDE 9 MG/ML
1000 INJECTION, SOLUTION INTRAVENOUS CONTINUOUS PRN
Status: CANCELLED
Start: 2021-06-02

## 2021-05-28 RX ORDER — NALOXONE HYDROCHLORIDE 0.4 MG/ML
.1-.4 INJECTION, SOLUTION INTRAMUSCULAR; INTRAVENOUS; SUBCUTANEOUS
Status: CANCELLED | OUTPATIENT
Start: 2021-06-02

## 2021-05-28 RX ORDER — MEPERIDINE HYDROCHLORIDE 25 MG/ML
25 INJECTION INTRAMUSCULAR; INTRAVENOUS; SUBCUTANEOUS EVERY 30 MIN PRN
Status: CANCELLED | OUTPATIENT
Start: 2021-06-02

## 2021-05-28 RX ORDER — HEPARIN SODIUM (PORCINE) LOCK FLUSH IV SOLN 100 UNIT/ML 100 UNIT/ML
5 SOLUTION INTRAVENOUS EVERY 8 HOURS PRN
Status: CANCELLED | OUTPATIENT
Start: 2021-06-02

## 2021-05-28 RX ORDER — LORAZEPAM 2 MG/ML
0.5 INJECTION INTRAMUSCULAR EVERY 4 HOURS PRN
Status: CANCELLED | OUTPATIENT
Start: 2021-06-02

## 2021-05-28 RX ORDER — ALBUTEROL SULFATE 0.83 MG/ML
2.5 SOLUTION RESPIRATORY (INHALATION)
Status: CANCELLED | OUTPATIENT
Start: 2021-06-02

## 2021-05-28 RX ORDER — HEPARIN SODIUM,PORCINE 10 UNIT/ML
5 VIAL (ML) INTRAVENOUS
Status: CANCELLED | OUTPATIENT
Start: 2021-06-02

## 2021-05-28 RX ORDER — ALBUTEROL SULFATE 90 UG/1
1-2 AEROSOL, METERED RESPIRATORY (INHALATION)
Status: CANCELLED
Start: 2021-06-02

## 2021-06-01 ENCOUNTER — HOSPITAL ENCOUNTER (OUTPATIENT)
Facility: CLINIC | Age: 81
Setting detail: SPECIMEN
Discharge: HOME | End: 2021-06-01
Attending: INTERNAL MEDICINE | Admitting: NURSE PRACTITIONER
Payer: MEDICARE

## 2021-06-01 ENCOUNTER — VIRTUAL VISIT (OUTPATIENT)
Dept: ONCOLOGY | Facility: CLINIC | Age: 81
End: 2021-06-01
Attending: NURSE PRACTITIONER
Payer: MEDICARE

## 2021-06-01 ENCOUNTER — INFUSION THERAPY VISIT (OUTPATIENT)
Dept: INFUSION THERAPY | Facility: CLINIC | Age: 81
End: 2021-06-01
Attending: INTERNAL MEDICINE
Payer: MEDICARE

## 2021-06-01 DIAGNOSIS — C34.31 MALIGNANT NEOPLASM OF LOWER LOBE OF RIGHT LUNG (H): ICD-10-CM

## 2021-06-01 DIAGNOSIS — C34.31 MALIGNANT NEOPLASM OF LOWER LOBE OF RIGHT LUNG (H): Primary | ICD-10-CM

## 2021-06-01 DIAGNOSIS — Z51.11 ENCOUNTER FOR ANTINEOPLASTIC CHEMOTHERAPY: ICD-10-CM

## 2021-06-01 DIAGNOSIS — Z51.11 ENCOUNTER FOR ANTINEOPLASTIC CHEMOTHERAPY: Primary | ICD-10-CM

## 2021-06-01 LAB
ALBUMIN SERPL-MCNC: 3.2 G/DL (ref 3.4–5)
ALP SERPL-CCNC: 64 U/L (ref 40–150)
ALT SERPL W P-5'-P-CCNC: 16 U/L (ref 0–70)
ANION GAP SERPL CALCULATED.3IONS-SCNC: 4 MMOL/L (ref 3–14)
AST SERPL W P-5'-P-CCNC: 13 U/L (ref 0–45)
BASOPHILS # BLD AUTO: 0 10E9/L (ref 0–0.2)
BASOPHILS NFR BLD AUTO: 0.4 %
BILIRUB SERPL-MCNC: 0.5 MG/DL (ref 0.2–1.3)
BUN SERPL-MCNC: 20 MG/DL (ref 7–30)
CALCIUM SERPL-MCNC: 8.7 MG/DL (ref 8.5–10.1)
CHLORIDE SERPL-SCNC: 110 MMOL/L (ref 94–109)
CO2 SERPL-SCNC: 27 MMOL/L (ref 20–32)
CREAT SERPL-MCNC: 1.04 MG/DL (ref 0.66–1.25)
DIFFERENTIAL METHOD BLD: ABNORMAL
EOSINOPHIL # BLD AUTO: 0.1 10E9/L (ref 0–0.7)
EOSINOPHIL NFR BLD AUTO: 2.4 %
ERYTHROCYTE [DISTWIDTH] IN BLOOD BY AUTOMATED COUNT: 12.8 % (ref 10–15)
GFR SERPL CREATININE-BSD FRML MDRD: 67 ML/MIN/{1.73_M2}
GLUCOSE SERPL-MCNC: 172 MG/DL (ref 70–99)
HCT VFR BLD AUTO: 38.6 % (ref 40–53)
HGB BLD-MCNC: 12.9 G/DL (ref 13.3–17.7)
IMM GRANULOCYTES # BLD: 0 10E9/L (ref 0–0.4)
IMM GRANULOCYTES NFR BLD: 0.4 %
LYMPHOCYTES # BLD AUTO: 1.2 10E9/L (ref 0.8–5.3)
LYMPHOCYTES NFR BLD AUTO: 22.9 %
MCH RBC QN AUTO: 31.5 PG (ref 26.5–33)
MCHC RBC AUTO-ENTMCNC: 33.4 G/DL (ref 31.5–36.5)
MCV RBC AUTO: 94 FL (ref 78–100)
MONOCYTES # BLD AUTO: 0.4 10E9/L (ref 0–1.3)
MONOCYTES NFR BLD AUTO: 7.3 %
NEUTROPHILS # BLD AUTO: 3.4 10E9/L (ref 1.6–8.3)
NEUTROPHILS NFR BLD AUTO: 66.6 %
NRBC # BLD AUTO: 0 10*3/UL
NRBC BLD AUTO-RTO: 0 /100
PLATELET # BLD AUTO: 156 10E9/L (ref 150–450)
POTASSIUM SERPL-SCNC: 4 MMOL/L (ref 3.4–5.3)
PROT SERPL-MCNC: 6.7 G/DL (ref 6.8–8.8)
RBC # BLD AUTO: 4.1 10E12/L (ref 4.4–5.9)
SODIUM SERPL-SCNC: 141 MMOL/L (ref 133–144)
TSH SERPL DL<=0.005 MIU/L-ACNC: 0.66 MU/L (ref 0.4–4)
WBC # BLD AUTO: 5.1 10E9/L (ref 4–11)

## 2021-06-01 PROCEDURE — 99442 PR PHYSICIAN TELEPHONE EVALUATION 11-20 MIN: CPT | Mod: 95 | Performed by: NURSE PRACTITIONER

## 2021-06-01 PROCEDURE — 80053 COMPREHEN METABOLIC PANEL: CPT | Performed by: NURSE PRACTITIONER

## 2021-06-01 PROCEDURE — 36415 COLL VENOUS BLD VENIPUNCTURE: CPT

## 2021-06-01 PROCEDURE — 84443 ASSAY THYROID STIM HORMONE: CPT | Performed by: NURSE PRACTITIONER

## 2021-06-01 PROCEDURE — 85025 COMPLETE CBC W/AUTO DIFF WBC: CPT | Performed by: NURSE PRACTITIONER

## 2021-06-01 NOTE — PROGRESS NOTES
"The patient has been notified of following:      \"This telephone visit will be conducted via a call between you and your physician/provider. We have found that certain health care needs can be provided without the need for a physical exam.  This service lets us provide the care you need with a short phone conversation during the COVID-19 pandemic.  If a prescription is necessary we can send it directly to your pharmacy.  If lab work is needed we can place an order for that and you can then stop by our lab to have the test done at a later time.     Telephone visits are billed at different rates depending on your insurance coverage. During this emergency period, for some insurers they may be billed the same as an in-person visit.  Please reach out to your insurance provider with any questions.     If during the course of the call the physician/provider feels a telephone visit is not appropriate, you will not be charged for this service.\"     Patient has given verbal consent for Telephone visit?  Yes       Telephone visit 20 minutes      Oncology/Hematology Visit Note  Jun 1, 2021    Reason for Visit: follow up of non-small cell cancer    Patient currently getting treatment with carboplatin Taxol and pembrolizumab      Interval History:  Reports he has been tolerating treatment well.  He reports occasionally he gets mild neck pain in the back.  He reports he did not have pain for the last 7 days.  He reports he has a some mild pain this morning.  He is not taking any pain pills denies swelling denies neuropathy.  Denies adenopathy.  Denies fever chills sweats cough shortness of breath chest pain nausea vomiting diarrhea abdominal pain bleeding    Review of Systems:  14 point ROS of systems including Constitutional, Eyes, Respiratory, Cardiovascular, Gastroenterology, Genitourinary, Integumentary, Muscularskeletal, Psychiatric were all negative except for pertinent positives noted in my HPI.        Physical " Examination:  Telephone visit no audible wheezing or cough  Laboratory Data:  Results for orders placed or performed in visit on 06/01/21 (from the past 24 hour(s))   CBC with platelets differential   Result Value Ref Range    WBC 5.1 4.0 - 11.0 10e9/L    RBC Count 4.10 (L) 4.4 - 5.9 10e12/L    Hemoglobin 12.9 (L) 13.3 - 17.7 g/dL    Hematocrit 38.6 (L) 40.0 - 53.0 %    MCV 94 78 - 100 fl    MCH 31.5 26.5 - 33.0 pg    MCHC 33.4 31.5 - 36.5 g/dL    RDW 12.8 10.0 - 15.0 %    Platelet Count 156 150 - 450 10e9/L    Diff Method Automated Method     % Neutrophils 66.6 %    % Lymphocytes 22.9 %    % Monocytes 7.3 %    % Eosinophils 2.4 %    % Basophils 0.4 %    % Immature Granulocytes 0.4 %    Nucleated RBCs 0 0 /100    Absolute Neutrophil 3.4 1.6 - 8.3 10e9/L    Absolute Lymphocytes 1.2 0.8 - 5.3 10e9/L    Absolute Monocytes 0.4 0.0 - 1.3 10e9/L    Absolute Eosinophils 0.1 0.0 - 0.7 10e9/L    Absolute Basophils 0.0 0.0 - 0.2 10e9/L    Abs Immature Granulocytes 0.0 0 - 0.4 10e9/L    Absolute Nucleated RBC 0.0    Comprehensive metabolic panel   Result Value Ref Range    Sodium 141 133 - 144 mmol/L    Potassium 4.0 3.4 - 5.3 mmol/L    Chloride 110 (H) 94 - 109 mmol/L    Carbon Dioxide 27 20 - 32 mmol/L    Anion Gap 4 3 - 14 mmol/L    Glucose 172 (H) 70 - 99 mg/dL    Urea Nitrogen 20 7 - 30 mg/dL    Creatinine 1.04 0.66 - 1.25 mg/dL    GFR Estimate 67 >60 mL/min/[1.73_m2]    GFR Estimate If Black 77 >60 mL/min/[1.73_m2]    Calcium 8.7 8.5 - 10.1 mg/dL    Bilirubin Total 0.5 0.2 - 1.3 mg/dL    Albumin 3.2 (L) 3.4 - 5.0 g/dL    Protein Total 6.7 (L) 6.8 - 8.8 g/dL    Alkaline Phosphatase 64 40 - 150 U/L    ALT 16 0 - 70 U/L    AST 13 0 - 45 U/L   TSH with free T4 reflex   Result Value Ref Range    TSH 0.66 0.40 - 4.00 mU/L         Assessment and Plan:  This is a 80-year-old male with    non-small cell cancer  Patient currently getting treatment with carboplatinTaxol and pembrolizumab-completed 6 cycles  -Currently on  maintenance immunotherapy  So far patient reports he has been tolerating treatment well  Labs reviewed  Okay to proceed with treatment  Patient has follow-up appointment with Dr. Larsen with next cycle of treatment        Bone mets  -Patient currently on Zometa every 4 to 6 weeks-Next treatment is scheduled in May  Continue with vitamin D and calcium    Anemia  Patient is asymptomatic  Stable hemoglobin patient denies bleeding         Patient advised to call our clinic in the event of fever chills sweats cough shortness of breath chest pain nausea vomiting diarrhea abdominal pain bleeding or any changes in health condition    DARSHANA Meza [facility] [place]     Chart documentation with Dragon Voice recognition Software. Although reviewed after completion, some words and grammatical errors may remain.

## 2021-06-01 NOTE — LETTER
"    6/1/2021         RE: Derrick Benitez  [address]  [facility] [address]        Dear Colleague,    Thank you for referring your patient, Derrick Benitez, to the [facility] [place]. Please see a copy of my visit note below.    Derrick is a 80 year old who is being evaluated via a billable telephone visit.      What phone number would you like to be contacted at? [phone]     How would you like to obtain your AVS? Lindaharstephanie      The patient has been notified of following:      \"This telephone visit will be conducted via a call between you and your physician/provider. We have found that certain health care needs can be provided without the need for a physical exam.  This service lets us provide the care you need with a short phone conversation during the COVID-19 pandemic.  If a prescription is necessary we can send it directly to your pharmacy.  If lab work is needed we can place an order for that and you can then stop by our lab to have the test done at a later time.     Telephone visits are billed at different rates depending on your insurance coverage. During this emergency period, for some insurers they may be billed the same as an in-person visit.  Please reach out to your insurance provider with any questions.     If during the course of the call the physician/provider feels a telephone visit is not appropriate, you will not be charged for this service.\"     Patient has given verbal consent for Telephone visit?  Yes       Telephone visit 20 minutes      Oncology/Hematology Visit Note  Jun 1, 2021    Reason for Visit: follow up of non-small cell cancer    Patient currently getting treatment with carboplatin Taxol and pembrolizumab      Interval History:  Reports he has been tolerating treatment well.  He reports occasionally he gets mild neck pain in the back.  He reports he did not have pain for the last 7 days.  He reports he has a some mild pain this morning.  He is not taking any pain " pills denies swelling denies neuropathy.  Denies adenopathy.  Denies fever chills sweats cough shortness of breath chest pain nausea vomiting diarrhea abdominal pain bleeding    Review of Systems:  14 point ROS of systems including Constitutional, Eyes, Respiratory, Cardiovascular, Gastroenterology, Genitourinary, Integumentary, Muscularskeletal, Psychiatric were all negative except for pertinent positives noted in my HPI.        Physical Examination:  Telephone visit no audible wheezing or cough  Laboratory Data:  Results for orders placed or performed in visit on 06/01/21 (from the past 24 hour(s))   CBC with platelets differential   Result Value Ref Range    WBC 5.1 4.0 - 11.0 10e9/L    RBC Count 4.10 (L) 4.4 - 5.9 10e12/L    Hemoglobin 12.9 (L) 13.3 - 17.7 g/dL    Hematocrit 38.6 (L) 40.0 - 53.0 %    MCV 94 78 - 100 fl    MCH 31.5 26.5 - 33.0 pg    MCHC 33.4 31.5 - 36.5 g/dL    RDW 12.8 10.0 - 15.0 %    Platelet Count 156 150 - 450 10e9/L    Diff Method Automated Method     % Neutrophils 66.6 %    % Lymphocytes 22.9 %    % Monocytes 7.3 %    % Eosinophils 2.4 %    % Basophils 0.4 %    % Immature Granulocytes 0.4 %    Nucleated RBCs 0 0 /100    Absolute Neutrophil 3.4 1.6 - 8.3 10e9/L    Absolute Lymphocytes 1.2 0.8 - 5.3 10e9/L    Absolute Monocytes 0.4 0.0 - 1.3 10e9/L    Absolute Eosinophils 0.1 0.0 - 0.7 10e9/L    Absolute Basophils 0.0 0.0 - 0.2 10e9/L    Abs Immature Granulocytes 0.0 0 - 0.4 10e9/L    Absolute Nucleated RBC 0.0    Comprehensive metabolic panel   Result Value Ref Range    Sodium 141 133 - 144 mmol/L    Potassium 4.0 3.4 - 5.3 mmol/L    Chloride 110 (H) 94 - 109 mmol/L    Carbon Dioxide 27 20 - 32 mmol/L    Anion Gap 4 3 - 14 mmol/L    Glucose 172 (H) 70 - 99 mg/dL    Urea Nitrogen 20 7 - 30 mg/dL    Creatinine 1.04 0.66 - 1.25 mg/dL    GFR Estimate 67 >60 mL/min/[1.73_m2]    GFR Estimate If Black 77 >60 mL/min/[1.73_m2]    Calcium 8.7 8.5 - 10.1 mg/dL    Bilirubin Total 0.5 0.2 - 1.3 mg/dL     Albumin 3.2 (L) 3.4 - 5.0 g/dL    Protein Total 6.7 (L) 6.8 - 8.8 g/dL    Alkaline Phosphatase 64 40 - 150 U/L    ALT 16 0 - 70 U/L    AST 13 0 - 45 U/L   TSH with free T4 reflex   Result Value Ref Range    TSH 0.66 0.40 - 4.00 mU/L         Assessment and Plan:  This is a 80-year-old male with    non-small cell cancer  Patient currently getting treatment with carboplatinTaxol and pembrolizumab-completed 6 cycles  -Currently on maintenance immunotherapy  So far patient reports he has been tolerating treatment well  Labs reviewed  Okay to proceed with treatment  Patient has follow-up appointment with Dr. Larsen with next cycle of treatment        Bone mets  -Patient currently on Zometa every 4 to 6 weeks-Next treatment is scheduled in May  Continue with vitamin D and calcium    Anemia  Patient is asymptomatic  Stable hemoglobin patient denies bleeding         Patient advised to call our clinic in the event of fever chills sweats cough shortness of breath chest pain nausea vomiting diarrhea abdominal pain bleeding or any changes in health condition    DARSHANA Meza CNP  [facility]     Chart documentation with Dragon Voice recognition Software. Although reviewed after completion, some words and grammatical errors may remain.            Again, thank you for allowing me to participate in the care of your patient.        Sincerely,        DARSHANA Meza CNP

## 2021-06-01 NOTE — LETTER
"    6/1/2021         RE: Derrick Benitez  [address]  [facility] [address]        Dear Colleague,    Thank you for referring your patient, Derrick Benitez, to the [facility] [place]. Please see a copy of my visit note below.    Derrick is a 80 year old who is being evaluated via a billable telephone visit.      What phone number would you like to be contacted at? [phone]     How would you like to obtain your AVS? Lindaharstephanie      The patient has been notified of following:      \"This telephone visit will be conducted via a call between you and your physician/provider. We have found that certain health care needs can be provided without the need for a physical exam.  This service lets us provide the care you need with a short phone conversation during the COVID-19 pandemic.  If a prescription is necessary we can send it directly to your pharmacy.  If lab work is needed we can place an order for that and you can then stop by our lab to have the test done at a later time.     Telephone visits are billed at different rates depending on your insurance coverage. During this emergency period, for some insurers they may be billed the same as an in-person visit.  Please reach out to your insurance provider with any questions.     If during the course of the call the physician/provider feels a telephone visit is not appropriate, you will not be charged for this service.\"     Patient has given verbal consent for Telephone visit?  Yes       Telephone visit 20 minutes      Oncology/Hematology Visit Note  Jun 1, 2021    Reason for Visit: follow up of non-small cell cancer    Patient currently getting treatment with carboplatin Taxol and pembrolizumab      Interval History:  Reports he has been tolerating treatment well.  He reports occasionally he gets mild neck pain in the back.  He reports he did not have pain for the last 7 days.  He reports he has a some mild pain this morning.  He is not taking any pain " pills denies swelling denies neuropathy.  Denies adenopathy.  Denies fever chills sweats cough shortness of breath chest pain nausea vomiting diarrhea abdominal pain bleeding    Review of Systems:  14 point ROS of systems including Constitutional, Eyes, Respiratory, Cardiovascular, Gastroenterology, Genitourinary, Integumentary, Muscularskeletal, Psychiatric were all negative except for pertinent positives noted in my HPI.        Physical Examination:  Telephone visit no audible wheezing or cough  Laboratory Data:  Results for orders placed or performed in visit on 06/01/21 (from the past 24 hour(s))   CBC with platelets differential   Result Value Ref Range    WBC 5.1 4.0 - 11.0 10e9/L    RBC Count 4.10 (L) 4.4 - 5.9 10e12/L    Hemoglobin 12.9 (L) 13.3 - 17.7 g/dL    Hematocrit 38.6 (L) 40.0 - 53.0 %    MCV 94 78 - 100 fl    MCH 31.5 26.5 - 33.0 pg    MCHC 33.4 31.5 - 36.5 g/dL    RDW 12.8 10.0 - 15.0 %    Platelet Count 156 150 - 450 10e9/L    Diff Method Automated Method     % Neutrophils 66.6 %    % Lymphocytes 22.9 %    % Monocytes 7.3 %    % Eosinophils 2.4 %    % Basophils 0.4 %    % Immature Granulocytes 0.4 %    Nucleated RBCs 0 0 /100    Absolute Neutrophil 3.4 1.6 - 8.3 10e9/L    Absolute Lymphocytes 1.2 0.8 - 5.3 10e9/L    Absolute Monocytes 0.4 0.0 - 1.3 10e9/L    Absolute Eosinophils 0.1 0.0 - 0.7 10e9/L    Absolute Basophils 0.0 0.0 - 0.2 10e9/L    Abs Immature Granulocytes 0.0 0 - 0.4 10e9/L    Absolute Nucleated RBC 0.0    Comprehensive metabolic panel   Result Value Ref Range    Sodium 141 133 - 144 mmol/L    Potassium 4.0 3.4 - 5.3 mmol/L    Chloride 110 (H) 94 - 109 mmol/L    Carbon Dioxide 27 20 - 32 mmol/L    Anion Gap 4 3 - 14 mmol/L    Glucose 172 (H) 70 - 99 mg/dL    Urea Nitrogen 20 7 - 30 mg/dL    Creatinine 1.04 0.66 - 1.25 mg/dL    GFR Estimate 67 >60 mL/min/[1.73_m2]    GFR Estimate If Black 77 >60 mL/min/[1.73_m2]    Calcium 8.7 8.5 - 10.1 mg/dL    Bilirubin Total 0.5 0.2 - 1.3 mg/dL     Albumin 3.2 (L) 3.4 - 5.0 g/dL    Protein Total 6.7 (L) 6.8 - 8.8 g/dL    Alkaline Phosphatase 64 40 - 150 U/L    ALT 16 0 - 70 U/L    AST 13 0 - 45 U/L   TSH with free T4 reflex   Result Value Ref Range    TSH 0.66 0.40 - 4.00 mU/L         Assessment and Plan:  This is a 80-year-old male with    non-small cell cancer  Patient currently getting treatment with carboplatinTaxol and pembrolizumab-completed 6 cycles  -Currently on maintenance immunotherapy  So far patient reports he has been tolerating treatment well  Labs reviewed  Okay to proceed with treatment  Patient has follow-up appointment with Dr. Larsen with next cycle of treatment        Bone mets  -Patient currently on Zometa every 4 to 6 weeks-Next treatment is scheduled in May  Continue with vitamin D and calcium    Anemia  Patient is asymptomatic  Stable hemoglobin patient denies bleeding         Patient advised to call our clinic in the event of fever chills sweats cough shortness of breath chest pain nausea vomiting diarrhea abdominal pain bleeding or any changes in health condition    DARSHANA Meza CNP  [facility] [place]     Chart documentation with Dragon Voice recognition Software. Although reviewed after completion, some words and grammatical errors may remain.            Again, thank you for allowing me to participate in the care of your patient.        Sincerely,        DARSHANA Meza CNP

## 2021-06-01 NOTE — PROGRESS NOTES
Medical Assistant Note:  Derrick Benitez presents today for blood draw.    Patient seen by provider today: Yes: negrito.   present during visit today: Not Applicable.    Concerns: No Concerns.    Procedure:  Lab draw site: rac, Needle type: bf, Gauge: 23.    Post Assessment:  Labs drawn without difficulty: Yes.    Discharge Plan:  Departure Mode: Ambulatory.    Face to Face Time: 5 min  .    Corie Vieira, CMA

## 2021-06-01 NOTE — PROGRESS NOTES
Derrick is a 80 year old who is being evaluated via a billable telephone visit.      What phone number would you like to be contacted at? [phone]     How would you like to obtain your AVS? Juan

## 2021-06-02 ENCOUNTER — INFUSION THERAPY VISIT (OUTPATIENT)
Dept: INFUSION THERAPY | Facility: CLINIC | Age: 81
End: 2021-06-02
Attending: INTERNAL MEDICINE
Payer: MEDICARE

## 2021-06-02 VITALS
RESPIRATION RATE: 16 BRPM | HEART RATE: 78 BPM | TEMPERATURE: 97.5 F | SYSTOLIC BLOOD PRESSURE: 152 MMHG | WEIGHT: 192 LBS | BODY MASS INDEX: 30.13 KG/M2 | DIASTOLIC BLOOD PRESSURE: 73 MMHG | HEIGHT: 67 IN

## 2021-06-02 DIAGNOSIS — C34.31 MALIGNANT NEOPLASM OF LOWER LOBE OF RIGHT LUNG (H): ICD-10-CM

## 2021-06-02 DIAGNOSIS — Z51.11 ENCOUNTER FOR ANTINEOPLASTIC CHEMOTHERAPY: Primary | ICD-10-CM

## 2021-06-02 PROCEDURE — 258N000003 HC RX IP 258 OP 636: Performed by: NURSE PRACTITIONER

## 2021-06-02 PROCEDURE — 96413 CHEMO IV INFUSION 1 HR: CPT

## 2021-06-02 PROCEDURE — 250N000011 HC RX IP 250 OP 636: Performed by: NURSE PRACTITIONER

## 2021-06-02 RX ORDER — HEPARIN SODIUM (PORCINE) LOCK FLUSH IV SOLN 100 UNIT/ML 100 UNIT/ML
5 SOLUTION INTRAVENOUS EVERY 8 HOURS PRN
Status: DISCONTINUED | OUTPATIENT
Start: 2021-06-02 | End: 2021-06-02 | Stop reason: HOSPADM

## 2021-06-02 RX ADMIN — Medication 5 ML: at 14:19

## 2021-06-02 RX ADMIN — SODIUM CHLORIDE 250 ML: 9 INJECTION, SOLUTION INTRAVENOUS at 13:42

## 2021-06-02 RX ADMIN — SODIUM CHLORIDE 200 MG: 9 INJECTION, SOLUTION INTRAVENOUS at 13:42

## 2021-06-02 ASSESSMENT — MIFFLIN-ST. JEOR: SCORE: 1534.66

## 2021-06-02 NOTE — PROGRESS NOTES
Infusion Nursing Note:  Derrick Benitez presents today for Keytruda.    Patient seen by provider today: No.  Patient was seen yesterday.   present during visit today: Not Applicable.    Note: N/A.      Intravenous Access:  Implanted Port.    Treatment Conditions:  Lab Results   Component Value Date    HGB 12.9 06/01/2021     Lab Results   Component Value Date    WBC 5.1 06/01/2021      Lab Results   Component Value Date    ANEU 3.4 06/01/2021     Lab Results   Component Value Date     06/01/2021      Lab Results   Component Value Date     06/01/2021                   Lab Results   Component Value Date    POTASSIUM 4.0 06/01/2021           Lab Results   Component Value Date    MAG 2.1 10/30/2014            Lab Results   Component Value Date    CR 1.04 06/01/2021                   Lab Results   Component Value Date    ANTHONY 8.7 06/01/2021                Lab Results   Component Value Date    BILITOTAL 0.5 06/01/2021           Lab Results   Component Value Date    ALBUMIN 3.2 06/01/2021                    Lab Results   Component Value Date    ALT 16 06/01/2021           Lab Results   Component Value Date    AST 13 06/01/2021       Results reviewed, labs MET treatment parameters, ok to proceed with treatment.  TSH=0.66.      Post Infusion Assessment:  Patient tolerated infusion without incident.  Blood return noted pre and post infusion.  Site patent and intact, free from redness, edema or discomfort.  No evidence of extravasations.  Access discontinued per protocol.       Discharge Plan:   Discharge instructions reviewed with: Patient.  Patient and/or family verbalized understanding of discharge instructions and all questions answered.  AVS to patient via [employer].  Patient will return 6/22/21 for next appointment.   Patient discharged in stable condition accompanied by: self.  Departure Mode: Ambulatory.      Musa Taylor RN

## 2021-06-19 DIAGNOSIS — Z51.11 ENCOUNTER FOR ANTINEOPLASTIC CHEMOTHERAPY: ICD-10-CM

## 2021-06-19 DIAGNOSIS — C34.31 MALIGNANT NEOPLASM OF LOWER LOBE OF RIGHT LUNG (H): ICD-10-CM

## 2021-06-19 DIAGNOSIS — C79.51 BONE METASTASES: Primary | ICD-10-CM

## 2021-06-19 RX ORDER — ALBUTEROL SULFATE 90 UG/1
1-2 AEROSOL, METERED RESPIRATORY (INHALATION)
Status: CANCELLED
Start: 2021-06-23

## 2021-06-19 RX ORDER — ALBUTEROL SULFATE 0.83 MG/ML
2.5 SOLUTION RESPIRATORY (INHALATION)
Status: CANCELLED | OUTPATIENT
Start: 2021-06-23

## 2021-06-19 RX ORDER — LORAZEPAM 2 MG/ML
0.5 INJECTION INTRAMUSCULAR EVERY 4 HOURS PRN
Status: CANCELLED | OUTPATIENT
Start: 2021-06-23

## 2021-06-19 RX ORDER — MEPERIDINE HYDROCHLORIDE 25 MG/ML
25 INJECTION INTRAMUSCULAR; INTRAVENOUS; SUBCUTANEOUS EVERY 30 MIN PRN
Status: CANCELLED | OUTPATIENT
Start: 2021-06-23

## 2021-06-19 RX ORDER — HEPARIN SODIUM,PORCINE 10 UNIT/ML
5 VIAL (ML) INTRAVENOUS
Status: CANCELLED | OUTPATIENT
Start: 2021-08-04

## 2021-06-19 RX ORDER — HEPARIN SODIUM (PORCINE) LOCK FLUSH IV SOLN 100 UNIT/ML 100 UNIT/ML
5 SOLUTION INTRAVENOUS EVERY 8 HOURS PRN
Status: CANCELLED | OUTPATIENT
Start: 2021-06-23

## 2021-06-19 RX ORDER — HEPARIN SODIUM (PORCINE) LOCK FLUSH IV SOLN 100 UNIT/ML 100 UNIT/ML
5 SOLUTION INTRAVENOUS
Status: CANCELLED | OUTPATIENT
Start: 2021-08-04

## 2021-06-19 RX ORDER — NALOXONE HYDROCHLORIDE 0.4 MG/ML
.1-.4 INJECTION, SOLUTION INTRAMUSCULAR; INTRAVENOUS; SUBCUTANEOUS
Status: CANCELLED | OUTPATIENT
Start: 2021-06-23

## 2021-06-19 RX ORDER — DIPHENHYDRAMINE HYDROCHLORIDE 50 MG/ML
50 INJECTION INTRAMUSCULAR; INTRAVENOUS
Status: CANCELLED
Start: 2021-06-23

## 2021-06-19 RX ORDER — EPINEPHRINE 1 MG/ML
0.3 INJECTION, SOLUTION, CONCENTRATE INTRAVENOUS EVERY 5 MIN PRN
Status: CANCELLED | OUTPATIENT
Start: 2021-06-23

## 2021-06-19 RX ORDER — SODIUM CHLORIDE 9 MG/ML
1000 INJECTION, SOLUTION INTRAVENOUS CONTINUOUS PRN
Status: CANCELLED
Start: 2021-06-23

## 2021-06-19 RX ORDER — HEPARIN SODIUM,PORCINE 10 UNIT/ML
5 VIAL (ML) INTRAVENOUS
Status: CANCELLED | OUTPATIENT
Start: 2021-06-23

## 2021-06-22 ENCOUNTER — HOSPITAL ENCOUNTER (OUTPATIENT)
Facility: CLINIC | Age: 81
Setting detail: SPECIMEN
Discharge: HOME | End: 2021-06-22
Attending: INTERNAL MEDICINE | Admitting: INTERNAL MEDICINE
Payer: MEDICARE

## 2021-06-22 ENCOUNTER — INFUSION THERAPY VISIT (OUTPATIENT)
Dept: INFUSION THERAPY | Facility: CLINIC | Age: 81
End: 2021-06-22
Attending: INTERNAL MEDICINE
Payer: MEDICARE

## 2021-06-22 DIAGNOSIS — C79.51 BONE METASTASES: Primary | ICD-10-CM

## 2021-06-22 DIAGNOSIS — C34.31 MALIGNANT NEOPLASM OF LOWER LOBE OF RIGHT LUNG (H): ICD-10-CM

## 2021-06-22 DIAGNOSIS — Z51.11 ENCOUNTER FOR ANTINEOPLASTIC CHEMOTHERAPY: ICD-10-CM

## 2021-06-22 LAB
ALBUMIN SERPL-MCNC: 3 G/DL (ref 3.4–5)
ALP SERPL-CCNC: 69 U/L (ref 40–150)
ALT SERPL W P-5'-P-CCNC: 18 U/L (ref 0–70)
ANION GAP SERPL CALCULATED.3IONS-SCNC: 5 MMOL/L (ref 3–14)
AST SERPL W P-5'-P-CCNC: 17 U/L (ref 0–45)
BILIRUB SERPL-MCNC: 0.4 MG/DL (ref 0.2–1.3)
BUN SERPL-MCNC: 16 MG/DL (ref 7–30)
CALCIUM SERPL-MCNC: 8.4 MG/DL (ref 8.5–10.1)
CHLORIDE SERPL-SCNC: 112 MMOL/L (ref 94–109)
CO2 SERPL-SCNC: 23 MMOL/L (ref 20–32)
CREAT SERPL-MCNC: 0.86 MG/DL (ref 0.66–1.25)
GFR SERPL CREATININE-BSD FRML MDRD: 81 ML/MIN/{1.73_M2}
GLUCOSE SERPL-MCNC: 141 MG/DL (ref 70–99)
POTASSIUM SERPL-SCNC: 4 MMOL/L (ref 3.4–5.3)
PROT SERPL-MCNC: 6.5 G/DL (ref 6.8–8.8)
SODIUM SERPL-SCNC: 140 MMOL/L (ref 133–144)
TSH SERPL DL<=0.005 MIU/L-ACNC: 0.49 MU/L (ref 0.4–4)

## 2021-06-22 PROCEDURE — 84443 ASSAY THYROID STIM HORMONE: CPT | Performed by: INTERNAL MEDICINE

## 2021-06-22 PROCEDURE — 36591 DRAW BLOOD OFF VENOUS DEVICE: CPT

## 2021-06-22 PROCEDURE — 80053 COMPREHEN METABOLIC PANEL: CPT | Performed by: INTERNAL MEDICINE

## 2021-06-22 PROCEDURE — 250N000011 HC RX IP 250 OP 636: Performed by: INTERNAL MEDICINE

## 2021-06-22 RX ORDER — HEPARIN SODIUM (PORCINE) LOCK FLUSH IV SOLN 100 UNIT/ML 100 UNIT/ML
5 SOLUTION INTRAVENOUS
Status: DISCONTINUED | OUTPATIENT
Start: 2021-06-22 | End: 2021-06-22 | Stop reason: HOSPADM

## 2021-06-22 RX ADMIN — Medication 5 ML: at 09:47

## 2021-06-22 NOTE — PROGRESS NOTES
Nursing Note:  Derrick Benitez presents today for port labs.    Patient seen by provider today: No   present during visit today: Not Applicable.    Note: N/A.    Intravenous Access:  Implanted Port.    Discharge Plan:   Patient was sent home for 6/23/21 appointment.    Erma Bocanegra RN

## 2021-06-23 ENCOUNTER — INFUSION THERAPY VISIT (OUTPATIENT)
Dept: INFUSION THERAPY | Facility: CLINIC | Age: 81
End: 2021-06-23
Attending: INTERNAL MEDICINE
Payer: MEDICARE

## 2021-06-23 ENCOUNTER — ONCOLOGY VISIT (OUTPATIENT)
Dept: ONCOLOGY | Facility: CLINIC | Age: 81
End: 2021-06-23
Attending: INTERNAL MEDICINE
Payer: MEDICARE

## 2021-06-23 VITALS
TEMPERATURE: 98.1 F | DIASTOLIC BLOOD PRESSURE: 69 MMHG | OXYGEN SATURATION: 96 % | SYSTOLIC BLOOD PRESSURE: 147 MMHG | HEART RATE: 83 BPM | WEIGHT: 194 LBS | BODY MASS INDEX: 30.38 KG/M2 | RESPIRATION RATE: 18 BRPM

## 2021-06-23 VITALS — DIASTOLIC BLOOD PRESSURE: 64 MMHG | SYSTOLIC BLOOD PRESSURE: 134 MMHG

## 2021-06-23 DIAGNOSIS — C79.51 BONE METASTASES: Primary | ICD-10-CM

## 2021-06-23 DIAGNOSIS — Z51.11 ENCOUNTER FOR ANTINEOPLASTIC CHEMOTHERAPY: ICD-10-CM

## 2021-06-23 DIAGNOSIS — C34.31 MALIGNANT NEOPLASM OF LOWER LOBE OF RIGHT LUNG (H): Primary | ICD-10-CM

## 2021-06-23 DIAGNOSIS — C34.31 MALIGNANT NEOPLASM OF LOWER LOBE OF RIGHT LUNG (H): ICD-10-CM

## 2021-06-23 PROCEDURE — G0463 HOSPITAL OUTPT CLINIC VISIT: HCPCS | Mod: 25

## 2021-06-23 PROCEDURE — 250N000011 HC RX IP 250 OP 636: Performed by: INTERNAL MEDICINE

## 2021-06-23 PROCEDURE — 96367 TX/PROPH/DG ADDL SEQ IV INF: CPT

## 2021-06-23 PROCEDURE — 96413 CHEMO IV INFUSION 1 HR: CPT

## 2021-06-23 PROCEDURE — 99214 OFFICE O/P EST MOD 30 MIN: CPT | Performed by: INTERNAL MEDICINE

## 2021-06-23 PROCEDURE — 258N000003 HC RX IP 258 OP 636: Performed by: INTERNAL MEDICINE

## 2021-06-23 RX ORDER — ALBUTEROL SULFATE 0.83 MG/ML
2.5 SOLUTION RESPIRATORY (INHALATION)
Status: CANCELLED | OUTPATIENT
Start: 2021-07-15

## 2021-06-23 RX ORDER — HEPARIN SODIUM (PORCINE) LOCK FLUSH IV SOLN 100 UNIT/ML 100 UNIT/ML
5 SOLUTION INTRAVENOUS EVERY 8 HOURS PRN
Status: DISCONTINUED | OUTPATIENT
Start: 2021-06-23 | End: 2021-06-23 | Stop reason: HOSPADM

## 2021-06-23 RX ORDER — HEPARIN SODIUM (PORCINE) LOCK FLUSH IV SOLN 100 UNIT/ML 100 UNIT/ML
5 SOLUTION INTRAVENOUS EVERY 8 HOURS PRN
Status: CANCELLED | OUTPATIENT
Start: 2021-07-15

## 2021-06-23 RX ORDER — NALOXONE HYDROCHLORIDE 0.4 MG/ML
.1-.4 INJECTION, SOLUTION INTRAMUSCULAR; INTRAVENOUS; SUBCUTANEOUS
Status: CANCELLED | OUTPATIENT
Start: 2021-07-15

## 2021-06-23 RX ORDER — ZOLEDRONIC ACID 0.04 MG/ML
4 INJECTION, SOLUTION INTRAVENOUS ONCE
Status: COMPLETED | OUTPATIENT
Start: 2021-06-23 | End: 2021-06-23

## 2021-06-23 RX ORDER — MEPERIDINE HYDROCHLORIDE 25 MG/ML
25 INJECTION INTRAMUSCULAR; INTRAVENOUS; SUBCUTANEOUS EVERY 30 MIN PRN
Status: CANCELLED | OUTPATIENT
Start: 2021-07-15

## 2021-06-23 RX ORDER — SODIUM CHLORIDE 9 MG/ML
1000 INJECTION, SOLUTION INTRAVENOUS CONTINUOUS PRN
Status: CANCELLED
Start: 2021-07-15

## 2021-06-23 RX ORDER — HEPARIN SODIUM,PORCINE 10 UNIT/ML
5 VIAL (ML) INTRAVENOUS
Status: CANCELLED | OUTPATIENT
Start: 2021-07-15

## 2021-06-23 RX ORDER — DIPHENHYDRAMINE HYDROCHLORIDE 50 MG/ML
50 INJECTION INTRAMUSCULAR; INTRAVENOUS
Status: CANCELLED
Start: 2021-07-15

## 2021-06-23 RX ORDER — METHYLPREDNISOLONE SODIUM SUCCINATE 125 MG/2ML
125 INJECTION, POWDER, LYOPHILIZED, FOR SOLUTION INTRAMUSCULAR; INTRAVENOUS
Status: CANCELLED
Start: 2021-07-15

## 2021-06-23 RX ORDER — LORAZEPAM 2 MG/ML
0.5 INJECTION INTRAMUSCULAR EVERY 4 HOURS PRN
Status: CANCELLED | OUTPATIENT
Start: 2021-07-15

## 2021-06-23 RX ORDER — EPINEPHRINE 1 MG/ML
0.3 INJECTION, SOLUTION INTRAMUSCULAR; SUBCUTANEOUS EVERY 5 MIN PRN
Status: CANCELLED | OUTPATIENT
Start: 2021-07-15

## 2021-06-23 RX ORDER — ALBUTEROL SULFATE 90 UG/1
1-2 AEROSOL, METERED RESPIRATORY (INHALATION)
Status: CANCELLED
Start: 2021-07-15

## 2021-06-23 RX ADMIN — SODIUM CHLORIDE 200 MG: 9 INJECTION, SOLUTION INTRAVENOUS at 09:33

## 2021-06-23 RX ADMIN — ZOLEDRONIC ACID 4 MG: 0.04 INJECTION, SOLUTION INTRAVENOUS at 09:14

## 2021-06-23 RX ADMIN — SODIUM CHLORIDE 250 ML: 9 INJECTION, SOLUTION INTRAVENOUS at 09:14

## 2021-06-23 RX ADMIN — Medication 5 ML: at 10:04

## 2021-06-23 ASSESSMENT — PAIN SCALES - GENERAL: PAINLEVEL: NO PAIN (0)

## 2021-06-23 NOTE — PROGRESS NOTES
Infusion Nursing Note:  Derrick Benitez presents today for C5D1 Keytruda, Zometa.    Patient seen by provider today: Yes: Dr. Larsen   present during visit today: Not Applicable.    Note: Ok to proceed with treatment today per Dr. Larsen. Patient confirmed he is not taking any oral steroids, and is taking calcium and vitamin D supplements as recommended.    Intravenous Access:  Implanted Port.    Treatment Conditions:  Lab Results   Component Value Date     06/22/2021                   Lab Results   Component Value Date    POTASSIUM 4.0 06/22/2021           Lab Results   Component Value Date    CR 0.86 06/22/2021                   Lab Results   Component Value Date    ANTHONY  Corrected calcium 8.4  9.2 06/22/2021 06/22/2021                Lab Results   Component Value Date    BILITOTAL 0.4 06/22/2021           Lab Results   Component Value Date    ALBUMIN 3.0 06/22/2021                    Lab Results   Component Value Date    ALT 18 06/22/2021           Lab Results   Component Value Date    AST  TSH 17  0.49 06/22/2021 06/22/2021     Results reviewed, labs MET treatment parameters, ok to proceed with treatment.      Post Infusion Assessment:  Patient tolerated infusion without incident.  Blood return noted pre and post infusion.  Site patent and intact, free from redness, edema or discomfort.  No evidence of extravasations.  Access discontinued per protocol.       Discharge Plan:   Discharge instructions reviewed with: Patient.  Patient and/or family verbalized understanding of discharge instructions and all questions answered.  AVS to patient via [employer].  Patient will return in 3 weeks for next appointment, schedulers to contact patient once appointment is made.   Patient discharged in stable condition accompanied by: self.  Departure Mode: Ambulatory.      Jenny Manzo RN

## 2021-06-23 NOTE — PROGRESS NOTES
"Oncology Rooming Note    June 23, 2021 8:39 AM   Derrick Benitez is a 80 year old male who presents for:    Chief Complaint   Patient presents with     Oncology Clinic Visit     Initial Vitals: BP (!) 147/69   Pulse 83   Temp 98.1  F (36.7  C) (Oral)   Resp 18   Wt 88 kg (194 lb)   SpO2 96%   BMI 30.38 kg/m   Estimated body mass index is 30.38 kg/m  as calculated from the following:    Height as of 6/2/21: 1.702 m (5' 7.01\").    Weight as of this encounter: 88 kg (194 lb). Body surface area is 2.04 meters squared.  No Pain (0) Comment: Data Unavailable   No LMP for male patient.  Allergies reviewed: Yes  Medications reviewed: Yes    Medications: Medication refills not needed today.  Pharmacy name entered into EPIC:    [place], [place] - [address]  [id number] [place] [address]    Clinical concerns: CBC was missed yesterday BK was notified.      Shari J. Schoenberger, [facility]            "

## 2021-06-23 NOTE — PATIENT INSTRUCTIONS
1. Continue pembrolizumab and zometa.  2. See De Frausto with next chemotherapy.  3. See me in 6 weeks.

## 2021-06-23 NOTE — LETTER
"    6/23/2021         RE: Derrick Benitez  [address]  [facility] [address]        Dear Colleague,    Thank you for referring your patient, Derrick Benitez, to the [facility]. Please see a copy of my visit note below.    Oncology Rooming Note    June 23, 2021 8:39 AM   Derrick Benitez is a 80 year old male who presents for:    Chief Complaint   Patient presents with     Oncology Clinic Visit     Initial Vitals: BP (!) 147/69   Pulse 83   Temp 98.1  F (36.7  C) (Oral)   Resp 18   Wt 88 kg (194 lb)   SpO2 96%   BMI 30.38 kg/m   Estimated body mass index is 30.38 kg/m  as calculated from the following:    Height as of 6/2/21: 1.702 m (5' 7.01\").    Weight as of this encounter: 88 kg (194 lb). Body surface area is 2.04 meters squared.  No Pain (0) Comment: Data Unavailable   No LMP for male patient.  Allergies reviewed: Yes  Medications reviewed: Yes    Medications: Medication refills not needed today.  Pharmacy name entered into EPIC:    [place], [place] - [address]  [facility] [id number] [place], [place] - [address] [place] [address]    Clinical concerns: CBC was missed yesterday BK was notified.      Shari J. Schoenberger, [facility]              ONCOLOGIC HISTORY:  Mr. Benitez is a gentleman with non-small cell lung cancer, favor squamous cell carcinoma.   -NGS and PDL staining could not be done because of small sample.   -Guardant 360 does not reveal any actionable alteration.  1.  CT chest angiogram on 09/07/2020 revealed right lower lobe mass.   2.  CT-guided biopsy was done on 09/18/2020.  Pathology revealed non-small cell lung cancer, favor squamous cell carcinoma.  Sample size was small.  PDL staining and NGS panel could not be done.   3.  Brain MRI on 09/26/2020 did not reveal any brain metastasis.   4.  PET scan on 10/05/2020 revealed hypermetabolic right lung mass and multiple hypermetabolic bone lesions.   5.  The patient started on carboplatin and Taxol " on 11/25/2020.   -Pembrolizumab added with cycle 2.     SUBJECTIVE:  Mr. Benitez is an 80-year-old gentleman with stage VI lung squamous cell carcinoma.  He is currently on pembrolizumab.  Disease overall is stable.     Overall, he is doing good.  He does have some neuropathy in the feet from previous Taxol. It is not getting worse.  Walking is fairly good.  He has not been falling down.     No headache.  No dizziness.  No chest pain.  No shortness of breath.  No nausea or vomiting.  Appetite is fairly good.  No urinary complaint.  No bowel problem.  No diarrhea. No fevers or chills.     All other review of system is negative.     PHYSICAL EXAMINATION:   GENERAL:  Alert and oriented x 3.   VITAL SIGNS:  Reviewed.  ECOG PS of 1.     EYES:  No icterus.   THROAT:  No ulcer. No thrush.   NECK:  Supple. No lymphadenopathy. No thyromegaly.   AXILLAE:  No lymphadenopathy.   LUNGS:  Good air entry bilaterally.  No crackles or wheezing.   HEART:  Regular.  No murmur.   GI: Abdomen is soft.  Nontender. No mass.   EXTREMITIES:  No pedal edema.  No calf swelling or tenderness.   SKIN:  No rash.      LABS:  CMP and TSH reviewed.     ASSESSMENT:    1.  An 80-year-old gentleman with metastatic lung squamous cell carcinoma on pembrolizumab.  Disease is Stable.  2.  Peripheral neuropathy, stable.  3.  Elevated blood pressure secondary to anxiety.     PLAN:    1.  Discussed regarding lung cancer.  The patient is clinically stable.  No clinical suspicion.  No progression of disease.  2.  He is on pembrolizumab.  He is tolerating it well.  He will continue on it.  Side effects reviewed.  3.  The patient has neuropathy.  It is from Taxol.  I told the patient that neuropathy may or may not improve.  It is not getting worse.  He will let us know if there is any change.  4.  Discussed regarding the scans.  I will plan on doing it in either August or September.  We will discuss it during next appointment.  5.  I will see him in 6 weeks.  In  between, he will see our nurse practitioner.    This office note has been dictated.          Again, thank you for allowing me to participate in the care of your patient.        Sincerely,        Buzz Larsen MD

## 2021-06-27 NOTE — PROGRESS NOTES
ONCOLOGIC HISTORY:  Mr. Benitez is a gentleman with non-small cell lung cancer, favor squamous cell carcinoma.   -NGS and PDL staining could not be done because of small sample.   -Guardant 360 does not reveal any actionable alteration.  1.  CT chest angiogram on 09/07/2020 revealed right lower lobe mass.   2.  CT-guided biopsy was done on 09/18/2020.  Pathology revealed non-small cell lung cancer, favor squamous cell carcinoma.  Sample size was small.  PDL staining and NGS panel could not be done.   3.  Brain MRI on 09/26/2020 did not reveal any brain metastasis.   4.  PET scan on 10/05/2020 revealed hypermetabolic right lung mass and multiple hypermetabolic bone lesions.   5.  The patient started on carboplatin and Taxol on 11/25/2020.   -Pembrolizumab added with cycle 2.     SUBJECTIVE:  Mr. Benitez is an 80-year-old gentleman with stage VI lung squamous cell carcinoma.  He is currently on pembrolizumab.  Disease overall is stable.     Overall, he is doing good.  He does have some neuropathy in the feet from previous Taxol. It is not getting worse.  Walking is fairly good.  He has not been falling down.     No headache.  No dizziness.  No chest pain.  No shortness of breath.  No nausea or vomiting.  Appetite is fairly good.  No urinary complaint.  No bowel problem.  No diarrhea. No fevers or chills.     All other review of system is negative.     PHYSICAL EXAMINATION:   GENERAL:  Alert and oriented x 3.   VITAL SIGNS:  Reviewed.  ECOG PS of 1.     EYES:  No icterus.   THROAT:  No ulcer. No thrush.   NECK:  Supple. No lymphadenopathy. No thyromegaly.   AXILLAE:  No lymphadenopathy.   LUNGS:  Good air entry bilaterally.  No crackles or wheezing.   HEART:  Regular.  No murmur.   GI: Abdomen is soft.  Nontender. No mass.   EXTREMITIES:  No pedal edema.  No calf swelling or tenderness.   SKIN:  No rash.      LABS:  CMP and TSH reviewed.     ASSESSMENT:    1.  An 80-year-old gentleman with metastatic lung squamous cell  carcinoma on pembrolizumab.  Disease is Stable.  2.  Peripheral neuropathy, stable.  3.  Elevated blood pressure secondary to anxiety.     PLAN:    1.  Discussed regarding lung cancer.  The patient is clinically stable.  No clinical suspicion.  No progression of disease.  2.  He is on pembrolizumab.  He is tolerating it well.  He will continue on it.  Side effects reviewed.  3.  The patient has neuropathy.  It is from Taxol.  I told the patient that neuropathy may or may not improve.  It is not getting worse.  He will let us know if there is any change.  4.  Discussed regarding the scans.  I will plan on doing it in either August or September.  We will discuss it during next appointment.  5.  I will see him in 6 weeks.  In between, he will see our nurse practitioner.

## 2021-07-09 ENCOUNTER — TELEPHONE (OUTPATIENT)
Dept: ONCOLOGY | Facility: CLINIC | Age: 81
End: 2021-07-09

## 2021-07-09 NOTE — TELEPHONE ENCOUNTER
----- Message from Yvonne Johnston RN sent at 7/9/2021 10:22 AM CDT -----  Regarding: Adjust visit on 8/12  Good morning,    This pt is scheduled to see Dr. Montes on 8/12 at 2:30, however that is a blocked time in her schedule. Please call pt and offer a different available rtn spot.    Thank you,    DADA BarclayN, RN  Palliative Care Nurse Clinician  [phone]

## 2021-07-12 ENCOUNTER — HOSPITAL ENCOUNTER (OUTPATIENT)
Facility: CLINIC | Age: 81
End: 2021-07-12
Payer: MEDICARE

## 2021-07-12 DIAGNOSIS — C34.31 MALIGNANT NEOPLASM OF LOWER LOBE OF RIGHT LUNG (H): Primary | ICD-10-CM

## 2021-07-13 ENCOUNTER — LAB (OUTPATIENT)
Dept: INFUSION THERAPY | Facility: CLINIC | Age: 81
End: 2021-07-13
Attending: NURSE PRACTITIONER
Payer: MEDICARE

## 2021-07-13 ENCOUNTER — HOSPITAL ENCOUNTER (OUTPATIENT)
Facility: CLINIC | Age: 81
Discharge: HOME | End: 2021-07-13
Attending: INTERNAL MEDICINE | Admitting: INTERNAL MEDICINE
Payer: MEDICARE

## 2021-07-13 DIAGNOSIS — C34.31 MALIGNANT NEOPLASM OF LOWER LOBE OF RIGHT LUNG (H): ICD-10-CM

## 2021-07-13 DIAGNOSIS — Z51.11 ENCOUNTER FOR ANTINEOPLASTIC CHEMOTHERAPY: Primary | ICD-10-CM

## 2021-07-13 LAB
ALBUMIN SERPL-MCNC: 3.3 G/DL (ref 3.4–5)
ALP SERPL-CCNC: 62 U/L (ref 40–150)
ALT SERPL W P-5'-P-CCNC: 17 U/L (ref 0–70)
ANION GAP SERPL CALCULATED.3IONS-SCNC: 5 MMOL/L (ref 3–14)
AST SERPL W P-5'-P-CCNC: 21 U/L (ref 0–45)
BASOPHILS # BLD AUTO: 0 10E3/UL (ref 0–0.2)
BASOPHILS NFR BLD AUTO: 0 %
BILIRUB SERPL-MCNC: 0.9 MG/DL (ref 0.2–1.3)
BUN SERPL-MCNC: 15 MG/DL (ref 7–30)
CALCIUM SERPL-MCNC: 8.4 MG/DL (ref 8.5–10.1)
CHLORIDE BLD-SCNC: 111 MMOL/L (ref 94–109)
CO2 SERPL-SCNC: 25 MMOL/L (ref 20–32)
CREAT SERPL-MCNC: 1 MG/DL (ref 0.66–1.25)
EOSINOPHIL # BLD AUTO: 0.1 10E3/UL (ref 0–0.7)
EOSINOPHIL NFR BLD AUTO: 2 %
ERYTHROCYTE [DISTWIDTH] IN BLOOD BY AUTOMATED COUNT: 13 % (ref 10–15)
GFR SERPL CREATININE-BSD FRML MDRD: 70 ML/MIN/1.73M2
GLUCOSE BLD-MCNC: 110 MG/DL (ref 70–99)
HCT VFR BLD AUTO: 38.6 % (ref 40–53)
HGB BLD-MCNC: 12.8 G/DL (ref 13.3–17.7)
IMM GRANULOCYTES # BLD: 0 10E3/UL
IMM GRANULOCYTES NFR BLD: 1 %
LYMPHOCYTES # BLD AUTO: 1.5 10E3/UL (ref 0.8–5.3)
LYMPHOCYTES NFR BLD AUTO: 27 %
MCH RBC QN AUTO: 30.6 PG (ref 26.5–33)
MCHC RBC AUTO-ENTMCNC: 33.2 G/DL (ref 31.5–36.5)
MCV RBC AUTO: 92 FL (ref 78–100)
MONOCYTES # BLD AUTO: 0.5 10E3/UL (ref 0–1.3)
MONOCYTES NFR BLD AUTO: 9 %
NEUTROPHILS # BLD AUTO: 3.4 10E3/UL (ref 1.6–8.3)
NEUTROPHILS NFR BLD AUTO: 61 %
NRBC # BLD AUTO: 0 10E3/UL
NRBC BLD AUTO-RTO: 0 /100
PLATELET # BLD AUTO: 153 10E3/UL (ref 150–450)
POTASSIUM BLD-SCNC: 4.3 MMOL/L (ref 3.4–5.3)
PROT SERPL-MCNC: 6.8 G/DL (ref 6.8–8.8)
RBC # BLD AUTO: 4.18 10E6/UL (ref 4.4–5.9)
SODIUM SERPL-SCNC: 141 MMOL/L (ref 133–144)
TSH SERPL DL<=0.005 MIU/L-ACNC: 0.56 MU/L (ref 0.4–4)
WBC # BLD AUTO: 5.6 10E3/UL (ref 4–11)

## 2021-07-13 PROCEDURE — 36591 DRAW BLOOD OFF VENOUS DEVICE: CPT

## 2021-07-13 PROCEDURE — 85025 COMPLETE CBC W/AUTO DIFF WBC: CPT | Performed by: INTERNAL MEDICINE

## 2021-07-13 PROCEDURE — 250N000011 HC RX IP 250 OP 636: Performed by: INTERNAL MEDICINE

## 2021-07-13 PROCEDURE — 80053 COMPREHEN METABOLIC PANEL: CPT | Performed by: INTERNAL MEDICINE

## 2021-07-13 PROCEDURE — 84443 ASSAY THYROID STIM HORMONE: CPT | Performed by: INTERNAL MEDICINE

## 2021-07-13 RX ORDER — HEPARIN SODIUM (PORCINE) LOCK FLUSH IV SOLN 100 UNIT/ML 100 UNIT/ML
5 SOLUTION INTRAVENOUS
Status: CANCELLED | OUTPATIENT
Start: 2021-07-13

## 2021-07-13 RX ORDER — HEPARIN SODIUM (PORCINE) LOCK FLUSH IV SOLN 100 UNIT/ML 100 UNIT/ML
5 SOLUTION INTRAVENOUS
Status: DISCONTINUED | OUTPATIENT
Start: 2021-07-13 | End: 2021-07-13 | Stop reason: HOSPADM

## 2021-07-13 RX ORDER — HEPARIN SODIUM,PORCINE 10 UNIT/ML
5 VIAL (ML) INTRAVENOUS
Status: CANCELLED | OUTPATIENT
Start: 2021-07-13

## 2021-07-13 RX ADMIN — Medication 5 ML: at 14:11

## 2021-07-15 ENCOUNTER — ONCOLOGY VISIT (OUTPATIENT)
Dept: ONCOLOGY | Facility: CLINIC | Age: 81
End: 2021-07-15
Attending: NURSE PRACTITIONER
Payer: MEDICARE

## 2021-07-15 ENCOUNTER — INFUSION THERAPY VISIT (OUTPATIENT)
Dept: INFUSION THERAPY | Facility: CLINIC | Age: 81
End: 2021-07-15
Attending: INTERNAL MEDICINE
Payer: MEDICARE

## 2021-07-15 VITALS
HEART RATE: 86 BPM | TEMPERATURE: 98.1 F | DIASTOLIC BLOOD PRESSURE: 60 MMHG | WEIGHT: 192.8 LBS | RESPIRATION RATE: 16 BRPM | OXYGEN SATURATION: 95 % | SYSTOLIC BLOOD PRESSURE: 121 MMHG | BODY MASS INDEX: 30.19 KG/M2

## 2021-07-15 VITALS
TEMPERATURE: 98.1 F | RESPIRATION RATE: 16 BRPM | DIASTOLIC BLOOD PRESSURE: 60 MMHG | HEART RATE: 86 BPM | WEIGHT: 192.9 LBS | OXYGEN SATURATION: 95 % | BODY MASS INDEX: 30.21 KG/M2 | SYSTOLIC BLOOD PRESSURE: 121 MMHG

## 2021-07-15 DIAGNOSIS — C34.31 MALIGNANT NEOPLASM OF LOWER LOBE OF RIGHT LUNG (H): ICD-10-CM

## 2021-07-15 DIAGNOSIS — C34.31 MALIGNANT NEOPLASM OF LOWER LOBE OF RIGHT LUNG (H): Primary | ICD-10-CM

## 2021-07-15 DIAGNOSIS — Z51.11 ENCOUNTER FOR ANTINEOPLASTIC CHEMOTHERAPY: Primary | ICD-10-CM

## 2021-07-15 PROCEDURE — 250N000011 HC RX IP 250 OP 636: Performed by: INTERNAL MEDICINE

## 2021-07-15 PROCEDURE — 96413 CHEMO IV INFUSION 1 HR: CPT

## 2021-07-15 PROCEDURE — G0463 HOSPITAL OUTPT CLINIC VISIT: HCPCS | Mod: 25

## 2021-07-15 PROCEDURE — 258N000003 HC RX IP 258 OP 636: Performed by: INTERNAL MEDICINE

## 2021-07-15 PROCEDURE — 99214 OFFICE O/P EST MOD 30 MIN: CPT | Performed by: NURSE PRACTITIONER

## 2021-07-15 RX ORDER — HEPARIN SODIUM (PORCINE) LOCK FLUSH IV SOLN 100 UNIT/ML 100 UNIT/ML
5 SOLUTION INTRAVENOUS EVERY 8 HOURS PRN
Status: DISCONTINUED | OUTPATIENT
Start: 2021-07-15 | End: 2021-07-15 | Stop reason: HOSPADM

## 2021-07-15 RX ADMIN — SODIUM CHLORIDE 200 MG: 9 INJECTION, SOLUTION INTRAVENOUS at 14:17

## 2021-07-15 RX ADMIN — Medication 5 ML: at 14:43

## 2021-07-15 RX ADMIN — SODIUM CHLORIDE 250 ML: 9 INJECTION, SOLUTION INTRAVENOUS at 14:17

## 2021-07-15 NOTE — PROGRESS NOTES
Infusion Nursing Note:  Derrick Benitez presents today for C6D1 Keytruda.    Patient seen by provider today: No   present during visit today: Not Applicable.    Note: N/A.  Patient did not meet criteria for an asymptomatic covid-19 PCR test in infusion today.     Intravenous Access:  Implanted Port.    Treatment Conditions:  Lab Results   Component Value Date    HGB 12.8 07/13/2021    HGB 12.9 06/01/2021     Lab Results   Component Value Date    WBC 5.6 07/13/2021    WBC 5.1 06/01/2021      Lab Results   Component Value Date    ANEU 3.4 06/01/2021     Lab Results   Component Value Date     07/13/2021     06/01/2021      Lab Results   Component Value Date     07/13/2021     06/22/2021                   Lab Results   Component Value Date    POTASSIUM 4.3 07/13/2021    POTASSIUM 4.0 06/22/2021           Lab Results   Component Value Date    MAG 2.1 10/30/2014            Lab Results   Component Value Date    CR 1.00 07/13/2021    CR 0.86 06/22/2021                   Lab Results   Component Value Date    ANTHONY 8.4 07/13/2021    ANTHONY 8.4 06/22/2021                Lab Results   Component Value Date    BILITOTAL 0.9 07/13/2021    BILITOTAL 0.4 06/22/2021           Lab Results   Component Value Date    ALBUMIN 3.3 07/13/2021    ALBUMIN 3.0 06/22/2021                    Lab Results   Component Value Date    ALT 17 07/13/2021    ALT 18 06/22/2021           Lab Results   Component Value Date    AST 21 07/13/2021    AST 17 06/22/2021       Results reviewed, labs MET treatment parameters, ok to proceed with treatment.      Post Infusion Assessment:  Patient tolerated infusion without incident.  Blood return noted pre and post infusion.  Site patent and intact, free from redness, edema or discomfort.  No evidence of extravasations.  Access discontinued per protocol.       Discharge Plan:   Patient declined prescription refills.  Discharge instructions reviewed with: Patient.  Patient and/or family  verbalized understanding of discharge instructions and all questions answered.  AVS to patient via [email or web address].  Patient will return 8/3 for next appointment.   Patient discharged in stable condition accompanied by: self.  Departure Mode: Ambulatory.      Fili Luz RN

## 2021-07-15 NOTE — LETTER
7/15/2021         RE: Derrick Benitez  [address]  [facility] [address]        Dear Colleague,    Thank you for referring your patient, Derrick Benitez, to the [facility]. Please see a copy of my visit note below.        Oncology/Hematology Visit Note  Jul 15, 2021    Reason for Visit:   Lung cancer  Metastatic lung squamous cell carcinoma    Completed carboplatin Taxol and pembrolizumab x 6 cycle on 03/10/2021  03/31-on maintenance with single agent with pembrolizumab  05/10/2021-CT chest abdomen pelvis reveals continued improvement in cancer    Interval History:  Patient reports he is well.  Reports he has been tolerating treatment well.  Reports no side effects from it  Denies fever chills sweats cough shortness of breath chest pain nausea vomiting diarrhea abdominal pain bleeding  Reports good energy and appetite      Review of Systems:  14 point ROS of systems including Constitutional, Eyes, Respiratory, Cardiovascular, Gastroenterology, Genitourinary, Integumentary, Muscularskeletal, Psychiatric were all negative except for pertinent positives noted in my HPI.        Physical Examination:  Physical Exam  Eyes:      Pupils: Pupils are equal, round, and reactive to light.   Cardiovascular:      Rate and Rhythm: Normal rate.      Pulses: Normal pulses.   Pulmonary:      Effort: Pulmonary effort is normal.   Abdominal:      General: Abdomen is flat.   Musculoskeletal:      Cervical back: Normal range of motion.   Skin:     General: Skin is warm.   Neurological:      General: No focal deficit present.      Mental Status: He is alert.   Psychiatric:         Mood and Affect: Mood normal.         Laboratory Data:  CBC CMP and TSH results reviewed    Assessment and Plan:  This is a 81-year-old male with    Lung cancer  -Metastatic lung squamous cell carcinoma   status post carboplatinTaxol and pembrolizumab-completed 6 cycles in March 2021  -Currently on maintenance with immunotherapy  pembrolizumab  05/2021-CT scan chest abdomen pelvis reveals improvement in cancer  -CBC CMP and TSH results reviewed with patient.  Abnormalities discussed  Okay to proceed with pembrolizumab  Patient has follow-up appointment with Dr. Larsen with next cycle      Bone mets  -Patient currently on Zometa every 4 to 6 weeks  Last given on June 23  -Continue with vitamin D and calcium  -Schedule for Zometa on August 4  Call our clinic with any jaw pain or dental issues    Anemia  Patient is asymptomatic  Stable hemoglobin patient denies bleeding  Continue to monitor        Patient advised to call our clinic in the event of fever chills sweats cough shortness of breath chest pain nausea vomiting diarrhea abdominal pain bleeding or any changes in health condition    DARSHANA Meza CNP  [facility]     Chart documentation with [employer] recognition [employer]. Although reviewed after completion, some words and grammatical errors may remain.            Again, thank you for allowing me to participate in the care of your patient.        Sincerely,        DARSHANA Meza CNP

## 2021-07-15 NOTE — PROGRESS NOTES
Oncology/Hematology Visit Note  Jul 15, 2021    Reason for Visit:   Lung cancer  Metastatic lung squamous cell carcinoma    Completed carboplatin Taxol and pembrolizumab x 6 cycle on 03/10/2021  03/31-on maintenance with single agent with pembrolizumab  05/10/2021-CT chest abdomen pelvis reveals continued improvement in cancer    Interval History:  Patient reports he is well.  Reports he has been tolerating treatment well.  Reports no side effects from it  Denies fever chills sweats cough shortness of breath chest pain nausea vomiting diarrhea abdominal pain bleeding  Reports good energy and appetite      Review of Systems:  14 point ROS of systems including Constitutional, Eyes, Respiratory, Cardiovascular, Gastroenterology, Genitourinary, Integumentary, Muscularskeletal, Psychiatric were all negative except for pertinent positives noted in my HPI.        Physical Examination:  Physical Exam  Eyes:      Pupils: Pupils are equal, round, and reactive to light.   Cardiovascular:      Rate and Rhythm: Normal rate.      Pulses: Normal pulses.   Pulmonary:      Effort: Pulmonary effort is normal.   Abdominal:      General: Abdomen is flat.   Musculoskeletal:      Cervical back: Normal range of motion.   Skin:     General: Skin is warm.   Neurological:      General: No focal deficit present.      Mental Status: He is alert.   Psychiatric:         Mood and Affect: Mood normal.         Laboratory Data:  CBC CMP and TSH results reviewed    Assessment and Plan:  This is a 81-year-old male with    Lung cancer  -Metastatic lung squamous cell carcinoma   status post carboplatinTaxol and pembrolizumab-completed 6 cycles in March 2021  -Currently on maintenance with immunotherapy pembrolizumab  05/2021-CT scan chest abdomen pelvis reveals improvement in cancer  -CBC CMP and TSH results reviewed with patient.  Abnormalities discussed  Okay to proceed with pembrolizumab  Patient has follow-up appointment with Dr. Larsen with next  cycle      Bone mets  -Patient currently on Zometa every 4 to 6 weeks  Last given on June 23  -Continue with vitamin D and calcium  -Schedule for Zometa on August 4  Call our clinic with any jaw pain or dental issues    Anemia  Patient is asymptomatic  Stable hemoglobin patient denies bleeding  Continue to monitor        Patient advised to call our clinic in the event of fever chills sweats cough shortness of breath chest pain nausea vomiting diarrhea abdominal pain bleeding or any changes in health condition    DARSHANA Meza [facility]     Chart documentation with [employer] recognition [employer]. Although reviewed after completion, some words and grammatical errors may remain.

## 2021-07-22 ENCOUNTER — TELEPHONE (OUTPATIENT)
Dept: ONCOLOGY | Facility: CLINIC | Age: 81
End: 2021-07-22

## 2021-07-22 NOTE — TELEPHONE ENCOUNTER
----- Message from Buzz Larsen MD sent at 7/9/2021  5:33 PM CDT -----  De,   Can you, please, order CT scan for august. You see him next week.    bk  ----- Message -----  From: Bry Cassie H  Sent: 7/9/2021   4:46 PM CDT  To: Liz Flores RN, Buzz Larsen MD    HI DR Larsen,     Patient is asking about CT Scan he thought should be scheduled in August?  Please advise and place orders if needed.     Thank you  Cassie

## 2021-07-22 NOTE — PROGRESS NOTES
Left voicemail message for patient requesting a return call regarding scheduling a CT Scan for August.

## 2021-07-23 ENCOUNTER — HOSPITAL ENCOUNTER (OUTPATIENT)
Facility: CLINIC | Age: 81
End: 2021-07-23
Payer: MEDICARE

## 2021-08-02 ENCOUNTER — HOSPITAL ENCOUNTER (OUTPATIENT)
Facility: CLINIC | Age: 81
Discharge: HOME | End: 2021-08-02
Attending: INTERNAL MEDICINE | Admitting: INTERNAL MEDICINE
Payer: MEDICARE

## 2021-08-02 ENCOUNTER — LAB (OUTPATIENT)
Dept: INFUSION THERAPY | Facility: CLINIC | Age: 81
End: 2021-08-02
Attending: INTERNAL MEDICINE
Payer: MEDICARE

## 2021-08-02 DIAGNOSIS — Z51.11 ENCOUNTER FOR ANTINEOPLASTIC CHEMOTHERAPY: Primary | ICD-10-CM

## 2021-08-02 DIAGNOSIS — C34.31 MALIGNANT NEOPLASM OF LOWER LOBE OF RIGHT LUNG (H): ICD-10-CM

## 2021-08-02 LAB
ALBUMIN SERPL-MCNC: 3.4 G/DL (ref 3.4–5)
ALP SERPL-CCNC: 65 U/L (ref 40–150)
ALT SERPL W P-5'-P-CCNC: 21 U/L (ref 0–70)
ANION GAP SERPL CALCULATED.3IONS-SCNC: 1 MMOL/L (ref 3–14)
AST SERPL W P-5'-P-CCNC: 18 U/L (ref 0–45)
BILIRUB SERPL-MCNC: 0.5 MG/DL (ref 0.2–1.3)
BUN SERPL-MCNC: 17 MG/DL (ref 7–30)
CALCIUM SERPL-MCNC: 8.2 MG/DL (ref 8.5–10.1)
CHLORIDE BLD-SCNC: 110 MMOL/L (ref 94–109)
CO2 SERPL-SCNC: 27 MMOL/L (ref 20–32)
CREAT SERPL-MCNC: 1.02 MG/DL (ref 0.66–1.25)
GFR SERPL CREATININE-BSD FRML MDRD: 68 ML/MIN/1.73M2
GLUCOSE BLD-MCNC: 128 MG/DL (ref 70–99)
POTASSIUM BLD-SCNC: 4.4 MMOL/L (ref 3.4–5.3)
PROT SERPL-MCNC: 6.8 G/DL (ref 6.8–8.8)
SODIUM SERPL-SCNC: 138 MMOL/L (ref 133–144)
TSH SERPL DL<=0.005 MIU/L-ACNC: 0.57 MU/L (ref 0.4–4)

## 2021-08-02 PROCEDURE — 250N000011 HC RX IP 250 OP 636: Performed by: INTERNAL MEDICINE

## 2021-08-02 PROCEDURE — 36415 COLL VENOUS BLD VENIPUNCTURE: CPT | Performed by: INTERNAL MEDICINE

## 2021-08-02 PROCEDURE — 36591 DRAW BLOOD OFF VENOUS DEVICE: CPT | Performed by: INTERNAL MEDICINE

## 2021-08-02 PROCEDURE — 84443 ASSAY THYROID STIM HORMONE: CPT | Performed by: INTERNAL MEDICINE

## 2021-08-02 PROCEDURE — 82040 ASSAY OF SERUM ALBUMIN: CPT | Performed by: INTERNAL MEDICINE

## 2021-08-02 RX ORDER — HEPARIN SODIUM (PORCINE) LOCK FLUSH IV SOLN 100 UNIT/ML 100 UNIT/ML
5 SOLUTION INTRAVENOUS
Status: DISCONTINUED | OUTPATIENT
Start: 2021-08-02 | End: 2021-08-02 | Stop reason: HOSPADM

## 2021-08-02 RX ORDER — HEPARIN SODIUM,PORCINE 10 UNIT/ML
5 VIAL (ML) INTRAVENOUS
Status: CANCELLED | OUTPATIENT
Start: 2021-08-02

## 2021-08-02 RX ORDER — HEPARIN SODIUM (PORCINE) LOCK FLUSH IV SOLN 100 UNIT/ML 100 UNIT/ML
5 SOLUTION INTRAVENOUS
Status: CANCELLED | OUTPATIENT
Start: 2021-08-02

## 2021-08-02 RX ADMIN — Medication 5 ML: at 12:58

## 2021-08-02 NOTE — PROGRESS NOTES
Nursing Note:  Derrick SALAS Emmanuel presents today for Port labs.    Patient seen by provider today: No   present during visit today: Not Applicable.    Note: N/A.    Intravenous Access:  Implanted Port.    Discharge Plan:   Patient was sent to home for 8/3/21 appointment.    Alma Delia Leon RN

## 2021-08-03 ENCOUNTER — ONCOLOGY VISIT (OUTPATIENT)
Dept: ONCOLOGY | Facility: CLINIC | Age: 81
End: 2021-08-03
Attending: INTERNAL MEDICINE
Payer: MEDICARE

## 2021-08-03 VITALS
RESPIRATION RATE: 16 BRPM | TEMPERATURE: 98 F | OXYGEN SATURATION: 96 % | BODY MASS INDEX: 30.51 KG/M2 | DIASTOLIC BLOOD PRESSURE: 72 MMHG | HEART RATE: 80 BPM | WEIGHT: 194.4 LBS | HEIGHT: 67 IN | SYSTOLIC BLOOD PRESSURE: 148 MMHG

## 2021-08-03 DIAGNOSIS — C34.31 MALIGNANT NEOPLASM OF LOWER LOBE OF RIGHT LUNG (H): Primary | ICD-10-CM

## 2021-08-03 DIAGNOSIS — C79.51 BONE METASTASES: ICD-10-CM

## 2021-08-03 DIAGNOSIS — Z51.11 ENCOUNTER FOR ANTINEOPLASTIC CHEMOTHERAPY: ICD-10-CM

## 2021-08-03 PROCEDURE — 99215 OFFICE O/P EST HI 40 MIN: CPT | Performed by: INTERNAL MEDICINE

## 2021-08-03 PROCEDURE — G0463 HOSPITAL OUTPT CLINIC VISIT: HCPCS

## 2021-08-03 RX ORDER — HEPARIN SODIUM (PORCINE) LOCK FLUSH IV SOLN 100 UNIT/ML 100 UNIT/ML
5 SOLUTION INTRAVENOUS EVERY 8 HOURS PRN
Status: CANCELLED | OUTPATIENT
Start: 2021-08-05

## 2021-08-03 RX ORDER — MEPERIDINE HYDROCHLORIDE 25 MG/ML
25 INJECTION INTRAMUSCULAR; INTRAVENOUS; SUBCUTANEOUS EVERY 30 MIN PRN
Status: CANCELLED | OUTPATIENT
Start: 2021-08-05

## 2021-08-03 RX ORDER — HEPARIN SODIUM,PORCINE 10 UNIT/ML
5 VIAL (ML) INTRAVENOUS
Status: CANCELLED | OUTPATIENT
Start: 2021-08-05

## 2021-08-03 RX ORDER — EPINEPHRINE 1 MG/ML
0.3 INJECTION, SOLUTION INTRAMUSCULAR; SUBCUTANEOUS EVERY 5 MIN PRN
Status: CANCELLED | OUTPATIENT
Start: 2021-08-05

## 2021-08-03 RX ORDER — DIPHENHYDRAMINE HYDROCHLORIDE 50 MG/ML
50 INJECTION INTRAMUSCULAR; INTRAVENOUS
Status: CANCELLED
Start: 2021-08-05

## 2021-08-03 RX ORDER — ALBUTEROL SULFATE 90 UG/1
1-2 AEROSOL, METERED RESPIRATORY (INHALATION)
Status: CANCELLED
Start: 2021-08-05

## 2021-08-03 RX ORDER — SODIUM CHLORIDE 9 MG/ML
1000 INJECTION, SOLUTION INTRAVENOUS CONTINUOUS PRN
Status: CANCELLED
Start: 2021-08-05

## 2021-08-03 RX ORDER — LORAZEPAM 2 MG/ML
0.5 INJECTION INTRAMUSCULAR EVERY 4 HOURS PRN
Status: CANCELLED | OUTPATIENT
Start: 2021-08-05

## 2021-08-03 RX ORDER — METHYLPREDNISOLONE SODIUM SUCCINATE 125 MG/2ML
125 INJECTION, POWDER, LYOPHILIZED, FOR SOLUTION INTRAMUSCULAR; INTRAVENOUS
Status: CANCELLED
Start: 2021-08-05

## 2021-08-03 RX ORDER — ALBUTEROL SULFATE 0.83 MG/ML
2.5 SOLUTION RESPIRATORY (INHALATION)
Status: CANCELLED | OUTPATIENT
Start: 2021-08-05

## 2021-08-03 RX ORDER — NALOXONE HYDROCHLORIDE 0.4 MG/ML
.1-.4 INJECTION, SOLUTION INTRAMUSCULAR; INTRAVENOUS; SUBCUTANEOUS
Status: CANCELLED | OUTPATIENT
Start: 2021-08-05

## 2021-08-03 ASSESSMENT — PAIN SCALES - GENERAL: PAINLEVEL: NO PAIN (0)

## 2021-08-03 ASSESSMENT — MIFFLIN-ST. JEOR: SCORE: 1540.42

## 2021-08-03 NOTE — PROGRESS NOTES
"  Oncology Rooming Note    August 3, 2021 9:00 AM   Derrick Benitez is a 81 year old male who presents for:    Chief Complaint   Patient presents with     Oncology Clinic Visit     Malignant neoplasm of lower lobe of right lung (H)     Initial Vitals: BP (!) 148/72   Pulse 80   Temp 98  F (36.7  C) (Oral)   Resp 16   Ht 1.702 m (5' 7\")   Wt 88.2 kg (194 lb 6.4 oz)   SpO2 96%   BMI 30.45 kg/m   Estimated body mass index is 30.45 kg/m  as calculated from the following:    Height as of this encounter: 1.702 m (5' 7\").    Weight as of this encounter: 88.2 kg (194 lb 6.4 oz). Body surface area is 2.04 meters squared.  No Pain (0) Comment: Data Unavailable   No LMP for male patient.  Allergies reviewed: Yes  Medications reviewed: Yes    Medications: Medication refills not needed today.  Pharmacy name entered into EPIC:    [place], [place] - [address]  [facility] [address] [place], [place] - [address] [place] [address]    Clinical concerns: None       Angela Bazzi MA            "

## 2021-08-03 NOTE — LETTER
"    8/3/2021         RE: Derrick Benitez  [address]  [facility] [address]        Dear Colleague,    Thank you for referring your patient, Derrick Benitez, to the [facility] CANCER [facility]. Please see a copy of my visit note below.      Oncology Rooming Note    August 3, 2021 9:00 AM   Derrick Benitez is a 81 year old male who presents for:    Chief Complaint   Patient presents with     Oncology Clinic Visit     Malignant neoplasm of lower lobe of right lung (H)     Initial Vitals: BP (!) 148/72   Pulse 80   Temp 98  F (36.7  C) (Oral)   Resp 16   Ht 1.702 m (5' 7\")   Wt 88.2 kg (194 lb 6.4 oz)   SpO2 96%   BMI 30.45 kg/m   Estimated body mass index is 30.45 kg/m  as calculated from the following:    Height as of this encounter: 1.702 m (5' 7\").    Weight as of this encounter: 88.2 kg (194 lb 6.4 oz). Body surface area is 2.04 meters squared.  No Pain (0) Comment: Data Unavailable   No LMP for male patient.  Allergies reviewed: Yes  Medications reviewed: Yes    Medications: Medication refills not needed today.  Pharmacy name entered into EPIC:    [place], [place] - [address] [facility] [address] [place], [place] - [address]    Clinical concerns: None       Angela Bazzi MA              ONCOLOGIC HISTORY:  Mr. Benitez is a gentleman with non-small cell lung cancer, favor squamous cell carcinoma.   -NGS and PDL staining could not be done because of small sample.   -Guardant 360 does not reveal any actionable alteration.  1.  CT chest angiogram on 09/07/2020 revealed right lower lobe mass.   2.  CT-guided biopsy was done on 09/18/2020.  Pathology revealed non-small cell lung cancer, favor squamous cell carcinoma.  Sample size was small.  PDL staining and NGS panel could not be done.   3.  Brain MRI on 09/26/2020 did not reveal any brain metastasis.   4.  PET scan on 10/05/2020 revealed hypermetabolic right lung mass and multiple hypermetabolic bone " lesions.   5.  The patient started on carboplatin and Taxol on 11/25/2020.   -Pembrolizumab added with cycle 2.     SUBJECTIVE:  Mr. Benitez is an 81-year-old gentleman with metastatic lung adenocarcinoma on pembrolizumab.  The patient tolerating pembrolizumab well.  He has some fatigue, which would go along with his age.  Some insomnia.  He has peripheral neuropathy from previous Taxol. Neuropathy has been getting better.     No headache.  Occasional dizziness.  No chest pain.  No shortness of breath.  No abdominal pain.  No nausea or vomiting.  No urinary or bowel complaints.  No diarrhea.      All other review of systems is negative.     PHYSICAL EXAMINATION:   GENERAL:  Alert and oriented x 3.   VITAL SIGNS:  Reviewed.  ECOG PS of 1.     EYES:  No icterus.   THROAT:  No ulcer. No thrush.   NECK:  Supple. No lymphadenopathy. No thyromegaly.   AXILLAE:  No lymphadenopathy.   LUNGS:  Good air entry bilaterally.  No crackles or wheezing.   HEART:  Regular.  No murmur.   GI: Abdomen is soft.  Nontender. No mass.   EXTREMITIES:  No pedal edema.  No calf swelling or tenderness.   SKIN:  No rash.      LABORATORY:  CMP and TSH reviewed.     ASSESSMENT:     1.  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  2.  Peripheral neuropathy, improving.  3.  Elevated blood pressure.  4.  Elevated blood sugar.(nonfasting sample).     PLAN:     1.  The patient clinically is doing well from lung cancer.  No clinical suspicion of progression.     He is on pembrolizumab.  He will continue on it.  He is tolerating it well.  Side effects reviewed.  He has been getting pembrolizumab every 3 weeks.  If next scan does not reveal any progression of disease, we will change the pembrolizumab to every 6 weeks.  If it reveals progression of disease, then treatment will be changed.  He is agreeable for this plan.     2.  Discussed regarding scans.  We will get CT chest, abdomen and pelvis in about 2-3 weeks'  time.     3.  For bone metastases, he gets Zometa.  He has been tolerating it well.  No dental or jaw-related symptoms.  He will continue on Zometa every 6 weeks.     4.  Labs were reviewed.  I explained to him there are minor abnormalities.  Labs are good for pembrolizumab.     5.  I will see him in 6-9 weeks.  In between, he will see our nurse practitioner.  I advised the patient to see a physician if he has diarrhea, skin rash, worsening fatigue, chest pain, shortness of breath, or any other concerns.    This office note has been dictated.          Again, thank you for allowing me to participate in the care of your patient.        Sincerely,        Buzz Larsen MD

## 2021-08-03 NOTE — PATIENT INSTRUCTIONS
1. Continue pembrolizumab and zometa.  2. CT scan in 2-3 weeks.  3. See De Frausto with next chemotherapy.  4. See me in 6 to 9 weeks.    Please call to schedule

## 2021-08-04 ENCOUNTER — INFUSION THERAPY VISIT (OUTPATIENT)
Dept: INFUSION THERAPY | Facility: CLINIC | Age: 81
End: 2021-08-04
Attending: INTERNAL MEDICINE
Payer: MEDICARE

## 2021-08-04 VITALS
WEIGHT: 194.4 LBS | RESPIRATION RATE: 18 BRPM | SYSTOLIC BLOOD PRESSURE: 142 MMHG | HEART RATE: 76 BPM | HEIGHT: 67 IN | DIASTOLIC BLOOD PRESSURE: 71 MMHG | TEMPERATURE: 98.3 F | BODY MASS INDEX: 30.51 KG/M2

## 2021-08-04 DIAGNOSIS — C79.51 BONE METASTASES: Primary | ICD-10-CM

## 2021-08-04 DIAGNOSIS — C34.31 MALIGNANT NEOPLASM OF LOWER LOBE OF RIGHT LUNG (H): ICD-10-CM

## 2021-08-04 DIAGNOSIS — Z51.11 ENCOUNTER FOR ANTINEOPLASTIC CHEMOTHERAPY: ICD-10-CM

## 2021-08-04 PROCEDURE — 96413 CHEMO IV INFUSION 1 HR: CPT

## 2021-08-04 PROCEDURE — 250N000011 HC RX IP 250 OP 636: Performed by: INTERNAL MEDICINE

## 2021-08-04 PROCEDURE — 96367 TX/PROPH/DG ADDL SEQ IV INF: CPT

## 2021-08-04 PROCEDURE — 258N000003 HC RX IP 258 OP 636: Performed by: INTERNAL MEDICINE

## 2021-08-04 RX ORDER — HEPARIN SODIUM (PORCINE) LOCK FLUSH IV SOLN 100 UNIT/ML 100 UNIT/ML
5 SOLUTION INTRAVENOUS EVERY 8 HOURS PRN
Status: DISCONTINUED | OUTPATIENT
Start: 2021-08-04 | End: 2021-08-04 | Stop reason: HOSPADM

## 2021-08-04 RX ORDER — HEPARIN SODIUM,PORCINE 10 UNIT/ML
5 VIAL (ML) INTRAVENOUS
Status: DISCONTINUED | OUTPATIENT
Start: 2021-08-04 | End: 2021-08-04 | Stop reason: HOSPADM

## 2021-08-04 RX ORDER — ZOLEDRONIC ACID 0.04 MG/ML
4 INJECTION, SOLUTION INTRAVENOUS ONCE
Status: COMPLETED | OUTPATIENT
Start: 2021-08-04 | End: 2021-08-04

## 2021-08-04 RX ADMIN — SODIUM CHLORIDE 200 MG: 9 INJECTION, SOLUTION INTRAVENOUS at 13:29

## 2021-08-04 RX ADMIN — SODIUM CHLORIDE 250 ML: 9 INJECTION, SOLUTION INTRAVENOUS at 13:07

## 2021-08-04 RX ADMIN — Medication 5 ML: at 14:05

## 2021-08-04 RX ADMIN — ZOLEDRONIC ACID 4 MG: 0.04 INJECTION, SOLUTION INTRAVENOUS at 13:07

## 2021-08-04 ASSESSMENT — MIFFLIN-ST. JEOR: SCORE: 1540.54

## 2021-08-04 NOTE — PROGRESS NOTES
Infusion Nursing Note:  Derrick Benitez presents today for keytruda/zometa.    Patient seen by provider: Yes: Chava   present during visit today: Not Applicable.    Note: N/A.      Intravenous Access:  Implanted Port.    Treatment Conditions:  Lab Results   Component Value Date     08/02/2021     06/22/2021                   Lab Results   Component Value Date    POTASSIUM 4.4 08/02/2021    POTASSIUM 4.0 06/22/2021           Lab Results   Component Value Date    MAG 2.1 10/30/2014            Lab Results   Component Value Date    CR 1.02 08/02/2021    CR 0.86 06/22/2021                   Lab Results   Component Value Date    ANTHONY 8.2 08/02/2021    ANTHONY 8.4 06/22/2021                Lab Results   Component Value Date    BILITOTAL 0.5 08/02/2021    BILITOTAL 0.4 06/22/2021           Lab Results   Component Value Date    ALBUMIN 3.4 08/02/2021    ALBUMIN 3.0 06/22/2021                    Lab Results   Component Value Date    ALT 21 08/02/2021    ALT 18 06/22/2021           Lab Results   Component Value Date    AST 18 08/02/2021    AST 17 06/22/2021       Results reviewed, labs MET treatment parameters, ok to proceed with treatment.      Post Infusion Assessment:  Patient tolerated infusion without incident.  Blood return noted pre and post infusion.  Site patent and intact, free from redness, edema or discomfort.  No evidence of extravasations.  Access discontinued per protocol.       Discharge Plan:   Discharge instructions reviewed with: Patient.  Patient and/or family verbalized understanding of discharge instructions and all questions answered.  Patient discharged in stable condition accompanied by: self.  Departure Mode: Ambulatory.      Archana Hall RN

## 2021-08-08 NOTE — PROGRESS NOTES
ONCOLOGIC HISTORY:  Mr. Benitez is a gentleman with non-small cell lung cancer, favor squamous cell carcinoma.   -NGS and PDL staining could not be done because of small sample.   -Guardant 360 does not reveal any actionable alteration.  1.  CT chest angiogram on 09/07/2020 revealed right lower lobe mass.   2.  CT-guided biopsy was done on 09/18/2020.  Pathology revealed non-small cell lung cancer, favor squamous cell carcinoma.  Sample size was small.  PDL staining and NGS panel could not be done.   3.  Brain MRI on 09/26/2020 did not reveal any brain metastasis.   4.  PET scan on 10/05/2020 revealed hypermetabolic right lung mass and multiple hypermetabolic bone lesions.   5.  The patient started on carboplatin and Taxol on 11/25/2020.   -Pembrolizumab added with cycle 2.     SUBJECTIVE:  Mr. Benitez is an 81-year-old gentleman with metastatic lung adenocarcinoma on pembrolizumab.  The patient tolerating pembrolizumab well.  He has some fatigue, which would go along with his age.  Some insomnia.  He has peripheral neuropathy from previous Taxol. Neuropathy has been getting better.     No headache.  Occasional dizziness.  No chest pain.  No shortness of breath.  No abdominal pain.  No nausea or vomiting.  No urinary or bowel complaints.  No diarrhea.      All other review of systems is negative.     PHYSICAL EXAMINATION:   GENERAL:  Alert and oriented x 3.   VITAL SIGNS:  Reviewed.  ECOG PS of 1.     EYES:  No icterus.   THROAT:  No ulcer. No thrush.   NECK:  Supple. No lymphadenopathy. No thyromegaly.   AXILLAE:  No lymphadenopathy.   LUNGS:  Good air entry bilaterally.  No crackles or wheezing.   HEART:  Regular.  No murmur.   GI: Abdomen is soft.  Nontender. No mass.   EXTREMITIES:  No pedal edema.  No calf swelling or tenderness.   SKIN:  No rash.      LABORATORY:  CMP and TSH reviewed.     ASSESSMENT:     1.  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  2.  Peripheral  neuropathy, improving.  3.  Elevated blood pressure.  4.  Elevated blood sugar.(nonfasting sample).     PLAN:     1.  The patient clinically is doing well from lung cancer.  No clinical suspicion of progression.     He is on pembrolizumab.  He will continue on it.  He is tolerating it well.  Side effects reviewed.  He has been getting pembrolizumab every 3 weeks.  If next scan does not reveal any progression of disease, we will change the pembrolizumab to every 6 weeks.  If it reveals progression of disease, then treatment will be changed.  He is agreeable for this plan.     2.  Discussed regarding scans.  We will get CT chest, abdomen and pelvis in about 2-3 weeks' time.     3.  For bone metastases, he gets Zometa.  He has been tolerating it well.  No dental or jaw-related symptoms.  He will continue on Zometa every 6 weeks.     4.  Labs were reviewed.  I explained to him there are minor abnormalities.  Labs are good for pembrolizumab.     5.  I will see him in 6-9 weeks.  In between, he will see our nurse practitioner.  I advised the patient to see a physician if he has diarrhea, skin rash, worsening fatigue, chest pain, shortness of breath, or any other concerns.

## 2021-08-18 ENCOUNTER — HOSPITAL ENCOUNTER (OUTPATIENT)
Dept: CT IMAGING | Facility: CLINIC | Age: 81
End: 2021-08-18
Attending: INTERNAL MEDICINE | Admitting: INTERNAL MEDICINE
Payer: MEDICARE

## 2021-08-18 ENCOUNTER — HOSPITAL ENCOUNTER (OUTPATIENT)
Facility: CLINIC | Age: 81
Discharge: HOME | End: 2021-08-18
Attending: INTERNAL MEDICINE | Admitting: INTERNAL MEDICINE
Payer: MEDICARE

## 2021-08-18 DIAGNOSIS — C34.31 MALIGNANT NEOPLASM OF LOWER LOBE OF RIGHT LUNG (H): ICD-10-CM

## 2021-08-18 DIAGNOSIS — C34.31 MALIGNANT NEOPLASM OF LOWER LOBE OF RIGHT LUNG (H): Primary | ICD-10-CM

## 2021-08-18 PROCEDURE — 999N000154 HC STATISTIC RADIOLOGY XRAY, US, CT, MAR, NM

## 2021-08-18 PROCEDURE — 74177 CT ABD & PELVIS W/CONTRAST: CPT | Mod: ME

## 2021-08-18 PROCEDURE — 250N000009 HC RX 250: Performed by: INTERNAL MEDICINE

## 2021-08-18 PROCEDURE — 250N000011 HC RX IP 250 OP 636: Performed by: INTERNAL MEDICINE

## 2021-08-18 RX ORDER — IOPAMIDOL 755 MG/ML
95 INJECTION, SOLUTION INTRAVASCULAR ONCE
Status: COMPLETED | OUTPATIENT
Start: 2021-08-18 | End: 2021-08-18

## 2021-08-18 RX ORDER — HEPARIN SODIUM,PORCINE 10 UNIT/ML
5 VIAL (ML) INTRAVENOUS
Status: DISCONTINUED | OUTPATIENT
Start: 2021-08-18 | End: 2021-08-18 | Stop reason: HOSPADM

## 2021-08-18 RX ORDER — HEPARIN SODIUM (PORCINE) LOCK FLUSH IV SOLN 100 UNIT/ML 100 UNIT/ML
5 SOLUTION INTRAVENOUS
Status: CANCELLED | OUTPATIENT
Start: 2021-08-18

## 2021-08-18 RX ORDER — HEPARIN SODIUM,PORCINE 10 UNIT/ML
5 VIAL (ML) INTRAVENOUS
Status: CANCELLED | OUTPATIENT
Start: 2021-08-18

## 2021-08-18 RX ORDER — HEPARIN SODIUM (PORCINE) LOCK FLUSH IV SOLN 100 UNIT/ML 100 UNIT/ML
5 SOLUTION INTRAVENOUS
Status: DISCONTINUED | OUTPATIENT
Start: 2021-08-18 | End: 2021-08-18 | Stop reason: HOSPADM

## 2021-08-18 RX ADMIN — SODIUM CHLORIDE 67 ML: 9 INJECTION, SOLUTION INTRAVENOUS at 12:35

## 2021-08-18 RX ADMIN — HEPARIN SODIUM (PORCINE) LOCK FLUSH IV SOLN 100 UNIT/ML 5 ML: 100 SOLUTION at 12:41

## 2021-08-18 RX ADMIN — IOPAMIDOL 95 ML: 755 INJECTION, SOLUTION INTRAVENOUS at 12:35

## 2021-08-19 ENCOUNTER — VIRTUAL VISIT (OUTPATIENT)
Dept: ONCOLOGY | Facility: CLINIC | Age: 81
End: 2021-08-19
Attending: STUDENT IN AN ORGANIZED HEALTH CARE EDUCATION/TRAINING PROGRAM
Payer: MEDICARE

## 2021-08-19 DIAGNOSIS — Z51.5 ENCOUNTER FOR PALLIATIVE CARE: ICD-10-CM

## 2021-08-19 DIAGNOSIS — Z71.89 ADVANCED CARE PLANNING/COUNSELING DISCUSSION: ICD-10-CM

## 2021-08-19 DIAGNOSIS — T45.1X5A PERIPHERAL NEUROPATHY DUE TO CHEMOTHERAPY (H): Primary | ICD-10-CM

## 2021-08-19 DIAGNOSIS — G62.0 PERIPHERAL NEUROPATHY DUE TO CHEMOTHERAPY (H): Primary | ICD-10-CM

## 2021-08-19 DIAGNOSIS — G89.3 CANCER RELATED PAIN: ICD-10-CM

## 2021-08-19 DIAGNOSIS — C34.31 MALIGNANT NEOPLASM OF LOWER LOBE OF RIGHT LUNG (H): ICD-10-CM

## 2021-08-19 PROCEDURE — 99441 PR PHYSICIAN TELEPHONE EVALUATION 5-10 MIN: CPT | Performed by: STUDENT IN AN ORGANIZED HEALTH CARE EDUCATION/TRAINING PROGRAM

## 2021-08-19 RX ORDER — DULOXETIN HYDROCHLORIDE 30 MG/1
30 CAPSULE, DELAYED RELEASE ORAL DAILY
Qty: 90 CAPSULE | Refills: 1 | Status: SHIPPED | OUTPATIENT
Start: 2021-08-19 | End: 2021-10-06

## 2021-08-19 NOTE — LETTER
2021         RE: Derrick Benitez  [address]  [facility] [address]        Dear Colleague,    Thank you for referring your patient, Derrick Benitez, to the [facility] CANCER CENTER [place]. Please see a copy of my visit note below.    Palliative Care Progress Note    Patient Name: Derrick Benitez  Primary Provider: Clarisse Golden    Chief Complaint/Patient ID:   Medical - He has NSCLC  - RLL mass dx , no PDL staining could be done on bx. Bone mets at time of dx.  Carbo, taxol, pembro x 2020, only on pembro now.    Last Palliative care appointment: 21 with me. Previously discussed SNRI for neuropathy, however, symptoms were improved.      Reviewed: Yes, no concerns. Last Rx for oxycodone in March.    Interim History:  Derrick Benitez 81 year old male is seen today for follow up with Palliative Care via billable telephone visit.      Reviewed Oncology note from 8/3/21. Doing well with therapy. If next scans were negative for PD, would extend frequency of pembro infusions to Q6 weeks. If PD present, will change therapy.    Scans from yesterday showed overall improvement in cancer, so will likely spread out pembro infusions.    Back to doing many things he was able to do last year. 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     Still dealing with the neuropathy. Does OK when he's walking. Really bothersome at bedtime. Only involves feet/toes on both sides. Feels he's ready to try a medication now.    Social History:  Lives with his wife.  Has a hobby snowblower/yard business.  Social History     Tobacco Use     Smoking status: Former Smoker     Packs/day: 3.00     Years: 40.00     Pack years: 120.00     Quit date: 10/28/2004     Years since quittin.8     Smokeless tobacco: Never Used   Substance Use Topics     Alcohol use: No     Comment: states he quit 30 years ago     Drug use: No     Family History- Reviewed in Epic.    Allergies   Allergen Reactions      Losartan Other (See Comments)     Fatigue, weakness in legs     Advanced Care Planning: Discussed at visit on 1/14/2021.  Has completed a healthcare directive, though not scanned in our chart.    Medications- Reviewed in Epic.    Past Medical History- Reviewed in [facility].    Past Surgical History- Reviewed in Epic.    Review of Systems:   ROS: 10 point ROS neg other than the symptoms noted above in the HPI.    Key Data Reviewed:  LABS: 8/2/21- Cr 1.02, GFR 68, Albumin 3.4, LFTs wnl.   7/13/21- WBC 5.6, Hgb 12.8, Plts 153.     IMAGING: CT CAP 8/18/21- IMPRESSION:  1.  Previously seen right lower lobe lung mass has essentially resolved with residual scarring.  2.  Stable skeletal metastases.  3.  No new sites of disease.    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  Now on maintenance pembrolizumab treatment.  Most recent scans have shown improvement in disease.    Pain  Peripheral neuropathy - No true pain but very bothered by neuropathy. Particularly affects him at night. Has reached point where he'd like to try medication for it.  - Starting duloxetine 30mg daily. Can increase to 60mg after one month if needed.    ACP - Has completed HCD but not on file. He will bring a copy to one of his infusion appts to have scanned in his chart.    Follow up: 6-8 weeks    Telephone Visit Details  Total call duration: 9 mins, started 1:47PM.      Total time spent on day of encounter is 17 mins, including reviewing record, review of above studies, above visit with patient, and documentation.     Zulema Montes,   Palliative Medicine   Pager [phone], AMCOM ID [id number]    Some chart documentation performed using Dragon Voice recognition Software. Although reviewed after completion, some words and grammatical errors may remain.        Again, thank you for allowing me to participate in the  care of your patient.        Sincerely,        Zulema Montes, DO

## 2021-08-19 NOTE — PATIENT INSTRUCTIONS
Recommendations:  - We will be starting the duloxetine (Cymbalta) 1 capsule daily for your neuropathy. We can increase this dose in the future if needed.  - Please bring a copy of your healthcare directive to one of your infusion appointments so it can be scanned in your electronic medical chart.    Follow up: 6-8 weeks      Reasons to Call    If you are having worsening/uncontrolled symptoms we want you to call!    You or your other physicians make any changes to medications we have prescribed.    Important Phone Numbers    [place] [facility] - Amy Johnston. Palliative Care RN - [phone]    [facility] - Xochitl Mattson. Palliative Care RN - [phone]  *For Refills, scheduling, and general questions - [phone]  *After hours or on weekends. Will connect you with on call MD. [phone]

## 2021-08-19 NOTE — LETTER
2021         RE: Derrick Benitez  [address]  [facility] [address]        Dear Colleague,    Thank you for referring your patient, Derrick Benitez, to the [facility] CANCER CENTER [place]. Please see a copy of my visit note below.    Palliative Care Progress Note    Patient Name: Derrick Benitez  Primary Provider: Clarisse Golden    Chief Complaint/Patient ID:   Medical - He has NSCLC  - RLL mass dx , no PDL staining could be done on bx. Bone mets at time of dx.  Carbo, taxol, pembro x 2020, only on pembro now.    Last Palliative care appointment: 21 with me. Previously discussed SNRI for neuropathy, however, symptoms were improved.      Reviewed: Yes, no concerns. Last Rx for oxycodone in March.    Interim History:  Derrick Benitez 81 year old male is seen today for follow up with Palliative Care via billable telephone visit.      Reviewed Oncology note from 8/3/21. Doing well with therapy. If next scans were negative for PD, would extend frequency of pembro infusions to Q6 weeks. If PD present, will change therapy.    Scans from yesterday showed overall improvement in cancer, so will likely spread out pembro infusions.    Back to doing many things he was able to do last year. 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     Still dealing with the neuropathy. Does OK when he's walking. Really bothersome at bedtime. Only involves feet/toes on both sides. Feels he's ready to try a medication now.    Social History:  Lives with his wife.  Has a hobby snowblower/yard business.  Social History     Tobacco Use     Smoking status: Former Smoker     Packs/day: 3.00     Years: 40.00     Pack years: 120.00     Quit date: 10/28/2004     Years since quittin.8     Smokeless tobacco: Never Used   Substance Use Topics     Alcohol use: No     Comment: states he quit 30 years ago     Drug use: No     Family History- Reviewed in Epic.    Allergies   Allergen Reactions      Losartan Other (See Comments)     Fatigue, weakness in legs     Advanced Care Planning: Discussed at visit on 1/14/2021.  Has completed a healthcare directive, though not scanned in our chart.    Medications- Reviewed in Epic.    Past Medical History- Reviewed in [facility].    Past Surgical History- Reviewed in Epic.    Review of Systems:   ROS: 10 point ROS neg other than the symptoms noted above in the HPI.    Key Data Reviewed:  LABS: 8/2/21- Cr 1.02, GFR 68, Albumin 3.4, LFTs wnl.   7/13/21- WBC 5.6, Hgb 12.8, Plts 153.     IMAGING: CT CAP 8/18/21- IMPRESSION:  1.  Previously seen right lower lobe lung mass has essentially resolved with residual scarring.  2.  Stable skeletal metastases.  3.  No new sites of disease.    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  Now on maintenance pembrolizumab treatment.  Most recent scans have shown improvement in disease.    Pain  Peripheral neuropathy - No true pain but very bothered by neuropathy. Particularly affects him at night. Has reached point where he'd like to try medication for it.  - Starting duloxetine 30mg daily. Can increase to 60mg after one month if needed.    ACP - Has completed HCD but not on file. He will bring a copy to one of his infusion appts to have scanned in his chart.    Follow up: 6-8 weeks    Telephone Visit Details  Total call duration: 9 mins, started 1:47PM.      Total time spent on day of encounter is 17 mins, including reviewing record, review of above studies, above visit with patient, and documentation.     Zulema Montes,   Palliative Medicine   Pager [phone], AMCOM ID [id number]    Some chart documentation performed using Dragon Voice recognition Software. Although reviewed after completion, some words and grammatical errors may remain.        Again, thank you for allowing me to participate in the  care of your patient.        Sincerely,        Zulema Montes, DO

## 2021-08-23 RX ORDER — LORAZEPAM 2 MG/ML
0.5 INJECTION INTRAMUSCULAR EVERY 4 HOURS PRN
Status: CANCELLED | OUTPATIENT
Start: 2021-08-26

## 2021-08-23 RX ORDER — HEPARIN SODIUM (PORCINE) LOCK FLUSH IV SOLN 100 UNIT/ML 100 UNIT/ML
5 SOLUTION INTRAVENOUS EVERY 8 HOURS PRN
Status: CANCELLED | OUTPATIENT
Start: 2021-08-26

## 2021-08-23 RX ORDER — SODIUM CHLORIDE 9 MG/ML
1000 INJECTION, SOLUTION INTRAVENOUS CONTINUOUS PRN
Status: CANCELLED
Start: 2021-08-26

## 2021-08-23 RX ORDER — ALBUTEROL SULFATE 0.83 MG/ML
2.5 SOLUTION RESPIRATORY (INHALATION)
Status: CANCELLED | OUTPATIENT
Start: 2021-08-26

## 2021-08-23 RX ORDER — EPINEPHRINE 1 MG/ML
0.3 INJECTION, SOLUTION, CONCENTRATE INTRAVENOUS EVERY 5 MIN PRN
Status: CANCELLED | OUTPATIENT
Start: 2021-08-26

## 2021-08-23 RX ORDER — ALBUTEROL SULFATE 90 UG/1
1-2 AEROSOL, METERED RESPIRATORY (INHALATION)
Status: CANCELLED
Start: 2021-08-26

## 2021-08-23 RX ORDER — HEPARIN SODIUM,PORCINE 10 UNIT/ML
5 VIAL (ML) INTRAVENOUS
Status: CANCELLED | OUTPATIENT
Start: 2021-08-26

## 2021-08-23 RX ORDER — DIPHENHYDRAMINE HYDROCHLORIDE 50 MG/ML
50 INJECTION INTRAMUSCULAR; INTRAVENOUS
Status: CANCELLED
Start: 2021-08-26

## 2021-08-23 RX ORDER — MEPERIDINE HYDROCHLORIDE 25 MG/ML
25 INJECTION INTRAMUSCULAR; INTRAVENOUS; SUBCUTANEOUS EVERY 30 MIN PRN
Status: CANCELLED | OUTPATIENT
Start: 2021-08-26

## 2021-08-23 RX ORDER — NALOXONE HYDROCHLORIDE 0.4 MG/ML
.1-.4 INJECTION, SOLUTION INTRAMUSCULAR; INTRAVENOUS; SUBCUTANEOUS
Status: CANCELLED | OUTPATIENT
Start: 2021-08-26

## 2021-08-25 ENCOUNTER — LAB (OUTPATIENT)
Dept: INFUSION THERAPY | Facility: CLINIC | Age: 81
End: 2021-08-25
Attending: INTERNAL MEDICINE
Payer: MEDICARE

## 2021-08-25 ENCOUNTER — HOSPITAL ENCOUNTER (OUTPATIENT)
Facility: CLINIC | Age: 81
End: 2021-08-25
Attending: INTERNAL MEDICINE
Payer: MEDICARE

## 2021-08-25 DIAGNOSIS — C34.31 MALIGNANT NEOPLASM OF LOWER LOBE OF RIGHT LUNG (H): ICD-10-CM

## 2021-08-25 DIAGNOSIS — Z51.11 ENCOUNTER FOR ANTINEOPLASTIC CHEMOTHERAPY: Primary | ICD-10-CM

## 2021-08-25 LAB
ALBUMIN SERPL-MCNC: 3.2 G/DL (ref 3.4–5)
ALP SERPL-CCNC: 62 U/L (ref 40–150)
ALT SERPL W P-5'-P-CCNC: 19 U/L (ref 0–70)
ANION GAP SERPL CALCULATED.3IONS-SCNC: 3 MMOL/L (ref 3–14)
AST SERPL W P-5'-P-CCNC: 13 U/L (ref 0–45)
BASOPHILS # BLD AUTO: 0 10E3/UL (ref 0–0.2)
BASOPHILS NFR BLD AUTO: 0 %
BILIRUB SERPL-MCNC: 0.5 MG/DL (ref 0.2–1.3)
BUN SERPL-MCNC: 16 MG/DL (ref 7–30)
CALCIUM SERPL-MCNC: 8.5 MG/DL (ref 8.5–10.1)
CHLORIDE BLD-SCNC: 109 MMOL/L (ref 94–109)
CO2 SERPL-SCNC: 26 MMOL/L (ref 20–32)
CREAT SERPL-MCNC: 0.99 MG/DL (ref 0.66–1.25)
EOSINOPHIL # BLD AUTO: 0.1 10E3/UL (ref 0–0.7)
EOSINOPHIL NFR BLD AUTO: 2 %
ERYTHROCYTE [DISTWIDTH] IN BLOOD BY AUTOMATED COUNT: 13.4 % (ref 10–15)
GFR SERPL CREATININE-BSD FRML MDRD: 71 ML/MIN/1.73M2
GLUCOSE BLD-MCNC: 146 MG/DL (ref 70–99)
HCT VFR BLD AUTO: 39.3 % (ref 40–53)
HGB BLD-MCNC: 13.3 G/DL (ref 13.3–17.7)
IMM GRANULOCYTES # BLD: 0 10E3/UL
IMM GRANULOCYTES NFR BLD: 0 %
LYMPHOCYTES # BLD AUTO: 1.1 10E3/UL (ref 0.8–5.3)
LYMPHOCYTES NFR BLD AUTO: 19 %
MCH RBC QN AUTO: 31.1 PG (ref 26.5–33)
MCHC RBC AUTO-ENTMCNC: 33.8 G/DL (ref 31.5–36.5)
MCV RBC AUTO: 92 FL (ref 78–100)
MONOCYTES # BLD AUTO: 0.4 10E3/UL (ref 0–1.3)
MONOCYTES NFR BLD AUTO: 8 %
NEUTROPHILS # BLD AUTO: 3.9 10E3/UL (ref 1.6–8.3)
NEUTROPHILS NFR BLD AUTO: 71 %
NRBC # BLD AUTO: 0 10E3/UL
NRBC BLD AUTO-RTO: 0 /100
PLATELET # BLD AUTO: 141 10E3/UL (ref 150–450)
POTASSIUM BLD-SCNC: 4.2 MMOL/L (ref 3.4–5.3)
PROT SERPL-MCNC: 6.8 G/DL (ref 6.8–8.8)
RBC # BLD AUTO: 4.27 10E6/UL (ref 4.4–5.9)
SODIUM SERPL-SCNC: 138 MMOL/L (ref 133–144)
TSH SERPL DL<=0.005 MIU/L-ACNC: 1.33 MU/L (ref 0.4–4)
WBC # BLD AUTO: 5.6 10E3/UL (ref 4–11)

## 2021-08-25 PROCEDURE — 250N000011 HC RX IP 250 OP 636: Performed by: INTERNAL MEDICINE

## 2021-08-25 PROCEDURE — 36591 DRAW BLOOD OFF VENOUS DEVICE: CPT | Performed by: INTERNAL MEDICINE

## 2021-08-25 PROCEDURE — 80053 COMPREHEN METABOLIC PANEL: CPT | Performed by: INTERNAL MEDICINE

## 2021-08-25 PROCEDURE — 85004 AUTOMATED DIFF WBC COUNT: CPT | Performed by: INTERNAL MEDICINE

## 2021-08-25 PROCEDURE — 84443 ASSAY THYROID STIM HORMONE: CPT | Performed by: INTERNAL MEDICINE

## 2021-08-25 RX ORDER — HEPARIN SODIUM (PORCINE) LOCK FLUSH IV SOLN 100 UNIT/ML 100 UNIT/ML
5 SOLUTION INTRAVENOUS
Status: DISCONTINUED | OUTPATIENT
Start: 2021-08-25 | End: 2021-08-25 | Stop reason: HOSPADM

## 2021-08-25 RX ORDER — HEPARIN SODIUM (PORCINE) LOCK FLUSH IV SOLN 100 UNIT/ML 100 UNIT/ML
5 SOLUTION INTRAVENOUS
Status: CANCELLED | OUTPATIENT
Start: 2021-08-25

## 2021-08-25 RX ORDER — HEPARIN SODIUM,PORCINE 10 UNIT/ML
5 VIAL (ML) INTRAVENOUS
Status: CANCELLED | OUTPATIENT
Start: 2021-08-25

## 2021-08-25 RX ADMIN — Medication 5 ML: at 10:42

## 2021-08-25 NOTE — PROGRESS NOTES
Nursing Note:  Derrick Benitez presents today for port labs for tx 8/26.    Patient seen by provider today: No   present during visit today: Not Applicable.    Note: N/A.    Intravenous Access:  Labs drawn without difficulty.  Implanted Port.    Discharge Plan:   Patient was sent home.    Musa Taylor RN

## 2021-08-26 ENCOUNTER — INFUSION THERAPY VISIT (OUTPATIENT)
Dept: INFUSION THERAPY | Facility: CLINIC | Age: 81
End: 2021-08-26
Attending: INTERNAL MEDICINE
Payer: MEDICARE

## 2021-08-26 VITALS
SYSTOLIC BLOOD PRESSURE: 134 MMHG | TEMPERATURE: 97.7 F | RESPIRATION RATE: 16 BRPM | BODY MASS INDEX: 29.94 KG/M2 | OXYGEN SATURATION: 96 % | HEART RATE: 69 BPM | DIASTOLIC BLOOD PRESSURE: 60 MMHG | WEIGHT: 191.2 LBS

## 2021-08-26 DIAGNOSIS — Z51.11 ENCOUNTER FOR ANTINEOPLASTIC CHEMOTHERAPY: Primary | ICD-10-CM

## 2021-08-26 DIAGNOSIS — C34.31 MALIGNANT NEOPLASM OF LOWER LOBE OF RIGHT LUNG (H): ICD-10-CM

## 2021-08-26 PROCEDURE — 250N000011 HC RX IP 250 OP 636: Performed by: INTERNAL MEDICINE

## 2021-08-26 PROCEDURE — 96413 CHEMO IV INFUSION 1 HR: CPT

## 2021-08-26 PROCEDURE — 258N000003 HC RX IP 258 OP 636: Performed by: INTERNAL MEDICINE

## 2021-08-26 RX ORDER — HEPARIN SODIUM (PORCINE) LOCK FLUSH IV SOLN 100 UNIT/ML 100 UNIT/ML
5 SOLUTION INTRAVENOUS EVERY 8 HOURS PRN
Status: DISCONTINUED | OUTPATIENT
Start: 2021-08-26 | End: 2021-08-26 | Stop reason: HOSPADM

## 2021-08-26 RX ADMIN — SODIUM CHLORIDE 400 MG: 9 INJECTION, SOLUTION INTRAVENOUS at 12:45

## 2021-08-26 RX ADMIN — Medication 5 ML: at 13:29

## 2021-08-26 RX ADMIN — SODIUM CHLORIDE 250 ML: 9 INJECTION, SOLUTION INTRAVENOUS at 12:45

## 2021-08-26 ASSESSMENT — PAIN SCALES - GENERAL: PAINLEVEL: NO PAIN (0)

## 2021-08-26 NOTE — PROGRESS NOTES
Infusion Nursing Note:  Derrick Benitez presents today for C8D1 Keytruda.    Patient seen by provider today: No   present during visit today: Not Applicable.    Note: N/A.      Intravenous Access:  Implanted Port.    Treatment Conditions:  Lab Results   Component Value Date    HGB 13.3 08/25/2021    HGB 12.9 06/01/2021     Lab Results   Component Value Date    WBC 5.6 08/25/2021    WBC 5.1 06/01/2021      Lab Results   Component Value Date    ANEU 3.4 06/01/2021     Lab Results   Component Value Date     08/25/2021     06/01/2021      Lab Results   Component Value Date     08/25/2021     06/22/2021                   Lab Results   Component Value Date    POTASSIUM 4.2 08/25/2021    POTASSIUM 4.0 06/22/2021           Lab Results   Component Value Date    MAG 2.1 10/30/2014            Lab Results   Component Value Date    CR 0.99 08/25/2021    CR 0.86 06/22/2021                   Lab Results   Component Value Date    ANTHONY 8.5 08/25/2021    ANTHONY 8.4 06/22/2021                Lab Results   Component Value Date    BILITOTAL 0.5 08/25/2021    BILITOTAL 0.4 06/22/2021           Lab Results   Component Value Date    ALBUMIN 3.2 08/25/2021    ALBUMIN 3.0 06/22/2021                    Lab Results   Component Value Date    ALT 19 08/25/2021    ALT 18 06/22/2021           Lab Results   Component Value Date    AST 13 08/25/2021    AST 17 06/22/2021       Results reviewed, labs MET treatment parameters, ok to proceed with treatment.      Post Infusion Assessment:  Patient tolerated infusion without incident.  Blood return noted pre and post infusion.  Site patent and intact, free from redness, edema or discomfort.  No evidence of extravasations.  Access discontinued per protocol.       Discharge Plan:   Discharge instructions reviewed with: Patient.  Patient and/or family verbalized understanding of discharge instructions and all questions answered.  AVS to patient via [employer].  Patient will return  9/15/21 for next appointment.   Patient discharged in stable condition accompanied by: self.  Departure Mode: Ambulatory.      Hollie Pink RN

## 2021-08-28 ENCOUNTER — HEALTH MAINTENANCE LETTER (OUTPATIENT)
Age: 81
End: 2021-08-28

## 2021-09-14 ENCOUNTER — LAB (OUTPATIENT)
Dept: INFUSION THERAPY | Facility: CLINIC | Age: 81
End: 2021-09-14
Attending: INTERNAL MEDICINE
Payer: MEDICARE

## 2021-09-14 DIAGNOSIS — Z51.11 ENCOUNTER FOR ANTINEOPLASTIC CHEMOTHERAPY: ICD-10-CM

## 2021-09-14 DIAGNOSIS — C34.31 MALIGNANT NEOPLASM OF LOWER LOBE OF RIGHT LUNG (H): Primary | ICD-10-CM

## 2021-09-14 PROCEDURE — 36591 DRAW BLOOD OFF VENOUS DEVICE: CPT

## 2021-09-14 PROCEDURE — 250N000011 HC RX IP 250 OP 636: Performed by: INTERNAL MEDICINE

## 2021-09-14 RX ORDER — HEPARIN SODIUM (PORCINE) LOCK FLUSH IV SOLN 100 UNIT/ML 100 UNIT/ML
5 SOLUTION INTRAVENOUS
Status: CANCELLED | OUTPATIENT
Start: 2021-09-14

## 2021-09-14 RX ORDER — HEPARIN SODIUM,PORCINE 10 UNIT/ML
5 VIAL (ML) INTRAVENOUS
Status: CANCELLED | OUTPATIENT
Start: 2021-09-14

## 2021-09-14 RX ORDER — HEPARIN SODIUM (PORCINE) LOCK FLUSH IV SOLN 100 UNIT/ML 100 UNIT/ML
5 SOLUTION INTRAVENOUS
Status: DISCONTINUED | OUTPATIENT
Start: 2021-09-14 | End: 2021-09-14 | Stop reason: HOSPADM

## 2021-09-14 RX ADMIN — Medication 5 ML: at 12:21

## 2021-09-14 NOTE — PROGRESS NOTES
Nursing Note:  Derrick Benitez presents today for port labs.    Patient seen by provider today: No   present during visit today: Not Applicable.    Note: After labs drawn, pharmacy states that Keytruda was changed to 400mg Q 6 weeks, therefore patient is 3 weeks early.  Labs cancelled and appt changed.    Intravenous Access:  Labs drawn without difficulty.  Implanted Port.    Discharge Plan:   Patient was sent home.    Musa Taylor RN

## 2021-09-15 ENCOUNTER — DOCUMENTATION ONLY (OUTPATIENT)
Dept: OTHER | Facility: CLINIC | Age: 81
End: 2021-09-15

## 2021-09-20 ENCOUNTER — TELEPHONE (OUTPATIENT)
Dept: ONCOLOGY | Facility: CLINIC | Age: 81
End: 2021-09-20

## 2021-09-20 NOTE — TELEPHONE ENCOUNTER
Patient calling requesting a call back from [facility]. States he would like to discuss why his appointments were cancelled last week and that De has told him to call if he has any concerns and he will speak with him directly.     Offered assistance from writer or MOE Canchola RN but patient declines and states he only wants to speak to De.     Will route message to De requesting call back.    DADA BaileyN, RN, PHN, OCN  Oncology Care Coordinator  [facility]

## 2021-10-06 ENCOUNTER — LAB (OUTPATIENT)
Dept: INFUSION THERAPY | Facility: CLINIC | Age: 81
End: 2021-10-06
Attending: NURSE PRACTITIONER
Payer: MEDICARE

## 2021-10-06 ENCOUNTER — ONCOLOGY VISIT (OUTPATIENT)
Dept: ONCOLOGY | Facility: CLINIC | Age: 81
End: 2021-10-06
Attending: NURSE PRACTITIONER
Payer: MEDICARE

## 2021-10-06 VITALS
WEIGHT: 195 LBS | RESPIRATION RATE: 16 BRPM | TEMPERATURE: 98.5 F | HEART RATE: 82 BPM | BODY MASS INDEX: 30.61 KG/M2 | HEIGHT: 67 IN | SYSTOLIC BLOOD PRESSURE: 144 MMHG | DIASTOLIC BLOOD PRESSURE: 67 MMHG | OXYGEN SATURATION: 94 %

## 2021-10-06 DIAGNOSIS — R53.0 NEOPLASTIC (MALIGNANT) RELATED FATIGUE: ICD-10-CM

## 2021-10-06 DIAGNOSIS — C34.31 MALIGNANT NEOPLASM OF LOWER LOBE OF RIGHT LUNG (H): Primary | ICD-10-CM

## 2021-10-06 DIAGNOSIS — Z51.11 ENCOUNTER FOR ANTINEOPLASTIC CHEMOTHERAPY: ICD-10-CM

## 2021-10-06 LAB
ALBUMIN SERPL-MCNC: 3.2 G/DL (ref 3.4–5)
ALP SERPL-CCNC: 63 U/L (ref 40–150)
ALT SERPL W P-5'-P-CCNC: 22 U/L (ref 0–70)
ANION GAP SERPL CALCULATED.3IONS-SCNC: 7 MMOL/L (ref 3–14)
AST SERPL W P-5'-P-CCNC: 19 U/L (ref 0–45)
BASOPHILS # BLD AUTO: 0 10E3/UL (ref 0–0.2)
BASOPHILS NFR BLD AUTO: 0 %
BILIRUB SERPL-MCNC: 0.5 MG/DL (ref 0.2–1.3)
BUN SERPL-MCNC: 14 MG/DL (ref 7–30)
CALCIUM SERPL-MCNC: 7.8 MG/DL (ref 8.5–10.1)
CHLORIDE BLD-SCNC: 110 MMOL/L (ref 94–109)
CO2 SERPL-SCNC: 23 MMOL/L (ref 20–32)
CREAT SERPL-MCNC: 1.09 MG/DL (ref 0.66–1.25)
EOSINOPHIL # BLD AUTO: 0.1 10E3/UL (ref 0–0.7)
EOSINOPHIL NFR BLD AUTO: 1 %
ERYTHROCYTE [DISTWIDTH] IN BLOOD BY AUTOMATED COUNT: 13.4 % (ref 10–15)
GFR SERPL CREATININE-BSD FRML MDRD: 63 ML/MIN/1.73M2
GLUCOSE BLD-MCNC: 106 MG/DL (ref 70–99)
HCT VFR BLD AUTO: 39.1 % (ref 40–53)
HGB BLD-MCNC: 13.2 G/DL (ref 13.3–17.7)
IMM GRANULOCYTES # BLD: 0 10E3/UL
IMM GRANULOCYTES NFR BLD: 1 %
LYMPHOCYTES # BLD AUTO: 1.3 10E3/UL (ref 0.8–5.3)
LYMPHOCYTES NFR BLD AUTO: 20 %
MCH RBC QN AUTO: 30.9 PG (ref 26.5–33)
MCHC RBC AUTO-ENTMCNC: 33.8 G/DL (ref 31.5–36.5)
MCV RBC AUTO: 92 FL (ref 78–100)
MONOCYTES # BLD AUTO: 0.5 10E3/UL (ref 0–1.3)
MONOCYTES NFR BLD AUTO: 8 %
NEUTROPHILS # BLD AUTO: 4.3 10E3/UL (ref 1.6–8.3)
NEUTROPHILS NFR BLD AUTO: 70 %
NRBC # BLD AUTO: 0 10E3/UL
NRBC BLD AUTO-RTO: 0 /100
PLATELET # BLD AUTO: 130 10E3/UL (ref 150–450)
POTASSIUM BLD-SCNC: 4.2 MMOL/L (ref 3.4–5.3)
PROT SERPL-MCNC: 6.8 G/DL (ref 6.8–8.8)
RBC # BLD AUTO: 4.27 10E6/UL (ref 4.4–5.9)
SODIUM SERPL-SCNC: 140 MMOL/L (ref 133–144)
TSH SERPL DL<=0.005 MIU/L-ACNC: 0.74 MU/L (ref 0.4–4)
WBC # BLD AUTO: 6.3 10E3/UL (ref 4–11)

## 2021-10-06 PROCEDURE — 99214 OFFICE O/P EST MOD 30 MIN: CPT | Performed by: INTERNAL MEDICINE

## 2021-10-06 PROCEDURE — 85025 COMPLETE CBC W/AUTO DIFF WBC: CPT | Performed by: NURSE PRACTITIONER

## 2021-10-06 PROCEDURE — 84443 ASSAY THYROID STIM HORMONE: CPT | Performed by: NURSE PRACTITIONER

## 2021-10-06 PROCEDURE — 80053 COMPREHEN METABOLIC PANEL: CPT | Performed by: NURSE PRACTITIONER

## 2021-10-06 PROCEDURE — G0463 HOSPITAL OUTPT CLINIC VISIT: HCPCS

## 2021-10-06 PROCEDURE — 36591 DRAW BLOOD OFF VENOUS DEVICE: CPT

## 2021-10-06 PROCEDURE — 250N000011 HC RX IP 250 OP 636: Performed by: INTERNAL MEDICINE

## 2021-10-06 RX ORDER — HEPARIN SODIUM (PORCINE) LOCK FLUSH IV SOLN 100 UNIT/ML 100 UNIT/ML
5 SOLUTION INTRAVENOUS
Status: CANCELLED | OUTPATIENT
Start: 2021-10-06

## 2021-10-06 RX ORDER — HEPARIN SODIUM,PORCINE 10 UNIT/ML
5 VIAL (ML) INTRAVENOUS
Status: CANCELLED | OUTPATIENT
Start: 2021-10-06

## 2021-10-06 RX ORDER — HEPARIN SODIUM (PORCINE) LOCK FLUSH IV SOLN 100 UNIT/ML 100 UNIT/ML
5 SOLUTION INTRAVENOUS
Status: DISCONTINUED | OUTPATIENT
Start: 2021-10-06 | End: 2021-10-06 | Stop reason: HOSPADM

## 2021-10-06 RX ORDER — ALBUTEROL SULFATE 90 UG/1
1-2 AEROSOL, METERED RESPIRATORY (INHALATION)
Status: CANCELLED
Start: 2021-10-07

## 2021-10-06 RX ORDER — LORAZEPAM 2 MG/ML
0.5 INJECTION INTRAMUSCULAR EVERY 4 HOURS PRN
Status: CANCELLED | OUTPATIENT
Start: 2021-10-07

## 2021-10-06 RX ORDER — SODIUM CHLORIDE 9 MG/ML
1000 INJECTION, SOLUTION INTRAVENOUS CONTINUOUS PRN
Status: CANCELLED
Start: 2021-10-07

## 2021-10-06 RX ORDER — HEPARIN SODIUM,PORCINE 10 UNIT/ML
5 VIAL (ML) INTRAVENOUS
Status: CANCELLED | OUTPATIENT
Start: 2021-10-07

## 2021-10-06 RX ORDER — EPINEPHRINE 1 MG/ML
0.3 INJECTION, SOLUTION INTRAMUSCULAR; SUBCUTANEOUS EVERY 5 MIN PRN
Status: CANCELLED | OUTPATIENT
Start: 2021-10-07

## 2021-10-06 RX ORDER — HEPARIN SODIUM (PORCINE) LOCK FLUSH IV SOLN 100 UNIT/ML 100 UNIT/ML
5 SOLUTION INTRAVENOUS EVERY 8 HOURS PRN
Status: CANCELLED | OUTPATIENT
Start: 2021-10-07

## 2021-10-06 RX ORDER — NALOXONE HYDROCHLORIDE 0.4 MG/ML
.1-.4 INJECTION, SOLUTION INTRAMUSCULAR; INTRAVENOUS; SUBCUTANEOUS
Status: CANCELLED | OUTPATIENT
Start: 2021-10-07

## 2021-10-06 RX ORDER — METHYLPREDNISOLONE SODIUM SUCCINATE 125 MG/2ML
125 INJECTION, POWDER, LYOPHILIZED, FOR SOLUTION INTRAMUSCULAR; INTRAVENOUS
Status: CANCELLED
Start: 2021-10-07

## 2021-10-06 RX ORDER — ZOLEDRONIC ACID 0.04 MG/ML
4 INJECTION, SOLUTION INTRAVENOUS ONCE
Status: CANCELLED | OUTPATIENT
Start: 2021-10-06 | End: 2021-10-06

## 2021-10-06 RX ORDER — MEPERIDINE HYDROCHLORIDE 25 MG/ML
25 INJECTION INTRAMUSCULAR; INTRAVENOUS; SUBCUTANEOUS EVERY 30 MIN PRN
Status: CANCELLED | OUTPATIENT
Start: 2021-10-07

## 2021-10-06 RX ORDER — ALBUTEROL SULFATE 0.83 MG/ML
2.5 SOLUTION RESPIRATORY (INHALATION)
Status: CANCELLED | OUTPATIENT
Start: 2021-10-07

## 2021-10-06 RX ORDER — DIPHENHYDRAMINE HYDROCHLORIDE 50 MG/ML
50 INJECTION INTRAMUSCULAR; INTRAVENOUS
Status: CANCELLED
Start: 2021-10-07

## 2021-10-06 RX ADMIN — Medication 5 ML: at 14:45

## 2021-10-06 ASSESSMENT — PAIN SCALES - GENERAL: PAINLEVEL: NO PAIN (0)

## 2021-10-06 ASSESSMENT — MIFFLIN-ST. JEOR: SCORE: 1543.14

## 2021-10-06 NOTE — PROGRESS NOTES
Nursing Note:  Derrick Benitez presents today for port labs.    Patient seen by provider today: Yes: Dr. Larsen at 3pm today   present during visit today: Not Applicable.    Note: N/A.    Intravenous Access:  Labs drawn without difficulty.  Implanted Port.    Discharge Plan:   Patient was sent to [facility] for MD appointment.    Porsha Wild RN

## 2021-10-06 NOTE — PROGRESS NOTES
"Oncology Rooming Note    October 6, 2021 2:52 PM   Derrick Benitez is a 81 year old male who presents for:    Chief Complaint   Patient presents with     Oncology Clinic Visit     Initial Vitals: There were no vitals taken for this visit. Estimated body mass index is 29.94 kg/m  as calculated from the following:    Height as of 8/4/21: 1.702 m (5' 7.01\").    Weight as of 8/26/21: 86.7 kg (191 lb 3.2 oz). There is no height or weight on file to calculate BSA.  Data Unavailable Comment: Data Unavailable   No LMP for male patient.  Allergies reviewed: Yes  Medications reviewed: Yes    Medications: Medication refills not needed today.  Pharmacy name entered into EPIC:    [place], [place] - [address]  [facility] [address] [place], [place] - [address] [place] [address] [address]    Clinical concerns:  doctor was notified.      Brenda Martines MA            "

## 2021-10-06 NOTE — LETTER
"    10/6/2021         RE: Derrick Benitez  [address]  [facility] [address]        Dear Colleague,    Thank you for referring your patient, Derrick Benitez, to the [facility] CANCER [facility]. Please see a copy of my visit note below.    Oncology Rooming Note    October 6, 2021 2:52 PM   Derrick Benitez is a 81 year old male who presents for:    Chief Complaint   Patient presents with     Oncology Clinic Visit     Initial Vitals: There were no vitals taken for this visit. Estimated body mass index is 29.94 kg/m  as calculated from the following:    Height as of 8/4/21: 1.702 m (5' 7.01\").    Weight as of 8/26/21: 86.7 kg (191 lb 3.2 oz). There is no height or weight on file to calculate BSA.  Data Unavailable Comment: Data Unavailable   No LMP for male patient.  Allergies reviewed: Yes  Medications reviewed: Yes    Medications: Medication refills not needed today.  Pharmacy name entered into EPIC:    [place], [place] - [address]  [facility] [id number] [place], [place] - [address]    Clinical concerns:  doctor was notified.      Brenda Martines MA              ONCOLOGIC HISTORY:  Mr. Benitez is a gentleman with non-small cell lung cancer, favor squamous cell carcinoma.   -NGS and PDL staining could not be done because of small sample.   -Guardant 360 does not reveal any actionable alteration.  1.  CT chest angiogram on 09/07/2020 revealed right lower lobe mass.   2.  CT-guided biopsy was done on 09/18/2020.  Pathology revealed non-small cell lung cancer, favor squamous cell carcinoma.  Sample size was small.  PDL staining and NGS panel could not be done.   3.  Brain MRI on 09/26/2020 did not reveal any brain metastasis.   4.  PET scan on 10/05/2020 revealed hypermetabolic right lung mass and multiple hypermetabolic bone lesions.   5.  The patient started on carboplatin and Taxol on 11/25/2020.   -Pembrolizumab added with cycle 2.     SUBJECTIVE:  Mr. Benitez is an " 81-year-old gentleman with metastatic non-small cell lung cancer.  He is currently on pembrolizumab.  He is tolerating it well.  He also gets Zometa for bone metastases.     The patient has peripheral neuropathy; that is improving.  He was on Cymbalta; he has stopped it.     No headache.  No dizziness.  No chest pain.  No shortness of breath.  No nausea or vomiting.  Appetite has been good.  No urinary or bowel complaints.  No abnormal bleeding.     In the morning, his mouth is dry.  The patient says that he sleeps with his mouth open.  I explained to him it is because of that.     PHYSICAL EXAMINATION:   GENERAL:  Alert and oriented x 3.   VITAL SIGNS:  Reviewed.  ECOG PS of 1.     EYES:  No icterus.   THROAT:  No ulcer. No thrush.   NECK:  Supple. No lymphadenopathy.    AXILLAE:  No lymphadenopathy.   LUNGS:  Good air entry bilaterally.  No crackles or wheezing.   HEART:  Regular.  No murmur.   GI: Abdomen is soft.  Nontender. No mass.   EXTREMITIES:  No pedal edema.  No calf swelling or tenderness.   SKIN:  No rash.      LABORATORY DATA:  CBC, CMP and TSH reviewed.     ASSESSMENT:    1.  An 81-year-old gentleman with metastatic non-small cell lung cancer, favor squamous cell carcinoma.  2.  Peripheral neuropathy, improving.     PLAN:     1.  The patient is doing well from lung cancer.  He does not have any new symptoms.  He will continue on pembrolizumab.  He is tolerating it well.  Side effects reviewed.  2.  We will get CT chest, abdomen and pelvis in mid November.  2.  He will continue on Zometa every 6 weeks.  3.  The patient's mouth is dry.  I advised him to use a spray to keep it moist.  4.  He had a few questions, which were all answered.  I will see him in 6 weeks.  I advised him to call us with any questions or concerns.    This office note has been dictated.          Again, thank you for allowing me to participate in the care of your patient.        Sincerely,        Buzz Larsen MD

## 2021-10-07 ENCOUNTER — INFUSION THERAPY VISIT (OUTPATIENT)
Dept: INFUSION THERAPY | Facility: CLINIC | Age: 81
End: 2021-10-07
Attending: INTERNAL MEDICINE
Payer: MEDICARE

## 2021-10-07 DIAGNOSIS — C34.31 MALIGNANT NEOPLASM OF LOWER LOBE OF RIGHT LUNG (H): ICD-10-CM

## 2021-10-07 DIAGNOSIS — C79.51 BONE METASTASES: Primary | ICD-10-CM

## 2021-10-07 DIAGNOSIS — Z51.11 ENCOUNTER FOR ANTINEOPLASTIC CHEMOTHERAPY: ICD-10-CM

## 2021-10-07 PROCEDURE — 250N000011 HC RX IP 250 OP 636: Performed by: INTERNAL MEDICINE

## 2021-10-07 PROCEDURE — 96367 TX/PROPH/DG ADDL SEQ IV INF: CPT

## 2021-10-07 PROCEDURE — 258N000003 HC RX IP 258 OP 636: Performed by: INTERNAL MEDICINE

## 2021-10-07 PROCEDURE — 96413 CHEMO IV INFUSION 1 HR: CPT

## 2021-10-07 RX ORDER — HEPARIN SODIUM (PORCINE) LOCK FLUSH IV SOLN 100 UNIT/ML 100 UNIT/ML
5 SOLUTION INTRAVENOUS EVERY 8 HOURS PRN
Status: DISCONTINUED | OUTPATIENT
Start: 2021-10-07 | End: 2021-10-07 | Stop reason: HOSPADM

## 2021-10-07 RX ORDER — ZOLEDRONIC ACID 0.04 MG/ML
4 INJECTION, SOLUTION INTRAVENOUS ONCE
Status: COMPLETED | OUTPATIENT
Start: 2021-10-07 | End: 2021-10-07

## 2021-10-07 RX ADMIN — SODIUM CHLORIDE 250 ML: 9 INJECTION, SOLUTION INTRAVENOUS at 14:44

## 2021-10-07 RX ADMIN — SODIUM CHLORIDE 400 MG: 9 INJECTION, SOLUTION INTRAVENOUS at 15:08

## 2021-10-07 RX ADMIN — ZOLEDRONIC ACID 4 MG: 0.04 INJECTION, SOLUTION INTRAVENOUS at 14:44

## 2021-10-07 RX ADMIN — Medication 5 ML: at 15:40

## 2021-10-07 NOTE — PROGRESS NOTES
Infusion Nursing Note:  Derrick Benitez presents today for Zometa and Pembrolizumab.    Patient seen by provider today: No   present during visit today: Not Applicable.    Note: N/A.      Intravenous Access:  Implanted Port.    Treatment Conditions:  Lab Results   Component Value Date     10/06/2021    POTASSIUM 4.2 10/06/2021    MAG 2.1 10/30/2014    CR 1.09 10/06/2021    ANTHONY 7.8 (L) 10/06/2021    BILITOTAL 0.5 10/06/2021    ALBUMIN 3.2 (L) 10/06/2021    ALT 22 10/06/2021    AST 19 10/06/2021     Results reviewed, labs MET treatment parameters, ok to proceed with treatment.      Post Infusion Assessment:  Patient tolerated infusion without incident.  Blood return noted pre and post infusion.  Site patent and intact, free from redness, edema or discomfort.  No evidence of extravasations.  Access discontinued per protocol.       Discharge Plan:   Discharge instructions reviewed with: Patient.  Patient and/or family verbalized understanding of discharge instructions and all questions answered.  AVS printed for pt.  Patient will return 10/14/21 for next appointment.   Patient discharged in stable condition accompanied by: self.  Departure Mode: Ambulatory.      Alma Delia Leon RN

## 2021-10-10 NOTE — PROGRESS NOTES
ONCOLOGIC HISTORY:  Mr. Benitez is a gentleman with non-small cell lung cancer, favor squamous cell carcinoma.   -NGS and PDL staining could not be done because of small sample.   -Guardant 360 does not reveal any actionable alteration.  1.  CT chest angiogram on 09/07/2020 revealed right lower lobe mass.   2.  CT-guided biopsy was done on 09/18/2020.  Pathology revealed non-small cell lung cancer, favor squamous cell carcinoma.  Sample size was small.  PDL staining and NGS panel could not be done.   3.  Brain MRI on 09/26/2020 did not reveal any brain metastasis.   4.  PET scan on 10/05/2020 revealed hypermetabolic right lung mass and multiple hypermetabolic bone lesions.   5.  The patient started on carboplatin and Taxol on 11/25/2020.   -Pembrolizumab added with cycle 2.     SUBJECTIVE:  Mr. Benitez is an 81-year-old gentleman with metastatic non-small cell lung cancer.  He is currently on pembrolizumab.  He is tolerating it well.  He also gets Zometa for bone metastases.     The patient has peripheral neuropathy; that is improving.  He was on Cymbalta; he has stopped it.     No headache.  No dizziness.  No chest pain.  No shortness of breath.  No nausea or vomiting.  Appetite has been good.  No urinary or bowel complaints.  No abnormal bleeding.     In the morning, his mouth is dry.  The patient says that he sleeps with his mouth open.  I explained to him it is because of that.     PHYSICAL EXAMINATION:   GENERAL:  Alert and oriented x 3.   VITAL SIGNS:  Reviewed.  ECOG PS of 1.     EYES:  No icterus.   THROAT:  No ulcer. No thrush.   NECK:  Supple. No lymphadenopathy.    AXILLAE:  No lymphadenopathy.   LUNGS:  Good air entry bilaterally.  No crackles or wheezing.   HEART:  Regular.  No murmur.   GI: Abdomen is soft.  Nontender. No mass.   EXTREMITIES:  No pedal edema.  No calf swelling or tenderness.   SKIN:  No rash.      LABORATORY DATA:  CBC, CMP and TSH reviewed.     ASSESSMENT:    1.  An 81-year-old gentleman  with metastatic non-small cell lung cancer, favor squamous cell carcinoma.  2.  Peripheral neuropathy, improving.     PLAN:     1.  The patient is doing well from lung cancer.  He does not have any new symptoms.  He will continue on pembrolizumab.  He is tolerating it well.  Side effects reviewed.  2.  We will get CT chest, abdomen and pelvis in mid November.  2.  He will continue on Zometa every 6 weeks.  3.  The patient's mouth is dry.  I advised him to use a spray to keep it moist.  4.  He had a few questions, which were all answered.  I will see him in 6 weeks.  I advised him to call us with any questions or concerns.

## 2021-10-11 ENCOUNTER — TELEPHONE (OUTPATIENT)
Dept: ONCOLOGY | Facility: CLINIC | Age: 81
End: 2021-10-11

## 2021-10-11 NOTE — TELEPHONE ENCOUNTER
Pt left message on clinic VM stating he noted that an additional note was added after he saw Dr Larsen on 10/6/21, that he should be taking calcium.  I do not see mention of this in pts chart.    Routed to GREGORIO Canchola to address.    Alma Delia Leon RN

## 2021-10-12 NOTE — TELEPHONE ENCOUNTER
Responded to [employer] message it is ok for patient to continue to take his Calcium Carbonate and and Vitamin D.    Liz Flores RN

## 2021-10-14 ENCOUNTER — VIRTUAL VISIT (OUTPATIENT)
Dept: ONCOLOGY | Facility: CLINIC | Age: 81
End: 2021-10-14
Attending: STUDENT IN AN ORGANIZED HEALTH CARE EDUCATION/TRAINING PROGRAM
Payer: MEDICARE

## 2021-10-14 DIAGNOSIS — T45.1X5A PERIPHERAL NEUROPATHY DUE TO CHEMOTHERAPY (H): Primary | ICD-10-CM

## 2021-10-14 DIAGNOSIS — G62.0 PERIPHERAL NEUROPATHY DUE TO CHEMOTHERAPY (H): Primary | ICD-10-CM

## 2021-10-14 DIAGNOSIS — Z51.5 ENCOUNTER FOR PALLIATIVE CARE: ICD-10-CM

## 2021-10-14 DIAGNOSIS — G89.3 CANCER RELATED PAIN: ICD-10-CM

## 2021-10-14 DIAGNOSIS — C34.31 MALIGNANT NEOPLASM OF LOWER LOBE OF RIGHT LUNG (H): ICD-10-CM

## 2021-10-14 PROCEDURE — 99443 PR PHYSICIAN TELEPHONE EVALUATION 21-30 MIN: CPT | Mod: 95 | Performed by: STUDENT IN AN ORGANIZED HEALTH CARE EDUCATION/TRAINING PROGRAM

## 2021-10-14 NOTE — LETTER
10/14/2021         RE: Derrick Benitez  [address]  [facility] [address]        Dear Colleague,    Thank you for referring your patient, Derrick Benitez, to the [facility]CER [facility] [place]. Please see a copy of my visit note below.    Derrick is a 81 year old who is being evaluated via a billable telephone visit.      What phone number would you like to be contacted at? [phone]  How would you like to obtain your AVS? [facility]      Palliative Care Clinic Progress Note    Patient Name: Derrick Benitez  Primary Provider: Clarisse Golden    Chief Complaint/Patient ID:   Medical - He has NSCLC  - RLL mass dx , no PDL staining could be done on bx. Bone mets at time of dx.  Carbo, taxol, pembro x 2020. Now on maintenance with pembrolizumab Q6 Weeks.    Last Palliative care appointment: 21 with me. Started duloxetine.      Reviewed: Yes, no concerns. Last Rx for oxycodone in 2021.    Interim History:  Derrick Benitez 81 year old male returns to Palliative Care today for follow up via billable telephone encounter.      Oncology note from 10/6. Doing well, tolerating treatment. Planning to continue and reimage in November.    States he's doing well health wise. Started the duloxetine but really didn't need it so stopped. His neuropathy has been improving.    Unfortunately has had some difficulty with communication surrounding lab tests and imaging studies ordered. Also wasn't aware when his treatment was changed from every 3 weeks to every 6 weeks. He notes he would be OK with an extra trip to clinic for his lab appts if it means his results would be available to discuss at his Oncology appts.    Social History:  Lives with his wife.  Has a hobby snowStreetLight Dataower/yard business.  Social History     Tobacco Use     Smoking status: Former Smoker     Packs/day: 3.00     Years: 40.00     Pack years: 120.00     Quit date: 10/28/2004     Years since quittin.9     Smokeless tobacco: Never Used   Substance  Use Topics     Alcohol use: No     Comment: states he quit 30 years ago     Drug use: No       Family History- Reviewed in Epic.    Allergies   Allergen Reactions     Losartan Other (See Comments)     Fatigue, weakness in legs       Advanced Care Planning: HCD scanned in chart.  Names his wife as primary HCA, followed by his son, followed by his daughter.    Medications- Reviewed in Epic.    Past Medical History- Reviewed in [facility].    Past Surgical History- Reviewed in Epic.    Review of Systems:   ROS: 10 point ROS neg other than the symptoms noted above in the HPI.    Key Data Reviewed:  LABS: 10/6/2021- Cr 1.09, GFR 63, Albumin 3.2, LFTs WNL. WBC 6.3, Hgb 13.2, Plts 130.     IMAGING: CT CAP 8/18/2021- IMPRESSION:  1.  Previously seen right lower lobe lung mass has essentially resolved with residual scarring.  2.  Stable skeletal metastases.  3.  No new sites of disease.    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  Now on maintenance pembrolizumab treatment.    Pain  Neuropathy - Essentially resolved. Not needing any medications.    NSCLC - On maintenance with Keytruda.  Tolerating treatment well with excellent response.  -Discussed some of the changes that occurred over the last few months in the system in the context of his scheduling concerns.  -He would like to schedule his on lab appointments in the future so that he has the lab results when he meets with his oncology team.  He is okay with an extra trip to clinic in order for this to happen.      Follow up: 3-4 months    Telephone Visit Details  Total length of phone call: 22 mins, Start time: 9:19AM    Total time spent on day of encounter is 32 mins, including reviewing record, review of above studies, above visit with patient, and documentation.     Zulema Montes,   Palliative Medicine   Pager [phone], [facility]  ID [phone]    Some chart documentation performed using Dragon Voice recognition Software. Although reviewed after completion, some words and grammatical errors may remain.        Again, thank you for allowing me to participate in the care of your patient.        Sincerely,        Zulema Montes, DO

## 2021-10-14 NOTE — LETTER
10/14/2021         RE: Derrick Benitez  [address]  [facility] [address]        Dear Colleague,    Thank you for referring your patient, Derrick Benitez, to the [facility] CANCER CENTER [place]. Please see a copy of my visit note below.    Derrick is a 81 year old who is being evaluated via a billable telephone visit.      What phone number would you like to be contacted at? [phone]  How would you like to obtain your AVS? [facility]      Palliative Care Clinic Progress Note    Patient Name: Derrick Benitez  Primary Provider: Clarisse Golden    Chief Complaint/Patient ID:   Medical - He has NSCLC  - RLL mass dx , no PDL staining could be done on bx. Bone mets at time of dx.  Carbo, taxol, pembro x 2020. Now on maintenance with pembrolizumab Q6 Weeks.    Last Palliative care appointment: 21 with me. Started duloxetine.      Reviewed: Yes, no concerns. Last Rx for oxycodone in 2021.    Interim History:  Derrick Benitez 81 year old male returns to Palliative Care today for follow up via billable telephone encounter.      Oncology note from 10/6. Doing well, tolerating treatment. Planning to continue and reimage in November.    States he's doing well health wise. Started the duloxetine but really didn't need it so stopped. His neuropathy has been improving.    Unfortunately has had some difficulty with communication surrounding lab tests and imaging studies ordered. Also wasn't aware when his treatment was changed from every 3 weeks to every 6 weeks. He notes he would be OK with an extra trip to clinic for his lab appts if it means his results would be available to discuss at his Oncology appts.    Social History:  Lives with his wife.  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  Social History     Tobacco Use     Smoking status: Former Smoker     Packs/day: 3.00     Years: 40.00     Pack years: 120.00     Quit date: 10/28/2004     Years since quittin.9     Smokeless tobacco: Never Used   Substance  Use Topics     Alcohol use: No     Comment: states he quit 30 years ago     Drug use: No       Family History- Reviewed in Epic.    Allergies   Allergen Reactions     Losartan Other (See Comments)     Fatigue, weakness in legs       Advanced Care Planning: HCD scanned in chart.  Names his wife as primary HCA, followed by his son, followed by his daughter.    Medications- Reviewed in Epic.    Past Medical History- Reviewed in [facility].    Past Surgical History- Reviewed in Epic.    Review of Systems:   ROS: 10 point ROS neg other than the symptoms noted above in the HPI.    Key Data Reviewed:  LABS: 10/6/2021- Cr 1.09, GFR 63, Albumin 3.2, LFTs WNL. WBC 6.3, Hgb 13.2, Plts 130.     IMAGING: CT CAP 8/18/2021- IMPRESSION:  1.  Previously seen right lower lobe lung mass has essentially resolved with residual scarring.  2.  Stable skeletal metastases.  3.  No new sites of disease.    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  Now on maintenance pembrolizumab treatment.    Pain  Neuropathy - Essentially resolved. Not needing any medications.    NSCLC - On maintenance with Keytruda.  Tolerating treatment well with excellent response.  -Discussed some of the changes that occurred over the last few months in the system in the context of his scheduling concerns.  -He would like to schedule his on lab appointments in the future so that he has the lab results when he meets with his oncology team.  He is okay with an extra trip to clinic in order for this to happen.      Follow up: 3-4 months    Telephone Visit Details  Total length of phone call: 22 mins, Start time: 9:19AM    Total time spent on day of encounter is 32 mins, including reviewing record, review of above studies, above visit with patient, and documentation.     Zulema Montes,   Palliative Medicine   Pager [phone], [facility]  ID [phone]    Some chart documentation performed using Dragon Voice recognition Software. Although reviewed after completion, some words and grammatical errors may remain.        Again, thank you for allowing me to participate in the care of your patient.        Sincerely,        Zulema Montes, DO

## 2021-10-14 NOTE — PATIENT INSTRUCTIONS
Recommendations:  -Please let me know if your symptoms worsen again or if you develop new concerns.    Follow up: 3-4 months      Reasons to Call    If you are having worsening/uncontrolled symptoms we want you to call!    You or your other physicians make any changes to medications we have prescribed.  -Please call for refills 4-5 days before you will run out of medication.    Important Phone Numbers    [place] [facility] - Amy Johnston. Palliative Care RN - [phone]    [facility] - Xochitl Mattson. Palliative Care RN - [phone]  *For Refills, scheduling, and general questions - [phone]  *After hours or on weekends. Will connect you with on call MD. [phone]

## 2021-10-14 NOTE — PROGRESS NOTES
Derrick is a 81 year old who is being evaluated via a billable telephone visit.      What phone number would you like to be contacted at? [phone]  How would you like to obtain your AVS? [facility]      Palliative Care Clinic Progress Note    Patient Name: Derrick Benitez  Primary Provider: Clarisse Golden    Chief Complaint/Patient ID:   Medical - He has NSCLC  - RLL mass dx , no PDL staining could be done on bx. Bone mets at time of dx.  Carbo, taxol, pembro x 2020. Now on maintenance with pembrolizumab Q6 Weeks.    Last Palliative care appointment: 21 with me. Started duloxetine.      Reviewed: Yes, no concerns. Last Rx for oxycodone in 2021.    Interim History:  Derrick Benitez 81 year old male returns to Palliative Care today for follow up via billable telephone encounter.      Oncology note from 10/6. Doing well, tolerating treatment. Planning to continue and reimage in November.    States he's doing well health wise. Started the duloxetine but really didn't need it so stopped. His neuropathy has been improving.    Unfortunately has had some difficulty with communication surrounding lab tests and imaging studies ordered. Also wasn't aware when his treatment was changed from every 3 weeks to every 6 weeks. He notes he would be OK with an extra trip to clinic for his lab appts if it means his results would be available to discuss at his Oncology appts.    Social History:  Lives with his wife.  Has a hobby snowblower/yard business.  Social History     Tobacco Use     Smoking status: Former Smoker     Packs/day: 3.00     Years: 40.00     Pack years: 120.00     Quit date: 10/28/2004     Years since quittin.9     Smokeless tobacco: Never Used   Substance Use Topics     Alcohol use: No     Comment: states he quit 30 years ago     Drug use: No       Family History- Reviewed in Epic.    Allergies   Allergen Reactions     Losartan Other (See Comments)     Fatigue, weakness in legs       Advanced Care  Planning: HCD scanned in chart.  Names his wife as primary HCA, followed by his son, followed by his daughter.    Medications- Reviewed in Epic.    Past Medical History- Reviewed in [facility].    Past Surgical History- Reviewed in Epic.    Review of Systems:   ROS: 10 point ROS neg other than the symptoms noted above in the HPI.    Key Data Reviewed:  LABS: 10/6/2021- Cr 1.09, GFR 63, Albumin 3.2, LFTs WNL. WBC 6.3, Hgb 13.2, Plts 130.     IMAGING: CT CAP 8/18/2021- IMPRESSION:  1.  Previously seen right lower lobe lung mass has essentially resolved with residual scarring.  2.  Stable skeletal metastases.  3.  No new sites of disease.    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  Now on maintenance pembrolizumab treatment.    Pain  Neuropathy - Essentially resolved. Not needing any medications.    NSCLC - On maintenance with Keytruda.  Tolerating treatment well with excellent response.  -Discussed some of the changes that occurred over the last few months in the system in the context of his scheduling concerns.  -He would like to schedule his on lab appointments in the future so that he has the lab results when he meets with his oncology team.  He is okay with an extra trip to clinic in order for this to happen.      Follow up: 3-4 months    Telephone Visit Details  Total length of phone call: 22 mins, Start time: 9:19AM    Total time spent on day of encounter is 32 mins, including reviewing record, review of above studies, above visit with patient, and documentation.     Zulema Montes DO  Palliative Medicine   Pager [phone], [facility] ID [id number]    Some chart documentation performed using Dragon Voice recognition Software. Although reviewed after completion, some words and grammatical errors may remain.

## 2021-11-15 ENCOUNTER — HOSPITAL ENCOUNTER (OUTPATIENT)
Dept: CT IMAGING | Facility: CLINIC | Age: 81
Discharge: HOME | End: 2021-11-15
Attending: INTERNAL MEDICINE | Admitting: INTERNAL MEDICINE
Payer: MEDICARE

## 2021-11-15 ENCOUNTER — LAB (OUTPATIENT)
Dept: INFUSION THERAPY | Facility: CLINIC | Age: 81
End: 2021-11-15
Attending: INTERNAL MEDICINE
Payer: MEDICARE

## 2021-11-15 DIAGNOSIS — Z51.11 ENCOUNTER FOR ANTINEOPLASTIC CHEMOTHERAPY: Primary | ICD-10-CM

## 2021-11-15 DIAGNOSIS — C34.31 MALIGNANT NEOPLASM OF LOWER LOBE OF RIGHT LUNG (H): Primary | ICD-10-CM

## 2021-11-15 DIAGNOSIS — C34.31 MALIGNANT NEOPLASM OF LOWER LOBE OF RIGHT LUNG (H): ICD-10-CM

## 2021-11-15 LAB
ALBUMIN SERPL-MCNC: 3.1 G/DL (ref 3.4–5)
ALP SERPL-CCNC: 76 U/L (ref 40–150)
ALT SERPL W P-5'-P-CCNC: 19 U/L (ref 0–70)
ANION GAP SERPL CALCULATED.3IONS-SCNC: 5 MMOL/L (ref 3–14)
AST SERPL W P-5'-P-CCNC: 19 U/L (ref 0–45)
BILIRUB SERPL-MCNC: 0.3 MG/DL (ref 0.2–1.3)
BUN SERPL-MCNC: 13 MG/DL (ref 7–30)
CALCIUM SERPL-MCNC: 8.7 MG/DL (ref 8.5–10.1)
CHLORIDE BLD-SCNC: 110 MMOL/L (ref 94–109)
CO2 SERPL-SCNC: 24 MMOL/L (ref 20–32)
CREAT BLD-MCNC: 1.1 MG/DL (ref 0.7–1.3)
CREAT SERPL-MCNC: 1 MG/DL (ref 0.66–1.25)
GFR SERPL CREATININE-BSD FRML MDRD: 70 ML/MIN/1.73M2
GFR SERPL CREATININE-BSD FRML MDRD: >60 ML/MIN/1.73M2
GLUCOSE BLD-MCNC: 127 MG/DL (ref 70–99)
POTASSIUM BLD-SCNC: 4.1 MMOL/L (ref 3.4–5.3)
PROT SERPL-MCNC: 6.8 G/DL (ref 6.8–8.8)
SODIUM SERPL-SCNC: 139 MMOL/L (ref 133–144)
TSH SERPL DL<=0.005 MIU/L-ACNC: 0.82 MU/L (ref 0.4–4)

## 2021-11-15 PROCEDURE — 71260 CT THORAX DX C+: CPT | Mod: ME

## 2021-11-15 PROCEDURE — 96374 THER/PROPH/DIAG INJ IV PUSH: CPT

## 2021-11-15 PROCEDURE — 250N000011 HC RX IP 250 OP 636: Performed by: INTERNAL MEDICINE

## 2021-11-15 PROCEDURE — 82040 ASSAY OF SERUM ALBUMIN: CPT | Performed by: INTERNAL MEDICINE

## 2021-11-15 PROCEDURE — 84443 ASSAY THYROID STIM HORMONE: CPT | Performed by: INTERNAL MEDICINE

## 2021-11-15 PROCEDURE — 82247 BILIRUBIN TOTAL: CPT | Performed by: INTERNAL MEDICINE

## 2021-11-15 PROCEDURE — 82565 ASSAY OF CREATININE: CPT

## 2021-11-15 PROCEDURE — 36591 DRAW BLOOD OFF VENOUS DEVICE: CPT | Performed by: INTERNAL MEDICINE

## 2021-11-15 PROCEDURE — 250N000009 HC RX 250: Performed by: INTERNAL MEDICINE

## 2021-11-15 PROCEDURE — 999N000154 HC STATISTIC RADIOLOGY XRAY, US, CT, MAR, NM

## 2021-11-15 RX ORDER — HEPARIN SODIUM (PORCINE) LOCK FLUSH IV SOLN 100 UNIT/ML 100 UNIT/ML
5 SOLUTION INTRAVENOUS
Status: CANCELLED | OUTPATIENT
Start: 2021-11-15

## 2021-11-15 RX ORDER — HEPARIN SODIUM,PORCINE 10 UNIT/ML
5 VIAL (ML) INTRAVENOUS
Status: DISCONTINUED | OUTPATIENT
Start: 2021-11-15 | End: 2021-11-16 | Stop reason: HOSPADM

## 2021-11-15 RX ORDER — HEPARIN SODIUM (PORCINE) LOCK FLUSH IV SOLN 100 UNIT/ML 100 UNIT/ML
5 SOLUTION INTRAVENOUS
Status: DISCONTINUED | OUTPATIENT
Start: 2021-11-15 | End: 2021-11-16 | Stop reason: HOSPADM

## 2021-11-15 RX ORDER — HEPARIN SODIUM,PORCINE 10 UNIT/ML
5 VIAL (ML) INTRAVENOUS
Status: CANCELLED | OUTPATIENT
Start: 2021-11-15

## 2021-11-15 RX ORDER — IOPAMIDOL 755 MG/ML
95 INJECTION, SOLUTION INTRAVASCULAR ONCE
Status: COMPLETED | OUTPATIENT
Start: 2021-11-15 | End: 2021-11-15

## 2021-11-15 RX ADMIN — HEPARIN SODIUM (PORCINE) LOCK FLUSH IV SOLN 100 UNIT/ML 5 ML: 100 SOLUTION at 14:45

## 2021-11-15 RX ADMIN — SODIUM CHLORIDE 67 ML: 9 INJECTION, SOLUTION INTRAVENOUS at 14:18

## 2021-11-15 RX ADMIN — IOPAMIDOL 95 ML: 755 INJECTION, SOLUTION INTRAVENOUS at 14:17

## 2021-11-15 NOTE — PROGRESS NOTES
Nursing Note:  Derrick Benitez presents today for port labs + power port needle for CT today.    Patient seen by provider today: No   present during visit today: Not Applicable.    Note: N/A.    Intravenous Access:  Labs drawn without difficulty.  Implanted Port.    Discharge Plan:   Patient was sent to [facility] with port accessed for CT appointment today.    Porsha Wild RN

## 2021-11-16 ENCOUNTER — ONCOLOGY VISIT (OUTPATIENT)
Dept: ONCOLOGY | Facility: CLINIC | Age: 81
End: 2021-11-16
Attending: INTERNAL MEDICINE
Payer: MEDICARE

## 2021-11-16 VITALS
SYSTOLIC BLOOD PRESSURE: 146 MMHG | WEIGHT: 197 LBS | OXYGEN SATURATION: 95 % | TEMPERATURE: 98.3 F | RESPIRATION RATE: 16 BRPM | HEART RATE: 76 BPM | BODY MASS INDEX: 30.85 KG/M2 | DIASTOLIC BLOOD PRESSURE: 63 MMHG

## 2021-11-16 DIAGNOSIS — Z51.11 ENCOUNTER FOR ANTINEOPLASTIC CHEMOTHERAPY: ICD-10-CM

## 2021-11-16 DIAGNOSIS — C34.31 MALIGNANT NEOPLASM OF LOWER LOBE OF RIGHT LUNG (H): Primary | ICD-10-CM

## 2021-11-16 PROCEDURE — 99214 OFFICE O/P EST MOD 30 MIN: CPT | Performed by: INTERNAL MEDICINE

## 2021-11-16 RX ORDER — ALBUTEROL SULFATE 0.83 MG/ML
2.5 SOLUTION RESPIRATORY (INHALATION)
Status: CANCELLED | OUTPATIENT
Start: 2021-11-18

## 2021-11-16 RX ORDER — EPINEPHRINE 1 MG/ML
0.3 INJECTION, SOLUTION INTRAMUSCULAR; SUBCUTANEOUS EVERY 5 MIN PRN
Status: CANCELLED | OUTPATIENT
Start: 2021-11-18

## 2021-11-16 RX ORDER — LORAZEPAM 2 MG/ML
0.5 INJECTION INTRAMUSCULAR EVERY 4 HOURS PRN
Status: CANCELLED | OUTPATIENT
Start: 2021-11-18

## 2021-11-16 RX ORDER — HEPARIN SODIUM (PORCINE) LOCK FLUSH IV SOLN 100 UNIT/ML 100 UNIT/ML
5 SOLUTION INTRAVENOUS
Status: CANCELLED | OUTPATIENT
Start: 2021-11-16

## 2021-11-16 RX ORDER — ALBUTEROL SULFATE 90 UG/1
1-2 AEROSOL, METERED RESPIRATORY (INHALATION)
Status: CANCELLED
Start: 2021-11-18

## 2021-11-16 RX ORDER — METHYLPREDNISOLONE SODIUM SUCCINATE 125 MG/2ML
125 INJECTION, POWDER, LYOPHILIZED, FOR SOLUTION INTRAMUSCULAR; INTRAVENOUS
Status: CANCELLED
Start: 2021-11-18

## 2021-11-16 RX ORDER — TOLTERODINE 2 MG/1
2 CAPSULE, EXTENDED RELEASE ORAL DAILY
COMMUNITY

## 2021-11-16 RX ORDER — MEPERIDINE HYDROCHLORIDE 25 MG/ML
25 INJECTION INTRAMUSCULAR; INTRAVENOUS; SUBCUTANEOUS EVERY 30 MIN PRN
Status: CANCELLED | OUTPATIENT
Start: 2021-11-18

## 2021-11-16 RX ORDER — SODIUM CHLORIDE 9 MG/ML
1000 INJECTION, SOLUTION INTRAVENOUS CONTINUOUS PRN
Status: CANCELLED
Start: 2021-11-18

## 2021-11-16 RX ORDER — DIPHENHYDRAMINE HYDROCHLORIDE 50 MG/ML
50 INJECTION INTRAMUSCULAR; INTRAVENOUS
Status: CANCELLED
Start: 2021-11-18

## 2021-11-16 RX ORDER — BLOOD SUGAR DIAGNOSTIC
STRIP MISCELLANEOUS PRN
COMMUNITY
Start: 2021-08-04

## 2021-11-16 RX ORDER — HEPARIN SODIUM,PORCINE 10 UNIT/ML
5 VIAL (ML) INTRAVENOUS
Status: CANCELLED | OUTPATIENT
Start: 2021-11-16

## 2021-11-16 RX ORDER — NALOXONE HYDROCHLORIDE 0.4 MG/ML
.1-.4 INJECTION, SOLUTION INTRAMUSCULAR; INTRAVENOUS; SUBCUTANEOUS
Status: CANCELLED | OUTPATIENT
Start: 2021-11-18

## 2021-11-16 RX ORDER — HEPARIN SODIUM,PORCINE 10 UNIT/ML
5 VIAL (ML) INTRAVENOUS
Status: CANCELLED | OUTPATIENT
Start: 2021-11-18

## 2021-11-16 RX ORDER — HEPARIN SODIUM (PORCINE) LOCK FLUSH IV SOLN 100 UNIT/ML 100 UNIT/ML
5 SOLUTION INTRAVENOUS EVERY 8 HOURS PRN
Status: CANCELLED | OUTPATIENT
Start: 2021-11-18

## 2021-11-16 RX ORDER — ZOLEDRONIC ACID 0.04 MG/ML
4 INJECTION, SOLUTION INTRAVENOUS ONCE
Status: CANCELLED | OUTPATIENT
Start: 2021-11-16 | End: 2021-11-16

## 2021-11-16 ASSESSMENT — PAIN SCALES - GENERAL: PAINLEVEL: NO PAIN (0)

## 2021-11-16 NOTE — PROGRESS NOTES
"Oncology Rooming Note    November 16, 2021 2:53 PM   Derrick Benitez is a 81 year old male who presents for:    Chief Complaint   Patient presents with     Oncology Clinic Visit     Initial Vitals: BP (!) 146/63   Pulse 76   Temp 98.3  F (36.8  C) (Oral)   Resp 16   Wt 89.4 kg (197 lb)   SpO2 95%   BMI 30.85 kg/m   Estimated body mass index is 30.85 kg/m  as calculated from the following:    Height as of 10/6/21: 1.702 m (5' 7\").    Weight as of this encounter: 89.4 kg (197 lb). Body surface area is 2.06 meters squared.  No Pain (0) Comment: Data Unavailable   No LMP for male patient.  Allergies reviewed: Yes  Medications reviewed: Yes    Medications: Medication refills not needed today.  Pharmacy name entered into EPIC:    [place], [place] - [address]  [facility] [address] [place], [place] - [address] [place] [address] PKY    Clinical concerns: no       Crystal Rojas            "

## 2021-11-16 NOTE — LETTER
"    11/16/2021         RE: Derrick Benitez  [address]  [facility] [address]        Dear Colleague,    Thank you for referring your patient, Derrick Benitez, to the [facility]CER [facility]. Please see a copy of my visit note below.    Oncology Rooming Note    November 16, 2021 2:53 PM   Derrick Benitez is a 81 year old male who presents for:    Chief Complaint   Patient presents with     Oncology Clinic Visit     Initial Vitals: BP (!) 146/63   Pulse 76   Temp 98.3  F (36.8  C) (Oral)   Resp 16   Wt 89.4 kg (197 lb)   SpO2 95%   BMI 30.85 kg/m   Estimated body mass index is 30.85 kg/m  as calculated from the following:    Height as of 10/6/21: 1.702 m (5' 7\").    Weight as of this encounter: 89.4 kg (197 lb). Body surface area is 2.06 meters squared.  No Pain (0) Comment: Data Unavailable   No LMP for male patient.  Allergies reviewed: Yes  Medications reviewed: Yes    Medications: Medication refills not needed today.  Pharmacy name entered into EPIC:    [facility] [place], [place] - [address]  [facility] [address] [place], [place] - [address] [place] [address] [address]    Clinical concerns: no       Crystal Rojas              ONCOLOGIC HISTORY:  Mr. Benitez is a gentleman with non-small cell lung cancer, favor squamous cell carcinoma.   -NGS and PDL staining could not be done because of small sample.   -Guardant 360 does not reveal any actionable alteration.  1.  CT chest angiogram on 09/07/2020 revealed right lower lobe mass.   2.  CT-guided biopsy was done on 09/18/2020.  Pathology revealed non-small cell lung cancer, favor squamous cell carcinoma.  Sample size was small.  PDL staining and NGS panel could not be done.   3.  Brain MRI on 09/26/2020 did not reveal any brain metastasis.   4.  PET scan on 10/05/2020 revealed hypermetabolic right lung mass and multiple hypermetabolic bone lesions.   5.  The patient started on carboplatin and Taxol on 11/25/2020.   -Pembrolizumab added " with cycle 2.     SUBJECTIVE:  Mr. Benitez is an 81-year-old gentleman with metastatic non-small cell lung cancer on pembrolizumab.  He also gets zometa for bone metastasis.  He is doing well.     Overall, his condition is stable.  No headache.  No dizziness.  No chest pain.  No shortness of breath.  No nausea or vomiting.  Appetite is good.  He has fatigue.  It is not getting worse.  His peripheral neuropathy is stable.     All other review of systems is negative.     PHYSICAL EXAMINATION:    He is alert, oriented x 3.  Not in any distress.  Rest of systems not examined.     LABS:  TSH and CMP were reviewed.     ASSESSMENT:    1.  An 81-year-old gentleman with metastatic non-small cell lung cancer, which is stable.  2.  Peripheral neuropathy, stable.  3.  Fatigue.     PLAN:    1.  CT scan done yesterday was personally reviewed by me.  I explained to him that his malignancy is stable.  He was happy to know that there is no progression.  2.  He will continue on pembrolizumab every 6 weeks.  He is tolerating it well.  3.  He will continue on Zometa every 6 weeks.  Bone metastases are all stable.  Also, continue on calcium and vitamin D twice a day.  4.  Labs were all reviewed.  Overall, they are good for treatment.  5.  He had few questions, which were all answered.  I will see him in 3 months.  In between, he will see our nurse practitioner.  The patient was advised to call us with any questions or concerns.    This office note has been dictated.          Again, thank you for allowing me to participate in the care of your patient.        Sincerely,        Buzz Larsen MD

## 2021-11-16 NOTE — PATIENT INSTRUCTIONS
1. Continue pembrolizumab and zometa every 6 weeks.  2. See De Frausto in 6 weeks.  3. See me in 12 weeks.    Please call to schedule

## 2021-11-18 ENCOUNTER — INFUSION THERAPY VISIT (OUTPATIENT)
Dept: INFUSION THERAPY | Facility: CLINIC | Age: 81
End: 2021-11-18
Attending: INTERNAL MEDICINE
Payer: MEDICARE

## 2021-11-18 VITALS
BODY MASS INDEX: 30.98 KG/M2 | RESPIRATION RATE: 24 BRPM | TEMPERATURE: 98.2 F | SYSTOLIC BLOOD PRESSURE: 132 MMHG | OXYGEN SATURATION: 95 % | DIASTOLIC BLOOD PRESSURE: 62 MMHG | WEIGHT: 197.8 LBS

## 2021-11-18 DIAGNOSIS — C79.51 BONE METASTASES: ICD-10-CM

## 2021-11-18 DIAGNOSIS — C34.31 MALIGNANT NEOPLASM OF LOWER LOBE OF RIGHT LUNG (H): ICD-10-CM

## 2021-11-18 DIAGNOSIS — Z51.11 ENCOUNTER FOR ANTINEOPLASTIC CHEMOTHERAPY: Primary | ICD-10-CM

## 2021-11-18 PROCEDURE — 96367 TX/PROPH/DG ADDL SEQ IV INF: CPT

## 2021-11-18 PROCEDURE — 258N000003 HC RX IP 258 OP 636: Performed by: INTERNAL MEDICINE

## 2021-11-18 PROCEDURE — 250N000011 HC RX IP 250 OP 636: Performed by: INTERNAL MEDICINE

## 2021-11-18 PROCEDURE — 96413 CHEMO IV INFUSION 1 HR: CPT

## 2021-11-18 RX ORDER — HEPARIN SODIUM (PORCINE) LOCK FLUSH IV SOLN 100 UNIT/ML 100 UNIT/ML
5 SOLUTION INTRAVENOUS EVERY 8 HOURS PRN
Status: DISCONTINUED | OUTPATIENT
Start: 2021-11-18 | End: 2021-11-18 | Stop reason: HOSPADM

## 2021-11-18 RX ORDER — ZOLEDRONIC ACID 0.04 MG/ML
4 INJECTION, SOLUTION INTRAVENOUS ONCE
Status: COMPLETED | OUTPATIENT
Start: 2021-11-18 | End: 2021-11-18

## 2021-11-18 RX ADMIN — SODIUM CHLORIDE 250 ML: 9 INJECTION, SOLUTION INTRAVENOUS at 12:55

## 2021-11-18 RX ADMIN — Medication 5 ML: at 14:05

## 2021-11-18 RX ADMIN — SODIUM CHLORIDE 400 MG: 9 INJECTION, SOLUTION INTRAVENOUS at 12:55

## 2021-11-18 RX ADMIN — ZOLEDRONIC ACID 4 MG: 0.04 INJECTION, SOLUTION INTRAVENOUS at 13:44

## 2021-11-18 NOTE — PROGRESS NOTES
Infusion Nursing Note:  Derrick Benitez presents today for C10D1 keytruda and zometa  Patient seen by provider today: No   present during visit today: Not Applicable.    Note: N/A.      Intravenous Access:  Implanted Port.    Treatment Conditions:  Lab Results   Component Value Date    HGB 13.2 (L) 10/06/2021    WBC 6.3 10/06/2021    ANEU 3.4 06/01/2021    ANEUTAUTO 4.3 10/06/2021     (L) 10/06/2021      Lab Results   Component Value Date     11/15/2021    POTASSIUM 4.1 11/15/2021    MAG 2.1 10/30/2014    CR 1.1 11/15/2021    ANTHONY 8.7 11/15/2021    BILITOTAL 0.3 11/15/2021    ALBUMIN 3.1 (L) 11/15/2021    ALT 19 11/15/2021    AST 19 11/15/2021     Results reviewed, labs MET treatment parameters, ok to proceed with treatment.      Post Infusion Assessment:  Patient tolerated infusion without incident.  Blood return noted pre and post infusion.  Site patent and intact, free from redness, edema or discomfort.  No evidence of extravasations.  Access discontinued per protocol.       Discharge Plan:   AVS to patient via MYCHART.  Patient will return as prev ginette for next appointment.   Patient discharged in stable condition accompanied by: self.  Departure Mode: Ambulatory.      Alberto Merida RN

## 2021-11-21 NOTE — PROGRESS NOTES
ONCOLOGIC HISTORY:  Mr. Benitez is a gentleman with non-small cell lung cancer, favor squamous cell carcinoma.   -NGS and PDL staining could not be done because of small sample.   -Guardant 360 does not reveal any actionable alteration.  1.  CT chest angiogram on 09/07/2020 revealed right lower lobe mass.   2.  CT-guided biopsy was done on 09/18/2020.  Pathology revealed non-small cell lung cancer, favor squamous cell carcinoma.  Sample size was small.  PDL staining and NGS panel could not be done.   3.  Brain MRI on 09/26/2020 did not reveal any brain metastasis.   4.  PET scan on 10/05/2020 revealed hypermetabolic right lung mass and multiple hypermetabolic bone lesions.   5.  The patient started on carboplatin and Taxol on 11/25/2020.   -Pembrolizumab added with cycle 2.     SUBJECTIVE:  Mr. Benitez is an 81-year-old gentleman with metastatic non-small cell lung cancer on pembrolizumab.  He also gets zometa for bone metastasis.  He is doing well.     Overall, his condition is stable.  No headache.  No dizziness.  No chest pain.  No shortness of breath.  No nausea or vomiting.  Appetite is good.  He has fatigue.  It is not getting worse.  His peripheral neuropathy is stable.     All other review of systems is negative.     PHYSICAL EXAMINATION:    He is alert, oriented x 3.  Not in any distress.  Rest of systems not examined.     LABS:  TSH and CMP were reviewed.     ASSESSMENT:    1.  An 81-year-old gentleman with metastatic non-small cell lung cancer, which is stable.  2.  Peripheral neuropathy, stable.  3.  Fatigue.     PLAN:    1.  CT scan done yesterday was personally reviewed by me.  I explained to him that his malignancy is stable.  He was happy to know that there is no progression.  2.  He will continue on pembrolizumab every 6 weeks.  He is tolerating it well.  3.  He will continue on Zometa every 6 weeks.  Bone metastases are all stable.  Also, continue on calcium and vitamin D twice a day.  4.  Labs were all  reviewed.  Overall, they are good for treatment.  5.  He had few questions, which were all answered.  I will see him in 3 months.  In between, he will see our nurse practitioner.  The patient was advised to call us with any questions or concerns.

## 2021-12-18 ENCOUNTER — HEALTH MAINTENANCE LETTER (OUTPATIENT)
Age: 81
End: 2021-12-18

## 2021-12-27 ENCOUNTER — LAB (OUTPATIENT)
Dept: INFUSION THERAPY | Facility: CLINIC | Age: 81
End: 2021-12-27
Attending: INTERNAL MEDICINE
Payer: MEDICARE

## 2021-12-27 ENCOUNTER — ONCOLOGY VISIT (OUTPATIENT)
Dept: ONCOLOGY | Facility: CLINIC | Age: 81
End: 2021-12-27
Attending: STUDENT IN AN ORGANIZED HEALTH CARE EDUCATION/TRAINING PROGRAM
Payer: MEDICARE

## 2021-12-27 VITALS
BODY MASS INDEX: 31.33 KG/M2 | HEART RATE: 82 BPM | SYSTOLIC BLOOD PRESSURE: 137 MMHG | WEIGHT: 199.6 LBS | TEMPERATURE: 98.4 F | OXYGEN SATURATION: 95 % | DIASTOLIC BLOOD PRESSURE: 68 MMHG | RESPIRATION RATE: 16 BRPM | HEIGHT: 67 IN

## 2021-12-27 DIAGNOSIS — C34.31 MALIGNANT NEOPLASM OF LOWER LOBE OF RIGHT LUNG (H): ICD-10-CM

## 2021-12-27 DIAGNOSIS — Z51.11 ENCOUNTER FOR ANTINEOPLASTIC CHEMOTHERAPY: Primary | ICD-10-CM

## 2021-12-27 DIAGNOSIS — C34.31 MALIGNANT NEOPLASM OF LOWER LOBE OF RIGHT LUNG (H): Primary | ICD-10-CM

## 2021-12-27 LAB
ALBUMIN SERPL-MCNC: 3.3 G/DL (ref 3.4–5)
ALP SERPL-CCNC: 61 U/L (ref 40–150)
ALT SERPL W P-5'-P-CCNC: 18 U/L (ref 0–70)
ANION GAP SERPL CALCULATED.3IONS-SCNC: 4 MMOL/L (ref 3–14)
AST SERPL W P-5'-P-CCNC: 18 U/L (ref 0–45)
BILIRUB SERPL-MCNC: 0.4 MG/DL (ref 0.2–1.3)
BUN SERPL-MCNC: 16 MG/DL (ref 7–30)
CALCIUM SERPL-MCNC: 8.2 MG/DL (ref 8.5–10.1)
CHLORIDE BLD-SCNC: 112 MMOL/L (ref 94–109)
CO2 SERPL-SCNC: 24 MMOL/L (ref 20–32)
CREAT SERPL-MCNC: 0.94 MG/DL (ref 0.66–1.25)
ERYTHROCYTE [DISTWIDTH] IN BLOOD BY AUTOMATED COUNT: 13.8 % (ref 10–15)
GFR SERPL CREATININE-BSD FRML MDRD: 81 ML/MIN/1.73M2
GLUCOSE BLD-MCNC: 145 MG/DL (ref 70–99)
HCT VFR BLD AUTO: 39.2 % (ref 40–53)
HGB BLD-MCNC: 13 G/DL (ref 13.3–17.7)
MCH RBC QN AUTO: 31.6 PG (ref 26.5–33)
MCHC RBC AUTO-ENTMCNC: 33.2 G/DL (ref 31.5–36.5)
MCV RBC AUTO: 95 FL (ref 78–100)
PLATELET # BLD AUTO: 131 10E3/UL (ref 150–450)
POTASSIUM BLD-SCNC: 4.2 MMOL/L (ref 3.4–5.3)
PROT SERPL-MCNC: 6.7 G/DL (ref 6.8–8.8)
RBC # BLD AUTO: 4.12 10E6/UL (ref 4.4–5.9)
SODIUM SERPL-SCNC: 140 MMOL/L (ref 133–144)
TSH SERPL DL<=0.005 MIU/L-ACNC: 0.8 MU/L (ref 0.4–4)
WBC # BLD AUTO: 5.5 10E3/UL (ref 4–11)

## 2021-12-27 PROCEDURE — 99214 OFFICE O/P EST MOD 30 MIN: CPT | Performed by: NURSE PRACTITIONER

## 2021-12-27 PROCEDURE — 84443 ASSAY THYROID STIM HORMONE: CPT | Performed by: INTERNAL MEDICINE

## 2021-12-27 PROCEDURE — 80053 COMPREHEN METABOLIC PANEL: CPT | Performed by: INTERNAL MEDICINE

## 2021-12-27 PROCEDURE — 36591 DRAW BLOOD OFF VENOUS DEVICE: CPT | Performed by: INTERNAL MEDICINE

## 2021-12-27 PROCEDURE — G0463 HOSPITAL OUTPT CLINIC VISIT: HCPCS

## 2021-12-27 PROCEDURE — 85027 COMPLETE CBC AUTOMATED: CPT | Performed by: INTERNAL MEDICINE

## 2021-12-27 RX ORDER — LORAZEPAM 2 MG/ML
0.5 INJECTION INTRAMUSCULAR EVERY 4 HOURS PRN
Status: CANCELLED | OUTPATIENT
Start: 2021-12-28

## 2021-12-27 RX ORDER — DIPHENHYDRAMINE HYDROCHLORIDE 50 MG/ML
50 INJECTION INTRAMUSCULAR; INTRAVENOUS
Status: CANCELLED
Start: 2021-12-28

## 2021-12-27 RX ORDER — NALOXONE HYDROCHLORIDE 0.4 MG/ML
.1-.4 INJECTION, SOLUTION INTRAMUSCULAR; INTRAVENOUS; SUBCUTANEOUS
Status: CANCELLED | OUTPATIENT
Start: 2021-12-28

## 2021-12-27 RX ORDER — EPINEPHRINE 1 MG/ML
0.3 INJECTION, SOLUTION INTRAMUSCULAR; SUBCUTANEOUS EVERY 5 MIN PRN
Status: CANCELLED | OUTPATIENT
Start: 2021-12-28

## 2021-12-27 RX ORDER — ALBUTEROL SULFATE 0.83 MG/ML
2.5 SOLUTION RESPIRATORY (INHALATION)
Status: CANCELLED | OUTPATIENT
Start: 2021-12-28

## 2021-12-27 RX ORDER — MEPERIDINE HYDROCHLORIDE 25 MG/ML
25 INJECTION INTRAMUSCULAR; INTRAVENOUS; SUBCUTANEOUS EVERY 30 MIN PRN
Status: CANCELLED | OUTPATIENT
Start: 2021-12-28

## 2021-12-27 RX ORDER — SODIUM CHLORIDE 9 MG/ML
1000 INJECTION, SOLUTION INTRAVENOUS CONTINUOUS PRN
Status: CANCELLED
Start: 2021-12-28

## 2021-12-27 RX ORDER — ZOLEDRONIC ACID 0.04 MG/ML
4 INJECTION, SOLUTION INTRAVENOUS ONCE
Status: CANCELLED | OUTPATIENT
Start: 2021-12-27 | End: 2021-12-27

## 2021-12-27 RX ORDER — HEPARIN SODIUM,PORCINE 10 UNIT/ML
5 VIAL (ML) INTRAVENOUS
Status: CANCELLED | OUTPATIENT
Start: 2021-12-28

## 2021-12-27 RX ORDER — HEPARIN SODIUM,PORCINE 10 UNIT/ML
5 VIAL (ML) INTRAVENOUS
Status: CANCELLED | OUTPATIENT
Start: 2021-12-27

## 2021-12-27 RX ORDER — HEPARIN SODIUM (PORCINE) LOCK FLUSH IV SOLN 100 UNIT/ML 100 UNIT/ML
5 SOLUTION INTRAVENOUS EVERY 8 HOURS PRN
Status: CANCELLED | OUTPATIENT
Start: 2021-12-28

## 2021-12-27 RX ORDER — METHYLPREDNISOLONE SODIUM SUCCINATE 125 MG/2ML
125 INJECTION, POWDER, LYOPHILIZED, FOR SOLUTION INTRAMUSCULAR; INTRAVENOUS
Status: CANCELLED
Start: 2021-12-28

## 2021-12-27 RX ORDER — HEPARIN SODIUM (PORCINE) LOCK FLUSH IV SOLN 100 UNIT/ML 100 UNIT/ML
5 SOLUTION INTRAVENOUS
Status: CANCELLED | OUTPATIENT
Start: 2021-12-27

## 2021-12-27 RX ORDER — ALBUTEROL SULFATE 90 UG/1
1-2 AEROSOL, METERED RESPIRATORY (INHALATION)
Status: CANCELLED
Start: 2021-12-28

## 2021-12-27 ASSESSMENT — PAIN SCALES - GENERAL: PAINLEVEL: NO PAIN (0)

## 2021-12-27 ASSESSMENT — MIFFLIN-ST. JEOR: SCORE: 1564.01

## 2021-12-27 NOTE — PROGRESS NOTES
Nursing Note:  Derrick MARITZA Benitez presents today for port labs.    Patient seen by provider today: Yes: De Frausto NP   present during visit today: Not Applicable.    Note: N/A.    Intravenous Access:  Labs drawn without difficulty.  Implanted Port.    Discharge Plan:   Patient was sent to [facility] for next appointment.    Sushma Hampton RN

## 2021-12-27 NOTE — PROGRESS NOTES
Oncology/Hematology Visit Note  Dec 27, 2021    Reason for Visit:   Lung cancer  Metastatic lung squamous cell carcinoma    Completed carboplatin Taxol and pembrolizumab x 6 cycle on 03/10/2021  03/31-on maintenance with single agent with pembrolizumab  11//2021-CT chest abdomen pelvis reveals overall stable disease    Interval History:  Patient reports he has been tolerating Keytruda well.  Denies fever chills sweats cough shortness of breath chest pain nausea vomiting diarrhea abdominal pain or bleeding.      Review of Systems:  14 point ROS of systems including Constitutional, Eyes, Respiratory, Cardiovascular, Gastroenterology, Genitourinary, Integumentary, Muscularskeletal, Psychiatric were all negative except for pertinent positives noted in my HPI.        Physical Examination:  Physical Exam  Eyes:      Pupils: Pupils are equal, round, and reactive to light.   Cardiovascular:      Rate and Rhythm: Normal rate.      Pulses: Normal pulses.   Pulmonary:      Effort: Pulmonary effort is normal.   Abdominal:      General: Abdomen is flat.   Musculoskeletal:      Cervical back: Normal range of motion.   Skin:     General: Skin is warm.   Neurological:      General: No focal deficit present.      Mental Status: He is alert.   Psychiatric:         Mood and Affect: Mood normal.         Laboratory Data:  CBC CMP and TSH results reviewed    Assessment and Plan:  This is a 81-year-old male with    Lung cancer  -Metastatic lung squamous cell carcinoma   status post carboplatinTaxol and pembrolizumab-completed 6 cycles in March 2021  -Currently on maintenance with immunotherapy pembrolizumab  11/2021-CT scan chest abdomen pelvis reveals stable disease  Labs reviewed abnormalities discussed  Continue treatment  Schedule with Dr. Larsen in February with the next cycle of treatment        Bone mets  -Patient currently on Zometa every 4 to 6 weeks  Last given on June 23  -Continue with vitamin D and calcium  -Schedule for  Zometa on August 4  Call our clinic with any jaw pain or dental issues    Anemia  Patient is asymptomatic  Stable hemoglobin patient denies bleeding  Continue to monitor    Thrombocytopenia  Continue to monitor closely  Complications of thrombocytopenia reviewed  Call our clinic in the event of bleeding, bruising fall or injury      Patient advised to call our clinic in the event of fever chills sweats cough shortness of breath chest pain nausea vomiting diarrhea abdominal pain bleeding or any changes in health condition    DARSHANA Meza [facility] [place]     Chart documentation with Dragon Voice recognition Software. Although reviewed after completion, some words and grammatical errors may remain.

## 2021-12-27 NOTE — LETTER
"    12/27/2021         RE: Derrick Benitez  [address]  [facility] [address]        Dear Colleague,    Thank you for referring your patient, Derrick Benitez, to the [facility]CER [facility]. Please see a copy of my visit note below.    Oncology Rooming Note    December 27, 2021 1:30 PM   Derrick Benitez is a 81 year old male who presents for:    Chief Complaint   Patient presents with     Oncology Clinic Visit     Bone metastases (H)     Initial Vitals: /68   Pulse 82   Temp 98.4  F (36.9  C) (Oral)   Resp 16   Ht 1.702 m (5' 7\")   Wt 90.5 kg (199 lb 9.6 oz)   SpO2 95%   BMI 31.26 kg/m   Estimated body mass index is 31.26 kg/m  as calculated from the following:    Height as of this encounter: 1.702 m (5' 7\").    Weight as of this encounter: 90.5 kg (199 lb 9.6 oz). Body surface area is 2.07 meters squared.  No Pain (0) Comment: Data Unavailable   No LMP for male patient.  Allergies reviewed: Yes  Medications reviewed: Yes    Medications: Medication refills not needed today.  Pharmacy name entered into EPIC:    [place], [place] - [address] [facility] [address] [place], [place] - [address]    Clinical concerns: None       Angela Bazzi MA                    Oncology/Hematology Visit Note  Dec 27, 2021    Reason for Visit:   Lung cancer  Metastatic lung squamous cell carcinoma    Completed carboplatin Taxol and pembrolizumab x 6 cycle on 03/10/2021  03/31-on maintenance with single agent with pembrolizumab  11//2021-CT chest abdomen pelvis reveals overall stable disease    Interval History:  Patient reports he has been tolerating Keytruda well.  Denies fever chills sweats cough shortness of breath chest pain nausea vomiting diarrhea abdominal pain or bleeding.      Review of Systems:  14 point ROS of systems including Constitutional, Eyes, Respiratory, Cardiovascular, Gastroenterology, Genitourinary, Integumentary, Muscularskeletal, " Psychiatric were all negative except for pertinent positives noted in my HPI.        Physical Examination:  Physical Exam  Eyes:      Pupils: Pupils are equal, round, and reactive to light.   Cardiovascular:      Rate and Rhythm: Normal rate.      Pulses: Normal pulses.   Pulmonary:      Effort: Pulmonary effort is normal.   Abdominal:      General: Abdomen is flat.   Musculoskeletal:      Cervical back: Normal range of motion.   Skin:     General: Skin is warm.   Neurological:      General: No focal deficit present.      Mental Status: He is alert.   Psychiatric:         Mood and Affect: Mood normal.         Laboratory Data:  CBC CMP and TSH results reviewed    Assessment and Plan:  This is a 81-year-old male with    Lung cancer  -Metastatic lung squamous cell carcinoma   status post carboplatinTaxol and pembrolizumab-completed 6 cycles in March 2021  -Currently on maintenance with immunotherapy pembrolizumab  11/2021-CT scan chest abdomen pelvis reveals stable disease  Labs reviewed abnormalities discussed  Continue treatment        Bone mets  -Patient currently on Zometa every 4 to 6 weeks  Last given on June 23  -Continue with vitamin D and calcium  -Schedule for Zometa on August 4  Call our clinic with any jaw pain or dental issues    Anemia  Patient is asymptomatic  Stable hemoglobin patient denies bleeding  Continue to monitor    Thrombocytopenia  Continue to monitor closely  Complications of thrombocytopenia reviewed  Call our clinic in the event of bleeding, bruising fall or injury      Patient advised to call our clinic in the event of fever chills sweats cough shortness of breath chest pain nausea vomiting diarrhea abdominal pain bleeding or any changes in health condition    DARSHANA Meza [facility] [place]     Chart documentation with Dragon Voice recognition Software. Although reviewed after completion, some words and grammatical errors may remain.            Again, thank  you for allowing me to participate in the care of your patient.        Sincerely,        DARSHANA Meza CNP

## 2021-12-27 NOTE — PROGRESS NOTES
"Oncology Rooming Note    December 27, 2021 1:30 PM   Derrick Benitez is a 81 year old male who presents for:    Chief Complaint   Patient presents with     Oncology Clinic Visit     Bone metastases (H)     Initial Vitals: /68   Pulse 82   Temp 98.4  F (36.9  C) (Oral)   Resp 16   Ht 1.702 m (5' 7\")   Wt 90.5 kg (199 lb 9.6 oz)   SpO2 95%   BMI 31.26 kg/m   Estimated body mass index is 31.26 kg/m  as calculated from the following:    Height as of this encounter: 1.702 m (5' 7\").    Weight as of this encounter: 90.5 kg (199 lb 9.6 oz). Body surface area is 2.07 meters squared.  No Pain (0) Comment: Data Unavailable   No LMP for male patient.  Allergies reviewed: Yes  Medications reviewed: Yes    Medications: Medication refills not needed today.  Pharmacy name entered into EPIC:    [place], [place] - [address]  [facility] [address] [place], [place] - [address] [facility]    Clinical concerns: None       Angela Bazzi MA              "

## 2021-12-28 ENCOUNTER — INFUSION THERAPY VISIT (OUTPATIENT)
Dept: INFUSION THERAPY | Facility: CLINIC | Age: 81
End: 2021-12-28
Attending: INTERNAL MEDICINE
Payer: MEDICARE

## 2021-12-28 VITALS
BODY MASS INDEX: 31.01 KG/M2 | WEIGHT: 198 LBS | DIASTOLIC BLOOD PRESSURE: 85 MMHG | RESPIRATION RATE: 18 BRPM | HEART RATE: 82 BPM | TEMPERATURE: 98.1 F | SYSTOLIC BLOOD PRESSURE: 157 MMHG

## 2021-12-28 DIAGNOSIS — C34.31 MALIGNANT NEOPLASM OF LOWER LOBE OF RIGHT LUNG (H): ICD-10-CM

## 2021-12-28 DIAGNOSIS — C79.51 BONE METASTASES: ICD-10-CM

## 2021-12-28 DIAGNOSIS — Z51.11 ENCOUNTER FOR ANTINEOPLASTIC CHEMOTHERAPY: Primary | ICD-10-CM

## 2021-12-28 PROCEDURE — 96375 TX/PRO/DX INJ NEW DRUG ADDON: CPT

## 2021-12-28 PROCEDURE — 96413 CHEMO IV INFUSION 1 HR: CPT

## 2021-12-28 PROCEDURE — 258N000003 HC RX IP 258 OP 636: Performed by: NURSE PRACTITIONER

## 2021-12-28 PROCEDURE — 250N000011 HC RX IP 250 OP 636: Performed by: NURSE PRACTITIONER

## 2021-12-28 RX ORDER — ZOLEDRONIC ACID 0.04 MG/ML
4 INJECTION, SOLUTION INTRAVENOUS ONCE
Status: COMPLETED | OUTPATIENT
Start: 2021-12-28 | End: 2021-12-28

## 2021-12-28 RX ORDER — HEPARIN SODIUM,PORCINE 10 UNIT/ML
5 VIAL (ML) INTRAVENOUS
Status: DISCONTINUED | OUTPATIENT
Start: 2021-12-28 | End: 2021-12-28 | Stop reason: HOSPADM

## 2021-12-28 RX ORDER — HEPARIN SODIUM (PORCINE) LOCK FLUSH IV SOLN 100 UNIT/ML 100 UNIT/ML
5 SOLUTION INTRAVENOUS EVERY 8 HOURS PRN
Status: DISCONTINUED | OUTPATIENT
Start: 2021-12-28 | End: 2021-12-28 | Stop reason: HOSPADM

## 2021-12-28 RX ADMIN — ZOLEDRONIC ACID 4 MG: 0.04 INJECTION, SOLUTION INTRAVENOUS at 10:45

## 2021-12-28 RX ADMIN — SODIUM CHLORIDE 400 MG: 9 INJECTION, SOLUTION INTRAVENOUS at 11:03

## 2021-12-28 RX ADMIN — Medication 5 ML: at 11:34

## 2021-12-28 ASSESSMENT — PAIN SCALES - GENERAL: PAINLEVEL: NO PAIN (0)

## 2021-12-28 NOTE — PROGRESS NOTES
Infusion Nursing Note:  Derrick Benitez presents today for C11D1 keytruda and zometa.    Patient seen by provider today: No   present during visit today: Not Applicable.    Note: N/A.      Intravenous Access:  Peripheral IV placed.    Treatment Conditions:  Lab Results   Component Value Date    HGB 13.0 (L) 12/27/2021    WBC 5.5 12/27/2021    ANEU 3.4 06/01/2021    ANEUTAUTO 4.3 10/06/2021     (L) 12/27/2021      Lab Results   Component Value Date     12/27/2021    POTASSIUM 4.2 12/27/2021    MAG 2.1 10/30/2014    CR 0.94 12/27/2021    ANTHONY 8.2 (L) 12/27/2021    BILITOTAL 0.4 12/27/2021    ALBUMIN 3.3 (L) 12/27/2021    ALT 18 12/27/2021    AST 18 12/27/2021     Results reviewed, labs MET treatment parameters, ok to proceed with treatment.    Corrected ca 8.76    Post Infusion Assessment:  Patient tolerated infusion without incident.  Blood return noted pre and post infusion.  Site patent and intact, free from redness, edema or discomfort.  No evidence of extravasations.  Access discontinued per protocol.       Discharge Plan:   Discharge instructions reviewed with: Patient.  Patient and/or family verbalized understanding of discharge instructions and all questions answered.  Patient discharged in stable condition accompanied by: self.  Departure Mode: Ambulatory.      Safia Simms RN

## 2022-01-03 DIAGNOSIS — C34.31 MALIGNANT NEOPLASM OF LOWER LOBE OF RIGHT LUNG (H): Primary | ICD-10-CM

## 2022-01-06 DIAGNOSIS — C34.31 MALIGNANT NEOPLASM OF LOWER LOBE OF RIGHT LUNG (H): Primary | ICD-10-CM

## 2022-01-06 DIAGNOSIS — Z13.29 SCREENING FOR THYROID DISORDER: ICD-10-CM

## 2022-02-07 ENCOUNTER — HOSPITAL ENCOUNTER (OUTPATIENT)
Facility: CLINIC | Age: 82
Discharge: HOME | End: 2022-02-07
Admitting: RADIOLOGY
Payer: MEDICARE

## 2022-02-07 ENCOUNTER — LAB (OUTPATIENT)
Dept: LAB | Facility: CLINIC | Age: 82
End: 2022-02-07
Payer: MEDICARE

## 2022-02-07 ENCOUNTER — HOSPITAL ENCOUNTER (OUTPATIENT)
Dept: CT IMAGING | Facility: CLINIC | Age: 82
End: 2022-02-07
Attending: INTERNAL MEDICINE
Payer: MEDICARE

## 2022-02-07 DIAGNOSIS — C34.31 MALIGNANT NEOPLASM OF LOWER LOBE OF RIGHT LUNG (H): Primary | ICD-10-CM

## 2022-02-07 DIAGNOSIS — C34.31 MALIGNANT NEOPLASM OF LOWER LOBE OF RIGHT LUNG (H): ICD-10-CM

## 2022-02-07 DIAGNOSIS — Z13.29 SCREENING FOR THYROID DISORDER: ICD-10-CM

## 2022-02-07 LAB
ALBUMIN SERPL-MCNC: 3.3 G/DL (ref 3.4–5)
ALP SERPL-CCNC: 70 U/L (ref 40–150)
ALT SERPL W P-5'-P-CCNC: 14 U/L (ref 0–70)
ANION GAP SERPL CALCULATED.3IONS-SCNC: 8 MMOL/L (ref 3–14)
AST SERPL W P-5'-P-CCNC: 13 U/L (ref 0–45)
BILIRUB SERPL-MCNC: 0.4 MG/DL (ref 0.2–1.3)
BUN SERPL-MCNC: 20 MG/DL (ref 7–30)
CALCIUM SERPL-MCNC: 8.5 MG/DL (ref 8.5–10.1)
CHLORIDE BLD-SCNC: 108 MMOL/L (ref 94–109)
CO2 SERPL-SCNC: 23 MMOL/L (ref 20–32)
CREAT BLD-MCNC: 1.2 MG/DL (ref 0.7–1.3)
CREAT SERPL-MCNC: 1.12 MG/DL (ref 0.66–1.25)
GFR SERPL CREATININE-BSD FRML MDRD: 60 ML/MIN/1.73M2
GFR SERPL CREATININE-BSD FRML MDRD: 66 ML/MIN/1.73M2
GLUCOSE BLD-MCNC: 188 MG/DL (ref 70–99)
POTASSIUM BLD-SCNC: 4.1 MMOL/L (ref 3.4–5.3)
PROT SERPL-MCNC: 6.8 G/DL (ref 6.8–8.8)
SODIUM SERPL-SCNC: 139 MMOL/L (ref 133–144)
TSH SERPL DL<=0.005 MIU/L-ACNC: 0.83 MU/L (ref 0.4–4)

## 2022-02-07 PROCEDURE — 999N000154 HC STATISTIC RADIOLOGY XRAY, US, CT, MAR, NM

## 2022-02-07 PROCEDURE — 36415 COLL VENOUS BLD VENIPUNCTURE: CPT | Performed by: INTERNAL MEDICINE

## 2022-02-07 PROCEDURE — 250N000011 HC RX IP 250 OP 636

## 2022-02-07 PROCEDURE — 84443 ASSAY THYROID STIM HORMONE: CPT | Mod: GZ | Performed by: INTERNAL MEDICINE

## 2022-02-07 PROCEDURE — 250N000011 HC RX IP 250 OP 636: Performed by: INTERNAL MEDICINE

## 2022-02-07 PROCEDURE — 250N000009 HC RX 250: Performed by: INTERNAL MEDICINE

## 2022-02-07 PROCEDURE — 82565 ASSAY OF CREATININE: CPT | Mod: 91

## 2022-02-07 PROCEDURE — 74177 CT ABD & PELVIS W/CONTRAST: CPT | Mod: ME

## 2022-02-07 PROCEDURE — 80053 COMPREHEN METABOLIC PANEL: CPT | Performed by: INTERNAL MEDICINE

## 2022-02-07 RX ORDER — HEPARIN SODIUM,PORCINE 10 UNIT/ML
5 VIAL (ML) INTRAVENOUS
Status: DISCONTINUED | OUTPATIENT
Start: 2022-02-07 | End: 2022-02-07 | Stop reason: HOSPADM

## 2022-02-07 RX ORDER — HEPARIN SODIUM (PORCINE) LOCK FLUSH IV SOLN 100 UNIT/ML 100 UNIT/ML
5 SOLUTION INTRAVENOUS
Status: DISCONTINUED | OUTPATIENT
Start: 2022-02-07 | End: 2022-02-07 | Stop reason: HOSPADM

## 2022-02-07 RX ORDER — IOPAMIDOL 755 MG/ML
97 INJECTION, SOLUTION INTRAVASCULAR ONCE
Status: COMPLETED | OUTPATIENT
Start: 2022-02-07 | End: 2022-02-07

## 2022-02-07 RX ADMIN — HEPARIN SODIUM (PORCINE) LOCK FLUSH IV SOLN 100 UNIT/ML 5 ML: 100 SOLUTION at 11:20

## 2022-02-07 RX ADMIN — IOPAMIDOL 97 ML: 755 INJECTION, SOLUTION INTRAVENOUS at 11:29

## 2022-02-07 RX ADMIN — SODIUM CHLORIDE 68 ML: 9 INJECTION, SOLUTION INTRAVENOUS at 11:30

## 2022-02-08 ENCOUNTER — ONCOLOGY VISIT (OUTPATIENT)
Dept: ONCOLOGY | Facility: CLINIC | Age: 82
End: 2022-02-08
Attending: INTERNAL MEDICINE
Payer: MEDICARE

## 2022-02-08 ENCOUNTER — INFUSION THERAPY VISIT (OUTPATIENT)
Dept: INFUSION THERAPY | Facility: CLINIC | Age: 82
End: 2022-02-08
Attending: INTERNAL MEDICINE
Payer: MEDICARE

## 2022-02-08 VITALS
OXYGEN SATURATION: 95 % | DIASTOLIC BLOOD PRESSURE: 69 MMHG | SYSTOLIC BLOOD PRESSURE: 161 MMHG | WEIGHT: 199.2 LBS | HEIGHT: 67 IN | HEART RATE: 55 BPM | BODY MASS INDEX: 31.27 KG/M2 | TEMPERATURE: 98.4 F

## 2022-02-08 DIAGNOSIS — C34.31 MALIGNANT NEOPLASM OF LOWER LOBE OF RIGHT LUNG (H): Primary | ICD-10-CM

## 2022-02-08 DIAGNOSIS — Z51.11 ENCOUNTER FOR ANTINEOPLASTIC CHEMOTHERAPY: Primary | ICD-10-CM

## 2022-02-08 DIAGNOSIS — C34.31 MALIGNANT NEOPLASM OF LOWER LOBE OF RIGHT LUNG (H): ICD-10-CM

## 2022-02-08 DIAGNOSIS — C79.51 BONE METASTASES: ICD-10-CM

## 2022-02-08 PROCEDURE — 258N000003 HC RX IP 258 OP 636: Performed by: INTERNAL MEDICINE

## 2022-02-08 PROCEDURE — 96413 CHEMO IV INFUSION 1 HR: CPT

## 2022-02-08 PROCEDURE — 99214 OFFICE O/P EST MOD 30 MIN: CPT | Performed by: INTERNAL MEDICINE

## 2022-02-08 PROCEDURE — G0463 HOSPITAL OUTPT CLINIC VISIT: HCPCS | Mod: 25

## 2022-02-08 PROCEDURE — 250N000011 HC RX IP 250 OP 636: Performed by: INTERNAL MEDICINE

## 2022-02-08 PROCEDURE — 96367 TX/PROPH/DG ADDL SEQ IV INF: CPT

## 2022-02-08 RX ORDER — MEPERIDINE HYDROCHLORIDE 25 MG/ML
25 INJECTION INTRAMUSCULAR; INTRAVENOUS; SUBCUTANEOUS EVERY 30 MIN PRN
Status: CANCELLED | OUTPATIENT
Start: 2022-02-08

## 2022-02-08 RX ORDER — HEPARIN SODIUM,PORCINE 10 UNIT/ML
5 VIAL (ML) INTRAVENOUS
Status: CANCELLED | OUTPATIENT
Start: 2022-03-22

## 2022-02-08 RX ORDER — HEPARIN SODIUM (PORCINE) LOCK FLUSH IV SOLN 100 UNIT/ML 100 UNIT/ML
5 SOLUTION INTRAVENOUS
Status: CANCELLED | OUTPATIENT
Start: 2022-02-08

## 2022-02-08 RX ORDER — HEPARIN SODIUM,PORCINE 10 UNIT/ML
5 VIAL (ML) INTRAVENOUS
Status: CANCELLED | OUTPATIENT
Start: 2022-02-08

## 2022-02-08 RX ORDER — MEPERIDINE HYDROCHLORIDE 25 MG/ML
25 INJECTION INTRAMUSCULAR; INTRAVENOUS; SUBCUTANEOUS EVERY 30 MIN PRN
Status: CANCELLED | OUTPATIENT
Start: 2022-03-22

## 2022-02-08 RX ORDER — EPINEPHRINE 1 MG/ML
0.3 INJECTION, SOLUTION INTRAMUSCULAR; SUBCUTANEOUS EVERY 5 MIN PRN
Status: CANCELLED | OUTPATIENT
Start: 2022-02-08

## 2022-02-08 RX ORDER — SODIUM CHLORIDE 9 MG/ML
1000 INJECTION, SOLUTION INTRAVENOUS CONTINUOUS PRN
Status: CANCELLED
Start: 2022-02-08

## 2022-02-08 RX ORDER — DIPHENHYDRAMINE HYDROCHLORIDE 50 MG/ML
50 INJECTION INTRAMUSCULAR; INTRAVENOUS
Status: CANCELLED
Start: 2022-03-22

## 2022-02-08 RX ORDER — HEPARIN SODIUM (PORCINE) LOCK FLUSH IV SOLN 100 UNIT/ML 100 UNIT/ML
5 SOLUTION INTRAVENOUS EVERY 8 HOURS PRN
Status: CANCELLED | OUTPATIENT
Start: 2022-02-08

## 2022-02-08 RX ORDER — SODIUM CHLORIDE 9 MG/ML
1000 INJECTION, SOLUTION INTRAVENOUS CONTINUOUS PRN
Status: CANCELLED
Start: 2022-03-22

## 2022-02-08 RX ORDER — NALOXONE HYDROCHLORIDE 0.4 MG/ML
.1-.4 INJECTION, SOLUTION INTRAMUSCULAR; INTRAVENOUS; SUBCUTANEOUS
Status: CANCELLED | OUTPATIENT
Start: 2022-03-22

## 2022-02-08 RX ORDER — EPINEPHRINE 1 MG/ML
0.3 INJECTION, SOLUTION INTRAMUSCULAR; SUBCUTANEOUS EVERY 5 MIN PRN
Status: CANCELLED | OUTPATIENT
Start: 2022-03-22

## 2022-02-08 RX ORDER — METHYLPREDNISOLONE SODIUM SUCCINATE 125 MG/2ML
125 INJECTION, POWDER, LYOPHILIZED, FOR SOLUTION INTRAMUSCULAR; INTRAVENOUS
Status: CANCELLED
Start: 2022-03-22

## 2022-02-08 RX ORDER — LORAZEPAM 2 MG/ML
0.5 INJECTION INTRAMUSCULAR EVERY 4 HOURS PRN
Status: CANCELLED | OUTPATIENT
Start: 2022-03-22

## 2022-02-08 RX ORDER — ALBUTEROL SULFATE 0.83 MG/ML
2.5 SOLUTION RESPIRATORY (INHALATION)
Status: CANCELLED | OUTPATIENT
Start: 2022-02-08

## 2022-02-08 RX ORDER — LORAZEPAM 2 MG/ML
0.5 INJECTION INTRAMUSCULAR EVERY 4 HOURS PRN
Status: CANCELLED | OUTPATIENT
Start: 2022-02-08

## 2022-02-08 RX ORDER — ALBUTEROL SULFATE 0.83 MG/ML
2.5 SOLUTION RESPIRATORY (INHALATION)
Status: CANCELLED | OUTPATIENT
Start: 2022-03-22

## 2022-02-08 RX ORDER — HEPARIN SODIUM (PORCINE) LOCK FLUSH IV SOLN 100 UNIT/ML 100 UNIT/ML
5 SOLUTION INTRAVENOUS EVERY 8 HOURS PRN
Status: CANCELLED | OUTPATIENT
Start: 2022-03-22

## 2022-02-08 RX ORDER — ZOLEDRONIC ACID 0.04 MG/ML
4 INJECTION, SOLUTION INTRAVENOUS ONCE
Status: CANCELLED | OUTPATIENT
Start: 2022-02-08 | End: 2022-02-08

## 2022-02-08 RX ORDER — ALBUTEROL SULFATE 90 UG/1
1-2 AEROSOL, METERED RESPIRATORY (INHALATION)
Status: CANCELLED
Start: 2022-02-08

## 2022-02-08 RX ORDER — DIPHENHYDRAMINE HYDROCHLORIDE 50 MG/ML
50 INJECTION INTRAMUSCULAR; INTRAVENOUS
Status: CANCELLED
Start: 2022-02-08

## 2022-02-08 RX ORDER — HEPARIN SODIUM (PORCINE) LOCK FLUSH IV SOLN 100 UNIT/ML 100 UNIT/ML
5 SOLUTION INTRAVENOUS EVERY 8 HOURS PRN
Status: DISCONTINUED | OUTPATIENT
Start: 2022-02-08 | End: 2022-02-08 | Stop reason: HOSPADM

## 2022-02-08 RX ORDER — METHYLPREDNISOLONE SODIUM SUCCINATE 125 MG/2ML
125 INJECTION, POWDER, LYOPHILIZED, FOR SOLUTION INTRAMUSCULAR; INTRAVENOUS
Status: CANCELLED
Start: 2022-02-08

## 2022-02-08 RX ORDER — ALBUTEROL SULFATE 90 UG/1
1-2 AEROSOL, METERED RESPIRATORY (INHALATION)
Status: CANCELLED
Start: 2022-03-22

## 2022-02-08 RX ORDER — NALOXONE HYDROCHLORIDE 0.4 MG/ML
.1-.4 INJECTION, SOLUTION INTRAMUSCULAR; INTRAVENOUS; SUBCUTANEOUS
Status: CANCELLED | OUTPATIENT
Start: 2022-02-08

## 2022-02-08 RX ADMIN — ZOLEDRONIC ACID 3.5 MG: 4 INJECTION, SOLUTION, CONCENTRATE INTRAVENOUS at 14:46

## 2022-02-08 RX ADMIN — SODIUM CHLORIDE 250 ML: 9 INJECTION, SOLUTION INTRAVENOUS at 14:46

## 2022-02-08 RX ADMIN — SODIUM CHLORIDE 400 MG: 9 INJECTION, SOLUTION INTRAVENOUS at 15:06

## 2022-02-08 RX ADMIN — Medication 5 ML: at 15:39

## 2022-02-08 ASSESSMENT — PAIN SCALES - GENERAL: PAINLEVEL: NO PAIN (0)

## 2022-02-08 ASSESSMENT — MIFFLIN-ST. JEOR: SCORE: 1562.2

## 2022-02-08 NOTE — PATIENT INSTRUCTIONS
1. Continue pembrolizumab and zometa every 6 weeks.  2. See De Frausto in 6 weeks.  3. See me in 12 weeks.

## 2022-02-08 NOTE — LETTER
"    2/8/2022         RE: Derrick Benitez  [address]  [facility] [address]        Dear Colleague,    Thank you for referring your patient, Derrick Benitez, to the [facility]. Please see a copy of my visit note below.    Oncology Rooming Note    February 8, 2022 2:00 PM   Derrick Benitez is a 81 year old male who presents for:    Chief Complaint   Patient presents with     Oncology Clinic Visit     Bone metastases (H)     Initial Vitals: BP (!) 161/69   Pulse 55   Temp 98.4  F (36.9  C) (Oral)   Ht 1.702 m (5' 7\")   Wt 90.4 kg (199 lb 3.2 oz)   SpO2 95%   BMI 31.20 kg/m   Estimated body mass index is 31.2 kg/m  as calculated from the following:    Height as of this encounter: 1.702 m (5' 7\").    Weight as of this encounter: 90.4 kg (199 lb 3.2 oz). Body surface area is 2.07 meters squared.  No Pain (0) Comment: Data Unavailable   No LMP for male patient.  Allergies reviewed: Yes  Medications reviewed: Yes    Medications: Medication refills not needed today.  Pharmacy name entered into EPIC:    [facility] [place], [place] - [address] [facility] [address] [place], [place] - [address]    Clinical concerns: None       Angela Bazzi MA                ONCOLOGIC HISTORY:  Mr. Benitez is a gentleman with non-small cell lung cancer, favor squamous cell carcinoma.   -NGS and PDL staining could not be done because of small sample.   -Guardant 360 does not reveal any actionable alteration.  1.  CT chest angiogram on 09/07/2020 revealed right lower lobe mass.   2.  CT-guided biopsy was done on 09/18/2020.  Pathology revealed non-small cell lung cancer, favor squamous cell carcinoma.  Sample size was small.  PDL staining and NGS panel could not be done.   3.  Brain MRI on 09/26/2020 did not reveal any brain metastasis.   4.  PET scan on 10/05/2020 revealed hypermetabolic right lung mass and multiple hypermetabolic bone lesions.   5.  The patient started on " carboplatin and Taxol on 11/25/2020.   -Pembrolizumab added with cycle 2.      SUBJECTIVE:  Mr. Benitez is an 81-year-old gentleman with metastatic non-small cell lung cancer on pembrolizumab. He is also on zometa for bone metastasis.  He is tolerating it well.     His condition is stable.  No headache.  No dizziness.  No chest pain.  No shortness of breath.  No nausea or vomiting. Appetite is good.  He has mild fatigue. His peripheral neuropathy is stable.      All other review of systems is negative.     PHYSICAL EXAMINATION:    He is alert and oriented x 3.  Not in any distress.  Rest of systems not examined.     LABS:  TSH and CMP were reviewed.     ASSESSMENT:    1.  An 81-year-old gentleman with metastatic non-small cell lung cancer. Disease is stable.  2.  Peripheral neuropathy, stable.  3.  Fatigue.  4.  Mild anemia.  5.  Mild thrombocytopenia.     PLAN:    1.  Patient clinically is stable.  He does not have any new symptoms.    CT chest, abdomen and pelvis done yesterday was personally reviewed.  Images shown to the patient.  No evidence of disease in the chest, abdomen and pelvis.  There are stable sclerotic bone metastases.  He was happy to know that.  Explained to the patient that in future we will plan on getting a PET scan to see if there is any hypermetabolic lesion.    2.  Discussed regarding treatment. He will continue on pembrolizumab every 6 weeks.  He is tolerating it well.  My plan is to give pembrolizumab for at least 2 years.  It can be discontinued after that if PET scan is negative for any disease.    3.  He will continue on Zometa every 6 weeks.  He is tolerating it well.  No dental or jaw related symptoms.  He will also continue on calcium and vitamin D twice a day.     4.  Labs were all reviewed.  Overall, they are good for treatment.    5.  He had few questions, which were all answered.  I will see him in 3 months.  In between, he will see our nurse practitioner.  The patient was advised to  call us with any questions or concerns.    Total face to face time spent 25 minutes.  More than 50% time spent in counseling and coordination of care.      Again, thank you for allowing me to participate in the care of your patient.        Sincerely,        Buzz Larsen MD

## 2022-02-08 NOTE — PROGRESS NOTES
Infusion Nursing Note:  Derrick Benitez presents today for Keytruda/Zometa.    Patient seen by provider today: Yes: Chava   present during visit today: Not Applicable.    Note: N/A.      Intravenous Access:  Implanted Port.    Treatment Conditions:  Lab Results   Component Value Date     02/07/2022    POTASSIUM 4.1 02/07/2022    MAG 2.1 10/30/2014    CR 1.2 02/07/2022    ANTHONY 8.5 02/07/2022    BILITOTAL 0.4 02/07/2022    ALBUMIN 3.3 (L) 02/07/2022    ALT 14 02/07/2022    AST 13 02/07/2022     Results reviewed, labs MET treatment parameters, ok to proceed with treatment.  TSH=0.83.      Post Infusion Assessment:  Patient tolerated infusion without incident.  Blood return noted pre and post infusion.  Site patent and intact, free from redness, edema or discomfort.  No evidence of extravasations.  Access discontinued per protocol.       Discharge Plan:   Discharge instructions reviewed with: Patient.  Patient and/or family verbalized understanding of discharge instructions and all questions answered.  AVS to patient via test companyHART.  Patient will return in 6 weeks for next appointment.   Patient discharged in stable condition accompanied by: self.  Departure Mode: Ambulatory.      Musa Taylor RN

## 2022-02-08 NOTE — PROGRESS NOTES
ONCOLOGIC HISTORY:  Mr. Benitez is a gentleman with non-small cell lung cancer, favor squamous cell carcinoma.   -NGS and PDL staining could not be done because of small sample.   -Guardant 360 does not reveal any actionable alteration.  1.  CT chest angiogram on 09/07/2020 revealed right lower lobe mass.   2.  CT-guided biopsy was done on 09/18/2020.  Pathology revealed non-small cell lung cancer, favor squamous cell carcinoma.  Sample size was small.  PDL staining and NGS panel could not be done.   3.  Brain MRI on 09/26/2020 did not reveal any brain metastasis.   4.  PET scan on 10/05/2020 revealed hypermetabolic right lung mass and multiple hypermetabolic bone lesions.   5.  The patient started on carboplatin and Taxol on 11/25/2020.   -Pembrolizumab added with cycle 2.      SUBJECTIVE:  Mr. Benitez is an 81-year-old gentleman with metastatic non-small cell lung cancer on pembrolizumab. He is also on zometa for bone metastasis.  He is tolerating it well.     His condition is stable.  No headache.  No dizziness.  No chest pain.  No shortness of breath.  No nausea or vomiting. Appetite is good.  He has mild fatigue. His peripheral neuropathy is stable.      All other review of systems is negative.     PHYSICAL EXAMINATION:    He is alert and oriented x 3.  Not in any distress.  Rest of systems not examined.     LABS:  TSH and CMP were reviewed.     ASSESSMENT:    1.  An 81-year-old gentleman with metastatic non-small cell lung cancer. Disease is stable.  2.  Peripheral neuropathy, stable.  3.  Fatigue.  4.  Mild anemia.  5.  Mild thrombocytopenia.     PLAN:    1.  Patient clinically is stable.  He does not have any new symptoms.    CT chest, abdomen and pelvis done yesterday was personally reviewed.  Images shown to the patient.  No evidence of disease in the chest, abdomen and pelvis.  There are stable sclerotic bone metastases.  He was happy to know that.  Explained to the patient that in future we will plan on  getting a PET scan to see if there is any hypermetabolic lesion.    2.  Discussed regarding treatment. He will continue on pembrolizumab every 6 weeks.  He is tolerating it well.  My plan is to give pembrolizumab for at least 2 years.  It can be discontinued after that if PET scan is negative for any disease.    3.  He will continue on Zometa every 6 weeks.  He is tolerating it well.  No dental or jaw related symptoms.  He will also continue on calcium and vitamin D twice a day.     4.  Labs were all reviewed.  Overall, they are good for treatment.    5.  He had few questions, which were all answered.  I will see him in 3 months.  In between, he will see our nurse practitioner.  The patient was advised to call us with any questions or concerns.    Total face to face time spent 25 minutes.  More than 50% time spent in counseling and coordination of care.

## 2022-02-08 NOTE — PROGRESS NOTES
"Oncology Rooming Note    February 8, 2022 2:00 PM   Derrick Benitez is a 81 year old male who presents for:    Chief Complaint   Patient presents with     Oncology Clinic Visit     Bone metastases (H)     Initial Vitals: BP (!) 161/69   Pulse 55   Temp 98.4  F (36.9  C) (Oral)   Ht 1.702 m (5' 7\")   Wt 90.4 kg (199 lb 3.2 oz)   SpO2 95%   BMI 31.20 kg/m   Estimated body mass index is 31.2 kg/m  as calculated from the following:    Height as of this encounter: 1.702 m (5' 7\").    Weight as of this encounter: 90.4 kg (199 lb 3.2 oz). Body surface area is 2.07 meters squared.  No Pain (0) Comment: Data Unavailable   No LMP for male patient.  Allergies reviewed: Yes  Medications reviewed: Yes    Medications: Medication refills not needed today.  Pharmacy name entered into EPIC:    [place], [place] - [address]  [facility] [address] [place], [place] - [address]    Clinical concerns: None       Angela Bazzi MA              "

## 2022-02-12 ENCOUNTER — HEALTH MAINTENANCE LETTER (OUTPATIENT)
Age: 82
End: 2022-02-12

## 2022-02-17 ENCOUNTER — VIRTUAL VISIT (OUTPATIENT)
Dept: ONCOLOGY | Facility: CLINIC | Age: 82
End: 2022-02-17
Attending: STUDENT IN AN ORGANIZED HEALTH CARE EDUCATION/TRAINING PROGRAM
Payer: MEDICARE

## 2022-02-17 DIAGNOSIS — Z51.5 ENCOUNTER FOR PALLIATIVE CARE: ICD-10-CM

## 2022-02-17 DIAGNOSIS — G62.0 PERIPHERAL NEUROPATHY DUE TO CHEMOTHERAPY (H): ICD-10-CM

## 2022-02-17 DIAGNOSIS — C34.31 MALIGNANT NEOPLASM OF LOWER LOBE OF RIGHT LUNG (H): Primary | ICD-10-CM

## 2022-02-17 DIAGNOSIS — T45.1X5A PERIPHERAL NEUROPATHY DUE TO CHEMOTHERAPY (H): ICD-10-CM

## 2022-02-17 DIAGNOSIS — G89.3 CANCER RELATED PAIN: ICD-10-CM

## 2022-02-17 PROCEDURE — 99441 PR PHYSICIAN TELEPHONE EVALUATION 5-10 MIN: CPT | Mod: 95 | Performed by: STUDENT IN AN ORGANIZED HEALTH CARE EDUCATION/TRAINING PROGRAM

## 2022-02-17 NOTE — LETTER
2022         RE: Derrick Benitez  [address]  [facility] [address]        Dear Colleague,    Thank you for referring your patient, Derrick Benitez, to the [facility] [place]. Please see a copy of my visit note below.    Derrick is a 81 year old who is being evaluated via a billable telephone visit.      What phone number would you like to be contacted at? [phone]   How would you like to obtain your AVS? Juan Finney    Palliative Care Clinic Progress Note    Patient Name: Derrick Benitez  Primary Provider: Clarisse Golden    Chief Complaint/Patient ID: 80yo M with NSCLC  - RLL mass dx , no PDL staining could be done on bx. Bone mets at time of dx.  Carbo, taxol, pembro x 2020. Now on maintenance with pembrolizumab Q6 Weeks.    Last Palliative care appointment: 10/14/21 with me.      Reviewed: Yes, no concerns. Last Rx for oxycodone in 2021.    Interim History:  Derrick Benitez 81 year old male returns to Palliative Care today for follow up via billable telephone encounter.      Reviewed oncology note from .  Overall disease is stable.  Discussed that plan will be to repeat a PET scan in the future to look for hypermetabolic areas.  Plan is to give the Keytruda for at least 2 years, however can be discontinued after that if the PET scan is negative for any disease.    Overall doing well. 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 Still a little neuropathy in the feet but tolerable and not feeling like he needs any medications. Gets pain in shoulders, using 400mg advil three times daily. Has not been taking Tylenol.    Says things have been better with scheduling appts.      Social History:  Lives with his wife.  Has a hobby snowblower/yard business.  Social History     Tobacco Use     Smoking status: Former Smoker     Packs/day: 3.00     Years: 40.00     Pack years: 120.00     Quit date: 10/28/2004     Years since quittin.3     Smokeless  tobacco: Never Used   Substance Use Topics     Alcohol use: No     Comment: states he quit 30 years ago     Drug use: No       Family History- Reviewed in Epic.    Allergies   Allergen Reactions     Losartan Other (See Comments)     Fatigue, weakness in legs     Methenamine        Advanced Care Planning: HCD scanned in chart.  Names his wife as primary HCA, followed by his son, followed by his daughter.    Medications- Reviewed in Epic.    Past Medical History- Reviewed in Epic.    Past Surgical History- Reviewed in Epic.    Review of Systems:   ROS: 10 point ROS neg other than the symptoms noted above in the HPI.    Key Data Reviewed:  LABS: 2/7/2022- Cr 1.12, GFR 66, Albumin 3.3, LFTs WNL.   12/27/2021- WBC 5.5, Hgb 13, Plts 131.     IMAGING: CT CAP 2/7/2022- IMPRESSION: Stable skeletal metastases. No new sites of disease or  significant change compared to previous.    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  Now on maintenance pembrolizumab treatment.     Overall doing well. Currently taking 1200mg of ibuprofen in a day. Discussed that I would not go higher than this due to effect on kidneys and his lower platelets. Discussed adding in Tylenol to supplement, up to 3000mg daily. If not sufficient with current dosing, would recommend prescription for celebrex 200mg BID instead of the ibuprofen.    Follow up: 6 months    Telephone Visit Details  Total length of phone call: 10 mins, Start time 2:35PM    Total time spent on day of encounter is 22 mins, including reviewing record, review of above studies, above visit with patient, symptomatic discussion of pain and neuropathy, including medication adjustments/prescription management, and documentation.     Zulema Montes DO  Palliative Medicine   Pager [phone], AMCOM ID [id number]    Some chart documentation performed using  [employer] Voice recognition Software. Although reviewed after completion, some words and grammatical errors may remain.        Again, thank you for allowing me to participate in the care of your patient.        Sincerely,        Zulema Montes, DO

## 2022-02-17 NOTE — PATIENT INSTRUCTIONS
Recommendations:  - OK to continue with the ibuprofen at current dosing, however, if you are needing more than this, I would recommend we do a prescription for a similar medicine that is a little safer.  - You can take up to 3000mg.    Follow up: 4 months      Reasons to Call    If you are having worsening/uncontrolled symptoms we want you to call!    You or your other physicians make any changes to medications we have prescribed.  -Please call for refills 4-5 days before you will run out of medication.    Important Phone Numbers    [place] [facility] - Amy Johnston. Palliative Care RN - [phone]    [facility] - Xochitl Mattson. Palliative Care RN - [phone]  *For Refills, scheduling, and general questions - [phone]  *After hours or on weekends. Will connect you with on call MD. [phone]

## 2022-02-17 NOTE — LETTER
2022         RE: Derrick Benitez  [address]  [facility] [address]        Dear Colleague,    Thank you for referring your patient, Derrick Benitez, to the [facility] [place]. Please see a copy of my visit note below.    Derrick is a 81 year old who is being evaluated via a billable telephone visit.      What phone number would you like to be contacted at? [phone]   How would you like to obtain your AVS? Juan Finney    Palliative Care Clinic Progress Note    Patient Name: Derrick Benitez  Primary Provider: Clarisse Golden    Chief Complaint/Patient ID: 80yo M with NSCLC  - RLL mass dx , no PDL staining could be done on bx. Bone mets at time of dx.  Carbo, taxol, pembro x 2020. Now on maintenance with pembrolizumab Q6 Weeks.    Last Palliative care appointment: 10/14/21 with me.      Reviewed: Yes, no concerns. Last Rx for oxycodone in 2021.    Interim History:  Derrick Benitez 81 year old male returns to Palliative Care today for follow up via billable telephone encounter.      Reviewed oncology note from .  Overall disease is stable.  Discussed that plan will be to repeat a PET scan in the future to look for hypermetabolic areas.  Plan is to give the Keytruda for at least 2 years, however can be discontinued after that if the PET scan is negative for any disease.    Overall doing well. 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 Still a little neuropathy in the feet but tolerable and not feeling like he needs any medications. Gets pain in shoulders, using 400mg advil three times daily. Has not been taking Tylenol.    Says things have been better with scheduling appts.      Social History:  Lives with his wife.  Has a hobby snowblower/yard business.  Social History     Tobacco Use     Smoking status: Former Smoker     Packs/day: 3.00     Years: 40.00     Pack years: 120.00     Quit date: 10/28/2004     Years since quittin.3     Smokeless  tobacco: Never Used   Substance Use Topics     Alcohol use: No     Comment: states he quit 30 years ago     Drug use: No       Family History- Reviewed in Epic.    Allergies   Allergen Reactions     Losartan Other (See Comments)     Fatigue, weakness in legs     Methenamine        Advanced Care Planning: HCD scanned in chart.  Names his wife as primary HCA, followed by his son, followed by his daughter.    Medications- Reviewed in Epic.    Past Medical History- Reviewed in Epic.    Past Surgical History- Reviewed in Epic.    Review of Systems:   ROS: 10 point ROS neg other than the symptoms noted above in the HPI.    Key Data Reviewed:  LABS: 2/7/2022- Cr 1.12, GFR 66, Albumin 3.3, LFTs WNL.   12/27/2021- WBC 5.5, Hgb 13, Plts 131.     IMAGING: CT CAP 2/7/2022- IMPRESSION: Stable skeletal metastases. No new sites of disease or  significant change compared to previous.    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  Now on maintenance pembrolizumab treatment.     Overall doing well. Currently taking 1200mg of ibuprofen in a day. Discussed that I would not go higher than this due to effect on kidneys and his lower platelets. Discussed adding in Tylenol to supplement, up to 3000mg daily. If not sufficient with current dosing, would recommend prescription for celebrex 200mg BID instead of the ibuprofen.    Follow up: 6 months    Telephone Visit Details  Total length of phone call: 10 mins, Start time 2:35PM    Total time spent on day of encounter is 22 mins, including reviewing record, review of above studies, above visit with patient, symptomatic discussion of pain and neuropathy, including medication adjustments/prescription management, and documentation.     Zulema Montes DO  Palliative Medicine   Pager [phone], AMCOM ID [id number]    Some chart documentation performed using  [employer] Voice recognition Software. Although reviewed after completion, some words and grammatical errors may remain.        Again, thank you for allowing me to participate in the care of your patient.        Sincerely,        Zulema Montes, DO

## 2022-02-17 NOTE — PROGRESS NOTES
Derrick is a 81 year old who is being evaluated via a billable telephone visit.      What phone number would you like to be contacted at? [phone]   How would you like to obtain your AVS? Juan Finney    Palliative Care Clinic Progress Note    Patient Name: Derrick Benitez  Primary Provider: Clarisse Golden    Chief Complaint/Patient ID: 82yo M with NSCLC  - RLL mass dx , no PDL staining could be done on bx. Bone mets at time of dx.  Carbo, taxol, pembro x 2020. Now on maintenance with pembrolizumab Q6 Weeks.    Last Palliative care appointment: 10/14/21 with me.      Reviewed: Yes, no concerns. Last Rx for oxycodone in 2021.    Interim History:  Derrick Benitez 81 year old male returns to Palliative Care today for follow up via billable telephone encounter.      Reviewed oncology note from .  Overall disease is stable.  Discussed that plan will be to repeat a PET scan in the future to look for hypermetabolic areas.  Plan is to give the Keytruda for at least 2 years, however can be discontinued after that if the PET scan is negative for any disease.    Overall doing well. 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 Still a little neuropathy in the feet but tolerable and not feeling like he needs any medications. Gets pain in shoulders, using 400mg advil three times daily. Has not been taking Tylenol.    Says things have been better with scheduling appts.      Social History:  Lives with his wife.  Has a hobby snowblower/yard business.  Social History     Tobacco Use     Smoking status: Former Smoker     Packs/day: 3.00     Years: 40.00     Pack years: 120.00     Quit date: 10/28/2004     Years since quittin.3     Smokeless tobacco: Never Used   Substance Use Topics     Alcohol use: No     Comment: states he quit 30 years ago     Drug use: No       Family History- Reviewed in Epic.    Allergies   Allergen Reactions     Losartan Other (See Comments)     Fatigue, weakness  in legs     Methenamine        Advanced Care Planning: HCD scanned in chart.  Names his wife as primary HCA, followed by his son, followed by his daughter.    Medications- Reviewed in Epic.    Past Medical History- Reviewed in [facility].    Past Surgical History- Reviewed in Epic.    Review of Systems:   ROS: 10 point ROS neg other than the symptoms noted above in the HPI.    Key Data Reviewed:  LABS: 2/7/2022- Cr 1.12, GFR 66, Albumin 3.3, LFTs WNL.   12/27/2021- WBC 5.5, Hgb 13, Plts 131.     IMAGING: CT CAP 2/7/2022- IMPRESSION: Stable skeletal metastases. No new sites of disease or  significant change compared to previous.    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  Now on maintenance pembrolizumab treatment.     Overall doing well. Currently taking 1200mg of ibuprofen in a day. Discussed that I would not go higher than this due to effect on kidneys and his lower platelets. Discussed adding in Tylenol to supplement, up to 3000mg daily. If not sufficient with current dosing, would recommend prescription for celebrex 200mg BID instead of the ibuprofen.    Follow up: 6 months    Telephone Visit Details  Total length of phone call: 10 mins, Start time 2:35PM    Total time spent on day of encounter is 22 mins, including reviewing record, review of above studies, above visit with patient, symptomatic discussion of pain and neuropathy, including medication adjustments/prescription management, and documentation.     Zulema Montes,   Palliative Medicine   Pager [phone], AMCOM ID [id number]    Some chart documentation performed using Dragon Voice recognition Software. Although reviewed after completion, some words and grammatical errors may remain.

## 2022-03-21 ENCOUNTER — LAB (OUTPATIENT)
Dept: INFUSION THERAPY | Facility: CLINIC | Age: 82
End: 2022-03-21
Attending: STUDENT IN AN ORGANIZED HEALTH CARE EDUCATION/TRAINING PROGRAM
Payer: MEDICARE

## 2022-03-21 DIAGNOSIS — C34.31 MALIGNANT NEOPLASM OF LOWER LOBE OF RIGHT LUNG (H): ICD-10-CM

## 2022-03-21 DIAGNOSIS — Z51.11 ENCOUNTER FOR ANTINEOPLASTIC CHEMOTHERAPY: Primary | ICD-10-CM

## 2022-03-21 LAB
ALBUMIN SERPL-MCNC: 3 G/DL (ref 3.4–5)
ALP SERPL-CCNC: 64 U/L (ref 40–150)
ALT SERPL W P-5'-P-CCNC: 15 U/L (ref 0–70)
ANION GAP SERPL CALCULATED.3IONS-SCNC: 4 MMOL/L (ref 3–14)
AST SERPL W P-5'-P-CCNC: 15 U/L (ref 0–45)
BASOPHILS # BLD AUTO: 0 10E3/UL (ref 0–0.2)
BASOPHILS NFR BLD AUTO: 0 %
BILIRUB SERPL-MCNC: 0.6 MG/DL (ref 0.2–1.3)
BUN SERPL-MCNC: 16 MG/DL (ref 7–30)
CALCIUM SERPL-MCNC: 8.1 MG/DL (ref 8.5–10.1)
CHLORIDE BLD-SCNC: 111 MMOL/L (ref 94–109)
CO2 SERPL-SCNC: 25 MMOL/L (ref 20–32)
CREAT SERPL-MCNC: 1.13 MG/DL (ref 0.66–1.25)
EOSINOPHIL # BLD AUTO: 0.1 10E3/UL (ref 0–0.7)
EOSINOPHIL NFR BLD AUTO: 2 %
ERYTHROCYTE [DISTWIDTH] IN BLOOD BY AUTOMATED COUNT: 13.2 % (ref 10–15)
GFR SERPL CREATININE-BSD FRML MDRD: 65 ML/MIN/1.73M2
GLUCOSE BLD-MCNC: 152 MG/DL (ref 70–99)
HCT VFR BLD AUTO: 40.1 % (ref 40–53)
HGB BLD-MCNC: 13.5 G/DL (ref 13.3–17.7)
IMM GRANULOCYTES # BLD: 0 10E3/UL
IMM GRANULOCYTES NFR BLD: 1 %
LYMPHOCYTES # BLD AUTO: 1.2 10E3/UL (ref 0.8–5.3)
LYMPHOCYTES NFR BLD AUTO: 19 %
MCH RBC QN AUTO: 31.9 PG (ref 26.5–33)
MCHC RBC AUTO-ENTMCNC: 33.7 G/DL (ref 31.5–36.5)
MCV RBC AUTO: 95 FL (ref 78–100)
MONOCYTES # BLD AUTO: 0.4 10E3/UL (ref 0–1.3)
MONOCYTES NFR BLD AUTO: 6 %
NEUTROPHILS # BLD AUTO: 4.5 10E3/UL (ref 1.6–8.3)
NEUTROPHILS NFR BLD AUTO: 72 %
NRBC # BLD AUTO: 0 10E3/UL
NRBC BLD AUTO-RTO: 0 /100
PLATELET # BLD AUTO: 136 10E3/UL (ref 150–450)
POTASSIUM BLD-SCNC: 4.1 MMOL/L (ref 3.4–5.3)
PROT SERPL-MCNC: 6.5 G/DL (ref 6.8–8.8)
RBC # BLD AUTO: 4.23 10E6/UL (ref 4.4–5.9)
SODIUM SERPL-SCNC: 140 MMOL/L (ref 133–144)
TSH SERPL DL<=0.005 MIU/L-ACNC: 0.89 MU/L (ref 0.4–4)
WBC # BLD AUTO: 6.2 10E3/UL (ref 4–11)

## 2022-03-21 PROCEDURE — 84443 ASSAY THYROID STIM HORMONE: CPT | Performed by: INTERNAL MEDICINE

## 2022-03-21 PROCEDURE — 80053 COMPREHEN METABOLIC PANEL: CPT | Performed by: INTERNAL MEDICINE

## 2022-03-21 PROCEDURE — 85025 COMPLETE CBC W/AUTO DIFF WBC: CPT | Performed by: INTERNAL MEDICINE

## 2022-03-21 PROCEDURE — 250N000011 HC RX IP 250 OP 636: Performed by: INTERNAL MEDICINE

## 2022-03-21 PROCEDURE — 36591 DRAW BLOOD OFF VENOUS DEVICE: CPT | Performed by: INTERNAL MEDICINE

## 2022-03-21 RX ORDER — HEPARIN SODIUM (PORCINE) LOCK FLUSH IV SOLN 100 UNIT/ML 100 UNIT/ML
5 SOLUTION INTRAVENOUS
Status: DISCONTINUED | OUTPATIENT
Start: 2022-03-21 | End: 2022-03-21 | Stop reason: HOSPADM

## 2022-03-21 RX ADMIN — Medication 5 ML: at 10:34

## 2022-03-21 NOTE — PROGRESS NOTES
Nursing Note:  Derrick Benitez presents today for port labs.    Patient seen by provider today: No   present during visit today: Not Applicable.    Note: N/A.    Intravenous Access:  Labs drawn without difficulty.  Implanted Port.    Discharge Plan:   Patient was sent to home for 3/22 appointment.    Porsha Wild RN

## 2022-03-22 ENCOUNTER — ONCOLOGY VISIT (OUTPATIENT)
Dept: ONCOLOGY | Facility: CLINIC | Age: 82
End: 2022-03-22
Attending: INTERNAL MEDICINE
Payer: MEDICARE

## 2022-03-22 ENCOUNTER — INFUSION THERAPY VISIT (OUTPATIENT)
Dept: INFUSION THERAPY | Facility: CLINIC | Age: 82
End: 2022-03-22
Attending: INTERNAL MEDICINE
Payer: MEDICARE

## 2022-03-22 VITALS
WEIGHT: 169 LBS | OXYGEN SATURATION: 95 % | HEART RATE: 74 BPM | BODY MASS INDEX: 26.47 KG/M2 | TEMPERATURE: 98.4 F | RESPIRATION RATE: 16 BRPM | DIASTOLIC BLOOD PRESSURE: 57 MMHG | SYSTOLIC BLOOD PRESSURE: 143 MMHG

## 2022-03-22 DIAGNOSIS — C79.51 BONE METASTASES: ICD-10-CM

## 2022-03-22 DIAGNOSIS — C34.31 MALIGNANT NEOPLASM OF LOWER LOBE OF RIGHT LUNG (H): Primary | ICD-10-CM

## 2022-03-22 DIAGNOSIS — Z51.11 ENCOUNTER FOR ANTINEOPLASTIC CHEMOTHERAPY: ICD-10-CM

## 2022-03-22 DIAGNOSIS — C79.51 BONE METASTASES: Primary | ICD-10-CM

## 2022-03-22 DIAGNOSIS — C34.31 MALIGNANT NEOPLASM OF LOWER LOBE OF RIGHT LUNG (H): ICD-10-CM

## 2022-03-22 PROCEDURE — G0463 HOSPITAL OUTPT CLINIC VISIT: HCPCS | Mod: 25

## 2022-03-22 PROCEDURE — 258N000003 HC RX IP 258 OP 636: Performed by: INTERNAL MEDICINE

## 2022-03-22 PROCEDURE — 96365 THER/PROPH/DIAG IV INF INIT: CPT

## 2022-03-22 PROCEDURE — 96367 TX/PROPH/DG ADDL SEQ IV INF: CPT

## 2022-03-22 PROCEDURE — 99214 OFFICE O/P EST MOD 30 MIN: CPT | Performed by: NURSE PRACTITIONER

## 2022-03-22 PROCEDURE — 258N000003 HC RX IP 258 OP 636: Performed by: NURSE PRACTITIONER

## 2022-03-22 PROCEDURE — 96413 CHEMO IV INFUSION 1 HR: CPT

## 2022-03-22 PROCEDURE — 250N000011 HC RX IP 250 OP 636: Performed by: NURSE PRACTITIONER

## 2022-03-22 PROCEDURE — 250N000011 HC RX IP 250 OP 636: Performed by: INTERNAL MEDICINE

## 2022-03-22 RX ORDER — HEPARIN SODIUM,PORCINE 10 UNIT/ML
5 VIAL (ML) INTRAVENOUS
Status: CANCELLED | OUTPATIENT
Start: 2022-03-22

## 2022-03-22 RX ORDER — ZOLEDRONIC ACID 0.04 MG/ML
4 INJECTION, SOLUTION INTRAVENOUS ONCE
Status: CANCELLED | OUTPATIENT
Start: 2022-03-22 | End: 2022-03-22

## 2022-03-22 RX ORDER — HEPARIN SODIUM (PORCINE) LOCK FLUSH IV SOLN 100 UNIT/ML 100 UNIT/ML
5 SOLUTION INTRAVENOUS
Status: CANCELLED | OUTPATIENT
Start: 2022-03-22

## 2022-03-22 RX ORDER — HEPARIN SODIUM (PORCINE) LOCK FLUSH IV SOLN 100 UNIT/ML 100 UNIT/ML
5 SOLUTION INTRAVENOUS
Status: DISCONTINUED | OUTPATIENT
Start: 2022-03-22 | End: 2022-03-22 | Stop reason: HOSPADM

## 2022-03-22 RX ADMIN — SODIUM CHLORIDE 400 MG: 9 INJECTION, SOLUTION INTRAVENOUS at 12:04

## 2022-03-22 RX ADMIN — ZOLEDRONIC ACID 3.5 MG: 4 INJECTION INTRAVENOUS at 11:44

## 2022-03-22 RX ADMIN — SODIUM CHLORIDE 250 ML: 9 INJECTION, SOLUTION INTRAVENOUS at 11:44

## 2022-03-22 RX ADMIN — Medication 5 ML: at 12:49

## 2022-03-22 ASSESSMENT — PAIN SCALES - GENERAL: PAINLEVEL: NO PAIN (1)

## 2022-03-22 NOTE — PROGRESS NOTES
"Oncology Rooming Note    March 22, 2022 10:54 AM   Derrick Benitez is a 81 year old male who presents for:    Chief Complaint   Patient presents with     Oncology Clinic Visit     Initial Vitals: Pulse 74   Temp 98.4  F (36.9  C) (Oral)   Resp 16   Wt 76.7 kg (169 lb)   SpO2 95%   BMI 26.47 kg/m   Estimated body mass index is 26.47 kg/m  as calculated from the following:    Height as of 2/8/22: 1.702 m (5' 7\").    Weight as of this encounter: 76.7 kg (169 lb). Body surface area is 1.9 meters squared.  No Pain (1) Comment: Data Unavailable   No LMP for male patient.  Allergies reviewed: Yes  Medications reviewed: Yes    Medications: Medication refills not needed today.  Pharmacy name entered into EPIC:    [place], [place] - [address]  [facility] [address] [place], [place] - [address] [place] [address]    Clinical concerns:  NP was notified.      Corie Vieira CMA            "

## 2022-03-22 NOTE — LETTER
"    3/22/2022         RE: Derrick Benitez  [address]  [facility] [address]        Dear Colleague,    Thank you for referring your patient, Derrick Benitez, to the [facility]. Please see a copy of my visit note below.    Oncology Rooming Note    March 22, 2022 10:54 AM   Derrick Benitez is a 81 year old male who presents for:    Chief Complaint   Patient presents with     Oncology Clinic Visit     Initial Vitals: Pulse 74   Temp 98.4  F (36.9  C) (Oral)   Resp 16   Wt 76.7 kg (169 lb)   SpO2 95%   BMI 26.47 kg/m   Estimated body mass index is 26.47 kg/m  as calculated from the following:    Height as of 2/8/22: 1.702 m (5' 7\").    Weight as of this encounter: 76.7 kg (169 lb). Body surface area is 1.9 meters squared.  No Pain (1) Comment: Data Unavailable   No LMP for male patient.  Allergies reviewed: Yes  Medications reviewed: Yes    Medications: Medication refills not needed today.  Pharm[facility] entered into EPIC:    [facility] [place] [place] [facility] [address]  [facility] [address] [place], [place] - [address] [place] [address] [address]    Clinical concerns:  NP was notified.      Corie Vieira [facility]                  Oncology/Hematology Visit Note  Mar 22, 2022    Reason for Visit:   Lung cancer  Metastatic lung squamous cell carcinoma    Completed carboplatin Taxol and pembrolizumab x 6 cycle on 03/10/2021  03/31-on maintenance with single agent with pembrolizumab  02/07/2022-CT chest abdomen pelvis reveals stable skeletal metastasis no new sites of disease    Interval History:  Patient reports overall feeling well he reports he has been tolerating treatment well denies fever chills sweats cough shortness of breath chest pain nausea vomiting diarrhea abdominal pain or bleeding    Review of Systems:  14 point ROS of systems including Constitutional, Eyes, Respiratory, Cardiovascular, Gastroenterology, Genitourinary, Integumentary, Muscularskeletal, Psychiatric were all " negative except for pertinent positives noted in my HPI.        Physical Examination:  Physical Exam  Eyes:      Pupils: Pupils are equal, round, and reactive to light.   Cardiovascular:      Rate and Rhythm: Normal rate.      Pulses: Normal pulses.   Pulmonary:      Effort: Pulmonary effort is normal.   Abdominal:      General: Abdomen is flat.   Musculoskeletal:      Cervical back: Normal range of motion.   Skin:     General: Skin is warm.   Neurological:      General: No focal deficit present.      Mental Status: He is alert.   Psychiatric:         Mood and Affect: Mood normal.         Laboratory Data:  CBC CMP and TSH results reviewed    Assessment and Plan:  This is a 81-year-old male with    Lung cancer  -Metastatic lung squamous cell carcinoma   status post carboplatinTaxol and pembrolizumab-completed 6 cycles in March 2021  -Currently on maintenance with immunotherapy pembrolizumab every 6 weeks  02/07/2022-CT chest abdomen pelvis reveals stable skeletal metastasis no new sites of disease  Labs reviewed abnormalities discussed  Okay to proceed with treatment  Schedule for next pembrolizumab in 6 weeks and follow-up with Dr. Chava Wang mets  -Patient currently on Zometa every 4 to 6 weeks  Labs reviewed okay to give Zometa  -Continue with vitamin D and calcium  Continue with Zometa every 6 weeks    Anemia  Patient is asymptomatic  Stable hemoglobin patient denies bleeding  Continue to monitor    Thrombocytopenia  Continue to monitor closely  Complications of thrombocytopenia reviewed  Call our clinic in the event of bleeding, bruising fall or injury      Patient advised to call our clinic in the event of fever chills sweats cough shortness of breath chest pain nausea vomiting diarrhea abdominal pain bleeding or any changes in health condition    DARSHANA Meza [facility]     Chart documentation with Dragon Voice recognition Software. Although reviewed after  completion, some words and grammatical errors may remain.            Again, thank you for allowing me to participate in the care of your patient.        Sincerely,        DARSHANA Meza CNP

## 2022-03-22 NOTE — PROGRESS NOTES
Infusion Nursing Note:  Derrick Benitez presents today for C13D1 Keytruda/Zometa.    Patient seen by provider today: No   present during visit today: Not Applicable.    Note: Patient reports feeling well today.  No new concerns or changes to report since his visit with De Frausto NP.       Intravenous Access:  Implanted Port.    Treatment Conditions:  Lab Results   Component Value Date    HGB 13.5 03/21/2022    WBC 6.2 03/21/2022    ANEU 3.4 06/01/2021    ANEUTAUTO 4.5 03/21/2022     (L) 03/21/2022      Lab Results   Component Value Date     03/21/2022    POTASSIUM 4.1 03/21/2022    MAG 2.1 10/30/2014    CR 1.13 03/21/2022    ANTHONY 8.1 (L) 03/21/2022    BILITOTAL 0.6 03/21/2022    ALBUMIN 3.0 (L) 03/21/2022    ALT 15 03/21/2022    AST 15 03/21/2022     Results reviewed, labs MET treatment parameters, ok to proceed with treatment.      Post Infusion Assessment:  Patient tolerated infusion without incident.  Blood return noted pre and post infusion.  Site patent and intact, free from redness, edema or discomfort.  No evidence of extravasations.  Access discontinued per protocol.       Discharge Plan:   Patient declined prescription refills.  Discharge instructions reviewed with: Patient.  Patient and/or family verbalized understanding of discharge instructions and all questions answered.  Copy of AVS reviewed with patient and/or family.  Patient will return 5/2/22 for labs for for next appointment.  Patient discharged in stable condition accompanied by: self.  Departure Mode: Ambulatory.      Brii Sheets RN

## 2022-03-22 NOTE — PROGRESS NOTES
Oncology/Hematology Visit Note  Mar 22, 2022    Reason for Visit:   Lung cancer  Metastatic lung squamous cell carcinoma  Completed carboplatin Taxol and pembrolizumab x 6 cycle on 03/10/2021  03/31-on maintenance with single agent with pembrolizumab  02/07/2022-CT chest abdomen pelvis reveals stable skeletal metastasis no new sites of disease    Interval History:  Patient reports overall feeling well he reports he has been tolerating treatment well denies fever chills sweats cough shortness of breath chest pain nausea vomiting diarrhea abdominal pain or bleeding    Review of Systems:  14 point ROS of systems including Constitutional, Eyes, Respiratory, Cardiovascular, Gastroenterology, Genitourinary, Integumentary, Muscularskeletal, Psychiatric were all negative except for pertinent positives noted in my HPI.    Physical Examination:  Physical Exam  Eyes:      Pupils: Pupils are equal, round, and reactive to light.   Cardiovascular:      Rate and Rhythm: Normal rate.      Pulses: Normal pulses.   Pulmonary:      Effort: Pulmonary effort is normal.   Abdominal:      General: Abdomen is flat.   Musculoskeletal:      Cervical back: Normal range of motion.   Skin:     General: Skin is warm.   Neurological:      General: No focal deficit present.      Mental Status: He is alert.   Psychiatric:         Mood and Affect: Mood normal.       Laboratory Data:  CBC CMP and TSH results reviewed    Assessment and Plan:  This is a 81-year-old male with    Lung cancer  -Metastatic lung squamous cell carcinoma   status post carboplatinTaxol and pembrolizumab-completed 6 cycles in March 2021  -Currently on maintenance with immunotherapy pembrolizumab every 6 weeks  02/07/2022-CT chest abdomen pelvis reveals stable skeletal metastasis no new sites of disease  Labs reviewed abnormalities discussed  Okay to proceed with treatment  Schedule for next pembrolizumab in 6 weeks and follow-up with Dr. Chava Wang mets  -Patient currently  on Zometa every 4 to 6 weeks  Labs reviewed okay to give Zometa  -Continue with vitamin D and calcium  Continue with Zometa every 6 weeks    Anemia  Patient is asymptomatic  Stable hemoglobin patient denies bleeding  Continue to monitor    Thrombocytopenia  Continue to monitor closely  Complications of thrombocytopenia reviewed  Call our clinic in the event of bleeding, bruising fall or injury      Patient advised to call our clinic in the event of fever chills sweats cough shortness of breath chest pain nausea vomiting diarrhea abdominal pain bleeding or any changes in health condition.    DARSHANA Meza [facility]     Chart documentation with Dragon Voice recognition Software. Although reviewed after completion, some words and grammatical errors may remain.

## 2022-04-09 ENCOUNTER — HEALTH MAINTENANCE LETTER (OUTPATIENT)
Age: 82
End: 2022-04-09

## 2022-05-02 ENCOUNTER — LAB (OUTPATIENT)
Dept: INFUSION THERAPY | Facility: CLINIC | Age: 82
End: 2022-05-02
Attending: INTERNAL MEDICINE
Payer: MEDICARE

## 2022-05-02 DIAGNOSIS — Z51.11 ENCOUNTER FOR ANTINEOPLASTIC CHEMOTHERAPY: Primary | ICD-10-CM

## 2022-05-02 DIAGNOSIS — C34.31 MALIGNANT NEOPLASM OF LOWER LOBE OF RIGHT LUNG (H): ICD-10-CM

## 2022-05-02 LAB
ALBUMIN SERPL-MCNC: 3 G/DL (ref 3.4–5)
ALP SERPL-CCNC: 62 U/L (ref 40–150)
ALT SERPL W P-5'-P-CCNC: 15 U/L (ref 0–70)
ANION GAP SERPL CALCULATED.3IONS-SCNC: 5 MMOL/L (ref 3–14)
AST SERPL W P-5'-P-CCNC: 15 U/L (ref 0–45)
BILIRUB SERPL-MCNC: 0.4 MG/DL (ref 0.2–1.3)
BUN SERPL-MCNC: 14 MG/DL (ref 7–30)
CALCIUM SERPL-MCNC: 7.8 MG/DL (ref 8.5–10.1)
CHLORIDE BLD-SCNC: 112 MMOL/L (ref 94–109)
CO2 SERPL-SCNC: 24 MMOL/L (ref 20–32)
CREAT SERPL-MCNC: 1.01 MG/DL (ref 0.66–1.25)
ERYTHROCYTE [DISTWIDTH] IN BLOOD BY AUTOMATED COUNT: 13 % (ref 10–15)
GFR SERPL CREATININE-BSD FRML MDRD: 74 ML/MIN/1.73M2
GLUCOSE BLD-MCNC: 149 MG/DL (ref 70–99)
HCT VFR BLD AUTO: 39.9 % (ref 40–53)
HGB BLD-MCNC: 13.5 G/DL (ref 13.3–17.7)
MCH RBC QN AUTO: 31.8 PG (ref 26.5–33)
MCHC RBC AUTO-ENTMCNC: 33.8 G/DL (ref 31.5–36.5)
MCV RBC AUTO: 94 FL (ref 78–100)
PLATELET # BLD AUTO: 141 10E3/UL (ref 150–450)
POTASSIUM BLD-SCNC: 4 MMOL/L (ref 3.4–5.3)
PROT SERPL-MCNC: 6.4 G/DL (ref 6.8–8.8)
RBC # BLD AUTO: 4.25 10E6/UL (ref 4.4–5.9)
SODIUM SERPL-SCNC: 141 MMOL/L (ref 133–144)
TSH SERPL DL<=0.005 MIU/L-ACNC: 0.63 MU/L (ref 0.4–4)
WBC # BLD AUTO: 5.6 10E3/UL (ref 4–11)

## 2022-05-02 PROCEDURE — 85027 COMPLETE CBC AUTOMATED: CPT | Performed by: INTERNAL MEDICINE

## 2022-05-02 PROCEDURE — 36591 DRAW BLOOD OFF VENOUS DEVICE: CPT | Performed by: INTERNAL MEDICINE

## 2022-05-02 PROCEDURE — 82374 ASSAY BLOOD CARBON DIOXIDE: CPT | Performed by: INTERNAL MEDICINE

## 2022-05-02 PROCEDURE — 250N000011 HC RX IP 250 OP 636: Performed by: INTERNAL MEDICINE

## 2022-05-02 PROCEDURE — 82947 ASSAY GLUCOSE BLOOD QUANT: CPT | Performed by: INTERNAL MEDICINE

## 2022-05-02 PROCEDURE — 84443 ASSAY THYROID STIM HORMONE: CPT | Performed by: INTERNAL MEDICINE

## 2022-05-02 RX ORDER — HEPARIN SODIUM (PORCINE) LOCK FLUSH IV SOLN 100 UNIT/ML 100 UNIT/ML
5 SOLUTION INTRAVENOUS
Status: DISCONTINUED | OUTPATIENT
Start: 2022-05-02 | End: 2022-05-02 | Stop reason: HOSPADM

## 2022-05-02 RX ADMIN — Medication 5 ML: at 09:29

## 2022-05-02 NOTE — PROGRESS NOTES
Nursing Note:  Derrick Benitez presents today for port labs.    Patient seen by provider today: No   present during visit today: Not Applicable.    Note: N/A.    Intravenous Access:  Labs drawn without difficulty.  Implanted Port.    Discharge Plan:   Patient was sent to home for 5/3 appointment.    Porsha Wild RN

## 2022-05-03 ENCOUNTER — ONCOLOGY VISIT (OUTPATIENT)
Dept: ONCOLOGY | Facility: CLINIC | Age: 82
End: 2022-05-03
Attending: NURSE PRACTITIONER
Payer: MEDICARE

## 2022-05-03 ENCOUNTER — INFUSION THERAPY VISIT (OUTPATIENT)
Dept: INFUSION THERAPY | Facility: CLINIC | Age: 82
End: 2022-05-03
Attending: INTERNAL MEDICINE
Payer: MEDICARE

## 2022-05-03 VITALS
BODY MASS INDEX: 31.39 KG/M2 | SYSTOLIC BLOOD PRESSURE: 149 MMHG | RESPIRATION RATE: 16 BRPM | TEMPERATURE: 98.6 F | OXYGEN SATURATION: 95 % | DIASTOLIC BLOOD PRESSURE: 53 MMHG | HEART RATE: 54 BPM | WEIGHT: 200.4 LBS

## 2022-05-03 DIAGNOSIS — Z51.11 ENCOUNTER FOR ANTINEOPLASTIC CHEMOTHERAPY: Primary | ICD-10-CM

## 2022-05-03 DIAGNOSIS — C79.51 BONE METASTASES: ICD-10-CM

## 2022-05-03 DIAGNOSIS — C34.31 MALIGNANT NEOPLASM OF LOWER LOBE OF RIGHT LUNG (H): Primary | ICD-10-CM

## 2022-05-03 DIAGNOSIS — C34.31 MALIGNANT NEOPLASM OF LOWER LOBE OF RIGHT LUNG (H): ICD-10-CM

## 2022-05-03 PROCEDURE — 250N000011 HC RX IP 250 OP 636: Performed by: NURSE PRACTITIONER

## 2022-05-03 PROCEDURE — 258N000003 HC RX IP 258 OP 636: Performed by: NURSE PRACTITIONER

## 2022-05-03 PROCEDURE — 96413 CHEMO IV INFUSION 1 HR: CPT

## 2022-05-03 PROCEDURE — G0463 HOSPITAL OUTPT CLINIC VISIT: HCPCS | Mod: 25

## 2022-05-03 PROCEDURE — 99214 OFFICE O/P EST MOD 30 MIN: CPT | Performed by: NURSE PRACTITIONER

## 2022-05-03 PROCEDURE — 96375 TX/PRO/DX INJ NEW DRUG ADDON: CPT

## 2022-05-03 RX ORDER — HEPARIN SODIUM,PORCINE 10 UNIT/ML
5 VIAL (ML) INTRAVENOUS
Status: CANCELLED | OUTPATIENT
Start: 2022-05-03

## 2022-05-03 RX ORDER — ZOLEDRONIC ACID 0.04 MG/ML
4 INJECTION, SOLUTION INTRAVENOUS ONCE
Status: COMPLETED | OUTPATIENT
Start: 2022-05-03 | End: 2022-05-03

## 2022-05-03 RX ORDER — METHYLPREDNISOLONE SODIUM SUCCINATE 125 MG/2ML
125 INJECTION, POWDER, LYOPHILIZED, FOR SOLUTION INTRAMUSCULAR; INTRAVENOUS
Status: CANCELLED
Start: 2022-05-03

## 2022-05-03 RX ORDER — HEPARIN SODIUM (PORCINE) LOCK FLUSH IV SOLN 100 UNIT/ML 100 UNIT/ML
5 SOLUTION INTRAVENOUS EVERY 8 HOURS PRN
Status: CANCELLED | OUTPATIENT
Start: 2022-05-03

## 2022-05-03 RX ORDER — DIPHENHYDRAMINE HYDROCHLORIDE 50 MG/ML
50 INJECTION INTRAMUSCULAR; INTRAVENOUS
Status: CANCELLED
Start: 2022-05-03

## 2022-05-03 RX ORDER — HEPARIN SODIUM (PORCINE) LOCK FLUSH IV SOLN 100 UNIT/ML 100 UNIT/ML
5 SOLUTION INTRAVENOUS
Status: CANCELLED | OUTPATIENT
Start: 2022-05-03

## 2022-05-03 RX ORDER — MEPERIDINE HYDROCHLORIDE 25 MG/ML
25 INJECTION INTRAMUSCULAR; INTRAVENOUS; SUBCUTANEOUS EVERY 30 MIN PRN
Status: CANCELLED | OUTPATIENT
Start: 2022-05-03

## 2022-05-03 RX ORDER — SODIUM CHLORIDE 9 MG/ML
1000 INJECTION, SOLUTION INTRAVENOUS CONTINUOUS PRN
Status: CANCELLED
Start: 2022-05-03

## 2022-05-03 RX ORDER — EPINEPHRINE 1 MG/ML
0.3 INJECTION, SOLUTION INTRAMUSCULAR; SUBCUTANEOUS EVERY 5 MIN PRN
Status: CANCELLED | OUTPATIENT
Start: 2022-05-03

## 2022-05-03 RX ORDER — ALBUTEROL SULFATE 0.83 MG/ML
2.5 SOLUTION RESPIRATORY (INHALATION)
Status: CANCELLED | OUTPATIENT
Start: 2022-05-03

## 2022-05-03 RX ORDER — LORAZEPAM 2 MG/ML
0.5 INJECTION INTRAMUSCULAR EVERY 4 HOURS PRN
Status: CANCELLED | OUTPATIENT
Start: 2022-05-03

## 2022-05-03 RX ORDER — HEPARIN SODIUM (PORCINE) LOCK FLUSH IV SOLN 100 UNIT/ML 100 UNIT/ML
5 SOLUTION INTRAVENOUS EVERY 8 HOURS PRN
Status: DISCONTINUED | OUTPATIENT
Start: 2022-05-03 | End: 2022-05-03 | Stop reason: HOSPADM

## 2022-05-03 RX ORDER — ALBUTEROL SULFATE 90 UG/1
1-2 AEROSOL, METERED RESPIRATORY (INHALATION)
Status: CANCELLED
Start: 2022-05-03

## 2022-05-03 RX ORDER — NALOXONE HYDROCHLORIDE 0.4 MG/ML
.1-.4 INJECTION, SOLUTION INTRAMUSCULAR; INTRAVENOUS; SUBCUTANEOUS
Status: CANCELLED | OUTPATIENT
Start: 2022-05-03

## 2022-05-03 RX ORDER — ZOLEDRONIC ACID 0.04 MG/ML
4 INJECTION, SOLUTION INTRAVENOUS ONCE
Status: CANCELLED | OUTPATIENT
Start: 2022-05-03 | End: 2022-05-03

## 2022-05-03 RX ADMIN — ZOLEDRONIC ACID 4 MG: 0.04 INJECTION, SOLUTION INTRAVENOUS at 10:17

## 2022-05-03 RX ADMIN — Medication 5 ML: at 11:05

## 2022-05-03 RX ADMIN — SODIUM CHLORIDE 400 MG: 9 INJECTION, SOLUTION INTRAVENOUS at 10:35

## 2022-05-03 ASSESSMENT — PAIN SCALES - GENERAL: PAINLEVEL: NO PAIN (0)

## 2022-05-03 NOTE — PROGRESS NOTES
Oncology/Hematology Visit Note  May 3, 2022    Reason for Visit:   Lung cancer  Metastatic lung squamous cell carcinoma  Completed carboplatin Taxol and pembrolizumab x 6 cycle on 03/10/2021  03/31-on maintenance with single agent with pembrolizumab  02/07/2022-CT chest abdomen pelvis reveals stable skeletal metastasis no new sites of disease      Patient is currently getting treatment with pembrolizumab every 6 weeks      Interval History:  Patient reports he has been tolerating treatment well he denies fever chills sweats cough shortness of breath chest pain nausea vomiting diarrhea abdominal pain bleeding.  He reports some mild neuropathy in bilateral lower extremities.  Denies swelling patient is ambulatory patient reports he will see his doctor at [facility]    Review of Systems:  14 point ROS of systems including Constitutional, Eyes, Respiratory, Cardiovascular, Gastroenterology, Genitourinary, Integumentary, Muscularskeletal, Psychiatric were all negative except for pertinent positives noted in my HPI.    Physical Examination:  Physical Exam  Eyes:      Pupils: Pupils are equal, round, and reactive to light.   Cardiovascular:      Rate and Rhythm: Normal rate.      Pulses: Normal pulses.   Pulmonary:      Effort: Pulmonary effort is normal.   Abdominal:      General: Abdomen is flat.   Musculoskeletal:      Cervical back: Normal range of motion.   Skin:     General: Skin is warm.   Neurological:      General: No focal deficit present.      Mental Status: He is alert.   Psychiatric:         Mood and Affect: Mood normal.       Laboratory Data:  CBC CMP and TSH results reviewed    Assessment and Plan:  This is a 81-year-old male with    Lung cancer  -Metastatic lung squamous cell carcinoma   status post carboplatinTaxol and pembrolizumab-completed 6 cycles in March 2021  -Currently on maintenance with immunotherapy pembrolizumab every 6 weeks  02/07/2022-CT chest abdomen pelvis reveals stable skeletal  metastasis no new sites of disease  Labs reviewed abnormalities discussed  Okay to proceed with treatment  -Patient would like CT scan before he sees Dr. Larsen in June  Order CT scan in June prior to visit with Dr. Larsen        Bone mets  -Patient currently on Zometa every 4 to 6 weeks  Labs reviewed okay to give Zometa  -Continue with vitamin D and calcium  Continue with Zometa every 6 weeks  Okay to give Zometa today corrected calcium is around 8.7        Anemia  Patient is asymptomatic  Stable hemoglobin patient denies bleeding  Continue to monitor    Thrombocytopenia  Continue to monitor closely  Complications of thrombocytopenia reviewed  Call our clinic in the event of bleeding, bruising fall or injury      Neuropathy  Mild symptoms  -I offered him to see Dr. Caceres or neurology  Patient reports he will follow-up with  At [facility] .  Patient reports in the past his doctor at [facility] ordered medication that worked really well for him for neuropathy      Patient advised to call our clinic in the event of fever chills sweats cough shortness of breath chest pain nausea vomiting diarrhea abdominal pain bleeding or any changes in health condition.    DARSHANA Meza CNP  [facility]     Chart documentation with Dragon Voice recognition Software. Although reviewed after completion, some words and grammatical errors may remain.

## 2022-05-03 NOTE — LETTER
"    5/3/2022         RE: Derrick Benitez  [address]  [facility] [address]        Dear Colleague,    Thank you for referring your patient, Derrick Benitez, to the [facility]CER [facility]. Please see a copy of my visit note below.    Oncology Rooming Note    May 3, 2022 9:27 AM   Derrick Benitez is a 81 year old male who presents for:    Chief Complaint   Patient presents with     Oncology Clinic Visit     Initial Vitals: Resp 16   Wt 90.9 kg (200 lb 6.4 oz)   BMI 31.39 kg/m   Estimated body mass index is 31.39 kg/m  as calculated from the following:    Height as of 2/8/22: 1.702 m (5' 7\").    Weight as of this encounter: 90.9 kg (200 lb 6.4 oz). Body surface area is 2.07 meters squared.  No Pain (0) Comment: Data Unavailable   No LMP for male patient.  Allergies reviewed: Yes  Medications reviewed: Yes    Medications: Medication refills not needed today.  Pharmacy name entered into EPIC:    [place], [place] - [address]  [facility] [id number] [place], [place] - [address]    Clinical concerns: no      Crystal Rojas              Oncology/Hematology Visit Note  May 3, 2022    Reason for Visit:   Lung cancer  Metastatic lung squamous cell carcinoma  Completed carboplatin Taxol and pembrolizumab x 6 cycle on 03/10/2021  03/31-on maintenance with single agent with pembrolizumab  02/07/2022-CT chest abdomen pelvis reveals stable skeletal metastasis no new sites of disease      Patient is currently getting treatment with pembrolizumab every 6 weeks      Interval History:  Patient reports he has been tolerating treatment well he denies fever chills sweats cough shortness of breath chest pain nausea vomiting diarrhea abdominal pain bleeding.  He reports some mild neuropathy in bilateral lower extremities.  Denies swelling patient is ambulatory patient reports he will see his doctor at [facility]    Review of Systems:  14 point ROS of systems including " Constitutional, Eyes, Respiratory, Cardiovascular, Gastroenterology, Genitourinary, Integumentary, Muscularskeletal, Psychiatric were all negative except for pertinent positives noted in my HPI.    Physical Examination:  Physical Exam  Eyes:      Pupils: Pupils are equal, round, and reactive to light.   Cardiovascular:      Rate and Rhythm: Normal rate.      Pulses: Normal pulses.   Pulmonary:      Effort: Pulmonary effort is normal.   Abdominal:      General: Abdomen is flat.   Musculoskeletal:      Cervical back: Normal range of motion.   Skin:     General: Skin is warm.   Neurological:      General: No focal deficit present.      Mental Status: He is alert.   Psychiatric:         Mood and Affect: Mood normal.       Laboratory Data:  CBC CMP and TSH results reviewed    Assessment and Plan:  This is a 81-year-old male with    Lung cancer  -Metastatic lung squamous cell carcinoma   status post carboplatinTaxol and pembrolizumab-completed 6 cycles in March 2021  -Currently on maintenance with immunotherapy pembrolizumab every 6 weeks  02/07/2022-CT chest abdomen pelvis reveals stable skeletal metastasis no new sites of disease  Labs reviewed abnormalities discussed  Okay to proceed with treatment  -Patient would like CT scan before he sees Dr. Larsen in June  Order CT scan in June prior to visit with Dr. Larsen        Bone mets  -Patient currently on Zometa every 4 to 6 weeks  Labs reviewed okay to give Zometa  -Continue with vitamin D and calcium  Continue with Zometa every 6 weeks  Okay to give Zometa today corrected calcium is around 8.7        Anemia  Patient is asymptomatic  Stable hemoglobin patient denies bleeding  Continue to monitor    Thrombocytopenia  Continue to monitor closely  Complications of thrombocytopenia reviewed  Call our clinic in the event of bleeding, bruising fall or injury      Neuropathy  Mild symptoms  -I offered him to see Dr. Caceres or neurology  Patient reports he will follow-up with Dr. Winter  [facility] .  Patient reports in the past his doctor at [facility] ordered medication that worked really well for him for neuropathy      Patient advised to call our clinic in the event of fever chills sweats cough shortness of breath chest pain nausea vomiting diarrhea abdominal pain bleeding or any changes in health condition.    DARSHANA Meza CNP [facility]     Chart documentation with Dragon Voice recognition Software. Although reviewed after completion, some words and grammatical errors may remain.            Again, thank you for allowing me to participate in the care of your patient.        Sincerely,        DARSHANA Meza CNP

## 2022-05-03 NOTE — PROGRESS NOTES
"Oncology Rooming Note    May 3, 2022 9:27 AM   Derrick Benitez is a 81 year old male who presents for:    Chief Complaint   Patient presents with     Oncology Clinic Visit     Initial Vitals: Resp 16   Wt 90.9 kg (200 lb 6.4 oz)   BMI 31.39 kg/m   Estimated body mass index is 31.39 kg/m  as calculated from the following:    Height as of 2/8/22: 1.702 m (5' 7\").    Weight as of this encounter: 90.9 kg (200 lb 6.4 oz). Body surface area is 2.07 meters squared.  No Pain (0) Comment: Data Unavailable   No LMP for male patient.  Allergies reviewed: Yes  Medications reviewed: Yes    Medications: Medication refills not needed today.  Pharmacy name entered into EPIC:    [place], [place] - [address]  [facility] [address] [place], [place] - [address] [place] [address] PK[address]    Clinical concerns: no      Crystal Rojas            "

## 2022-05-03 NOTE — PROGRESS NOTES
Infusion Nursing Note:  Derrick Benitez presents today for keytruda and zometa.    Patient seen by provider today: Yes: LAUREANO Frausto   present during visit today: Not Applicable.    Note: N/A.      Intravenous Access:  Implanted Port.    Treatment Conditions:  Lab Results   Component Value Date    HGB 13.5 05/02/2022    WBC 5.6 05/02/2022    ANEU 3.4 06/01/2021    ANEUTAUTO 4.5 03/21/2022     (L) 05/02/2022      Lab Results   Component Value Date     05/02/2022    POTASSIUM 4.0 05/02/2022    MAG 2.1 10/30/2014    CR 1.01 05/02/2022    ANTHONY 7.8 (L) 05/02/2022    BILITOTAL 0.4 05/02/2022    ALBUMIN 3.0 (L) 05/02/2022    ALT 15 05/02/2022    AST 15 05/02/2022     Results reviewed, labs MET treatment parameters, ok to proceed with treatment.      Post Infusion Assessment:  Patient tolerated infusion without incident.  Site patent and intact, free from redness, edema or discomfort.  No evidence of extravasations.  Access discontinued per protocol.       Discharge Plan:   Patient and/or family verbalized understanding of discharge instructions and all questions answered.  AVS to patient via "[employer]HART.  Patient will return 6/13 for next appointment.   Patient discharged in stable condition accompanied by: self.  Departure Mode: Ambulatory.      Yuly Neil RN

## 2022-05-31 ENCOUNTER — OFFICE VISIT (OUTPATIENT)
Dept: URGENT CARE | Facility: URGENT CARE | Age: 82
End: 2022-05-31
Payer: MEDICARE

## 2022-05-31 VITALS — TEMPERATURE: 98.6 F | OXYGEN SATURATION: 96 % | HEART RATE: 50 BPM

## 2022-05-31 DIAGNOSIS — S61.209A AVULSION OF SKIN OF FINGER, INITIAL ENCOUNTER: Primary | ICD-10-CM

## 2022-05-31 PROCEDURE — 99203 OFFICE O/P NEW LOW 30 MIN: CPT

## 2022-05-31 RX ORDER — MUPIROCIN 20 MG/G
OINTMENT TOPICAL 2 TIMES DAILY
Qty: 22 G | Refills: 0 | Status: SHIPPED | OUTPATIENT
Start: 2022-05-31 | End: 2022-06-07

## 2022-05-31 NOTE — PROGRESS NOTES
Assessment & Plan     Avulsion of skin of finger, initial encounter    - mupirocin (BACTROBAN) 2 % external ointment; Apply topically 2 times daily for 7 days      15 minutes spent on the date of the encounter doing chart review, patient visit and documentation         See Patient Instructions    Return in about 1 week (around 6/7/2022), or if symptoms worsen or fail to improve.    CS Urgent Care Provider  [facility] URGENT [facility] SHANIQUE Meza is a 81 year old who presents for the following health issues     HPI     Was putting together a table umbrella 4 nights ago when his right index finger got caught in between the 2 metal parts.  Sustained an avulsion to the lateral aspect of the right index finger.  Is on chemotherapy treatment  and is concerned about infection  No fevers, did wash the wound immediately and bandage it.  Did not take the bandage off until today.    Review of Systems   Constitutional, HEENT, cardiovascular, pulmonary, gi and gu systems are negative, except as otherwise noted.      Objective    Pulse 50   Temp 98.6  F (37  C) (Oral)   SpO2 96%   There is no height or weight on file to calculate BMI.  Physical Exam   GENERAL: healthy, alert and no distress  SKIN: Healing avulsion to the lateral aspect of right index finger just below the PIP joint

## 2022-06-10 ENCOUNTER — TELEPHONE (OUTPATIENT)
Dept: MEDSURG UNIT | Facility: CLINIC | Age: 82
End: 2022-06-10
Payer: MEDICARE

## 2022-06-13 ENCOUNTER — HOSPITAL ENCOUNTER (OUTPATIENT)
Facility: CLINIC | Age: 82
Discharge: HOME | End: 2022-06-13
Admitting: RADIOLOGY
Payer: MEDICARE

## 2022-06-13 ENCOUNTER — LAB (OUTPATIENT)
Dept: INFUSION THERAPY | Facility: CLINIC | Age: 82
End: 2022-06-13
Attending: INTERNAL MEDICINE
Payer: MEDICARE

## 2022-06-13 ENCOUNTER — HOSPITAL ENCOUNTER (OUTPATIENT)
Dept: CT IMAGING | Facility: CLINIC | Age: 82
Discharge: HOME | End: 2022-06-13
Attending: NURSE PRACTITIONER
Payer: MEDICARE

## 2022-06-13 DIAGNOSIS — C34.31 MALIGNANT NEOPLASM OF LOWER LOBE OF RIGHT LUNG (H): ICD-10-CM

## 2022-06-13 DIAGNOSIS — C34.31 MALIGNANT NEOPLASM OF LOWER LOBE OF RIGHT LUNG (H): Primary | ICD-10-CM

## 2022-06-13 DIAGNOSIS — Z51.11 ENCOUNTER FOR ANTINEOPLASTIC CHEMOTHERAPY: Primary | ICD-10-CM

## 2022-06-13 LAB
ALBUMIN SERPL-MCNC: 3.1 G/DL (ref 3.4–5)
ALP SERPL-CCNC: 64 U/L (ref 40–150)
ALT SERPL W P-5'-P-CCNC: 12 U/L (ref 0–70)
ANION GAP SERPL CALCULATED.3IONS-SCNC: 5 MMOL/L (ref 3–14)
AST SERPL W P-5'-P-CCNC: 15 U/L (ref 0–45)
BASOPHILS # BLD AUTO: 0 10E3/UL (ref 0–0.2)
BASOPHILS NFR BLD AUTO: 0 %
BILIRUB SERPL-MCNC: 0.5 MG/DL (ref 0.2–1.3)
BUN SERPL-MCNC: 16 MG/DL (ref 7–30)
CALCIUM SERPL-MCNC: 8 MG/DL (ref 8.5–10.1)
CHLORIDE BLD-SCNC: 111 MMOL/L (ref 94–109)
CO2 SERPL-SCNC: 23 MMOL/L (ref 20–32)
CREAT SERPL-MCNC: 1.04 MG/DL (ref 0.66–1.25)
EOSINOPHIL # BLD AUTO: 0.2 10E3/UL (ref 0–0.7)
EOSINOPHIL NFR BLD AUTO: 3 %
ERYTHROCYTE [DISTWIDTH] IN BLOOD BY AUTOMATED COUNT: 13 % (ref 10–15)
GFR SERPL CREATININE-BSD FRML MDRD: 72 ML/MIN/1.73M2
GLUCOSE BLD-MCNC: 156 MG/DL (ref 70–99)
HCT VFR BLD AUTO: 39.8 % (ref 40–53)
HGB BLD-MCNC: 13.4 G/DL (ref 13.3–17.7)
IMM GRANULOCYTES # BLD: 0 10E3/UL
IMM GRANULOCYTES NFR BLD: 0 %
LYMPHOCYTES # BLD AUTO: 1.1 10E3/UL (ref 0.8–5.3)
LYMPHOCYTES NFR BLD AUTO: 19 %
MCH RBC QN AUTO: 31.6 PG (ref 26.5–33)
MCHC RBC AUTO-ENTMCNC: 33.7 G/DL (ref 31.5–36.5)
MCV RBC AUTO: 94 FL (ref 78–100)
MONOCYTES # BLD AUTO: 0.3 10E3/UL (ref 0–1.3)
MONOCYTES NFR BLD AUTO: 6 %
NEUTROPHILS # BLD AUTO: 4.1 10E3/UL (ref 1.6–8.3)
NEUTROPHILS NFR BLD AUTO: 72 %
NRBC # BLD AUTO: 0 10E3/UL
NRBC BLD AUTO-RTO: 0 /100
PLATELET # BLD AUTO: 135 10E3/UL (ref 150–450)
POTASSIUM BLD-SCNC: 4.1 MMOL/L (ref 3.4–5.3)
PROT SERPL-MCNC: 6.3 G/DL (ref 6.8–8.8)
RBC # BLD AUTO: 4.24 10E6/UL (ref 4.4–5.9)
SODIUM SERPL-SCNC: 139 MMOL/L (ref 133–144)
TSH SERPL DL<=0.005 MIU/L-ACNC: 0.83 MU/L (ref 0.4–4)
WBC # BLD AUTO: 5.7 10E3/UL (ref 4–11)

## 2022-06-13 PROCEDURE — 74177 CT ABD & PELVIS W/CONTRAST: CPT

## 2022-06-13 PROCEDURE — 80053 COMPREHEN METABOLIC PANEL: CPT | Performed by: INTERNAL MEDICINE

## 2022-06-13 PROCEDURE — 85025 COMPLETE CBC W/AUTO DIFF WBC: CPT | Performed by: INTERNAL MEDICINE

## 2022-06-13 PROCEDURE — 250N000011 HC RX IP 250 OP 636

## 2022-06-13 PROCEDURE — 250N000011 HC RX IP 250 OP 636: Performed by: NURSE PRACTITIONER

## 2022-06-13 PROCEDURE — 250N000009 HC RX 250: Performed by: NURSE PRACTITIONER

## 2022-06-13 PROCEDURE — 84443 ASSAY THYROID STIM HORMONE: CPT | Mod: GZ | Performed by: INTERNAL MEDICINE

## 2022-06-13 PROCEDURE — 82040 ASSAY OF SERUM ALBUMIN: CPT | Performed by: INTERNAL MEDICINE

## 2022-06-13 PROCEDURE — 36591 DRAW BLOOD OFF VENOUS DEVICE: CPT | Performed by: INTERNAL MEDICINE

## 2022-06-13 PROCEDURE — 999N000154 HC STATISTIC RADIOLOGY XRAY, US, CT, MAR, NM

## 2022-06-13 RX ORDER — IOPAMIDOL 755 MG/ML
98 INJECTION, SOLUTION INTRAVASCULAR ONCE
Status: COMPLETED | OUTPATIENT
Start: 2022-06-13 | End: 2022-06-13

## 2022-06-13 RX ORDER — HEPARIN SODIUM,PORCINE 10 UNIT/ML
5 VIAL (ML) INTRAVENOUS
Status: DISCONTINUED | OUTPATIENT
Start: 2022-06-13 | End: 2022-06-13 | Stop reason: HOSPADM

## 2022-06-13 RX ORDER — HEPARIN SODIUM (PORCINE) LOCK FLUSH IV SOLN 100 UNIT/ML 100 UNIT/ML
5 SOLUTION INTRAVENOUS
Status: DISCONTINUED | OUTPATIENT
Start: 2022-06-13 | End: 2022-06-13 | Stop reason: HOSPADM

## 2022-06-13 RX ADMIN — SODIUM CHLORIDE 68 ML: 900 INJECTION INTRAVENOUS at 10:12

## 2022-06-13 RX ADMIN — IOPAMIDOL 98 ML: 755 INJECTION, SOLUTION INTRAVENOUS at 10:12

## 2022-06-13 RX ADMIN — Medication 5 ML: at 10:20

## 2022-06-13 NOTE — PROGRESS NOTES
Nursing Note:  Derrick Benitez presents today for port labs.    Patient seen by provider today: No   present during visit today: Not Applicable.    Note: Power port accessed for CT scan today.    Intravenous Access:  Labs drawn without difficulty.  Implanted Port.    Discharge Plan:   Patient was sent to [facility] appointment.    Porsha Wild RN        \

## 2022-06-14 ENCOUNTER — ONCOLOGY VISIT (OUTPATIENT)
Dept: ONCOLOGY | Facility: CLINIC | Age: 82
End: 2022-06-14
Attending: INTERNAL MEDICINE
Payer: MEDICARE

## 2022-06-14 ENCOUNTER — INFUSION THERAPY VISIT (OUTPATIENT)
Dept: INFUSION THERAPY | Facility: CLINIC | Age: 82
End: 2022-06-14
Attending: INTERNAL MEDICINE
Payer: MEDICARE

## 2022-06-14 VITALS
BODY MASS INDEX: 31.23 KG/M2 | DIASTOLIC BLOOD PRESSURE: 62 MMHG | SYSTOLIC BLOOD PRESSURE: 149 MMHG | TEMPERATURE: 98.3 F | WEIGHT: 199 LBS | OXYGEN SATURATION: 95 % | HEIGHT: 67 IN | HEART RATE: 70 BPM | RESPIRATION RATE: 16 BRPM

## 2022-06-14 DIAGNOSIS — C34.31 MALIGNANT NEOPLASM OF LOWER LOBE OF RIGHT LUNG (H): Primary | ICD-10-CM

## 2022-06-14 DIAGNOSIS — C34.31 MALIGNANT NEOPLASM OF LOWER LOBE OF RIGHT LUNG (H): ICD-10-CM

## 2022-06-14 DIAGNOSIS — C79.51 BONE METASTASES: ICD-10-CM

## 2022-06-14 DIAGNOSIS — G62.0 PERIPHERAL NEUROPATHY DUE TO CHEMOTHERAPY (H): ICD-10-CM

## 2022-06-14 DIAGNOSIS — T45.1X5A PERIPHERAL NEUROPATHY DUE TO CHEMOTHERAPY (H): ICD-10-CM

## 2022-06-14 DIAGNOSIS — Z51.11 ENCOUNTER FOR ANTINEOPLASTIC CHEMOTHERAPY: ICD-10-CM

## 2022-06-14 DIAGNOSIS — Z51.11 ENCOUNTER FOR ANTINEOPLASTIC CHEMOTHERAPY: Primary | ICD-10-CM

## 2022-06-14 PROCEDURE — 258N000003 HC RX IP 258 OP 636: Performed by: INTERNAL MEDICINE

## 2022-06-14 PROCEDURE — 99214 OFFICE O/P EST MOD 30 MIN: CPT | Performed by: INTERNAL MEDICINE

## 2022-06-14 PROCEDURE — G0463 HOSPITAL OUTPT CLINIC VISIT: HCPCS | Mod: 25

## 2022-06-14 PROCEDURE — 96375 TX/PRO/DX INJ NEW DRUG ADDON: CPT

## 2022-06-14 PROCEDURE — 250N000011 HC RX IP 250 OP 636: Performed by: INTERNAL MEDICINE

## 2022-06-14 PROCEDURE — 96413 CHEMO IV INFUSION 1 HR: CPT

## 2022-06-14 RX ORDER — GABAPENTIN 300 MG/1
300 CAPSULE ORAL 3 TIMES DAILY
Qty: 90 CAPSULE | Refills: 3 | Status: SHIPPED | OUTPATIENT
Start: 2022-06-14 | End: 2022-10-25

## 2022-06-14 RX ORDER — ZOLEDRONIC ACID 0.04 MG/ML
4 INJECTION, SOLUTION INTRAVENOUS ONCE
Status: COMPLETED | OUTPATIENT
Start: 2022-06-14 | End: 2022-06-14

## 2022-06-14 RX ORDER — METHYLPREDNISOLONE SODIUM SUCCINATE 125 MG/2ML
125 INJECTION, POWDER, LYOPHILIZED, FOR SOLUTION INTRAMUSCULAR; INTRAVENOUS
Status: CANCELLED
Start: 2022-06-14

## 2022-06-14 RX ORDER — MEPERIDINE HYDROCHLORIDE 25 MG/ML
25 INJECTION INTRAMUSCULAR; INTRAVENOUS; SUBCUTANEOUS EVERY 30 MIN PRN
Status: CANCELLED | OUTPATIENT
Start: 2022-06-14

## 2022-06-14 RX ORDER — HEPARIN SODIUM,PORCINE 10 UNIT/ML
5 VIAL (ML) INTRAVENOUS
Status: CANCELLED | OUTPATIENT
Start: 2022-06-14

## 2022-06-14 RX ORDER — ALBUTEROL SULFATE 0.83 MG/ML
2.5 SOLUTION RESPIRATORY (INHALATION)
Status: CANCELLED | OUTPATIENT
Start: 2022-06-14

## 2022-06-14 RX ORDER — DIPHENHYDRAMINE HYDROCHLORIDE 50 MG/ML
50 INJECTION INTRAMUSCULAR; INTRAVENOUS
Status: CANCELLED
Start: 2022-06-14

## 2022-06-14 RX ORDER — SODIUM CHLORIDE 9 MG/ML
1000 INJECTION, SOLUTION INTRAVENOUS CONTINUOUS PRN
Status: CANCELLED
Start: 2022-06-14

## 2022-06-14 RX ORDER — HEPARIN SODIUM (PORCINE) LOCK FLUSH IV SOLN 100 UNIT/ML 100 UNIT/ML
5 SOLUTION INTRAVENOUS EVERY 8 HOURS PRN
Status: CANCELLED | OUTPATIENT
Start: 2022-06-14

## 2022-06-14 RX ORDER — HEPARIN SODIUM (PORCINE) LOCK FLUSH IV SOLN 100 UNIT/ML 100 UNIT/ML
5 SOLUTION INTRAVENOUS
Status: CANCELLED | OUTPATIENT
Start: 2022-06-14

## 2022-06-14 RX ORDER — ALBUTEROL SULFATE 90 UG/1
1-2 AEROSOL, METERED RESPIRATORY (INHALATION)
Status: CANCELLED
Start: 2022-06-14

## 2022-06-14 RX ORDER — HEPARIN SODIUM (PORCINE) LOCK FLUSH IV SOLN 100 UNIT/ML 100 UNIT/ML
5 SOLUTION INTRAVENOUS EVERY 8 HOURS PRN
Status: DISCONTINUED | OUTPATIENT
Start: 2022-06-14 | End: 2022-06-14 | Stop reason: HOSPADM

## 2022-06-14 RX ORDER — EPINEPHRINE 1 MG/ML
0.3 INJECTION, SOLUTION INTRAMUSCULAR; SUBCUTANEOUS EVERY 5 MIN PRN
Status: CANCELLED | OUTPATIENT
Start: 2022-06-14

## 2022-06-14 RX ORDER — ZOLEDRONIC ACID 0.04 MG/ML
4 INJECTION, SOLUTION INTRAVENOUS ONCE
Status: CANCELLED | OUTPATIENT
Start: 2022-06-14 | End: 2022-06-14

## 2022-06-14 RX ORDER — LORAZEPAM 2 MG/ML
0.5 INJECTION INTRAMUSCULAR EVERY 4 HOURS PRN
Status: CANCELLED | OUTPATIENT
Start: 2022-06-14

## 2022-06-14 RX ORDER — NALOXONE HYDROCHLORIDE 0.4 MG/ML
.1-.4 INJECTION, SOLUTION INTRAMUSCULAR; INTRAVENOUS; SUBCUTANEOUS
Status: CANCELLED | OUTPATIENT
Start: 2022-06-14

## 2022-06-14 RX ADMIN — SODIUM CHLORIDE 250 ML: 9 INJECTION, SOLUTION INTRAVENOUS at 10:15

## 2022-06-14 RX ADMIN — ZOLEDRONIC ACID 4 MG: 0.04 INJECTION, SOLUTION INTRAVENOUS at 10:15

## 2022-06-14 RX ADMIN — Medication 5 ML: at 11:09

## 2022-06-14 RX ADMIN — SODIUM CHLORIDE 400 MG: 9 INJECTION, SOLUTION INTRAVENOUS at 10:40

## 2022-06-14 ASSESSMENT — PAIN SCALES - GENERAL: PAINLEVEL: NO PAIN (1)

## 2022-06-14 NOTE — LETTER
"    6/14/2022         RE: Derrick Benitez  [address]  [facility] [address]        Dear Colleague,    Thank you for referring your patient, Derrick Benitez, to the [facility] CANCER [facility]. Please see a copy of my visit note below.    Oncology Rooming Note    June 14, 2022 9:30 AM   Derrick Benitez is a 81 year old male who presents for:    Chief Complaint   Patient presents with     Oncology Clinic Visit     Initial Vitals: Resp 16   Ht 1.702 m (5' 7\")   Wt 90.3 kg (199 lb)   BMI 31.17 kg/m   Estimated body mass index is 31.17 kg/m  as calculated from the following:    Height as of this encounter: 1.702 m (5' 7\").    Weight as of this encounter: 90.3 kg (199 lb). Body surface area is 2.07 meters squared.  No Pain (1) Comment: Data Unavailable   No LMP for male patient.  Allergies reviewed: Yes  Medications reviewed: Yes    Medications: Medication refills not needed today.  Pharmacy name entered into EPIC:    [place], [place] - [address]  [facility] [address] [place], [place] [address]    Clinical concerns:  doctor was notified.      Brenda Martines MA              ONCOLOGIC HISTORY:  Mr. Benitez is a gentleman with non-small cell lung cancer, favor squamous cell carcinoma.   -NGS and PDL staining could not be done because of small sample.   -Guardant 360 does not reveal any actionable alteration.  1.  CT chest angiogram on 09/07/2020 revealed right lower lobe mass.   2.  CT-guided biopsy was done on 09/18/2020.  Pathology revealed non-small cell lung cancer, favor squamous cell carcinoma.  Sample size was small.  PDL staining and NGS panel could not be done.   3.  Brain MRI on 09/26/2020 did not reveal any brain metastasis.   4.  PET scan on 10/05/2020 revealed hypermetabolic right lung mass and multiple hypermetabolic bone lesions.   5.  The patient started on carboplatin and Taxol on 11/25/2020.   -Pembrolizumab added with cycle 2.      SUBJECTIVE: "  Mr. Benitez is an 81-year-old gentleman with metastatic non-small cell lung cancer on pembrolizumab. He is also on zometa for bone metastasis.  He is tolerating treatment well.    CT chest, abdomen and pelvis on 06/13/2022 reveals stable disease.    Patient has peripheral neuropathy.  He has numbness and tingling mainly in the feet.  Not much in the fingers.  Neuropathy is slowly getting worse.  Walking is fairly good.  He has not been falling down.    He has fatigue.  No worsening.  No headache.  No dizziness.  No chest pain.  No shortness of breath.  No abdominal pain.  No nausea or vomiting.  Appetite has been good.  No urinary complaint.  No bowel problem. All other review of systems is negative.     PHYSICAL EXAMINATION:    He is alert and oriented x 3.  Not in any distress.  Rest of systems not examined.     LABS:  CBC, TSH and CMP were reviewed.     ASSESSMENT:    1.  An 81-year-old gentleman with metastatic non-small cell lung cancer. Disease is stable.  2.  Peripheral neuropathy, stable.  3.  Fatigue.  4.  Mild thrombocytopenia.  Stable.     PLAN:    1.  CT scan was personally reviewed.  Explained to him that metastatic lung cancer is stable.  He was happy to know that.    Patient is on pembrolizumab every 6 weeks.  He is tolerating it well.  He will continue on it.  Side effects reviewed reviewed.    My plan is to give pembrolizumab for at least 2 years.  It can be discontinued after that if PET scan is negative for any disease.     2.  He will continue on Zometa every 6 weeks.  He is tolerating it well.  No dental or jaw related symptoms.  He will also continue on calcium and vitamin D twice a day.    3.  Patient has peripheral neuropathy.  Discussed regarding treatment.  Discussed regarding gabapentin and Lyrica.  After discussion, plan is to start him on gabapentin 300 mg 3 times a day.  Side effects reviewed.  Hopefully he can tolerate it and his neuropathy improves.     4.  Labs were all reviewed.   Thrombocytopenia is stable.  No leukopenia or anemia.  LFT is normal.  Overall, they are good for treatment.     5.  He had few questions, which were all answered.  I will see him in 3 months.  In between, he will see our nurse practitioner.  The patient was advised to call us with any questions or concerns.    Total visit time of 30 minutes.  Time spent in today's visit, review of chart/investigations today, monitoring for toxicity of high risk drugs and documentation.      Again, thank you for allowing me to participate in the care of your patient.        Sincerely,        Buzz Larsen MD

## 2022-06-14 NOTE — PROGRESS NOTES
"Oncology Rooming Note    June 14, 2022 9:30 AM   Derrick Benitez is a 81 year old male who presents for:    Chief Complaint   Patient presents with     Oncology Clinic Visit     Initial Vitals: Resp 16   Ht 1.702 m (5' 7\")   Wt 90.3 kg (199 lb)   BMI 31.17 kg/m   Estimated body mass index is 31.17 kg/m  as calculated from the following:    Height as of this encounter: 1.702 m (5' 7\").    Weight as of this encounter: 90.3 kg (199 lb). Body surface area is 2.07 meters squared.  No Pain (1) Comment: Data Unavailable   No LMP for male patient.  Allergies reviewed: Yes  Medications reviewed: Yes    Medications: Medication refills not needed today.  Pharmacy name entered into EPIC:    [place], [place] - [address]  [facility] [address] [place], [place] - [address] [place] [address] [address]    Clinical concerns:  doctor was notified.      Brenda Martines MA            "

## 2022-06-14 NOTE — PROGRESS NOTES
Infusion Nursing Note:  Derrick Benitez presents today for Cycle 15 Day 1 Keytruda, Zometa.    Patient seen by provider today: Yes: Dr. Larsen.   present during visit today: Not Applicable.    Note: N/A.    Intravenous Access:  Implanted Port.    Treatment Conditions:  Lab Results   Component Value Date    HGB 13.4 06/13/2022    WBC 5.7 06/13/2022    ANEU 3.4 06/01/2021    ANEUTAUTO 4.1 06/13/2022     (L) 06/13/2022      Lab Results   Component Value Date     06/13/2022    POTASSIUM 4.1 06/13/2022    MAG 2.1 10/30/2014    CR 1.04 06/13/2022    ANTHONY 8.0 (L) 06/13/2022    BILITOTAL 0.5 06/13/2022    ALBUMIN 3.1 (L) 06/13/2022    ALT 12 06/13/2022    AST 15 06/13/2022     Results reviewed, labs MET treatment parameters, ok to proceed with treatment.    Post Infusion Assessment:  Patient tolerated infusion without incident.  Blood return noted pre and post infusion.  Site patent and intact, free from redness, edema or discomfort.  No evidence of extravasations.  Access discontinued per protocol.     Discharge Plan:   Patient declined prescription refills.  Discharge instructions reviewed with: Patient.  Patient verbalized understanding of discharge instructions and all questions answered.  AVS to patient via [employer].  Patient will return as scheduled for next appointment.   Patient discharged in stable condition accompanied by: self.  Departure Mode: Ambulatory.      Debra Baird RN

## 2022-06-14 NOTE — PATIENT INSTRUCTIONS
1. Continue pembrolizumab and zometa every 6 weeks.  2. See De Frausto in 6 weeks.  3. See me in 12 weeks.  4. Start gabapentin.

## 2022-06-15 NOTE — PROGRESS NOTES
ONCOLOGIC HISTORY:  Mr. Benitez is a gentleman with non-small cell lung cancer, favor squamous cell carcinoma.   -NGS and PDL staining could not be done because of small sample.   -Guardant 360 does not reveal any actionable alteration.  1.  CT chest angiogram on 09/07/2020 revealed right lower lobe mass.   2.  CT-guided biopsy was done on 09/18/2020.  Pathology revealed non-small cell lung cancer, favor squamous cell carcinoma.  Sample size was small.  PDL staining and NGS panel could not be done.   3.  Brain MRI on 09/26/2020 did not reveal any brain metastasis.   4.  PET scan on 10/05/2020 revealed hypermetabolic right lung mass and multiple hypermetabolic bone lesions.   5.  The patient started on carboplatin and Taxol on 11/25/2020.   -Pembrolizumab added with cycle 2.      SUBJECTIVE:  Mr. Benitez is an 81-year-old gentleman with metastatic non-small cell lung cancer on pembrolizumab. He is also on zometa for bone metastasis.  He is tolerating treatment well.    CT chest, abdomen and pelvis on 06/13/2022 reveals stable disease.    Patient has peripheral neuropathy.  He has numbness and tingling mainly in the feet.  Not much in the fingers.  Neuropathy is slowly getting worse.  Walking is fairly good.  He has not been falling down.    He has fatigue.  No worsening.  No headache.  No dizziness.  No chest pain.  No shortness of breath.  No abdominal pain.  No nausea or vomiting.  Appetite has been good.  No urinary complaint.  No bowel problem. All other review of systems is negative.     PHYSICAL EXAMINATION:    He is alert and oriented x 3.  Not in any distress.  Rest of systems not examined.     LABS:  CBC, TSH and CMP were reviewed.     ASSESSMENT:    1.  An 81-year-old gentleman with metastatic non-small cell lung cancer. Disease is stable.  2.  Peripheral neuropathy, stable.  3.  Fatigue.  4.  Mild thrombocytopenia.  Stable.     PLAN:    1.  CT scan was personally reviewed.  Explained to him that metastatic lung  cancer is stable.  He was happy to know that.    Patient is on pembrolizumab every 6 weeks.  He is tolerating it well.  He will continue on it.  Side effects reviewed reviewed.    My plan is to give pembrolizumab for at least 2 years.  It can be discontinued after that if PET scan is negative for any disease.     2.  He will continue on Zometa every 6 weeks.  He is tolerating it well.  No dental or jaw related symptoms.  He will also continue on calcium and vitamin D twice a day.    3.  Patient has peripheral neuropathy.  Discussed regarding treatment.  Discussed regarding gabapentin and Lyrica.  After discussion, plan is to start him on gabapentin 300 mg 3 times a day.  Side effects reviewed.  Hopefully he can tolerate it and his neuropathy improves.     4.  Labs were all reviewed.  Thrombocytopenia is stable.  No leukopenia or anemia.  LFT is normal.  Overall, they are good for treatment.     5.  He had few questions, which were all answered.  I will see him in 3 months.  In between, he will see our nurse practitioner.  The patient was advised to call us with any questions or concerns.    Total visit time of 30 minutes.  Time spent in today's visit, review of chart/investigations today, monitoring for toxicity of high risk drugs and documentation.

## 2022-07-25 ENCOUNTER — INFUSION THERAPY VISIT (OUTPATIENT)
Dept: INFUSION THERAPY | Facility: CLINIC | Age: 82
End: 2022-07-25
Attending: INTERNAL MEDICINE
Payer: MEDICARE

## 2022-07-25 DIAGNOSIS — C34.31 MALIGNANT NEOPLASM OF LOWER LOBE OF RIGHT LUNG (H): ICD-10-CM

## 2022-07-25 DIAGNOSIS — Z51.11 ENCOUNTER FOR ANTINEOPLASTIC CHEMOTHERAPY: Primary | ICD-10-CM

## 2022-07-25 LAB
ALBUMIN SERPL-MCNC: 3.2 G/DL (ref 3.4–5)
ALP SERPL-CCNC: 72 U/L (ref 40–150)
ALT SERPL W P-5'-P-CCNC: 18 U/L (ref 0–70)
ANION GAP SERPL CALCULATED.3IONS-SCNC: 5 MMOL/L (ref 3–14)
AST SERPL W P-5'-P-CCNC: 23 U/L (ref 0–45)
BASOPHILS # BLD AUTO: 0 10E3/UL (ref 0–0.2)
BASOPHILS NFR BLD AUTO: 0 %
BILIRUB SERPL-MCNC: 0.4 MG/DL (ref 0.2–1.3)
BUN SERPL-MCNC: 23 MG/DL (ref 7–30)
CALCIUM SERPL-MCNC: 8.1 MG/DL (ref 8.5–10.1)
CHLORIDE BLD-SCNC: 113 MMOL/L (ref 94–109)
CO2 SERPL-SCNC: 23 MMOL/L (ref 20–32)
CREAT SERPL-MCNC: 1.21 MG/DL (ref 0.66–1.25)
EOSINOPHIL # BLD AUTO: 0.2 10E3/UL (ref 0–0.7)
EOSINOPHIL NFR BLD AUTO: 3 %
ERYTHROCYTE [DISTWIDTH] IN BLOOD BY AUTOMATED COUNT: 13.3 % (ref 10–15)
GFR SERPL CREATININE-BSD FRML MDRD: 59 ML/MIN/1.73M2
GLUCOSE BLD-MCNC: 156 MG/DL (ref 70–99)
HCT VFR BLD AUTO: 38.9 % (ref 40–53)
HGB BLD-MCNC: 13 G/DL (ref 13.3–17.7)
IMM GRANULOCYTES # BLD: 0 10E3/UL
IMM GRANULOCYTES NFR BLD: 0 %
LYMPHOCYTES # BLD AUTO: 1 10E3/UL (ref 0.8–5.3)
LYMPHOCYTES NFR BLD AUTO: 17 %
MCH RBC QN AUTO: 31.7 PG (ref 26.5–33)
MCHC RBC AUTO-ENTMCNC: 33.4 G/DL (ref 31.5–36.5)
MCV RBC AUTO: 95 FL (ref 78–100)
MONOCYTES # BLD AUTO: 0.4 10E3/UL (ref 0–1.3)
MONOCYTES NFR BLD AUTO: 7 %
NEUTROPHILS # BLD AUTO: 4.1 10E3/UL (ref 1.6–8.3)
NEUTROPHILS NFR BLD AUTO: 73 %
NRBC # BLD AUTO: 0 10E3/UL
NRBC BLD AUTO-RTO: 0 /100
PLATELET # BLD AUTO: 130 10E3/UL (ref 150–450)
POTASSIUM BLD-SCNC: 4 MMOL/L (ref 3.4–5.3)
PROT SERPL-MCNC: 6.5 G/DL (ref 6.8–8.8)
RBC # BLD AUTO: 4.1 10E6/UL (ref 4.4–5.9)
SODIUM SERPL-SCNC: 141 MMOL/L (ref 133–144)
TSH SERPL DL<=0.005 MIU/L-ACNC: 0.52 MU/L (ref 0.4–4)
WBC # BLD AUTO: 5.6 10E3/UL (ref 4–11)

## 2022-07-25 PROCEDURE — 36591 DRAW BLOOD OFF VENOUS DEVICE: CPT | Performed by: INTERNAL MEDICINE

## 2022-07-25 PROCEDURE — 85025 COMPLETE CBC W/AUTO DIFF WBC: CPT | Performed by: INTERNAL MEDICINE

## 2022-07-25 PROCEDURE — 84443 ASSAY THYROID STIM HORMONE: CPT | Performed by: INTERNAL MEDICINE

## 2022-07-25 PROCEDURE — 250N000011 HC RX IP 250 OP 636: Performed by: INTERNAL MEDICINE

## 2022-07-25 PROCEDURE — 82947 ASSAY GLUCOSE BLOOD QUANT: CPT | Performed by: INTERNAL MEDICINE

## 2022-07-25 RX ORDER — HEPARIN SODIUM (PORCINE) LOCK FLUSH IV SOLN 100 UNIT/ML 100 UNIT/ML
5 SOLUTION INTRAVENOUS
Status: DISCONTINUED | OUTPATIENT
Start: 2022-07-25 | End: 2022-07-25 | Stop reason: HOSPADM

## 2022-07-25 RX ADMIN — Medication 5 ML: at 14:19

## 2022-07-25 NOTE — PROGRESS NOTES
Nursing Note:  Derrick Benitez presents today for port labs.    Patient seen by provider today: No   present during visit today: Not Applicable.    Note: N/A.    Intravenous Access:  Labs drawn without difficulty.  Implanted Port.    Discharge Plan:   Patient was sent to home for 7/26 appointment.    Porsha Wild RN

## 2022-07-26 ENCOUNTER — INFUSION THERAPY VISIT (OUTPATIENT)
Dept: INFUSION THERAPY | Facility: CLINIC | Age: 82
End: 2022-07-26
Attending: INTERNAL MEDICINE
Payer: MEDICARE

## 2022-07-26 ENCOUNTER — ONCOLOGY VISIT (OUTPATIENT)
Dept: ONCOLOGY | Facility: CLINIC | Age: 82
End: 2022-07-26
Attending: NURSE PRACTITIONER
Payer: MEDICARE

## 2022-07-26 VITALS
OXYGEN SATURATION: 96 % | DIASTOLIC BLOOD PRESSURE: 62 MMHG | RESPIRATION RATE: 16 BRPM | SYSTOLIC BLOOD PRESSURE: 138 MMHG | HEART RATE: 68 BPM | WEIGHT: 199 LBS | BODY MASS INDEX: 31.17 KG/M2

## 2022-07-26 VITALS — HEIGHT: 67 IN | BODY MASS INDEX: 31.23 KG/M2 | WEIGHT: 199 LBS

## 2022-07-26 DIAGNOSIS — Z51.11 ENCOUNTER FOR ANTINEOPLASTIC CHEMOTHERAPY: Primary | ICD-10-CM

## 2022-07-26 DIAGNOSIS — C79.51 BONE METASTASES: ICD-10-CM

## 2022-07-26 DIAGNOSIS — C34.31 MALIGNANT NEOPLASM OF LOWER LOBE OF RIGHT LUNG (H): Primary | ICD-10-CM

## 2022-07-26 DIAGNOSIS — C34.31 MALIGNANT NEOPLASM OF LOWER LOBE OF RIGHT LUNG (H): ICD-10-CM

## 2022-07-26 PROCEDURE — 99214 OFFICE O/P EST MOD 30 MIN: CPT | Performed by: NURSE PRACTITIONER

## 2022-07-26 PROCEDURE — G0463 HOSPITAL OUTPT CLINIC VISIT: HCPCS | Mod: 25

## 2022-07-26 PROCEDURE — 258N000003 HC RX IP 258 OP 636: Performed by: NURSE PRACTITIONER

## 2022-07-26 PROCEDURE — 96367 TX/PROPH/DG ADDL SEQ IV INF: CPT

## 2022-07-26 PROCEDURE — 96413 CHEMO IV INFUSION 1 HR: CPT

## 2022-07-26 PROCEDURE — 250N000011 HC RX IP 250 OP 636: Performed by: NURSE PRACTITIONER

## 2022-07-26 RX ORDER — HEPARIN SODIUM,PORCINE 10 UNIT/ML
5 VIAL (ML) INTRAVENOUS
Status: CANCELLED | OUTPATIENT
Start: 2022-07-26

## 2022-07-26 RX ORDER — ALBUTEROL SULFATE 90 UG/1
1-2 AEROSOL, METERED RESPIRATORY (INHALATION)
Status: CANCELLED
Start: 2022-07-26

## 2022-07-26 RX ORDER — LORAZEPAM 2 MG/ML
0.5 INJECTION INTRAMUSCULAR EVERY 4 HOURS PRN
Status: CANCELLED | OUTPATIENT
Start: 2022-07-26

## 2022-07-26 RX ORDER — DIPHENHYDRAMINE HYDROCHLORIDE 50 MG/ML
50 INJECTION INTRAMUSCULAR; INTRAVENOUS
Status: CANCELLED
Start: 2022-07-26

## 2022-07-26 RX ORDER — HEPARIN SODIUM (PORCINE) LOCK FLUSH IV SOLN 100 UNIT/ML 100 UNIT/ML
5 SOLUTION INTRAVENOUS EVERY 8 HOURS PRN
Status: DISCONTINUED | OUTPATIENT
Start: 2022-07-26 | End: 2022-07-26 | Stop reason: HOSPADM

## 2022-07-26 RX ORDER — METHYLPREDNISOLONE SODIUM SUCCINATE 125 MG/2ML
125 INJECTION, POWDER, LYOPHILIZED, FOR SOLUTION INTRAMUSCULAR; INTRAVENOUS
Status: CANCELLED
Start: 2022-07-26

## 2022-07-26 RX ORDER — SODIUM CHLORIDE 9 MG/ML
1000 INJECTION, SOLUTION INTRAVENOUS CONTINUOUS PRN
Status: CANCELLED
Start: 2022-07-26

## 2022-07-26 RX ORDER — ZOLEDRONIC ACID 0.04 MG/ML
4 INJECTION, SOLUTION INTRAVENOUS ONCE
Status: CANCELLED | OUTPATIENT
Start: 2022-07-26 | End: 2022-07-26

## 2022-07-26 RX ORDER — MEPERIDINE HYDROCHLORIDE 25 MG/ML
25 INJECTION INTRAMUSCULAR; INTRAVENOUS; SUBCUTANEOUS EVERY 30 MIN PRN
Status: CANCELLED | OUTPATIENT
Start: 2022-07-26

## 2022-07-26 RX ORDER — HEPARIN SODIUM (PORCINE) LOCK FLUSH IV SOLN 100 UNIT/ML 100 UNIT/ML
5 SOLUTION INTRAVENOUS
Status: CANCELLED | OUTPATIENT
Start: 2022-07-26

## 2022-07-26 RX ORDER — NALOXONE HYDROCHLORIDE 0.4 MG/ML
.1-.4 INJECTION, SOLUTION INTRAMUSCULAR; INTRAVENOUS; SUBCUTANEOUS
Status: CANCELLED | OUTPATIENT
Start: 2022-07-26

## 2022-07-26 RX ORDER — EPINEPHRINE 1 MG/ML
0.3 INJECTION, SOLUTION INTRAMUSCULAR; SUBCUTANEOUS EVERY 5 MIN PRN
Status: CANCELLED | OUTPATIENT
Start: 2022-07-26

## 2022-07-26 RX ORDER — ALBUTEROL SULFATE 0.83 MG/ML
2.5 SOLUTION RESPIRATORY (INHALATION)
Status: CANCELLED | OUTPATIENT
Start: 2022-07-26

## 2022-07-26 RX ORDER — HEPARIN SODIUM (PORCINE) LOCK FLUSH IV SOLN 100 UNIT/ML 100 UNIT/ML
5 SOLUTION INTRAVENOUS EVERY 8 HOURS PRN
Status: CANCELLED | OUTPATIENT
Start: 2022-07-26

## 2022-07-26 RX ADMIN — SODIUM CHLORIDE 250 ML: 9 INJECTION, SOLUTION INTRAVENOUS at 10:14

## 2022-07-26 RX ADMIN — Medication 5 ML: at 11:11

## 2022-07-26 RX ADMIN — ZOLEDRONIC ACID 3.5 MG: 4 INJECTION, SOLUTION, CONCENTRATE INTRAVENOUS at 10:15

## 2022-07-26 RX ADMIN — SODIUM CHLORIDE 400 MG: 9 INJECTION, SOLUTION INTRAVENOUS at 10:35

## 2022-07-26 ASSESSMENT — PAIN SCALES - GENERAL: PAINLEVEL: NO PAIN (0)

## 2022-07-26 NOTE — PROGRESS NOTES
"Oncology Rooming Note    July 26, 2022 9:30 AM   Derrick Benitez is a 82 year old male who presents for:    Chief Complaint   Patient presents with     Oncology Clinic Visit     Initial Vitals: /62   Pulse 68   Resp 16   Wt 90.3 kg (199 lb)   SpO2 96%   BMI 31.17 kg/m   Estimated body mass index is 31.17 kg/m  as calculated from the following:    Height as of 6/14/22: 1.702 m (5' 7\").    Weight as of this encounter: 90.3 kg (199 lb). Body surface area is 2.07 meters squared.  No Pain (0) Comment: Data Unavailable   No LMP for male patient.  Allergies reviewed: Yes  Medications reviewed: Yes    Medications: Medication refills not needed today.  Pharmacy name entered into EPIC:    [place], [place] - [address]  [facility] [id number] [place], [place] - [address] [place] [address] PKY    Clinical concerns: no      Elke Bravo CMA            "

## 2022-07-26 NOTE — PROGRESS NOTES
Infusion Nursing Note:  Derrick Benitez presents today for keytruda, zometa.    Patient seen by provider today: Yes, De Frausto NP   present during visit today: Not Applicable.    Note: N/A.    Intravenous Access:  Implanted Port.    Treatment Conditions:  Lab Results   Component Value Date    HGB 13.0 (L) 07/25/2022    WBC 5.6 07/25/2022    ANEU 3.4 06/01/2021    ANEUTAUTO 4.1 07/25/2022     (L) 07/25/2022      Lab Results   Component Value Date     07/25/2022    POTASSIUM 4.0 07/25/2022    MAG 2.1 10/30/2014    CR 1.21 07/25/2022    ANTHONY 8.1 (L) 07/25/2022    BILITOTAL 0.4 07/25/2022    ALBUMIN 3.2 (L) 07/25/2022    ALT 18 07/25/2022    AST 23 07/25/2022     Results reviewed, labs MET treatment parameters, ok to proceed with treatment.    Post Infusion Assessment:  Patient tolerated infusion without incident.  Blood return noted pre and post infusion.  Site patent and intact, free from redness, edema or discomfort.  No evidence of extravasations.  Access discontinued per protocol.     Discharge Plan:   Patient declined prescription refills.  Discharge instructions reviewed with: Patient.  Patient and/or family verbalized understanding of discharge instructions and all questions answered.  AVS to patient via marker.toT.  Patient will return 9/2/22 for next appointment.   Patient discharged in stable condition accompanied by: self.  Departure Mode: Ambulatory.      Porsha Wild RN

## 2022-07-26 NOTE — PROGRESS NOTES
Oncology/Hematology Visit Note  Jul 26, 2022    Reason for Visit:   Lung cancer  Metastatic lung squamous cell carcinoma  Completed carboplatin Taxol and pembrolizumab x 6 cycle on 03/10/2021  03/31-on maintenance with single agent with pembrolizumab  02/07/2022-CT chest abdomen pelvis reveals stable skeletal metastasis no new sites of disease      Patient is currently getting treatment with pembrolizumab every 6 weeks      Interval History:  Patient reports he has been tolerating pembrolizumab well.  Denies fever chills sweats cough shortness of breath chest pain nausea vomiting diarrhea abdominal pain bleeding bruising.  Overall patient reports feeling well    Review of Systems:  14 point ROS of systems including Constitutional, Eyes, Respiratory, Cardiovascular, Gastroenterology, Genitourinary, Integumentary, Muscularskeletal, Psychiatric were all negative except for pertinent positives noted in my HPI.    Physical Examination:  Physical Exam  Eyes:      Pupils: Pupils are equal, round, and reactive to light.   Cardiovascular:      Rate and Rhythm: Normal rate.      Pulses: Normal pulses.   Pulmonary:      Effort: Pulmonary effort is normal.   Abdominal:      General: Abdomen is flat.   Musculoskeletal:      Cervical back: Normal range of motion.   Skin:     General: Skin is warm.   Neurological:      General: No focal deficit present.      Mental Status: He is alert.   Psychiatric:         Mood and Affect: Mood normal.       Laboratory Data:  CBC CMP and TSH results reviewed    Assessment and Plan:  This is a 82-year-old male with    Lung cancer  -Metastatic lung squamous cell carcinoma   status post carboplatinTaxol and pembrolizumab-completed 6 cycles in March 2021  -Currently on maintenance with immunotherapy pembrolizumab every 6 weeks  06/13/2022-CT scan chest abdomen pelvis reveals stable disease  Continue with maintenance pembrolizumab  Labs reviewed abnormalities discussed  Okay to proceed with  treatment  Patient scheduled next cycle of treatment and follow-up with Dr. Larsen in September      Bone mets  -Patient currently on Zometa every 4 to 6 weeks  Labs reviewed okay to give Zometa  -Continue with vitamin D and calcium  Continue with Zometa every 6 weeks  Okay to give Zometa today corrected calcium is around 8.7      Anemia  Patient is asymptomatic  Stable hemoglobin patient denies bleeding  Continue to monitor    Thrombocytopenia  Continue to monitor closely  Complications of thrombocytopenia reviewed  Call our clinic in the event of bleeding, bruising fall or injury      Neuropathy  Mild symptoms  -I offered him to see Dr. Caceres or neurology  Patient reports he will follow-up with  At [facility] .  Patient reports in the past his doctor at [facility] ordered medication that worked really well for him for neuropathy      Patient advised to call our clinic in the event of fever chills sweats cough shortness of breath chest pain nausea vomiting diarrhea abdominal pain bleeding or any changes in health condition.    DARSHANA Meza CNP  [facility]     Chart documentation with Dragon Voice recognition Software. Although reviewed after completion, some words and grammatical errors may remain.

## 2022-07-26 NOTE — LETTER
"    7/26/2022         RE: Derrick Benitez  [address]  [facility] [address]        Dear Colleague,    Thank you for referring your patient, Derrick Benitez, to the [facility]. Please see a copy of my visit note below.    Oncology Rooming Note    July 26, 2022 9:30 AM   Derrick Benitez is a 82 year old male who presents for:    Chief Complaint   Patient presents with     Oncology Clinic Visit     Initial Vitals: /62   Pulse 68   Resp 16   Wt 90.3 kg (199 lb)   SpO2 96%   BMI 31.17 kg/m   Estimated body mass index is 31.17 kg/m  as calculated from the following:    Height as of 6/14/22: 1.702 m (5' 7\").    Weight as of this encounter: 90.3 kg (199 lb). Body surface area is 2.07 meters squared.  No Pain (0) Comment: Data Unavailable   No LMP for male patient.  Allergies reviewed: Yes  Medications reviewed: Yes    Medications: Medication refills not needed today.  Pharmacy name entered into EPIC:    [place], [place] - [address]  [facility] [id number] [place], [place] - [address]    Clinical concerns: no      Elke Bravo, GUZMAN              Oncology/Hematology Visit Note  Jul 26, 2022    Reason for Visit:   Lung cancer  Metastatic lung squamous cell carcinoma  Completed carboplatin Taxol and pembrolizumab x 6 cycle on 03/10/2021  03/31-on maintenance with single agent with pembrolizumab  02/07/2022-CT chest abdomen pelvis reveals stable skeletal metastasis no new sites of disease      Patient is currently getting treatment with pembrolizumab every 6 weeks      Interval History:  Patient reports he has been tolerating pembrolizumab well.  Denies fever chills sweats cough shortness of breath chest pain nausea vomiting diarrhea abdominal pain bleeding bruising.  Overall patient reports feeling well    Review of Systems:  14 point ROS of systems including Constitutional, Eyes, Respiratory, Cardiovascular, Gastroenterology, Genitourinary, " Integumentary, Muscularskeletal, Psychiatric were all negative except for pertinent positives noted in my HPI.    Physical Examination:  Physical Exam  Eyes:      Pupils: Pupils are equal, round, and reactive to light.   Cardiovascular:      Rate and Rhythm: Normal rate.      Pulses: Normal pulses.   Pulmonary:      Effort: Pulmonary effort is normal.   Abdominal:      General: Abdomen is flat.   Musculoskeletal:      Cervical back: Normal range of motion.   Skin:     General: Skin is warm.   Neurological:      General: No focal deficit present.      Mental Status: He is alert.   Psychiatric:         Mood and Affect: Mood normal.       Laboratory Data:  CBC CMP and TSH results reviewed    Assessment and Plan:  This is a 82-year-old male with    Lung cancer  -Metastatic lung squamous cell carcinoma   status post carboplatinTaxol and pembrolizumab-completed 6 cycles in March 2021  -Currently on maintenance with immunotherapy pembrolizumab every 6 weeks  06/13/2022-CT scan chest abdomen pelvis reveals stable disease  Continue with maintenance pembrolizumab  Labs reviewed abnormalities discussed  Okay to proceed with treatment  Patient scheduled next cycle of treatment and follow-up with Dr. Larsen in September      Bone mets  -Patient currently on Zometa every 4 to 6 weeks  Labs reviewed okay to give Zometa  -Continue with vitamin D and calcium  Continue with Zometa every 6 weeks  Okay to give Zometa today corrected calcium is around 8.7      Anemia  Patient is asymptomatic  Stable hemoglobin patient denies bleeding  Continue to monitor    Thrombocytopenia  Continue to monitor closely  Complications of thrombocytopenia reviewed  Call our clinic in the event of bleeding, bruising fall or injury      Neuropathy  Mild symptoms  -I offered him to see Dr. Caceres or neurology  Patient reports he will follow-up with  At [facility] .  Patient reports in the past his doctor at [facility] ordered medication that worked  really well for him for neuropathy      Patient advised to call our clinic in the event of fever chills sweats cough shortness of breath chest pain nausea vomiting diarrhea abdominal pain bleeding or any changes in health condition.    DARSHANA Meza CNP  [facility]     Chart documentation with Dragon Voice recognition Software. Although reviewed after completion, some words and grammatical errors may remain.            Again, thank you for allowing me to participate in the care of your patient.        Sincerely,        DARSHANA Meza CNP

## 2022-07-30 ENCOUNTER — HEALTH MAINTENANCE LETTER (OUTPATIENT)
Age: 82
End: 2022-07-30

## 2022-08-15 NOTE — PROGRESS NOTES
Derrick is a 82 year old who is being evaluated via a billable telephone visit.      What phone number would you like to be contacted at? [phone]  How would you like to obtain your AVS? [facility]    Palliative Care Progress Note    Patient Name: Derrick Benitez  Primary Provider: Clarisse Golden    Chief Complaint/Patient ID: 81 yo M with NSCLC  - RLL mass dx , no PDL staining could be done on bx. Bone mets at time of dx.  Carbo, taxol, pembro x 2020. Now on maintenance with pembrolizumab Q6 Weeks.    Last Palliative care appointment: 22 with me.      Reviewed: Yes, Rx's for Gabapentin.    Interim History:  Derirck Benitez is an 82 year old male who is seen today for follow up with Palliative Care via billable video visit.      Reviewed oncology note from .  Continues to have stable disease and no issues from treatment.  Planning to continue Keytruda and Zometa for now.    Has some neuropathy and has followed with a doctor at [facility].  Currently on gabapentin 300 mg TID. Feels this has been very helpful for his neuropathy. Will sometimes take 2-4 tablets of advil in day.    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     Overall says he's doing very well.      Social History:  Lives with his wife.  Has a hobby snowblower/yard business.  Social History     Tobacco Use     Smoking status: Former Smoker     Packs/day: 3.00     Years: 40.00     Pack years: 120.00     Quit date: 10/28/2004     Years since quittin.8     Smokeless tobacco: Never Used   Substance Use Topics     Alcohol use: No     Comment: states he quit 30 years ago     Drug use: No       Family History- Reviewed in Epic.    Allergies   Allergen Reactions     Losartan Other (See Comments)     Fatigue, weakness in legs     Methenamine        Advanced Care Planning: HCD scanned in chart.  Names his wife as primary HCA, followed by his son, followed by his daughter.    Medications- Reviewed in  Epic.    Past Medical History- Reviewed in [facility].    Past Surgical History- Reviewed in Epic.    Review of Systems:   ROS: 10 point ROS neg other than the symptoms noted above in the HPI.    Key Data Reviewed:  LABS: 7/25/22- Cr 1.21, GFR 59, Albumin 3.2, LFTs wnl. WBC 5.6, Hgb 13, Plts 130.     IMAGING: CT CAP 6/13/22- IMPRESSION: Stable skeletal metastases. No new sites of disease or significant change.     Impression & Recommendations & Counseling:  Derrick Benitez is a 82 year old male with history of metastatic NSCLCA complicated by pain, mostly CIPN/acute taxane pain syndrome.  Now on maintenance pembrolizumab treatment.     Neuropathy improved with the addition of gabapentin.  Given his kidney function, I would likely not recommend going above the 900 mg daily that he is currently taking.  Okay to continue with periodic ibuprofen use as well.    Otherwise, he is doing very well today and has no other questions or concerns.    Follow up: We will plan follow-up in about a year, however he knows that is an open door beforehand if there are any new concerns or symptoms that arise.    Telephone Visit Details  Patient unable to do video visits, so converted to telephone.  Start Time: 2:17PM  Call duration: 7 mins         Total time spent on day of encounter is 13 mins, including reviewing record, review of above studies, above visit with patient, symptomatic discussion of neuropathy/pain, including medication adjustments/prescription management, and documentation.     Zulema Montes,   Palliative Medicine   Pager [phone], AM[facility] ID [id number]    Some chart documentation performed using Dragon Voice recognition Software. Although reviewed after completion, some words and grammatical errors may remain.

## 2022-08-18 ENCOUNTER — VIRTUAL VISIT (OUTPATIENT)
Dept: ONCOLOGY | Facility: CLINIC | Age: 82
End: 2022-08-18
Attending: STUDENT IN AN ORGANIZED HEALTH CARE EDUCATION/TRAINING PROGRAM
Payer: MEDICARE

## 2022-08-18 DIAGNOSIS — Z51.5 ENCOUNTER FOR PALLIATIVE CARE: ICD-10-CM

## 2022-08-18 DIAGNOSIS — C34.31 MALIGNANT NEOPLASM OF LOWER LOBE OF RIGHT LUNG (H): Primary | ICD-10-CM

## 2022-08-18 DIAGNOSIS — G62.0 PERIPHERAL NEUROPATHY DUE TO CHEMOTHERAPY (H): ICD-10-CM

## 2022-08-18 DIAGNOSIS — C79.51 BONE METASTASES: ICD-10-CM

## 2022-08-18 DIAGNOSIS — T45.1X5A PERIPHERAL NEUROPATHY DUE TO CHEMOTHERAPY (H): ICD-10-CM

## 2022-08-18 PROCEDURE — 99441 PR PHYSICIAN TELEPHONE EVALUATION 5-10 MIN: CPT | Mod: 95 | Performed by: STUDENT IN AN ORGANIZED HEALTH CARE EDUCATION/TRAINING PROGRAM

## 2022-08-18 NOTE — PATIENT INSTRUCTIONS
Recommendations:  -Agree with continuing gabapentin for the neuropathy.  -Let me know if you have any new or worsening symptoms before our next visit.    Follow up: One year, but it's an open door before that if things change.      Reasons to Call    If you are having worsening/uncontrolled symptoms we want you to call!    You or your other physicians make any changes to medications we have prescribed.  -Please call for refills 4-5 days before you will run out of medication.    Important Phone Numbers    [facility] - Amy Johnston. Palliative Care RN - [phone]    [facility] [place] Xochitl Mattson. Palliative Care RN - [phone]  *For Refills, scheduling, and general questions - [phone]  *After hours or on weekends. Will connect you with on call MD. [phone]

## 2022-08-18 NOTE — LETTER
2022         RE: Derrick Benitez  [address]  [place] [facility] [address]        Dear Colleague,    Thank you for referring your patient, Derrick Benitez, to the [facility] [place]. Please see a copy of my visit note below.    Derrick is a 82 year old who is being evaluated via a billable telephone visit.      What phone number would you like to be contacted at? [phone]  How would you like to obtain your AVS? [facility]    Palliative Care Progress Note    Patient Name: Derrick Benitez  Primary Provider: Clarisse Golden    Chief Complaint/Patient ID: 81 yo M with NSCLC  - RLL mass dx , no PDL staining could be done on bx. Bone mets at time of dx.  Carbo, taxol, pembro x 2020. Now on maintenance with pembrolizumab Q6 Weeks.    Last Palliative care appointment: 22 with me.      Reviewed: Yes, Rx's for Gabapentin.    Interim History:  Derrick Benitez is an 82 year old male who is seen today for follow up with Palliative Care via billable video visit.      Reviewed oncology note from .  Continues to have stable disease and no issues from treatment.  Planning to continue Keytruda and Zometa for now.    Has some neuropathy and has followed with a doctor at [facility].  Currently on gabapentin 300 mg TID. Feels this has been very helpful for his neuropathy. Will sometimes take 2-4 tablets of advil in day.    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     Overall says he's doing very well.      Social History:  Lives with his wife.  Has a hobby snowblower/yard business.  Social History     Tobacco Use     Smoking status: Former Smoker     Packs/day: 3.00     Years: 40.00     Pack years: 120.00     Quit date: 10/28/2004     Years since quittin.8     Smokeless tobacco: Never Used   Substance Use Topics     Alcohol use: No     Comment: states he quit 30 years ago     Drug use: No       Family History- Reviewed in Epic.    Allergies   Allergen Reactions      Losartan Other (See Comments)     Fatigue, weakness in legs     Methenamine        Advanced Care Planning: HCD scanned in chart.  Names his wife as primary HCA, followed by his son, followed by his daughter.    Medications- Reviewed in Epic.    Past Medical History- Reviewed in [facility].    Past Surgical History- Reviewed in Epic.    Review of Systems:   ROS: 10 point ROS neg other than the symptoms noted above in the HPI.    Key Data Reviewed:  LABS: 7/25/22- Cr 1.21, GFR 59, Albumin 3.2, LFTs wnl. WBC 5.6, Hgb 13, Plts 130.     IMAGING: CT CAP 6/13/22- IMPRESSION: Stable skeletal metastases. No new sites of disease or significant change.     Impression & Recommendations & Counseling:  Derrick Benitez is a 82 year old male with history of metastatic NSCLCA complicated by pain, mostly CIPN/acute taxane pain syndrome.  Now on maintenance pembrolizumab treatment.     Neuropathy improved with the addition of gabapentin.  Given his kidney function, I would likely not recommend going above the 900 mg daily that he is currently taking.  Okay to continue with periodic ibuprofen use as well.    Otherwise, he is doing very well today and has no other questions or concerns.    Follow up: We will plan follow-up in about a year, however he knows that is an open door beforehand if there are any new concerns or symptoms that arise.    Telephone Visit Details  Patient unable to do video visits, so converted to telephone.  Start Time: 2:17PM  Call duration: 7 mins         Total time spent on day of encounter is 13 mins, including reviewing record, review of above studies, above visit with patient, symptomatic discussion of neuropathy/pain, including medication adjustments/prescription management, and documentation.     Zulema Montes,   Palliative Medicine   Pager [phone], AM[facility] ID [id number]    Some chart documentation performed using Dragon Voice recognition Software. Although reviewed after completion, some words and  grammatical errors may remain.        Again, thank you for allowing me to participate in the care of your patient.        Sincerely,        Zulema Montes, DO

## 2022-08-18 NOTE — LETTER
2022         RE: Derrick Benitez  [address]  [place] [address]        Dear Colleague,    Thank you for referring your patient, Derrick Benitez, to the [facility] [place]. Please see a copy of my visit note below.    Derrick is a 82 year old who is being evaluated via a billable telephone visit.      What phone number would you like to be contacted at? [phone]  How would you like to obtain your AVS? [facility]    Palliative Care Progress Note    Patient Name: Derrick Benitez  Primary Provider: Clarisse Golden    Chief Complaint/Patient ID: 83 yo M with NSCLC  - RLL mass dx , no PDL staining could be done on bx. Bone mets at time of dx.  Carbo, taxol, pembro x 2020. Now on maintenance with pembrolizumab Q6 Weeks.    Last Palliative care appointment: 22 with me.      Reviewed: Yes, Rx's for Gabapentin.    Interim History:  Derrick Benitez is an 82 year old male who is seen today for follow up with Palliative Care via billable video visit.      Reviewed oncology note from .  Continues to have stable disease and no issues from treatment.  Planning to continue Keytruda and Zometa for now.    Has some neuropathy and has followed with a doctor at [facility].  Currently on gabapentin 300 mg TID. Feels this has been very helpful for his neuropathy. Will sometimes take 2-4 tablets of advil in day.    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     Overall says he's doing very well.      Social History:  Lives with his wife.  Has a hobby snowblower/yard business.  Social History     Tobacco Use     Smoking status: Former Smoker     Packs/day: 3.00     Years: 40.00     Pack years: 120.00     Quit date: 10/28/2004     Years since quittin.8     Smokeless tobacco: Never Used   Substance Use Topics     Alcohol use: No     Comment: states he quit 30 years ago     Drug use: No       Family History- Reviewed in Epic.    Allergies   Allergen Reactions      Losartan Other (See Comments)     Fatigue, weakness in legs     Methenamine        Advanced Care Planning: HCD scanned in chart.  Names his wife as primary HCA, followed by his son, followed by his daughter.    Medications- Reviewed in Epic.    Past Medical History- Reviewed in [facility].    Past Surgical History- Reviewed in Epic.    Review of Systems:   ROS: 10 point ROS neg other than the symptoms noted above in the HPI.    Key Data Reviewed:  LABS: 7/25/22- Cr 1.21, GFR 59, Albumin 3.2, LFTs wnl. WBC 5.6, Hgb 13, Plts 130.     IMAGING: CT CAP 6/13/22- IMPRESSION: Stable skeletal metastases. No new sites of disease or significant change.     Impression & Recommendations & Counseling:  Derrick Benitez is a 82 year old male with history of metastatic NSCLCA complicated by pain, mostly CIPN/acute taxane pain syndrome.  Now on maintenance pembrolizumab treatment.     Neuropathy improved with the addition of gabapentin.  Given his kidney function, I would likely not recommend going above the 900 mg daily that he is currently taking.  Okay to continue with periodic ibuprofen use as well.    Otherwise, he is doing very well today and has no other questions or concerns.    Follow up: We will plan follow-up in about a year, however he knows that is an open door beforehand if there are any new concerns or symptoms that arise.    Telephone Visit Details  Patient unable to do video visits, so converted to telephone.  Start Time: 2:17PM  Call duration: 7 mins         Total time spent on day of encounter is 13 mins, including reviewing record, review of above studies, above visit with patient, symptomatic discussion of neuropathy/pain, including medication adjustments/prescription management, and documentation.     Zulema Montes,   Palliative Medicine   Pager [phone], AMCOM ID [id number]    Some chart documentation performed using Dragon Voice recognition Software. Although reviewed after completion, some words and  grammatical errors may remain.        Again, thank you for allowing me to participate in the care of your patient.        Sincerely,        Zulema Montes, DO

## 2022-09-02 ENCOUNTER — LAB (OUTPATIENT)
Dept: INFUSION THERAPY | Facility: CLINIC | Age: 82
End: 2022-09-02
Attending: INTERNAL MEDICINE
Payer: MEDICARE

## 2022-09-02 DIAGNOSIS — C34.31 MALIGNANT NEOPLASM OF LOWER LOBE OF RIGHT LUNG (H): ICD-10-CM

## 2022-09-02 DIAGNOSIS — Z51.11 ENCOUNTER FOR ANTINEOPLASTIC CHEMOTHERAPY: Primary | ICD-10-CM

## 2022-09-02 LAB
ALBUMIN SERPL-MCNC: 3 G/DL (ref 3.4–5)
ALP SERPL-CCNC: 63 U/L (ref 40–150)
ALT SERPL W P-5'-P-CCNC: 15 U/L (ref 0–70)
ANION GAP SERPL CALCULATED.3IONS-SCNC: 4 MMOL/L (ref 3–14)
AST SERPL W P-5'-P-CCNC: 14 U/L (ref 0–45)
BASOPHILS # BLD AUTO: 0 10E3/UL (ref 0–0.2)
BASOPHILS NFR BLD AUTO: 0 %
BILIRUB SERPL-MCNC: 0.4 MG/DL (ref 0.2–1.3)
BUN SERPL-MCNC: 21 MG/DL (ref 7–30)
CALCIUM SERPL-MCNC: 8.9 MG/DL (ref 8.5–10.1)
CHLORIDE BLD-SCNC: 111 MMOL/L (ref 94–109)
CO2 SERPL-SCNC: 25 MMOL/L (ref 20–32)
CREAT SERPL-MCNC: 1.05 MG/DL (ref 0.66–1.25)
EOSINOPHIL # BLD AUTO: 0.2 10E3/UL (ref 0–0.7)
EOSINOPHIL NFR BLD AUTO: 3 %
ERYTHROCYTE [DISTWIDTH] IN BLOOD BY AUTOMATED COUNT: 13.1 % (ref 10–15)
GFR SERPL CREATININE-BSD FRML MDRD: 70 ML/MIN/1.73M2
GLUCOSE BLD-MCNC: 132 MG/DL (ref 70–99)
HCT VFR BLD AUTO: 41.1 % (ref 40–53)
HGB BLD-MCNC: 13.9 G/DL (ref 13.3–17.7)
IMM GRANULOCYTES # BLD: 0 10E3/UL
IMM GRANULOCYTES NFR BLD: 0 %
LYMPHOCYTES # BLD AUTO: 1.1 10E3/UL (ref 0.8–5.3)
LYMPHOCYTES NFR BLD AUTO: 19 %
MCH RBC QN AUTO: 31.5 PG (ref 26.5–33)
MCHC RBC AUTO-ENTMCNC: 33.8 G/DL (ref 31.5–36.5)
MCV RBC AUTO: 93 FL (ref 78–100)
MONOCYTES # BLD AUTO: 0.4 10E3/UL (ref 0–1.3)
MONOCYTES NFR BLD AUTO: 7 %
NEUTROPHILS # BLD AUTO: 4.2 10E3/UL (ref 1.6–8.3)
NEUTROPHILS NFR BLD AUTO: 71 %
NRBC # BLD AUTO: 0 10E3/UL
NRBC BLD AUTO-RTO: 0 /100
PLATELET # BLD AUTO: 130 10E3/UL (ref 150–450)
POTASSIUM BLD-SCNC: 4.3 MMOL/L (ref 3.4–5.3)
PROT SERPL-MCNC: 6.4 G/DL (ref 6.8–8.8)
RBC # BLD AUTO: 4.41 10E6/UL (ref 4.4–5.9)
SODIUM SERPL-SCNC: 140 MMOL/L (ref 133–144)
TSH SERPL DL<=0.005 MIU/L-ACNC: 1.22 MU/L (ref 0.4–4)
WBC # BLD AUTO: 5.9 10E3/UL (ref 4–11)

## 2022-09-02 PROCEDURE — 80053 COMPREHEN METABOLIC PANEL: CPT | Performed by: INTERNAL MEDICINE

## 2022-09-02 PROCEDURE — 84443 ASSAY THYROID STIM HORMONE: CPT | Performed by: INTERNAL MEDICINE

## 2022-09-02 PROCEDURE — 250N000011 HC RX IP 250 OP 636: Performed by: INTERNAL MEDICINE

## 2022-09-02 PROCEDURE — 85025 COMPLETE CBC W/AUTO DIFF WBC: CPT | Performed by: INTERNAL MEDICINE

## 2022-09-02 PROCEDURE — 36591 DRAW BLOOD OFF VENOUS DEVICE: CPT | Performed by: INTERNAL MEDICINE

## 2022-09-02 PROCEDURE — 82040 ASSAY OF SERUM ALBUMIN: CPT | Performed by: INTERNAL MEDICINE

## 2022-09-02 RX ORDER — HEPARIN SODIUM,PORCINE 10 UNIT/ML
5 VIAL (ML) INTRAVENOUS
Status: DISCONTINUED | OUTPATIENT
Start: 2022-09-02 | End: 2022-09-02 | Stop reason: HOSPADM

## 2022-09-02 RX ORDER — HEPARIN SODIUM (PORCINE) LOCK FLUSH IV SOLN 100 UNIT/ML 100 UNIT/ML
5 SOLUTION INTRAVENOUS
Status: DISCONTINUED | OUTPATIENT
Start: 2022-09-02 | End: 2022-09-02 | Stop reason: HOSPADM

## 2022-09-02 RX ADMIN — Medication 5 ML: at 09:32

## 2022-09-02 NOTE — PROGRESS NOTES
Nursing Note:  Derrick Benitez presents today for Port Labs.    Patient seen by provider today: No   present during visit today: Not Applicable.    Note: N/A.    Intravenous Access:  Labs drawn without difficulty.  Implanted Port.    Discharge Plan:   Patient was discharged.    Fili Luz RN

## 2022-09-06 ENCOUNTER — ONCOLOGY VISIT (OUTPATIENT)
Dept: ONCOLOGY | Facility: CLINIC | Age: 82
End: 2022-09-06
Attending: INTERNAL MEDICINE
Payer: MEDICARE

## 2022-09-06 ENCOUNTER — INFUSION THERAPY VISIT (OUTPATIENT)
Dept: INFUSION THERAPY | Facility: CLINIC | Age: 82
End: 2022-09-06
Attending: INTERNAL MEDICINE
Payer: MEDICARE

## 2022-09-06 VITALS
TEMPERATURE: 98.5 F | HEART RATE: 60 BPM | SYSTOLIC BLOOD PRESSURE: 136 MMHG | HEIGHT: 67 IN | WEIGHT: 199 LBS | BODY MASS INDEX: 31.23 KG/M2 | RESPIRATION RATE: 16 BRPM | OXYGEN SATURATION: 93 % | DIASTOLIC BLOOD PRESSURE: 58 MMHG

## 2022-09-06 DIAGNOSIS — C79.51 BONE METASTASES: ICD-10-CM

## 2022-09-06 DIAGNOSIS — C34.31 MALIGNANT NEOPLASM OF LOWER LOBE OF RIGHT LUNG (H): Primary | ICD-10-CM

## 2022-09-06 DIAGNOSIS — C34.31 MALIGNANT NEOPLASM OF LOWER LOBE OF RIGHT LUNG (H): ICD-10-CM

## 2022-09-06 DIAGNOSIS — Z51.11 ENCOUNTER FOR ANTINEOPLASTIC CHEMOTHERAPY: Primary | ICD-10-CM

## 2022-09-06 PROCEDURE — 99214 OFFICE O/P EST MOD 30 MIN: CPT | Performed by: INTERNAL MEDICINE

## 2022-09-06 PROCEDURE — 250N000011 HC RX IP 250 OP 636: Performed by: INTERNAL MEDICINE

## 2022-09-06 PROCEDURE — 96413 CHEMO IV INFUSION 1 HR: CPT

## 2022-09-06 PROCEDURE — 258N000003 HC RX IP 258 OP 636: Performed by: INTERNAL MEDICINE

## 2022-09-06 PROCEDURE — 96367 TX/PROPH/DG ADDL SEQ IV INF: CPT

## 2022-09-06 RX ORDER — LORAZEPAM 2 MG/ML
0.5 INJECTION INTRAMUSCULAR EVERY 4 HOURS PRN
Status: CANCELLED | OUTPATIENT
Start: 2022-09-06

## 2022-09-06 RX ORDER — HEPARIN SODIUM (PORCINE) LOCK FLUSH IV SOLN 100 UNIT/ML 100 UNIT/ML
5 SOLUTION INTRAVENOUS
Status: CANCELLED | OUTPATIENT
Start: 2022-09-06

## 2022-09-06 RX ORDER — MEPERIDINE HYDROCHLORIDE 25 MG/ML
25 INJECTION INTRAMUSCULAR; INTRAVENOUS; SUBCUTANEOUS EVERY 30 MIN PRN
Status: CANCELLED | OUTPATIENT
Start: 2022-09-06

## 2022-09-06 RX ORDER — SODIUM CHLORIDE 9 MG/ML
1000 INJECTION, SOLUTION INTRAVENOUS CONTINUOUS PRN
Status: CANCELLED
Start: 2022-09-06

## 2022-09-06 RX ORDER — HEPARIN SODIUM,PORCINE 10 UNIT/ML
5 VIAL (ML) INTRAVENOUS
Status: CANCELLED | OUTPATIENT
Start: 2022-09-06

## 2022-09-06 RX ORDER — DIPHENHYDRAMINE HYDROCHLORIDE 50 MG/ML
50 INJECTION INTRAMUSCULAR; INTRAVENOUS
Status: CANCELLED
Start: 2022-09-06

## 2022-09-06 RX ORDER — HEPARIN SODIUM (PORCINE) LOCK FLUSH IV SOLN 100 UNIT/ML 100 UNIT/ML
5 SOLUTION INTRAVENOUS EVERY 8 HOURS PRN
Status: CANCELLED | OUTPATIENT
Start: 2022-09-06

## 2022-09-06 RX ORDER — HEPARIN SODIUM (PORCINE) LOCK FLUSH IV SOLN 100 UNIT/ML 100 UNIT/ML
5 SOLUTION INTRAVENOUS
Status: DISCONTINUED | OUTPATIENT
Start: 2022-09-06 | End: 2022-09-06 | Stop reason: HOSPADM

## 2022-09-06 RX ORDER — ALBUTEROL SULFATE 90 UG/1
1-2 AEROSOL, METERED RESPIRATORY (INHALATION)
Status: CANCELLED
Start: 2022-09-06

## 2022-09-06 RX ORDER — ALBUTEROL SULFATE 0.83 MG/ML
2.5 SOLUTION RESPIRATORY (INHALATION)
Status: CANCELLED | OUTPATIENT
Start: 2022-09-06

## 2022-09-06 RX ORDER — ZOLEDRONIC ACID 0.04 MG/ML
4 INJECTION, SOLUTION INTRAVENOUS ONCE
Status: COMPLETED | OUTPATIENT
Start: 2022-09-06 | End: 2022-09-06

## 2022-09-06 RX ORDER — NALOXONE HYDROCHLORIDE 0.4 MG/ML
.1-.4 INJECTION, SOLUTION INTRAMUSCULAR; INTRAVENOUS; SUBCUTANEOUS
Status: CANCELLED | OUTPATIENT
Start: 2022-09-06

## 2022-09-06 RX ORDER — ZOLEDRONIC ACID 0.04 MG/ML
4 INJECTION, SOLUTION INTRAVENOUS ONCE
Status: CANCELLED | OUTPATIENT
Start: 2022-09-06 | End: 2022-09-06

## 2022-09-06 RX ORDER — EPINEPHRINE 1 MG/ML
0.3 INJECTION, SOLUTION INTRAMUSCULAR; SUBCUTANEOUS EVERY 5 MIN PRN
Status: CANCELLED | OUTPATIENT
Start: 2022-09-06

## 2022-09-06 RX ORDER — METHYLPREDNISOLONE SODIUM SUCCINATE 125 MG/2ML
125 INJECTION, POWDER, LYOPHILIZED, FOR SOLUTION INTRAMUSCULAR; INTRAVENOUS
Status: CANCELLED
Start: 2022-09-06

## 2022-09-06 RX ADMIN — ZOLEDRONIC ACID 4 MG: 0.04 INJECTION, SOLUTION INTRAVENOUS at 10:39

## 2022-09-06 RX ADMIN — SODIUM CHLORIDE 250 ML: 9 INJECTION, SOLUTION INTRAVENOUS at 10:59

## 2022-09-06 RX ADMIN — Medication 5 ML: at 11:34

## 2022-09-06 RX ADMIN — SODIUM CHLORIDE 400 MG: 9 INJECTION, SOLUTION INTRAVENOUS at 10:59

## 2022-09-06 ASSESSMENT — PAIN SCALES - GENERAL: PAINLEVEL: NO PAIN (0)

## 2022-09-06 NOTE — LETTER
"    9/6/2022         RE: Derrick Benitez  [address]  [facility] [address]        Dear Colleague,    Thank you for referring your patient, Derrick Benitez, to the [facility]. Please see a copy of my visit note below.    Oncology Rooming Note    September 6, 2022 9:15 AM   Derrick Benitez is a 82 year old male who presents for:    Chief Complaint   Patient presents with     Oncology Clinic Visit     Initial Vitals: There were no vitals taken for this visit. Estimated body mass index is 31.17 kg/m  as calculated from the following:    Height as of 7/26/22: 1.702 m (5' 7\").    Weight as of 7/26/22: 90.3 kg (199 lb). There is no height or weight on file to calculate BSA.  Data Unavailable Comment: Data Unavailable   No LMP for male patient.  Allergies reviewed: Yes  Medications reviewed: Yes    Medications: Medication refills not needed today.  Pharmacy name entered into EPIC:    [place], [place] - [address] [facility] [address] [place], [place] - [address]    Clinical concerns:  doctor was notified.      Brenda Martines MA              ONCOLOGIC HISTORY:  Mr. Benitez is a gentleman with non-small cell lung cancer, favor squamous cell carcinoma.   -NGS and PDL staining could not be done because of small sample.   -Guardant 360 does not reveal any actionable alteration.  1.  CT chest angiogram on 09/07/2020 revealed right lower lobe mass.   2.  CT-guided biopsy was done on 09/18/2020.  Pathology revealed non-small cell lung cancer, favor squamous cell carcinoma.  Sample size was small.  PDL staining and NGS panel could not be done.   3.  Brain MRI on 09/26/2020 did not reveal any brain metastasis.   4.  PET scan on 10/05/2020 revealed hypermetabolic right lung mass and multiple hypermetabolic bone lesions.   5.  The patient started on carboplatin and Taxol on 11/25/2020.   -Pembrolizumab added with cycle 2.      SUBJECTIVE:  Mr. Benitez is an " 82-year-old gentleman with metastatic non-small cell lung cancer on pembrolizumab. He is tolerating it well. Disease has been stable. He is also on zometa for bone metastasis. He is tolerating it well.     Overall his condition is stable. Patient has peripheral neuropathy. He is on gabapentin. Neuropathy is improving. He has mild fatigue. Fatigue is not getting worse. No headache.  No dizziness.  No chest pain.  No shortness of breath.  No abdominal pain.  No nausea or vomiting.  Appetite is good.  No urinary complaint.  No bowel problem. All other review of systems is negative.     PHYSICAL EXAMINATION:   GENERAL:  Alert and oriented x 3.   VITAL SIGNS:  Reviewed.  ECOG PS of 1.     EYES:  No icterus.   NECK:  Supple. No lymphadenopathy.    AXILLAE:  No lymphadenopathy.   LUNGS:  Good air entry bilaterally.  No crackles or wheezing.   HEART:  Regular.  No murmur.   GI: Abdomen is soft.  Nontender. No mass.   EXTREMITIES:  No pedal edema.  No calf swelling or tenderness.   SKIN:  No rash.      LABS:  CBC, TSH and CMP were reviewed.     ASSESSMENT:    1.  An 81-year-old gentleman with metastatic non-small cell lung cancer on pembrolizumab.  No clinical suspicion of progression.  2.  Peripheral neuropathy, improving.  3.  Fatigue.  4.  Mild thrombocytopenia. Stable.     PLAN:    1.  Discussed regarding lung cancer.  Patient is clinically stable.  He will continue on pembrolizumab every 6 weeks.  He is tolerating it well.  Side effects reviewed.    Labs were all reviewed.  Thrombocytopenia is stable.  No leukopenia or anemia.  LFT and TSH is normal.  Overall, they are good for treatment.    2.  In about 6 weeks time, we will get a PET scan.  If PET scan does not reveal any evidence of disease, we can consider stopping the pembrolizumab.  Further discussion after the PET scan.    3.  He will continue on Zometa.  He is tolerating it well.  No dental or jaw related symptoms. He will also continue on calcium and vitamin D  twice a day.    4.  His peripheral neuropathy is improving.  He will continue on gabapentin.    5.  He will see our nurse practitioner in 6 weeks.  I will see him in 12 weeks.  Advised him to call us with any questions or concerns.      Total visit time of 30 minutes.  Time spent in today's visit, review of chart/investigations today, monitoring for toxicity of high risk drugs and documentation.      Again, thank you for allowing me to participate in the care of your patient.        Sincerely,        Buzz Larsen MD

## 2022-09-06 NOTE — PROGRESS NOTES
"Oncology Rooming Note    September 6, 2022 9:15 AM   Derrick Benitez is a 82 year old male who presents for:    Chief Complaint   Patient presents with     Oncology Clinic Visit     Initial Vitals: There were no vitals taken for this visit. Estimated body mass index is 31.17 kg/m  as calculated from the following:    Height as of 7/26/22: 1.702 m (5' 7\").    Weight as of 7/26/22: 90.3 kg (199 lb). There is no height or weight on file to calculate BSA.  Data Unavailable Comment: Data Unavailable   No LMP for male patient.  Allergies reviewed: Yes  Medications reviewed: Yes    Medications: Medication refills not needed today.  Pharmacy name entered into EPIC:    [place], [place] - [address]  [facility] [address] [place], [place] - [address] [place] [address]    Clinical concerns:  doctor was notified.      Brenda Martines MA            "

## 2022-09-06 NOTE — PROGRESS NOTES
Infusion Nursing Note:  Derrick Benitez presents today for C17 D1 Keytruda/Zometa.    Patient seen by provider today: Yes: Dr. Larsen   present during visit today: Not Applicable.    Note: N/A.    Intravenous Access:  Implanted Port.    Treatment Conditions:  Lab Results   Component Value Date    HGB 13.9 09/02/2022    WBC 5.9 09/02/2022    ANEU 3.4 06/01/2021    ANEUTAUTO 4.2 09/02/2022     (L) 09/02/2022      Lab Results   Component Value Date     09/02/2022    POTASSIUM 4.3 09/02/2022    MAG 2.1 10/30/2014    CR 1.05 09/02/2022    ANTHONY 8.9 09/02/2022    BILITOTAL 0.4 09/02/2022    ALBUMIN 3.0 (L) 09/02/2022    ALT 15 09/02/2022    AST 14 09/02/2022     Results reviewed, labs MET treatment parameters, ok to proceed with treatment.    Post Infusion Assessment:  Patient tolerated infusion without incident.  Blood return noted pre and post infusion.  Site patent and intact, free from redness, edema or discomfort.  No evidence of extravasations.  Access discontinued per protocol.     Discharge Plan:   Discharge instructions reviewed with: Patient.  Patient and/or family verbalized understanding of discharge instructions and all questions answered.  AVS to patient via iRatesT.  Patient will return 10/17 for next appointment.   Patient discharged in stable condition accompanied by: self.  Departure Mode: Ambulatory.      Tatiana Aguiar RN

## 2022-09-06 NOTE — PATIENT INSTRUCTIONS
1. Continue pembrolizumab and zometa every 6 weeks.  2. PET scan in 6 weeks.  3. See De Frausto in 6 weeks.  4. See me in 12 weeks.  5. Continue gabapentin.

## 2022-09-07 NOTE — PROGRESS NOTES
ONCOLOGIC HISTORY:  Mr. Benitez is a gentleman with non-small cell lung cancer, favor squamous cell carcinoma.   -NGS and PDL staining could not be done because of small sample.   -Guardant 360 does not reveal any actionable alteration.  1.  CT chest angiogram on 09/07/2020 revealed right lower lobe mass.   2.  CT-guided biopsy was done on 09/18/2020.  Pathology revealed non-small cell lung cancer, favor squamous cell carcinoma.  Sample size was small.  PDL staining and NGS panel could not be done.   3.  Brain MRI on 09/26/2020 did not reveal any brain metastasis.   4.  PET scan on 10/05/2020 revealed hypermetabolic right lung mass and multiple hypermetabolic bone lesions.   5.  The patient started on carboplatin and Taxol on 11/25/2020.   -Pembrolizumab added with cycle 2.      SUBJECTIVE:  Mr. Benitez is an 82-year-old gentleman with metastatic non-small cell lung cancer on pembrolizumab. He is tolerating it well. Disease has been stable. He is also on zometa for bone metastasis. He is tolerating it well.     Overall his condition is stable. Patient has peripheral neuropathy. He is on gabapentin. Neuropathy is improving. He has mild fatigue. Fatigue is not getting worse. No headache.  No dizziness.  No chest pain.  No shortness of breath.  No abdominal pain.  No nausea or vomiting.  Appetite is good.  No urinary complaint.  No bowel problem. All other review of systems is negative.     PHYSICAL EXAMINATION:   GENERAL:  Alert and oriented x 3.   VITAL SIGNS:  Reviewed.  ECOG PS of 1.     EYES:  No icterus.   NECK:  Supple. No lymphadenopathy.    AXILLAE:  No lymphadenopathy.   LUNGS:  Good air entry bilaterally.  No crackles or wheezing.   HEART:  Regular.  No murmur.   GI: Abdomen is soft.  Nontender. No mass.   EXTREMITIES:  No pedal edema.  No calf swelling or tenderness.   SKIN:  No rash.      LABS:  CBC, TSH and CMP were reviewed.     ASSESSMENT:    1.  An 81-year-old gentleman with metastatic non-small cell lung  cancer on pembrolizumab.  No clinical suspicion of progression.  2.  Peripheral neuropathy, improving.  3.  Fatigue.  4.  Mild thrombocytopenia. Stable.     PLAN:    1.  Discussed regarding lung cancer.  Patient is clinically stable.  He will continue on pembrolizumab every 6 weeks.  He is tolerating it well.  Side effects reviewed.    Labs were all reviewed.  Thrombocytopenia is stable.  No leukopenia or anemia.  LFT and TSH is normal.  Overall, they are good for treatment.    2.  In about 6 weeks time, we will get a PET scan.  If PET scan does not reveal any evidence of disease, we can consider stopping the pembrolizumab.  Further discussion after the PET scan.    3.  He will continue on Zometa.  He is tolerating it well.  No dental or jaw related symptoms. He will also continue on calcium and vitamin D twice a day.    4.  His peripheral neuropathy is improving.  He will continue on gabapentin.    5.  He will see our nurse practitioner in 6 weeks.  I will see him in 12 weeks.  Advised him to call us with any questions or concerns.      Total visit time of 30 minutes.  Time spent in today's visit, review of chart/investigations today, monitoring for toxicity of high risk drugs and documentation.

## 2022-10-09 ENCOUNTER — HEALTH MAINTENANCE LETTER (OUTPATIENT)
Age: 82
End: 2022-10-09

## 2022-10-17 ENCOUNTER — LAB (OUTPATIENT)
Dept: INFUSION THERAPY | Facility: CLINIC | Age: 82
End: 2022-10-17
Attending: INTERNAL MEDICINE
Payer: MEDICARE

## 2022-10-17 ENCOUNTER — HOSPITAL ENCOUNTER (OUTPATIENT)
Dept: PET IMAGING | Facility: CLINIC | Age: 82
Discharge: HOME | End: 2022-10-17
Attending: INTERNAL MEDICINE | Admitting: INTERNAL MEDICINE
Payer: MEDICARE

## 2022-10-17 DIAGNOSIS — C34.31 MALIGNANT NEOPLASM OF LOWER LOBE OF RIGHT LUNG (H): ICD-10-CM

## 2022-10-17 DIAGNOSIS — Z51.11 ENCOUNTER FOR ANTINEOPLASTIC CHEMOTHERAPY: Primary | ICD-10-CM

## 2022-10-17 LAB
ALBUMIN SERPL-MCNC: 3 G/DL (ref 3.4–5)
ALP SERPL-CCNC: 66 U/L (ref 40–150)
ALT SERPL W P-5'-P-CCNC: 16 U/L (ref 0–70)
ANION GAP SERPL CALCULATED.3IONS-SCNC: 5 MMOL/L (ref 3–14)
AST SERPL W P-5'-P-CCNC: 18 U/L (ref 0–45)
BASOPHILS # BLD MANUAL: 0 10E3/UL (ref 0–0.2)
BASOPHILS NFR BLD MANUAL: 0 %
BILIRUB SERPL-MCNC: 0.5 MG/DL (ref 0.2–1.3)
BUN SERPL-MCNC: 20 MG/DL (ref 7–30)
CALCIUM SERPL-MCNC: 8.7 MG/DL (ref 8.5–10.1)
CHLORIDE BLD-SCNC: 109 MMOL/L (ref 94–109)
CO2 SERPL-SCNC: 25 MMOL/L (ref 20–32)
CREAT SERPL-MCNC: 1.08 MG/DL (ref 0.66–1.25)
ELLIPTOCYTES BLD QL SMEAR: SLIGHT
EOSINOPHIL # BLD MANUAL: 0.4 10E3/UL (ref 0–0.7)
EOSINOPHIL NFR BLD MANUAL: 8 %
ERYTHROCYTE [DISTWIDTH] IN BLOOD BY AUTOMATED COUNT: 13 % (ref 10–15)
GFR SERPL CREATININE-BSD FRML MDRD: 68 ML/MIN/1.73M2
GLUCOSE BLD-MCNC: 126 MG/DL (ref 70–99)
HCT VFR BLD AUTO: 40.8 % (ref 40–53)
HGB BLD-MCNC: 14.1 G/DL (ref 13.3–17.7)
LYMPHOCYTES # BLD MANUAL: 1 10E3/UL (ref 0.8–5.3)
LYMPHOCYTES NFR BLD MANUAL: 18 %
MCH RBC QN AUTO: 32 PG (ref 26.5–33)
MCHC RBC AUTO-ENTMCNC: 34.6 G/DL (ref 31.5–36.5)
MCV RBC AUTO: 93 FL (ref 78–100)
MONOCYTES # BLD MANUAL: 0.1 10E3/UL (ref 0–1.3)
MONOCYTES NFR BLD MANUAL: 2 %
NEUTROPHILS # BLD MANUAL: 3.9 10E3/UL (ref 1.6–8.3)
NEUTROPHILS NFR BLD MANUAL: 72 %
PLAT MORPH BLD: ABNORMAL
PLATELET # BLD AUTO: 127 10E3/UL (ref 150–450)
POTASSIUM BLD-SCNC: 3.9 MMOL/L (ref 3.4–5.3)
PROT SERPL-MCNC: 6.3 G/DL (ref 6.8–8.8)
RBC # BLD AUTO: 4.4 10E6/UL (ref 4.4–5.9)
RBC MORPH BLD: ABNORMAL
SMUDGE CELLS BLD QL SMEAR: PRESENT
SODIUM SERPL-SCNC: 139 MMOL/L (ref 133–144)
TSH SERPL DL<=0.005 MIU/L-ACNC: 1.14 MU/L (ref 0.4–4)
WBC # BLD AUTO: 5.4 10E3/UL (ref 4–11)

## 2022-10-17 PROCEDURE — 36591 DRAW BLOOD OFF VENOUS DEVICE: CPT | Performed by: INTERNAL MEDICINE

## 2022-10-17 PROCEDURE — 84443 ASSAY THYROID STIM HORMONE: CPT | Performed by: INTERNAL MEDICINE

## 2022-10-17 PROCEDURE — 74177 CT ABD & PELVIS W/CONTRAST: CPT

## 2022-10-17 PROCEDURE — G1010 CDSM STANSON: HCPCS | Mod: PS

## 2022-10-17 PROCEDURE — 343N000001 HC RX 343: Performed by: INTERNAL MEDICINE

## 2022-10-17 PROCEDURE — 250N000011 HC RX IP 250 OP 636: Performed by: INTERNAL MEDICINE

## 2022-10-17 PROCEDURE — 85007 BL SMEAR W/DIFF WBC COUNT: CPT | Performed by: INTERNAL MEDICINE

## 2022-10-17 PROCEDURE — 82040 ASSAY OF SERUM ALBUMIN: CPT | Performed by: INTERNAL MEDICINE

## 2022-10-17 PROCEDURE — 80053 COMPREHEN METABOLIC PANEL: CPT | Performed by: INTERNAL MEDICINE

## 2022-10-17 PROCEDURE — A9552 F18 FDG: HCPCS | Performed by: INTERNAL MEDICINE

## 2022-10-17 PROCEDURE — 85027 COMPLETE CBC AUTOMATED: CPT | Performed by: INTERNAL MEDICINE

## 2022-10-17 RX ORDER — IOPAMIDOL 755 MG/ML
10-135 INJECTION, SOLUTION INTRAVASCULAR ONCE
Status: COMPLETED | OUTPATIENT
Start: 2022-10-17 | End: 2022-10-17

## 2022-10-17 RX ORDER — HEPARIN SODIUM (PORCINE) LOCK FLUSH IV SOLN 100 UNIT/ML 100 UNIT/ML
5 SOLUTION INTRAVENOUS
Status: DISCONTINUED | OUTPATIENT
Start: 2022-10-17 | End: 2022-10-17 | Stop reason: HOSPADM

## 2022-10-17 RX ORDER — HEPARIN SODIUM (PORCINE) LOCK FLUSH IV SOLN 100 UNIT/ML 100 UNIT/ML
500 SOLUTION INTRAVENOUS EVERY 8 HOURS
Status: DISCONTINUED | OUTPATIENT
Start: 2022-10-17 | End: 2022-10-18 | Stop reason: HOSPADM

## 2022-10-17 RX ORDER — HEPARIN SODIUM,PORCINE 10 UNIT/ML
5 VIAL (ML) INTRAVENOUS
Status: CANCELLED | OUTPATIENT
Start: 2022-10-17

## 2022-10-17 RX ORDER — LORAZEPAM 2 MG/ML
0.5 INJECTION INTRAMUSCULAR EVERY 4 HOURS PRN
Status: CANCELLED | OUTPATIENT
Start: 2022-10-18

## 2022-10-17 RX ORDER — ALBUTEROL SULFATE 0.83 MG/ML
2.5 SOLUTION RESPIRATORY (INHALATION)
Status: CANCELLED | OUTPATIENT
Start: 2022-10-18

## 2022-10-17 RX ORDER — DIPHENHYDRAMINE HYDROCHLORIDE 50 MG/ML
50 INJECTION INTRAMUSCULAR; INTRAVENOUS
Status: CANCELLED
Start: 2022-10-18

## 2022-10-17 RX ORDER — MEPERIDINE HYDROCHLORIDE 25 MG/ML
25 INJECTION INTRAMUSCULAR; INTRAVENOUS; SUBCUTANEOUS EVERY 30 MIN PRN
Status: CANCELLED | OUTPATIENT
Start: 2022-10-18

## 2022-10-17 RX ORDER — HEPARIN SODIUM,PORCINE 10 UNIT/ML
5 VIAL (ML) INTRAVENOUS
Status: CANCELLED | OUTPATIENT
Start: 2022-10-18

## 2022-10-17 RX ORDER — SODIUM CHLORIDE 9 MG/ML
1000 INJECTION, SOLUTION INTRAVENOUS CONTINUOUS PRN
Status: CANCELLED
Start: 2022-10-18

## 2022-10-17 RX ORDER — NALOXONE HYDROCHLORIDE 0.4 MG/ML
.1-.4 INJECTION, SOLUTION INTRAMUSCULAR; INTRAVENOUS; SUBCUTANEOUS
Status: CANCELLED | OUTPATIENT
Start: 2022-10-18

## 2022-10-17 RX ORDER — HEPARIN SODIUM (PORCINE) LOCK FLUSH IV SOLN 100 UNIT/ML 100 UNIT/ML
5 SOLUTION INTRAVENOUS EVERY 8 HOURS PRN
Status: CANCELLED | OUTPATIENT
Start: 2022-10-18

## 2022-10-17 RX ORDER — HEPARIN SODIUM (PORCINE) LOCK FLUSH IV SOLN 100 UNIT/ML 100 UNIT/ML
5 SOLUTION INTRAVENOUS
Status: CANCELLED | OUTPATIENT
Start: 2022-10-17

## 2022-10-17 RX ORDER — EPINEPHRINE 1 MG/ML
0.3 INJECTION, SOLUTION, CONCENTRATE INTRAVENOUS EVERY 5 MIN PRN
Status: CANCELLED | OUTPATIENT
Start: 2022-10-18

## 2022-10-17 RX ORDER — ALBUTEROL SULFATE 90 UG/1
1-2 AEROSOL, METERED RESPIRATORY (INHALATION)
Status: CANCELLED
Start: 2022-10-18

## 2022-10-17 RX ADMIN — IOPAMIDOL 122 ML: 755 INJECTION, SOLUTION INTRAVENOUS at 08:51

## 2022-10-17 RX ADMIN — FLUDEOXYGLUCOSE F-18 12.27 MCI.: 500 INJECTION, SOLUTION INTRAVENOUS at 08:50

## 2022-10-17 RX ADMIN — Medication 500 UNITS: at 08:51

## 2022-10-17 NOTE — RESULT ENCOUNTER NOTE
Dear Mr. Benitez,    PET scan is good. No evidence of cancer.    Please, call me with any questions.    Buzz Larsen MD

## 2022-10-17 NOTE — PROGRESS NOTES
Nursing Note:  Derrick Benitez presents today for port labs.    Patient seen by provider today: No   present during visit today: Not Applicable.    Note: Pt has pet/ct after and requests port remain accessed.    Intravenous Access:  Labs drawn without difficulty.  Implanted Port.    Discharge Plan:   Patient was sent to [facility] for PET/CT appointment.    Porsha Wild RN

## 2022-10-18 ENCOUNTER — INFUSION THERAPY VISIT (OUTPATIENT)
Dept: INFUSION THERAPY | Facility: CLINIC | Age: 82
End: 2022-10-18
Attending: INTERNAL MEDICINE
Payer: MEDICARE

## 2022-10-18 VITALS
SYSTOLIC BLOOD PRESSURE: 120 MMHG | WEIGHT: 199 LBS | HEIGHT: 67 IN | TEMPERATURE: 98 F | BODY MASS INDEX: 31.23 KG/M2 | HEART RATE: 70 BPM | DIASTOLIC BLOOD PRESSURE: 69 MMHG | RESPIRATION RATE: 18 BRPM

## 2022-10-18 DIAGNOSIS — C79.51 BONE METASTASES: ICD-10-CM

## 2022-10-18 DIAGNOSIS — Z51.11 ENCOUNTER FOR ANTINEOPLASTIC CHEMOTHERAPY: Primary | ICD-10-CM

## 2022-10-18 DIAGNOSIS — C34.31 MALIGNANT NEOPLASM OF LOWER LOBE OF RIGHT LUNG (H): ICD-10-CM

## 2022-10-18 PROCEDURE — 96413 CHEMO IV INFUSION 1 HR: CPT

## 2022-10-18 PROCEDURE — 258N000003 HC RX IP 258 OP 636: Performed by: INTERNAL MEDICINE

## 2022-10-18 PROCEDURE — 250N000011 HC RX IP 250 OP 636: Performed by: INTERNAL MEDICINE

## 2022-10-18 PROCEDURE — 96367 TX/PROPH/DG ADDL SEQ IV INF: CPT

## 2022-10-18 RX ORDER — HEPARIN SODIUM (PORCINE) LOCK FLUSH IV SOLN 100 UNIT/ML 100 UNIT/ML
5 SOLUTION INTRAVENOUS EVERY 8 HOURS PRN
Status: DISCONTINUED | OUTPATIENT
Start: 2022-10-18 | End: 2022-10-18 | Stop reason: HOSPADM

## 2022-10-18 RX ORDER — HEPARIN SODIUM,PORCINE 10 UNIT/ML
5 VIAL (ML) INTRAVENOUS
Status: DISCONTINUED | OUTPATIENT
Start: 2022-10-18 | End: 2022-10-18 | Stop reason: HOSPADM

## 2022-10-18 RX ORDER — HEPARIN SODIUM (PORCINE) LOCK FLUSH IV SOLN 100 UNIT/ML 100 UNIT/ML
5 SOLUTION INTRAVENOUS
Status: CANCELLED | OUTPATIENT
Start: 2022-10-18

## 2022-10-18 RX ORDER — ZOLEDRONIC ACID 0.04 MG/ML
4 INJECTION, SOLUTION INTRAVENOUS ONCE
Status: COMPLETED | OUTPATIENT
Start: 2022-10-18 | End: 2022-10-18

## 2022-10-18 RX ORDER — HEPARIN SODIUM,PORCINE 10 UNIT/ML
5 VIAL (ML) INTRAVENOUS
Status: CANCELLED | OUTPATIENT
Start: 2022-10-18

## 2022-10-18 RX ADMIN — SODIUM CHLORIDE 250 ML: 9 INJECTION, SOLUTION INTRAVENOUS at 11:12

## 2022-10-18 RX ADMIN — HEPARIN 5 ML: 100 SYRINGE at 12:06

## 2022-10-18 RX ADMIN — ZOLEDRONIC ACID 4 MG: 0.04 INJECTION, SOLUTION INTRAVENOUS at 11:47

## 2022-10-18 RX ADMIN — SODIUM CHLORIDE 400 MG: 9 INJECTION, SOLUTION INTRAVENOUS at 11:12

## 2022-10-18 NOTE — PROGRESS NOTES
Infusion Nursing Note:  Derrick Benitez presents today for keytruda/zometa.    Patient seen by provider today: No   present during visit today: Not Applicable.    Note: N/A.    Intravenous Access:  Implanted Port.    Treatment Conditions:  Lab Results   Component Value Date     10/17/2022    POTASSIUM 3.9 10/17/2022    MAG 2.1 10/30/2014    CR 1.08 10/17/2022    ANTHONY 8.7 10/17/2022    BILITOTAL 0.5 10/17/2022    ALBUMIN 3.0 (L) 10/17/2022    ALT 16 10/17/2022    AST 18 10/17/2022     Results reviewed, labs MET treatment parameters, ok to proceed with treatment.    Post Infusion Assessment:  Patient tolerated infusion without incident.  Blood return noted pre and post infusion.  Site patent and intact, free from redness, edema or discomfort.  No evidence of extravasations.  Access discontinued per protocol.     Discharge Plan:   Discharge instructions reviewed with: Patient.  Patient and/or family verbalized understanding of discharge instructions and all questions answered.  Patient discharged in stable condition accompanied by: self.  Departure Mode: Ambulatory.      Archana Hall RN

## 2022-10-25 ENCOUNTER — TELEPHONE (OUTPATIENT)
Dept: OTHER | Facility: CLINIC | Age: 82
End: 2022-10-25

## 2022-10-25 ENCOUNTER — ONCOLOGY VISIT (OUTPATIENT)
Dept: ONCOLOGY | Facility: CLINIC | Age: 82
End: 2022-10-25
Attending: INTERNAL MEDICINE
Payer: MEDICARE

## 2022-10-25 VITALS
WEIGHT: 201 LBS | BODY MASS INDEX: 31.55 KG/M2 | HEIGHT: 67 IN | OXYGEN SATURATION: 96 % | HEART RATE: 70 BPM | RESPIRATION RATE: 16 BRPM | DIASTOLIC BLOOD PRESSURE: 86 MMHG | TEMPERATURE: 97.6 F | SYSTOLIC BLOOD PRESSURE: 159 MMHG

## 2022-10-25 DIAGNOSIS — C34.31 MALIGNANT NEOPLASM OF LOWER LOBE OF RIGHT LUNG (H): Primary | ICD-10-CM

## 2022-10-25 DIAGNOSIS — T45.1X5A PERIPHERAL NEUROPATHY DUE TO CHEMOTHERAPY (H): ICD-10-CM

## 2022-10-25 DIAGNOSIS — G62.0 PERIPHERAL NEUROPATHY DUE TO CHEMOTHERAPY (H): ICD-10-CM

## 2022-10-25 DIAGNOSIS — I71.43 INFRARENAL ABDOMINAL AORTIC ANEURYSM (AAA) WITHOUT RUPTURE (H): ICD-10-CM

## 2022-10-25 PROCEDURE — G0463 HOSPITAL OUTPT CLINIC VISIT: HCPCS

## 2022-10-25 PROCEDURE — 99215 OFFICE O/P EST HI 40 MIN: CPT | Performed by: INTERNAL MEDICINE

## 2022-10-25 RX ORDER — HEPARIN SODIUM (PORCINE) LOCK FLUSH IV SOLN 100 UNIT/ML 100 UNIT/ML
5 SOLUTION INTRAVENOUS EVERY 8 HOURS PRN
Status: CANCELLED | OUTPATIENT
Start: 2022-11-29

## 2022-10-25 RX ORDER — MEPERIDINE HYDROCHLORIDE 25 MG/ML
25 INJECTION INTRAMUSCULAR; INTRAVENOUS; SUBCUTANEOUS EVERY 30 MIN PRN
Status: CANCELLED | OUTPATIENT
Start: 2022-11-29

## 2022-10-25 RX ORDER — HEPARIN SODIUM,PORCINE 10 UNIT/ML
5 VIAL (ML) INTRAVENOUS
Status: CANCELLED | OUTPATIENT
Start: 2022-11-29

## 2022-10-25 RX ORDER — ALBUTEROL SULFATE 90 UG/1
1-2 AEROSOL, METERED RESPIRATORY (INHALATION)
Status: CANCELLED
Start: 2022-11-29

## 2022-10-25 RX ORDER — GABAPENTIN 300 MG/1
300 CAPSULE ORAL 3 TIMES DAILY
Qty: 90 CAPSULE | Refills: 3 | Status: SHIPPED | OUTPATIENT
Start: 2022-10-25 | End: 2023-04-26

## 2022-10-25 RX ORDER — LORAZEPAM 2 MG/ML
0.5 INJECTION INTRAMUSCULAR EVERY 4 HOURS PRN
Status: CANCELLED | OUTPATIENT
Start: 2022-11-29

## 2022-10-25 RX ORDER — METHYLPREDNISOLONE SODIUM SUCCINATE 125 MG/2ML
125 INJECTION, POWDER, LYOPHILIZED, FOR SOLUTION INTRAMUSCULAR; INTRAVENOUS
Status: CANCELLED
Start: 2022-11-29

## 2022-10-25 RX ORDER — ALBUTEROL SULFATE 0.83 MG/ML
2.5 SOLUTION RESPIRATORY (INHALATION)
Status: CANCELLED | OUTPATIENT
Start: 2022-11-29

## 2022-10-25 RX ORDER — SODIUM CHLORIDE 9 MG/ML
1000 INJECTION, SOLUTION INTRAVENOUS CONTINUOUS PRN
Status: CANCELLED
Start: 2022-11-29

## 2022-10-25 RX ORDER — DIPHENHYDRAMINE HYDROCHLORIDE 50 MG/ML
50 INJECTION INTRAMUSCULAR; INTRAVENOUS
Status: CANCELLED
Start: 2022-11-29

## 2022-10-25 RX ORDER — NALOXONE HYDROCHLORIDE 0.4 MG/ML
.1-.4 INJECTION, SOLUTION INTRAMUSCULAR; INTRAVENOUS; SUBCUTANEOUS
Status: CANCELLED | OUTPATIENT
Start: 2022-11-29

## 2022-10-25 RX ORDER — EPINEPHRINE 1 MG/ML
0.3 INJECTION, SOLUTION INTRAMUSCULAR; SUBCUTANEOUS EVERY 5 MIN PRN
Status: CANCELLED | OUTPATIENT
Start: 2022-11-29

## 2022-10-25 ASSESSMENT — PAIN SCALES - GENERAL: PAINLEVEL: MILD PAIN (2)

## 2022-10-25 NOTE — LETTER
"    10/25/2022         RE: Derrick Benitez  [address]  [facility] [address]        Dear Colleague,    Thank you for referring your patient, Derrick Benitez, to the [facility]. Please see a copy of my visit note below.    Oncology Rooming Note    October 25, 2022 8:39 AM   Derrick Benitez is a 82 year old male who presents for:    Chief Complaint   Patient presents with     Oncology Clinic Visit     Initial Vitals: There were no vitals taken for this visit. Estimated body mass index is 31.16 kg/m  as calculated from the following:    Height as of 10/18/22: 1.702 m (5' 7.01\").    Weight as of 10/18/22: 90.3 kg (199 lb). There is no height or weight on file to calculate BSA.  Data Unavailable Comment: Data Unavailable   No LMP for male patient.  Allergies reviewed: Yes  Medications reviewed: Yes    Medications: Medication refills not needed today.  Pharmacy name entered into EPIC:    [place], [place] - [address] [facility] [address] [place], [place] - [address]    Clinical concerns:  doctor was notified.      Brenda Martines MA              ONCOLOGIC HISTORY:  Mr. Benitez is a gentleman with non-small cell lung cancer, favor squamous cell carcinoma.   -NGS and PDL staining could not be done because of small sample.   -Guardant 360 does not reveal any actionable alteration.  1.  CT chest angiogram on 09/07/2020 revealed right lower lobe mass.   2.  CT-guided biopsy was done on 09/18/2020.  Pathology revealed non-small cell lung cancer, favor squamous cell carcinoma.  Sample size was small.  PDL staining and NGS panel could not be done.   3.  Brain MRI on 09/26/2020 did not reveal any brain metastasis.   4.  PET scan on 10/05/2020 revealed hypermetabolic right lung mass and multiple hypermetabolic bone lesions.   5.  The patient started on carboplatin and Taxol on 11/25/2020.   -Pembrolizumab added with cycle 2.   6. PET scan on 10/17/2022 " reveals FDG avid sclerotic lesions involving the right posterior seventh rib slightly above background levels. Favored to simply represent sequelae of post treatment change.     SUBJECTIVE:  Mr. Benitez is an 82-year-old gentleman with metastatic non-small cell lung cancer on pembrolizumab. He is tolerating it well.     PET scan on 10/17/2022 reveals FDG avid sclerotic lesions involving the right posterior seventh rib slightly above background levels, favored to simply represent sequelae of post treatment change.    Patient's overall condition is stable.  He has mild fatigue which would go along with his age and treatment.  No headache.  No dizziness.  He has mild chronic pain in the neck.  No worsening.  No chest pain.  No shortness of breath.  No cough.  No abdominal pain.  No nausea or vomiting.  Appetite has been good.  No urinary complaints.  No bowel problem.  No bleeding.  No fever or night sweats.  He has some tingling in the toes.  He is taking gabapentin which is helping. All other review of systems is negative.     PHYSICAL EXAMINATION:   GENERAL:  Alert and oriented x 3.   VITAL SIGNS:  Reviewed.  ECOG PS of 1.     Rest of the system is not examined.    ASSESSMENT:    1.  An 82-year-old gentleman with metastatic non-small cell lung cancer on pembrolizumab. No evidence of disease.    2.  Grade 1 peripheral neuropathy.  3.  Fatigue.  4.  Mild thrombocytopenia. Stable.  5.  Aortic aneurysm.     PLAN:    1.  PET scan was personally reviewed.  Explained to him there is no evidence of any malignancy.  There is a sclerotic lesion in right posterior seventh rib.  FDG is slightly above background.  Previously it was hypermetabolic.  Explained to him that this is treatment change.  At this time I am not suspecting any active malignancy.  Explained to him that he is in complete remission.    Discussed regarding treatment.  He has been on pembrolizumab.  I would recommend giving pembrolizumab for 2 years and then stop  it.  He is agreeable for this plan.  He will get pembrolizumab end of November.  After that we will stop it.    Explained to the patient that after after stopping pembrolizumab, we will monitor him closely.  He will have scans done about every 3 months.  When there is radiological progression of disease, treatment will be resumed.    Also explained to the patient that when he has recurrence of disease, I would like to get a biopsy and send the sample for NGS panel.  Previously NGS panel could not be done.    2.  He has mild fatigue.  This is mainly from his age.  I am hoping some of the fatigue will improve after pembrolizumab is discontinued.    3.  Patient has grade 1 neuropathy.  He will continue on gabapentin which is helping.  He will let us know if it gets worse.  We can increase the dose of gabapentin.    4.  Patient has aortic aneurysm.  We will set him up to see vascular surgery.    6.  He has mild thrombocytopenia.  CBC will be monitored periodically.  Thrombocytopenia is mild and will not cause any problem.    7.  He has been getting Zometa.  He has been tolerating it well.  No dental or jaw related symptoms.  We will discontinue Zometa after next treatment as there is no active bone lesion on PET scan.    8.  Patient had multiple questions which were all answered.  I will see him with next pembrolizumab treatment.    Total visit time of 45 minutes.  Time is spent in today's visit, review of chart/investigations today, monitoring for toxicity of high risk medication and documentation today.      Again, thank you for allowing me to participate in the care of your patient.        Sincerely,        Buzz Larsen MD

## 2022-10-25 NOTE — TELEPHONE ENCOUNTER
Pt referred to [facility] by Buzz Larsen MD for AAA    Patient has imaging in [facility]    Pt needs to be scheduled for consult with vascular surgery.  Will route to scheduling to coordinate an appointment ASAP.    Appointment note: Pt referred to [facility] by Buzz Larsen MD for AAA        Zoya SALDAÑA, RN    [facility]  Office: [phone]  Fax: [phone]

## 2022-10-25 NOTE — TELEPHONE ENCOUNTER
[facility] VASCULAR [facility]    Who is the name of the provider?:  TBDEANNA    What is the location you see this provider at/preferred location?: [place]  Person calling: Derrick Benitez  Phone number:  [phone]  Nurse call back needed:  NO     Reason for call:   Patient calling about referral from Dr. Larsen. ELVIN active requests.    Pharmacy location:  n/a  Outside Imaging: Not Applicable   Can we leave a detailed message on this number?  YES

## 2022-10-25 NOTE — PATIENT INSTRUCTIONS
1. Continue pembrolizumab and zometa every 6 weeks.  2. See me in 5-6 weeks when he comes for next treatment.  3. Vascular surgery appointment for aneurysm.  4. Continue gabapentin.

## 2022-10-25 NOTE — PROGRESS NOTES
"Oncology Rooming Note    October 25, 2022 8:39 AM   Derrick Benitez is a 82 year old male who presents for:    Chief Complaint   Patient presents with     Oncology Clinic Visit     Initial Vitals: There were no vitals taken for this visit. Estimated body mass index is 31.16 kg/m  as calculated from the following:    Height as of 10/18/22: 1.702 m (5' 7.01\").    Weight as of 10/18/22: 90.3 kg (199 lb). There is no height or weight on file to calculate BSA.  Data Unavailable Comment: Data Unavailable   No LMP for male patient.  Allergies reviewed: Yes  Medications reviewed: Yes    Medications: Medication refills not needed today.  Pharmacy name entered into EPIC:    [place], [place] - [address]  [facility] [address] [place], [place] - [address] [place] [address] [address]    Clinical concerns:  doctor was notified.      Brenda Martines MA            "

## 2022-10-25 NOTE — PROGRESS NOTES
ONCOLOGIC HISTORY:  Mr. Benitez is a gentleman with non-small cell lung cancer, favor squamous cell carcinoma.   -NGS and PDL staining could not be done because of small sample.   -Guardant 360 does not reveal any actionable alteration.  1.  CT chest angiogram on 09/07/2020 revealed right lower lobe mass.   2.  CT-guided biopsy was done on 09/18/2020.  Pathology revealed non-small cell lung cancer, favor squamous cell carcinoma.  Sample size was small.  PDL staining and NGS panel could not be done.   3.  Brain MRI on 09/26/2020 did not reveal any brain metastasis.   4.  PET scan on 10/05/2020 revealed hypermetabolic right lung mass and multiple hypermetabolic bone lesions.   5.  The patient started on carboplatin and Taxol on 11/25/2020.   -Pembrolizumab added with cycle 2.   6. PET scan on 10/17/2022 reveals FDG avid sclerotic lesions involving the right posterior seventh rib slightly above background levels. Favored to simply represent sequelae of post treatment change.     SUBJECTIVE:  Mr. Benitez is an 82-year-old gentleman with metastatic non-small cell lung cancer on pembrolizumab. He is tolerating it well.     PET scan on 10/17/2022 reveals FDG avid sclerotic lesions involving the right posterior seventh rib slightly above background levels, favored to simply represent sequelae of post treatment change.    Patient's overall condition is stable.  He has mild fatigue which would go along with his age and treatment.  No headache.  No dizziness.  He has mild chronic pain in the neck.  No worsening.  No chest pain.  No shortness of breath.  No cough.  No abdominal pain.  No nausea or vomiting.  Appetite has been good.  No urinary complaints.  No bowel problem.  No bleeding.  No fever or night sweats.  He has some tingling in the toes.  He is taking gabapentin which is helping. All other review of systems is negative.     PHYSICAL EXAMINATION:   GENERAL:  Alert and oriented x 3.   VITAL SIGNS:  Reviewed.  ECOG PS of 1.      Rest of the system is not examined.    ASSESSMENT:    1.  An 82-year-old gentleman with metastatic non-small cell lung cancer on pembrolizumab. No evidence of disease.    2.  Grade 1 peripheral neuropathy.  3.  Fatigue.  4.  Mild thrombocytopenia. Stable.  5.  Aortic aneurysm.     PLAN:    1.  PET scan was personally reviewed.  Explained to him there is no evidence of any malignancy.  There is a sclerotic lesion in right posterior seventh rib.  FDG is slightly above background.  Previously it was hypermetabolic.  Explained to him that this is treatment change.  At this time I am not suspecting any active malignancy.  Explained to him that he is in complete remission.    Discussed regarding treatment.  He has been on pembrolizumab.  I would recommend giving pembrolizumab for 2 years and then stop it.  He is agreeable for this plan.  He will get pembrolizumab end of November.  After that we will stop it.    Explained to the patient that after after stopping pembrolizumab, we will monitor him closely.  He will have scans done about every 3 months.  When there is radiological progression of disease, treatment will be resumed.    Also explained to the patient that when he has recurrence of disease, I would like to get a biopsy and send the sample for NGS panel.  Previously NGS panel could not be done.    2.  He has mild fatigue.  This is mainly from his age.  I am hoping some of the fatigue will improve after pembrolizumab is discontinued.    3.  Patient has grade 1 neuropathy.  He will continue on gabapentin which is helping.  He will let us know if it gets worse.  We can increase the dose of gabapentin.    4.  Patient has aortic aneurysm.  We will set him up to see vascular surgery.    6.  He has mild thrombocytopenia.  CBC will be monitored periodically.  Thrombocytopenia is mild and will not cause any problem.    7.  He has been getting Zometa.  He has been tolerating it well.  No dental or jaw related symptoms.   We will discontinue Zometa after next treatment as there is no active bone lesion on PET scan.    8.  Patient had multiple questions which were all answered.  I will see him with next pembrolizumab treatment.    Total visit time of 45 minutes.  Time is spent in today's visit, review of chart/investigations today, monitoring for toxicity of high risk medication and documentation today.

## 2022-10-26 NOTE — TELEPHONE ENCOUNTER
Left voicemail with instructions for patient to call back to schedule their appointment(s)    October 26, 2022 , 10:25 AM

## 2022-11-01 ENCOUNTER — OFFICE VISIT (OUTPATIENT)
Dept: OTHER | Facility: CLINIC | Age: 82
End: 2022-11-01
Attending: INTERNAL MEDICINE
Payer: MEDICARE

## 2022-11-01 VITALS
HEART RATE: 74 BPM | SYSTOLIC BLOOD PRESSURE: 148 MMHG | WEIGHT: 200.2 LBS | OXYGEN SATURATION: 96 % | BODY MASS INDEX: 31.42 KG/M2 | HEIGHT: 67 IN | DIASTOLIC BLOOD PRESSURE: 58 MMHG

## 2022-11-01 DIAGNOSIS — I71.43 INFRARENAL ABDOMINAL AORTIC ANEURYSM (AAA) WITHOUT RUPTURE (H): Primary | ICD-10-CM

## 2022-11-01 DIAGNOSIS — I10 ESSENTIAL (PRIMARY) HYPERTENSION: ICD-10-CM

## 2022-11-01 PROCEDURE — 99205 OFFICE O/P NEW HI 60 MIN: CPT | Performed by: SURGERY

## 2022-11-01 PROCEDURE — G0463 HOSPITAL OUTPT CLINIC VISIT: HCPCS

## 2022-11-01 RX ORDER — OMEGA-3 FATTY ACIDS/FISH OIL 300-1000MG
200 CAPSULE ORAL EVERY 4 HOURS PRN
COMMUNITY

## 2022-11-01 NOTE — PROGRESS NOTES
"Patient is here to discuss consult.    BP (!) 148/58 (BP Location: Left arm, Patient Position: Chair, Cuff Size: Adult Regular)   Pulse 74   Ht 5' 7\" (1.702 m)   Wt 200 lb 3.2 oz (90.8 kg)   SpO2 96%   BMI 31.36 kg/m      Questions patient would like addressed today are: N/A.    Refills are needed: N/A    Has homecare services and agency name:  [facility]  "

## 2022-11-01 NOTE — PROGRESS NOTES
"Vascular Surgery Clinic     Derrick Benitez MRN# [id number]   YOB: 1940 Age: 82 year old        Reason for Clinic Visit: Evaluation of AAA              History of Present Illness:   Derrick Benitez is an 82 year old male who presents for evaluation of an infrarenal abdominal aortic aneurysm.     He was diagnosed with metastatic lung cancer in 2020 and underwent chemotherapy; he had metastatic disease to the bones and is considered to be in remission currently.       He has not been seen by his primary care physician in over three years. He has been following with Dr. Larsen for his cancer treatment. He has never had a colonoscopy as he figured he didn't need one.     He does a lot of walking throughout his home. Three years ago he was playing softball four times a week. He does not get short of breath with his current activities, but was experiencing SOB when playing softball and this is what prompted him to stop years ago. He has no trouble navigating grocery stores or other large stores. His wife uses a walker and he assists her with mobility. He mows the lawn without trouble. He can get up and down stairs without trouble; he was able to get up 6 flights of stairs to his infusion center when the elevators were broken not long ago. He gets mildly dyspneic with hot weather and activities.           Past Medical History:   I have personally reviewed the following:   Past Medical History:   Diagnosis Date     Calcific tendonitis of left hand      Cancer (H)     lung cancer     CKD (chronic kidney disease)     baseline Cr 1-1.3     Enlarged prostate 10/28/14    Been straight cathing for 1 week     Hypertension      Per Oncology notes:   \"Non-small cell lung cancer, favor squamous cell carcinoma.   1.  CT chest angiogram on 09/07/2020 revealed right lower lobe mass.   2.  CT-guided biopsy was done on 09/18/2020.  Pathology revealed non-small cell lung cancer, favor squamous cell carcinoma.  Sample size was " "small.   3.  Brain MRI on 2020 did not reveal any brain metastasis.   4.  PET scan on 10/05/2020 revealed hypermetabolic right lung mass and multiple hypermetabolic bone lesions.   5.  The patient started on carboplatin and Taxol on 2020. Pembrolizumab added with cycle 2.\"  He is considered to be in remission from most recent Oncology note of 10/25/22          Past Surgical History:   I have personally reviewed the following:   Past Surgical History:   Procedure Laterality Date     EXAM UNDER ANESTHESIA, RESTORATIONS, EXTRACTION(S) DENTAL, COMBINED       IR CHEST PORT PLACEMENT > 5 YRS OF AGE  2020            Social History:   I have personally reviewed the following:   Social History     Tobacco Use     Smoking status: Former     Packs/day: 3.00     Years: 40.00     Pack years: 120.00     Types: Cigarettes     Quit date: 10/28/2004     Years since quittin.0     Smokeless tobacco: Never   Substance Use Topics     Alcohol use: No     Comment: states he quit 30 years ago     Smoked from age 24 onward, quit around age [age]. Smoked 3 ppd before quitting. Was finally able to quit after developing a bladder infection. Does not drink, denies illicit drug use. Worked as a .           Family History:     Family History   Problem Relation Age of Onset     Diabetes No family hx of      Father had an intracranial aneurysm and  from rupture of this.          Allergies:     Allergies   Allergen Reactions     Losartan Other (See Comments)     Fatigue, weakness in legs     Methenamine              Medications:     Current Outpatient Medications Ordered in Epic   Medication     albuterol (PROAIR HFA/PROVENTIL HFA/VENTOLIN HFA) 108 (90 BASE) MCG/ACT Inhaler     AMLODIPINE BESYLATE PO     blood glucose (ACCU-CHEK SMARTVIEW) test strip     calcium carbonate-vitamin D (OS-ANTHONY) 500-400 MG-UNIT tablet     Ferrous Sulfate (IRON SUPPLEMENT PO)     FINASTERIDE PO     gabapentin (NEURONTIN) 300 MG " capsule     ibuprofen (ADVIL) 200 MG capsule     potassium gluconate 2.5 MEQ TABS     Tadalafil (CIALIS PO)     tamsulosin (FLOMAX) 0.4 MG capsule     tolterodine ER (DETROL LA) 2 MG 24 hr capsule     acetaminophen (TYLENOL) 500 MG tablet     No current Epic-ordered facility-administered medications on file.   No aspirin          Review of Systems:   The 10 point Review of Systems is negative other than noted in the HPI          Physical Exam:   Vitals were reviewed      BP: (!) 148/58 Pulse: 74     SpO2: 96 %        General: sitting comfortably on exam table, NAD.   Neuro/Psych: A&O x 4, pleasantly conversant   HENT: EOMI and conjugate. Moist mucous membranes.   Cardiac: Regular rate and rhythm, no m/g appreciated.   Pulm: Lungs CTAB, no w/r/r.  Abd: Soft, non-distended, no tenderness to palpation.  Extrem: Grossly normal and symmetric ROM in all four extremities. No edema.  Skin: Clean, dry, no rashes or wounds.  Vasc: DP pulses and radial pulses palpable BL.           Data:   Labs:       Lab Results   Component Value Date     10/17/2022     06/22/2021    Lab Results   Component Value Date    CHLORIDE 109 10/17/2022    CHLORIDE 112 06/22/2021    Lab Results   Component Value Date    BUN 20 10/17/2022    BUN 16 06/22/2021      Lab Results   Component Value Date    POTASSIUM 3.9 10/17/2022    POTASSIUM 4.0 06/22/2021    Lab Results   Component Value Date    CO2 25 10/17/2022    CO2 23 06/22/2021    Lab Results   Component Value Date    CR 1.08 10/17/2022    CR 0.86 06/22/2021        Lab Results   Component Value Date    WBC 5.4 10/17/2022    HGB 14.1 10/17/2022    HCT 40.8 10/17/2022    MCV 93 10/17/2022     (L) 10/17/2022       HgA1c ([facility], 3/23/22): 6.0%    No lipid panel on file      Prior imaging, from 10/2020; size approx 44.9 x 49.8 mm    CT chest/abd/pelvis (10/17/22):      Proximal neck: large, almost a double aneurysm, measures 33 x 36 mm; main aortic aneurysm 53.3 x 51.9 mm        Intraluminal thrombus within the aneurysm sac with penetrating ulceration           Assessment and Plan:   Mr. 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       His primary care has been somewhat lacking as he relies on his oncologist for his medical care. I explained to Mr. Benitez that he realistically needs to have a primary care provider to manage his long-term diseases (like HTN and CKD) and coordinate routine well-care, like a colonoscopy.     Will obtain an echocardiogram to assess his cardiac function ahead of tentative future surgical planning    Would meet criteria for repair when the aneurysm is > 5.5 cm in size, and it may be more reasonable to wait to a threshold of > 6 cm in size. He would be a marginal endovascular candidate and his age and history of metastatic disease make open repair difficult to justify (as indeed even any repair may be).     Will plan to repeat imaging in about 6 months to follow for growth of the aneurysm. May be able to coordinate this timing with planned oncologic surveillance CTA.     Yvonne Clifton MD    Total time spent on the date of this encounter doing: chart review, review of test results, patient visit, physical exam, education, counseling, developing plan of care, and documenting = 60 minutes

## 2022-11-04 ENCOUNTER — TELEPHONE (OUTPATIENT)
Dept: OTHER | Facility: CLINIC | Age: 82
End: 2022-11-04

## 2022-11-04 NOTE — TELEPHONE ENCOUNTER
[facility]    Who is the name of the provider?:  Etienne    What is the location you see this provider at/preferred location?: [place]  Person calling: Derrick Benitez  Phone number:  [phone]  Nurse call back needed:  YES     Reason for call:   Patient stated that there were no instructions in his after visit summary and he is asking for advice. Please call him back (Monday is OK per patient).    Pharmacy location:  n/a  Outside Imaging: Not Applicable   Can we leave a detailed message on this number?  YES

## 2022-11-04 NOTE — TELEPHONE ENCOUNTER
I returned call to Derrick and he would like to know what the plan for follow up is. I explained I will get back to him in a mychart message when Dr. Clifton finishes her note. He verbalized understanding.     Zoya SALDAÑA RN    [facility]  Vascular [facility]  Office: [phone]  Fax: [phone]

## 2022-11-09 NOTE — TELEPHONE ENCOUNTER
I called Derrick and stated Dr. Clifton would like him to follow up in 6 months with stress echo and we will mail letter closer to that time to schedule.    Zoya SALDAÑA RN    [facility]  Vascular [facility]  Office: [phone]  Fax: [phone]

## 2022-11-26 ENCOUNTER — HEALTH MAINTENANCE LETTER (OUTPATIENT)
Age: 82
End: 2022-11-26

## 2022-11-28 ENCOUNTER — LAB (OUTPATIENT)
Dept: INFUSION THERAPY | Facility: CLINIC | Age: 82
End: 2022-11-28
Attending: NURSE PRACTITIONER
Payer: MEDICARE

## 2022-11-28 DIAGNOSIS — C34.31 MALIGNANT NEOPLASM OF LOWER LOBE OF RIGHT LUNG (H): ICD-10-CM

## 2022-11-28 DIAGNOSIS — Z51.11 ENCOUNTER FOR ANTINEOPLASTIC CHEMOTHERAPY: Primary | ICD-10-CM

## 2022-11-28 LAB
ALBUMIN SERPL-MCNC: 3 G/DL (ref 3.4–5)
ALP SERPL-CCNC: 64 U/L (ref 40–150)
ALT SERPL W P-5'-P-CCNC: 16 U/L (ref 0–70)
ANION GAP SERPL CALCULATED.3IONS-SCNC: 7 MMOL/L (ref 3–14)
AST SERPL W P-5'-P-CCNC: 20 U/L (ref 0–45)
BASOPHILS # BLD AUTO: 0 10E3/UL (ref 0–0.2)
BASOPHILS NFR BLD AUTO: 0 %
BILIRUB SERPL-MCNC: 0.5 MG/DL (ref 0.2–1.3)
BUN SERPL-MCNC: 18 MG/DL (ref 7–30)
CALCIUM SERPL-MCNC: 8 MG/DL (ref 8.5–10.1)
CHLORIDE BLD-SCNC: 109 MMOL/L (ref 94–109)
CO2 SERPL-SCNC: 23 MMOL/L (ref 20–32)
CREAT SERPL-MCNC: 1.14 MG/DL (ref 0.66–1.25)
EOSINOPHIL # BLD AUTO: 0.1 10E3/UL (ref 0–0.7)
EOSINOPHIL NFR BLD AUTO: 2 %
ERYTHROCYTE [DISTWIDTH] IN BLOOD BY AUTOMATED COUNT: 13 % (ref 10–15)
GFR SERPL CREATININE-BSD FRML MDRD: 64 ML/MIN/1.73M2
GLUCOSE BLD-MCNC: 162 MG/DL (ref 70–99)
HCT VFR BLD AUTO: 42.3 % (ref 40–53)
HGB BLD-MCNC: 14.5 G/DL (ref 13.3–17.7)
IMM GRANULOCYTES # BLD: 0 10E3/UL
IMM GRANULOCYTES NFR BLD: 0 %
LYMPHOCYTES # BLD AUTO: 1.2 10E3/UL (ref 0.8–5.3)
LYMPHOCYTES NFR BLD AUTO: 22 %
MCH RBC QN AUTO: 31.7 PG (ref 26.5–33)
MCHC RBC AUTO-ENTMCNC: 34.3 G/DL (ref 31.5–36.5)
MCV RBC AUTO: 93 FL (ref 78–100)
MONOCYTES # BLD AUTO: 0.3 10E3/UL (ref 0–1.3)
MONOCYTES NFR BLD AUTO: 6 %
NEUTROPHILS # BLD AUTO: 3.9 10E3/UL (ref 1.6–8.3)
NEUTROPHILS NFR BLD AUTO: 70 %
NRBC # BLD AUTO: 0 10E3/UL
NRBC BLD AUTO-RTO: 0 /100
PLATELET # BLD AUTO: 128 10E3/UL (ref 150–450)
POTASSIUM BLD-SCNC: 4.1 MMOL/L (ref 3.4–5.3)
PROT SERPL-MCNC: 6.5 G/DL (ref 6.8–8.8)
RBC # BLD AUTO: 4.57 10E6/UL (ref 4.4–5.9)
SODIUM SERPL-SCNC: 139 MMOL/L (ref 133–144)
TSH SERPL DL<=0.005 MIU/L-ACNC: 1.47 MU/L (ref 0.4–4)
WBC # BLD AUTO: 5.6 10E3/UL (ref 4–11)

## 2022-11-28 PROCEDURE — 82040 ASSAY OF SERUM ALBUMIN: CPT | Performed by: INTERNAL MEDICINE

## 2022-11-28 PROCEDURE — 84443 ASSAY THYROID STIM HORMONE: CPT | Performed by: INTERNAL MEDICINE

## 2022-11-28 PROCEDURE — 36591 DRAW BLOOD OFF VENOUS DEVICE: CPT | Performed by: INTERNAL MEDICINE

## 2022-11-28 PROCEDURE — 80053 COMPREHEN METABOLIC PANEL: CPT | Performed by: INTERNAL MEDICINE

## 2022-11-28 PROCEDURE — 250N000011 HC RX IP 250 OP 636: Performed by: INTERNAL MEDICINE

## 2022-11-28 PROCEDURE — 85025 COMPLETE CBC W/AUTO DIFF WBC: CPT | Performed by: INTERNAL MEDICINE

## 2022-11-28 RX ORDER — HEPARIN SODIUM,PORCINE 10 UNIT/ML
5 VIAL (ML) INTRAVENOUS
Status: CANCELLED | OUTPATIENT
Start: 2022-11-28

## 2022-11-28 RX ORDER — HEPARIN SODIUM (PORCINE) LOCK FLUSH IV SOLN 100 UNIT/ML 100 UNIT/ML
5 SOLUTION INTRAVENOUS
Status: DISCONTINUED | OUTPATIENT
Start: 2022-11-28 | End: 2022-11-28 | Stop reason: HOSPADM

## 2022-11-28 RX ORDER — HEPARIN SODIUM (PORCINE) LOCK FLUSH IV SOLN 100 UNIT/ML 100 UNIT/ML
5 SOLUTION INTRAVENOUS
Status: CANCELLED | OUTPATIENT
Start: 2022-11-28

## 2022-11-28 RX ADMIN — Medication 5 ML: at 09:29

## 2022-11-28 NOTE — PROGRESS NOTES
Nursing Note:  Derrick SALAS Emmanuel presents today for Port labs.    Patient seen by provider today: No   present during visit today: Not Applicable.    Note: N/A.    Intravenous Access:  Implanted Port.    Discharge Plan:   Patient was sent to home in stable condition.    Brii Sheets RN

## 2022-11-29 ENCOUNTER — INFUSION THERAPY VISIT (OUTPATIENT)
Dept: INFUSION THERAPY | Facility: CLINIC | Age: 82
End: 2022-11-29
Attending: NURSE PRACTITIONER
Payer: MEDICARE

## 2022-11-29 ENCOUNTER — ONCOLOGY VISIT (OUTPATIENT)
Dept: ONCOLOGY | Facility: CLINIC | Age: 82
End: 2022-11-29
Attending: INTERNAL MEDICINE
Payer: MEDICARE

## 2022-11-29 VITALS
DIASTOLIC BLOOD PRESSURE: 66 MMHG | SYSTOLIC BLOOD PRESSURE: 146 MMHG | HEART RATE: 68 BPM | TEMPERATURE: 98.2 F | OXYGEN SATURATION: 96 % | BODY MASS INDEX: 31.32 KG/M2 | RESPIRATION RATE: 16 BRPM | WEIGHT: 200 LBS

## 2022-11-29 VITALS — RESPIRATION RATE: 16 BRPM

## 2022-11-29 DIAGNOSIS — C79.51 BONE METASTASES: Primary | ICD-10-CM

## 2022-11-29 DIAGNOSIS — C79.51 BONE METASTASES: ICD-10-CM

## 2022-11-29 DIAGNOSIS — Z51.11 ENCOUNTER FOR ANTINEOPLASTIC CHEMOTHERAPY: Primary | ICD-10-CM

## 2022-11-29 DIAGNOSIS — C34.31 MALIGNANT NEOPLASM OF LOWER LOBE OF RIGHT LUNG (H): ICD-10-CM

## 2022-11-29 PROCEDURE — 96413 CHEMO IV INFUSION 1 HR: CPT

## 2022-11-29 PROCEDURE — 99214 OFFICE O/P EST MOD 30 MIN: CPT | Performed by: NURSE PRACTITIONER

## 2022-11-29 PROCEDURE — 258N000003 HC RX IP 258 OP 636: Performed by: INTERNAL MEDICINE

## 2022-11-29 PROCEDURE — G0463 HOSPITAL OUTPT CLINIC VISIT: HCPCS | Mod: 25

## 2022-11-29 PROCEDURE — 250N000011 HC RX IP 250 OP 636: Performed by: INTERNAL MEDICINE

## 2022-11-29 PROCEDURE — 96367 TX/PROPH/DG ADDL SEQ IV INF: CPT

## 2022-11-29 PROCEDURE — 250N000011 HC RX IP 250 OP 636: Performed by: NURSE PRACTITIONER

## 2022-11-29 RX ORDER — ZOLEDRONIC ACID 0.04 MG/ML
4 INJECTION, SOLUTION INTRAVENOUS ONCE
Status: COMPLETED | OUTPATIENT
Start: 2022-11-29 | End: 2022-11-29

## 2022-11-29 RX ORDER — ZOLEDRONIC ACID 0.04 MG/ML
4 INJECTION, SOLUTION INTRAVENOUS ONCE
Status: CANCELLED | OUTPATIENT
Start: 2022-11-29 | End: 2022-11-29

## 2022-11-29 RX ORDER — HEPARIN SODIUM,PORCINE 10 UNIT/ML
5 VIAL (ML) INTRAVENOUS
Status: CANCELLED | OUTPATIENT
Start: 2022-11-29

## 2022-11-29 RX ORDER — HEPARIN SODIUM (PORCINE) LOCK FLUSH IV SOLN 100 UNIT/ML 100 UNIT/ML
5 SOLUTION INTRAVENOUS
Status: CANCELLED | OUTPATIENT
Start: 2022-11-29

## 2022-11-29 RX ORDER — HEPARIN SODIUM (PORCINE) LOCK FLUSH IV SOLN 100 UNIT/ML 100 UNIT/ML
5 SOLUTION INTRAVENOUS EVERY 8 HOURS PRN
Status: DISCONTINUED | OUTPATIENT
Start: 2022-11-29 | End: 2022-11-29 | Stop reason: HOSPADM

## 2022-11-29 RX ADMIN — ZOLEDRONIC ACID 4 MG: 0.04 INJECTION, SOLUTION INTRAVENOUS at 10:19

## 2022-11-29 RX ADMIN — SODIUM CHLORIDE 400 MG: 9 INJECTION, SOLUTION INTRAVENOUS at 10:47

## 2022-11-29 RX ADMIN — Medication 5 ML: at 11:28

## 2022-11-29 RX ADMIN — SODIUM CHLORIDE 250 ML: 9 INJECTION, SOLUTION INTRAVENOUS at 10:19

## 2022-11-29 ASSESSMENT — PAIN SCALES - GENERAL: PAINLEVEL: NO PAIN (0)

## 2022-11-29 NOTE — PROGRESS NOTES
Oncology/Hematology Visit Note  Nov 29, 2022    Reason for Visit:   Lung cancer  Metastatic lung squamous cell carcinoma  Completed carboplatin Taxol and pembrolizumab x 6 cycle on 03/10/2021  03/31/2021-on maintenance with single agent with pembrolizumab  10/17/2022-PET scan did not reveal evidence of malignancy  Patient is currently getting treatment with pembrolizumab every 6 weeks      Interval History:  Reports he has been tolerating pembrolizumab well.  Denies fever chills sweats cough shortness of breath denies nausea vomiting diarrhea abdominal pain bleeding denies skin rash    Review of Systems:  14 point ROS of systems including Constitutional, Eyes, Respiratory, Cardiovascular, Gastroenterology, Genitourinary, Integumentary, Muscularskeletal, Psychiatric were all negative except for pertinent positives noted in my HPI.    Physical Examination:  Physical Exam  Eyes:      Pupils: Pupils are equal, round, and reactive to light.   Cardiovascular:      Rate and Rhythm: Normal rate.      Pulses: Normal pulses.   Pulmonary:      Effort: Pulmonary effort is normal.   Abdominal:      General: Abdomen is flat.   Musculoskeletal:      Cervical back: Normal range of motion.   Skin:     General: Skin is warm.   Neurological:      General: No focal deficit present.      Mental Status: He is alert.   Psychiatric:         Mood and Affect: Mood normal.       Laboratory Data:  CBC CMP and TSH results reviewed    Assessment and Plan:  This is a 82-year-old male with    Lung cancer  -Metastatic lung squamous cell carcinoma   status post carboplatinTaxol and pembrolizumab-completed 6 cycles in March 2021  -Currently on maintenance with immunotherapy pembrolizumab every 6 weeks  10/17/2022-PET scan did not reveal evidence of malignancy  Patient is currently getting treatment with pembrolizumab every 6 weeks  Schedule with Dr. Larsen with next treatment        Bone mets  -Patient currently on Zometa every 4 to 6 weeks  Labs  reviewed okay to give Zometa  -Continue with vitamin D and calcium  Continue with Zometa every 6 weeks  Okay to give Zometa today corrected calcium is around 8.7      Anemia  Patient is asymptomatic  Stable hemoglobin patient denies bleeding  Continue to monitor    Thrombocytopenia  Continue to monitor closely  Complications of thrombocytopenia reviewed  Call our clinic in the event of bleeding, bruising fall or injury      Neuropathy  Mild symptoms  -Patient reports while he was on gabapentin but he discontinued on his own  Monitor closely    Patient advised to call our clinic in the event of fever chills sweats cough shortness of breath chest pain nausea vomiting diarrhea abdominal pain bleeding or any changes in health condition.    DARSHANA Meza [facility]     Chart documentation with Dragon Voice recognition Software. Although reviewed after completion, some words and grammatical errors may remain.

## 2022-11-29 NOTE — PROGRESS NOTES
"Oncology Rooming Note    November 29, 2022 9:44 AM   Derrick Benitez is a 82 year old male who presents for:    Chief Complaint   Patient presents with     Oncology Clinic Visit     Initial Vitals: There were no vitals taken for this visit. Estimated body mass index is 31.36 kg/m  as calculated from the following:    Height as of 11/1/22: 1.702 m (5' 7\").    Weight as of 11/1/22: 90.8 kg (200 lb 3.2 oz). There is no height or weight on file to calculate BSA.  Data Unavailable Comment: Data Unavailable   No LMP for male patient.  Allergies reviewed: Yes  Medications reviewed: Yes    Medications: Medication refills not needed today.  Pharmacy name entered into EPIC:    [place], [place] - [address]  [facility] [address] [place], [place] - [address] [place] [address] [address]    Clinical concerns: no      Elke Bravo [facility]            "

## 2022-11-29 NOTE — LETTER
"    11/29/2022         RE: Derrick Benitez  [address]  [facility] [address]        Dear Colleague,    Thank you for referring your patient, Derrick Benitez, to the [facility]. Please see a copy of my visit note below.    Oncology Rooming Note    November 29, 2022 9:44 AM   Derrick Benitez is a 82 year old male who presents for:    Chief Complaint   Patient presents with     Oncology Clinic Visit     Initial Vitals: There were no vitals taken for this visit. Estimated body mass index is 31.36 kg/m  as calculated from the following:    Height as of 11/1/22: 1.702 m (5' 7\").    Weight as of 11/1/22: 90.8 kg (200 lb 3.2 oz). There is no height or weight on file to calculate BSA.  Data Unavailable Comment: Data Unavailable   No LMP for male patient.  Allergies reviewed: Yes  Medications reviewed: Yes    Medications: Medication refills not needed today.  Pharmacy name entered into EPIC:    [place], [place] - [address] [facility] [address] [place], [place] - [address]    Clinical concerns: no      Elke Bravo CMA              Oncology/Hematology Visit Note  Nov 29, 2022    Reason for Visit:   Lung cancer  Metastatic lung squamous cell carcinoma  Completed carboplatin Taxol and pembrolizumab x 6 cycle on 03/10/2021  03/31/2021-on maintenance with single agent with pembrolizumab  10/17/2022-PET scan did not reveal evidence of malignancy  Patient is currently getting treatment with pembrolizumab every 6 weeks      Interval History:  Reports he has been tolerating pembrolizumab well.  Denies fever chills sweats cough shortness of breath denies nausea vomiting diarrhea abdominal pain bleeding denies skin rash    Review of Systems:  14 point ROS of systems including Constitutional, Eyes, Respiratory, Cardiovascular, Gastroenterology, Genitourinary, Integumentary, Muscularskeletal, Psychiatric were all negative except for pertinent positives noted " in my HPI.    Physical Examination:  Physical Exam  Eyes:      Pupils: Pupils are equal, round, and reactive to light.   Cardiovascular:      Rate and Rhythm: Normal rate.      Pulses: Normal pulses.   Pulmonary:      Effort: Pulmonary effort is normal.   Abdominal:      General: Abdomen is flat.   Musculoskeletal:      Cervical back: Normal range of motion.   Skin:     General: Skin is warm.   Neurological:      General: No focal deficit present.      Mental Status: He is alert.   Psychiatric:         Mood and Affect: Mood normal.       Laboratory Data:  CBC CMP and TSH results reviewed    Assessment and Plan:  This is a 82-year-old male with    Lung cancer  -Metastatic lung squamous cell carcinoma   status post carboplatinTaxol and pembrolizumab-completed 6 cycles in March 2021  -Currently on maintenance with immunotherapy pembrolizumab every 6 weeks  10/17/2022-PET scan did not reveal evidence of malignancy  Patient is currently getting treatment with pembrolizumab every 6 weeks  Schedule with Dr. Larsen with next treatment        Bone mets  -Patient currently on Zometa every 4 to 6 weeks  Labs reviewed okay to give Zometa  -Continue with vitamin D and calcium  Continue with Zometa every 6 weeks  Okay to give Zometa today corrected calcium is around 8.7      Anemia  Patient is asymptomatic  Stable hemoglobin patient denies bleeding  Continue to monitor    Thrombocytopenia  Continue to monitor closely  Complications of thrombocytopenia reviewed  Call our clinic in the event of bleeding, bruising fall or injury      Neuropathy  Mild symptoms  -Patient reports while he was on gabapentin but he discontinued on his own  Monitor closely    Patient advised to call our clinic in the event of fever chills sweats cough shortness of breath chest pain nausea vomiting diarrhea abdominal pain bleeding or any changes in health condition.    DARSHANA Meza [facility]     Chart documentation  with Dragon Voice recognition Software. Although reviewed after completion, some words and grammatical errors may remain.            Again, thank you for allowing me to participate in the care of your patient.        Sincerely,        DARSHANA Meza CNP

## 2022-11-29 NOTE — PROGRESS NOTES
Infusion Nursing Note:  Derrick Benitez presents today for C19D1 Keytruda/Zometa.    Patient seen by provider today: Yes: De Frausto NP   present during visit today: Not Applicable.    Note: N/A.    Intravenous Access:  Implanted Port.     Treatment Conditions:  Lab Results   Component Value Date    HGB 14.5 11/28/2022    WBC 5.6 11/28/2022    ANEU 3.9 10/17/2022    ANEUTAUTO 3.9 11/28/2022     (L) 11/28/2022      Lab Results   Component Value Date     11/28/2022    POTASSIUM 4.1 11/28/2022    MAG 2.1 10/30/2014    CR 1.14 11/28/2022    ANTHONY 8.0 (L) 11/28/2022    BILITOTAL 0.5 11/28/2022    ALBUMIN 3.0 (L) 11/28/2022    ALT 16 11/28/2022    AST 20 11/28/2022     Results reviewed, labs MET treatment parameters, ok to proceed with treatment.    Post Infusion Assessment:  Patient tolerated infusion without incident.  Blood return noted pre and post infusion.  Site patent and intact, free from redness, edema or discomfort.  No evidence of extravasations.  Access discontinued per protocol.     Discharge Plan:   AVS to patient via MYCHART.  Patient will return 1/9/23 for next appointment.   Patient discharged in stable condition accompanied by: self.  Departure Mode: Ambulatory.      Sushma Hampton RN

## 2022-12-04 DIAGNOSIS — C34.31 MALIGNANT NEOPLASM OF LOWER LOBE OF RIGHT LUNG (H): ICD-10-CM

## 2022-12-04 DIAGNOSIS — C79.51 BONE METASTASES: Primary | ICD-10-CM

## 2022-12-04 RX ORDER — HEPARIN SODIUM (PORCINE) LOCK FLUSH IV SOLN 100 UNIT/ML 100 UNIT/ML
5 SOLUTION INTRAVENOUS EVERY 8 HOURS PRN
Status: CANCELLED | OUTPATIENT
Start: 2023-01-10

## 2022-12-04 RX ORDER — SODIUM CHLORIDE 9 MG/ML
1000 INJECTION, SOLUTION INTRAVENOUS CONTINUOUS PRN
Status: CANCELLED
Start: 2023-01-10

## 2022-12-04 RX ORDER — NALOXONE HYDROCHLORIDE 0.4 MG/ML
.1-.4 INJECTION, SOLUTION INTRAMUSCULAR; INTRAVENOUS; SUBCUTANEOUS
Status: CANCELLED | OUTPATIENT
Start: 2023-01-10

## 2022-12-04 RX ORDER — DIPHENHYDRAMINE HYDROCHLORIDE 50 MG/ML
50 INJECTION INTRAMUSCULAR; INTRAVENOUS
Status: CANCELLED
Start: 2023-01-10

## 2022-12-04 RX ORDER — ALBUTEROL SULFATE 90 UG/1
1-2 AEROSOL, METERED RESPIRATORY (INHALATION)
Status: CANCELLED
Start: 2023-01-10

## 2022-12-04 RX ORDER — HEPARIN SODIUM,PORCINE 10 UNIT/ML
5 VIAL (ML) INTRAVENOUS
Status: CANCELLED | OUTPATIENT
Start: 2023-01-10

## 2022-12-04 RX ORDER — EPINEPHRINE 1 MG/ML
0.3 INJECTION, SOLUTION, CONCENTRATE INTRAVENOUS EVERY 5 MIN PRN
Status: CANCELLED | OUTPATIENT
Start: 2023-01-10

## 2022-12-04 RX ORDER — MEPERIDINE HYDROCHLORIDE 25 MG/ML
25 INJECTION INTRAMUSCULAR; INTRAVENOUS; SUBCUTANEOUS EVERY 30 MIN PRN
Status: CANCELLED | OUTPATIENT
Start: 2023-01-10

## 2022-12-04 RX ORDER — ALBUTEROL SULFATE 0.83 MG/ML
2.5 SOLUTION RESPIRATORY (INHALATION)
Status: CANCELLED | OUTPATIENT
Start: 2023-01-10

## 2022-12-04 RX ORDER — LORAZEPAM 2 MG/ML
0.5 INJECTION INTRAMUSCULAR EVERY 4 HOURS PRN
Status: CANCELLED | OUTPATIENT
Start: 2023-01-10

## 2022-12-04 RX ORDER — HEPARIN SODIUM (PORCINE) LOCK FLUSH IV SOLN 100 UNIT/ML 100 UNIT/ML
5 SOLUTION INTRAVENOUS
Status: CANCELLED | OUTPATIENT
Start: 2023-01-10

## 2023-01-09 ENCOUNTER — LAB (OUTPATIENT)
Dept: INFUSION THERAPY | Facility: CLINIC | Age: 83
End: 2023-01-09
Attending: INTERNAL MEDICINE
Payer: MEDICARE

## 2023-01-09 DIAGNOSIS — Z51.11 ENCOUNTER FOR ANTINEOPLASTIC CHEMOTHERAPY: Primary | ICD-10-CM

## 2023-01-09 DIAGNOSIS — C34.31 MALIGNANT NEOPLASM OF LOWER LOBE OF RIGHT LUNG (H): ICD-10-CM

## 2023-01-09 LAB
ALBUMIN SERPL-MCNC: 3.1 G/DL (ref 3.4–5)
ALP SERPL-CCNC: 62 U/L (ref 40–150)
ALT SERPL W P-5'-P-CCNC: 19 U/L (ref 0–70)
ANION GAP SERPL CALCULATED.3IONS-SCNC: 6 MMOL/L (ref 3–14)
AST SERPL W P-5'-P-CCNC: 18 U/L (ref 0–45)
BASOPHILS # BLD AUTO: 0 10E3/UL (ref 0–0.2)
BASOPHILS NFR BLD AUTO: 0 %
BILIRUB SERPL-MCNC: 0.5 MG/DL (ref 0.2–1.3)
BUN SERPL-MCNC: 22 MG/DL (ref 7–30)
CALCIUM SERPL-MCNC: 7.8 MG/DL (ref 8.5–10.1)
CHLORIDE BLD-SCNC: 112 MMOL/L (ref 94–109)
CO2 SERPL-SCNC: 22 MMOL/L (ref 20–32)
CREAT SERPL-MCNC: 1.13 MG/DL (ref 0.66–1.25)
EOSINOPHIL # BLD AUTO: 0.1 10E3/UL (ref 0–0.7)
EOSINOPHIL NFR BLD AUTO: 2 %
ERYTHROCYTE [DISTWIDTH] IN BLOOD BY AUTOMATED COUNT: 13.2 % (ref 10–15)
GFR SERPL CREATININE-BSD FRML MDRD: 64 ML/MIN/1.73M2
GLUCOSE BLD-MCNC: 168 MG/DL (ref 70–99)
HCT VFR BLD AUTO: 40.3 % (ref 40–53)
HGB BLD-MCNC: 13.5 G/DL (ref 13.3–17.7)
IMM GRANULOCYTES # BLD: 0 10E3/UL
IMM GRANULOCYTES NFR BLD: 0 %
LYMPHOCYTES # BLD AUTO: 0.9 10E3/UL (ref 0.8–5.3)
LYMPHOCYTES NFR BLD AUTO: 18 %
MCH RBC QN AUTO: 31.7 PG (ref 26.5–33)
MCHC RBC AUTO-ENTMCNC: 33.5 G/DL (ref 31.5–36.5)
MCV RBC AUTO: 95 FL (ref 78–100)
MONOCYTES # BLD AUTO: 0.4 10E3/UL (ref 0–1.3)
MONOCYTES NFR BLD AUTO: 7 %
NEUTROPHILS # BLD AUTO: 3.7 10E3/UL (ref 1.6–8.3)
NEUTROPHILS NFR BLD AUTO: 73 %
NRBC # BLD AUTO: 0 10E3/UL
NRBC BLD AUTO-RTO: 0 /100
PLATELET # BLD AUTO: 116 10E3/UL (ref 150–450)
POTASSIUM BLD-SCNC: 4 MMOL/L (ref 3.4–5.3)
PROT SERPL-MCNC: 6.1 G/DL (ref 6.8–8.8)
RBC # BLD AUTO: 4.26 10E6/UL (ref 4.4–5.9)
SODIUM SERPL-SCNC: 140 MMOL/L (ref 133–144)
TSH SERPL DL<=0.005 MIU/L-ACNC: 0.49 MU/L (ref 0.4–4)
WBC # BLD AUTO: 5.1 10E3/UL (ref 4–11)

## 2023-01-09 PROCEDURE — 84443 ASSAY THYROID STIM HORMONE: CPT | Performed by: INTERNAL MEDICINE

## 2023-01-09 PROCEDURE — 85004 AUTOMATED DIFF WBC COUNT: CPT | Performed by: INTERNAL MEDICINE

## 2023-01-09 PROCEDURE — 80053 COMPREHEN METABOLIC PANEL: CPT | Performed by: INTERNAL MEDICINE

## 2023-01-09 PROCEDURE — 36591 DRAW BLOOD OFF VENOUS DEVICE: CPT | Performed by: INTERNAL MEDICINE

## 2023-01-09 PROCEDURE — 250N000011 HC RX IP 250 OP 636: Performed by: INTERNAL MEDICINE

## 2023-01-09 RX ORDER — HEPARIN SODIUM (PORCINE) LOCK FLUSH IV SOLN 100 UNIT/ML 100 UNIT/ML
5 SOLUTION INTRAVENOUS
Status: DISCONTINUED | OUTPATIENT
Start: 2023-01-09 | End: 2023-01-09 | Stop reason: HOSPADM

## 2023-01-09 RX ORDER — HEPARIN SODIUM (PORCINE) LOCK FLUSH IV SOLN 100 UNIT/ML 100 UNIT/ML
5 SOLUTION INTRAVENOUS
Status: CANCELLED | OUTPATIENT
Start: 2023-01-09

## 2023-01-09 RX ORDER — HEPARIN SODIUM,PORCINE 10 UNIT/ML
5 VIAL (ML) INTRAVENOUS
Status: CANCELLED | OUTPATIENT
Start: 2023-01-09

## 2023-01-09 RX ADMIN — Medication 5 ML: at 08:50

## 2023-01-09 NOTE — PROGRESS NOTES
Nursing Note:  Derrick Benitez presents today for port labs.    Patient seen by provider today: No   present during visit today: Not Applicable.    Note: N/A.    Intravenous Access:  Labs drawn without difficulty.  Implanted Port.    Discharge Plan:   Patient was sent to [facility] [place] for tomorrow's appointment.    Sushma Hampton RN

## 2023-01-10 ENCOUNTER — ONCOLOGY VISIT (OUTPATIENT)
Dept: ONCOLOGY | Facility: CLINIC | Age: 83
End: 2023-01-10
Attending: INTERNAL MEDICINE
Payer: MEDICARE

## 2023-01-10 ENCOUNTER — INFUSION THERAPY VISIT (OUTPATIENT)
Dept: INFUSION THERAPY | Facility: CLINIC | Age: 83
End: 2023-01-10
Attending: INTERNAL MEDICINE
Payer: MEDICARE

## 2023-01-10 VITALS
HEIGHT: 67 IN | OXYGEN SATURATION: 94 % | RESPIRATION RATE: 16 BRPM | BODY MASS INDEX: 29.98 KG/M2 | WEIGHT: 191 LBS | DIASTOLIC BLOOD PRESSURE: 82 MMHG | HEART RATE: 74 BPM | SYSTOLIC BLOOD PRESSURE: 142 MMHG | TEMPERATURE: 98 F

## 2023-01-10 DIAGNOSIS — C79.51 BONE METASTASES: ICD-10-CM

## 2023-01-10 DIAGNOSIS — Z51.11 ENCOUNTER FOR ANTINEOPLASTIC CHEMOTHERAPY: Primary | ICD-10-CM

## 2023-01-10 DIAGNOSIS — C34.31 MALIGNANT NEOPLASM OF LOWER LOBE OF RIGHT LUNG (H): Primary | ICD-10-CM

## 2023-01-10 DIAGNOSIS — C34.31 MALIGNANT NEOPLASM OF LOWER LOBE OF RIGHT LUNG (H): ICD-10-CM

## 2023-01-10 PROCEDURE — 99214 OFFICE O/P EST MOD 30 MIN: CPT | Performed by: INTERNAL MEDICINE

## 2023-01-10 PROCEDURE — G0463 HOSPITAL OUTPT CLINIC VISIT: HCPCS | Mod: 25 | Performed by: INTERNAL MEDICINE

## 2023-01-10 PROCEDURE — 96413 CHEMO IV INFUSION 1 HR: CPT

## 2023-01-10 PROCEDURE — 96367 TX/PROPH/DG ADDL SEQ IV INF: CPT

## 2023-01-10 PROCEDURE — 250N000011 HC RX IP 250 OP 636: Performed by: INTERNAL MEDICINE

## 2023-01-10 PROCEDURE — 258N000003 HC RX IP 258 OP 636: Performed by: INTERNAL MEDICINE

## 2023-01-10 RX ORDER — HEPARIN SODIUM (PORCINE) LOCK FLUSH IV SOLN 100 UNIT/ML 100 UNIT/ML
5 SOLUTION INTRAVENOUS EVERY 8 HOURS PRN
Status: DISCONTINUED | OUTPATIENT
Start: 2023-01-10 | End: 2023-01-10 | Stop reason: HOSPADM

## 2023-01-10 RX ADMIN — SODIUM CHLORIDE 250 ML: 9 INJECTION, SOLUTION INTRAVENOUS at 12:17

## 2023-01-10 RX ADMIN — SODIUM CHLORIDE 400 MG: 9 INJECTION, SOLUTION INTRAVENOUS at 12:18

## 2023-01-10 RX ADMIN — Medication 5 ML: at 13:14

## 2023-01-10 RX ADMIN — ZOLEDRONIC ACID 3.5 MG: 4 INJECTION INTRAVENOUS at 12:51

## 2023-01-10 ASSESSMENT — PAIN SCALES - GENERAL: PAINLEVEL: NO PAIN (0)

## 2023-01-10 NOTE — PATIENT INSTRUCTIONS
1. Continue pembrolizumab and zometa every 6 weeks.  2. Continue gabapentin.  3. See me in 6 weeks when he comes for next treatment.

## 2023-01-10 NOTE — PROGRESS NOTES
"Oncology Rooming Note    January 10, 2023 11:23 AM   Derrick Benitez is a 82 year old male who presents for:    Chief Complaint   Patient presents with     Oncology Clinic Visit     Initial Vitals: There were no vitals taken for this visit. Estimated body mass index is 31.32 kg/m  as calculated from the following:    Height as of 11/1/22: 1.702 m (5' 7\").    Weight as of 11/29/22: 90.7 kg (200 lb). There is no height or weight on file to calculate BSA.  Data Unavailable Comment: Data Unavailable   No LMP for male patient.  Allergies reviewed: Yes  Medications reviewed: Yes    Medications: Medication refills not needed today.  Pharmacy name entered into EPIC:    [place], [place] - [address]  [facility] [address] [place], [place] - [address] [place] [address]    Clinical concerns:  doctor was notified.      Brenda Martines MA            "

## 2023-01-10 NOTE — PROGRESS NOTES
Infusion Nursing Note:  Derrick Benitez presents today for Cycle 20 Day 1 Keytruda, Zometa.    Patient seen by provider today: Yes: Dr. Larsen   present during visit today: Not Applicable.    Note: N/A.    Intravenous Access:  Implanted Port.    Treatment Conditions:  Lab Results   Component Value Date    HGB 13.5 01/09/2023    WBC 5.1 01/09/2023    ANEU 3.9 10/17/2022    ANEUTAUTO 3.7 01/09/2023     (L) 01/09/2023      Lab Results   Component Value Date     01/09/2023    POTASSIUM 4.0 01/09/2023    MAG 2.1 10/30/2014    CR 1.13 01/09/2023    ANTHONY 7.8 (L) 01/09/2023    BILITOTAL 0.5 01/09/2023    ALBUMIN 3.1 (L) 01/09/2023    ALT 19 01/09/2023    AST 18 01/09/2023     Results reviewed, labs MET treatment parameters, ok to proceed with treatment.    Post Infusion Assessment:  Patient tolerated infusion without incident.  Blood return noted pre and post infusion.  Site patent and intact, free from redness, edema or discomfort.  No evidence of extravasations.  Access discontinued per protocol.     Discharge Plan:   Patient declined prescription refills.  Discharge instructions reviewed with: Patient.  Patient verbalized understanding of discharge instructions and all questions answered.  AVS to patient via OsmopureT.  Patient will return 2/21/23 for next appointment.   Patient discharged in stable condition accompanied by: self.  Departure Mode: Ambulatory.      Debra Baird RN

## 2023-01-10 NOTE — LETTER
"    1/10/2023         RE: Derrick Benitez  [address]  [facility] [address]        Dear Colleague,    Thank you for referring your patient, Derrick Benitez, to the [facility]. Please see a copy of my visit note below.    Oncology Rooming Note    January 10, 2023 11:23 AM   Derrick Benitez is a 82 year old male who presents for:    Chief Complaint   Patient presents with     Oncology Clinic Visit     Initial Vitals: There were no vitals taken for this visit. Estimated body mass index is 31.32 kg/m  as calculated from the following:    Height as of 11/1/22: 1.702 m (5' 7\").    Weight as of 11/29/22: 90.7 kg (200 lb). There is no height or weight on file to calculate BSA.  Data Unavailable Comment: Data Unavailable   No LMP for male patient.  Allergies reviewed: Yes  Medications reviewed: Yes    Medications: Medication refills not needed today.  Pharmacy name entered into EPIC:    [place], [place] - [address] [facility] [address] [place], [place] - [address]    Clinical concerns:  doctor was notified.      Brenda Martines MA              ONCOLOGIC HISTORY:  Mr. Benitez is a gentleman with non-small cell lung cancer, favor squamous cell carcinoma.   -NGS and PDL staining could not be done because of small sample.   -Guardant 360 does not reveal any actionable alteration.  1.  CT chest angiogram on 09/07/2020 revealed right lower lobe mass.   2.  CT-guided biopsy was done on 09/18/2020.  Pathology revealed non-small cell lung cancer, favor squamous cell carcinoma.  Sample size was small.  PDL staining and NGS panel could not be done.   3.  Brain MRI on 09/26/2020 did not reveal any brain metastasis.   4.  PET scan on 10/05/2020 revealed hypermetabolic right lung mass and multiple hypermetabolic bone lesions.   5.  The patient started on carboplatin and Taxol on 11/25/2020.   -Pembrolizumab added with cycle 2.   6. PET scan on 10/17/2022 reveals " FDG avid sclerotic lesions involving the right posterior seventh rib slightly above background levels. Favored to simply represent sequelae of post treatment change.     SUBJECTIVE:  Mr. Benitez is an 82-year-old gentleman with metastatic non-small cell lung cancer on pembrolizumab. He is tolerating it well. PET scan on 10/17/2022 did not reveal any evidence of malignancy.     He is overall doing well.  He has mild generalized weakness which would go along with his age and treatment.  No headache.  No dizziness. No chest pain.  No shortness of breath.  No cough.  No abdominal pain.  No nausea or vomiting.  Appetite is good.  No urinary complaints.  No bowel problem.  No bleeding.  No fever or night sweats.  He has mild tingling in the toes.  He is taking gabapentin. It helps. All other review of systems is negative.     PHYSICAL EXAMINATION:   GENERAL:  Alert and oriented x 3.   VITAL SIGNS:  Reviewed.  ECOG PS of 1.     Rest of the system is not examined.     ASSESSMENT:    1.  An 82-year-old gentleman with metastatic non-small cell lung cancer on pembrolizumab. No evidence of disease.    2.  Grade 1 peripheral neuropathy. Stable  3.  Mild generalized weakness.  4.  Thrombocytopenia. Stable.  5.  Aortic aneurysm.     PLAN:  1.  Patient is doing well from a metastatic lung cancer.  Last PET scan did not reveal any evidence of disease.    He is currently on pembrolizumab.  Again discussed regarding duration of pembrolizumab.  Initially we were thinking of stopping it after he has been on it for 2 years.  After discussion today, plan is to continue it for now.  Last PET scan had revealed a sclerotic lesion in the right posterior seventh rib.  We want to make sure that this lesion does not get worse.  If next CT/PET scan reveals this lesion to be stable or better, we can consider stopping pembrolizumab.    Patient will get pembrolizumab today.  He is tolerating it well.    2.  He will continue on Zometa.  No dental or jaw  related symptoms.  He will continue to follow-up with dentist.  Zometa will be discontinued when he discontinued the pembrolizumab.    He will continue on calcium and vitamin D twice a day.    3.  He will continue on gabapentin for grade 1 neuropathy. It is helping.    4.  He has aortic aneurysm.  He will continue to follow-up with vascular surgery.    5.  He has mild thrombocytopenia.  CBC will be monitored periodically.  Thrombocytopenia is mild and will not cause any problem.    6.  He had few questions which were all answered.  I will see him with next treatment.     Total visit time of 30 minutes.  Time is spent in today's visit, review of chart/investigations today, monitoring for toxicity of high risk medication and documentation today.      Again, thank you for allowing me to participate in the care of your patient.        Sincerely,        Buzz Larsen MD

## 2023-01-24 NOTE — PROGRESS NOTES
ONCOLOGIC HISTORY:  Mr. Benitez is a gentleman with non-small cell lung cancer, favor squamous cell carcinoma.   -NGS and PDL staining could not be done because of small sample.   -Guardant 360 does not reveal any actionable alteration.  1.  CT chest angiogram on 09/07/2020 revealed right lower lobe mass.   2.  CT-guided biopsy was done on 09/18/2020.  Pathology revealed non-small cell lung cancer, favor squamous cell carcinoma.  Sample size was small.  PDL staining and NGS panel could not be done.   3.  Brain MRI on 09/26/2020 did not reveal any brain metastasis.   4.  PET scan on 10/05/2020 revealed hypermetabolic right lung mass and multiple hypermetabolic bone lesions.   5.  The patient started on carboplatin and Taxol on 11/25/2020.   -Pembrolizumab added with cycle 2.   6. PET scan on 10/17/2022 reveals FDG avid sclerotic lesions involving the right posterior seventh rib slightly above background levels. Favored to simply represent sequelae of post treatment change.     SUBJECTIVE:  Mr. Benitez is an 82-year-old gentleman with metastatic non-small cell lung cancer on pembrolizumab. He is tolerating it well. PET scan on 10/17/2022 did not reveal any evidence of malignancy.     He is overall doing well.  He has mild generalized weakness which would go along with his age and treatment.  No headache.  No dizziness. No chest pain.  No shortness of breath.  No cough.  No abdominal pain.  No nausea or vomiting.  Appetite is good.  No urinary complaints.  No bowel problem.  No bleeding.  No fever or night sweats.  He has mild tingling in the toes.  He is taking gabapentin. It helps. All other review of systems is negative.     PHYSICAL EXAMINATION:   GENERAL:  Alert and oriented x 3.   VITAL SIGNS:  Reviewed.  ECOG PS of 1.     Rest of the system is not examined.     ASSESSMENT:    1.  An 82-year-old gentleman with metastatic non-small cell lung cancer on pembrolizumab. No evidence of disease.    2.  Grade 1 peripheral  neuropathy. Stable  3.  Mild generalized weakness.  4.  Thrombocytopenia. Stable.  5.  Aortic aneurysm.     PLAN:  1.  Patient is doing well from a metastatic lung cancer.  Last PET scan did not reveal any evidence of disease.    He is currently on pembrolizumab.  Again discussed regarding duration of pembrolizumab.  Initially we were thinking of stopping it after he has been on it for 2 years.  After discussion today, plan is to continue it for now.  Last PET scan had revealed a sclerotic lesion in the right posterior seventh rib.  We want to make sure that this lesion does not get worse.  If next CT/PET scan reveals this lesion to be stable or better, we can consider stopping pembrolizumab.    Patient will get pembrolizumab today.  He is tolerating it well.    2.  He will continue on Zometa.  No dental or jaw related symptoms.  He will continue to follow-up with dentist.  Zometa will be discontinued when he discontinued the pembrolizumab.    He will continue on calcium and vitamin D twice a day.    3.  He will continue on gabapentin for grade 1 neuropathy. It is helping.    4.  He has aortic aneurysm.  He will continue to follow-up with vascular surgery.    5.  He has mild thrombocytopenia.  CBC will be monitored periodically.  Thrombocytopenia is mild and will not cause any problem.    6.  He had few questions which were all answered.  I will see him with next treatment.     Total visit time of 30 minutes.  Time is spent in today's visit, review of chart/investigations today, monitoring for toxicity of high risk medication and documentation today.

## 2023-02-18 ENCOUNTER — HEALTH MAINTENANCE LETTER (OUTPATIENT)
Age: 83
End: 2023-02-18

## 2023-02-20 ENCOUNTER — LAB (OUTPATIENT)
Dept: INFUSION THERAPY | Facility: CLINIC | Age: 83
End: 2023-02-20
Attending: INTERNAL MEDICINE
Payer: MEDICARE

## 2023-02-20 DIAGNOSIS — Z51.11 ENCOUNTER FOR ANTINEOPLASTIC CHEMOTHERAPY: Primary | ICD-10-CM

## 2023-02-20 DIAGNOSIS — C34.31 MALIGNANT NEOPLASM OF LOWER LOBE OF RIGHT LUNG (H): ICD-10-CM

## 2023-02-20 LAB
ALBUMIN SERPL BCG-MCNC: 3.6 G/DL (ref 3.5–5.2)
ALP SERPL-CCNC: 66 U/L (ref 40–129)
ALT SERPL W P-5'-P-CCNC: 9 U/L (ref 10–50)
ANION GAP SERPL CALCULATED.3IONS-SCNC: 9 MMOL/L (ref 7–15)
AST SERPL W P-5'-P-CCNC: 18 U/L (ref 10–50)
BASOPHILS # BLD AUTO: 0 10E3/UL (ref 0–0.2)
BASOPHILS NFR BLD AUTO: 0 %
BILIRUB SERPL-MCNC: 0.5 MG/DL
BUN SERPL-MCNC: 16.5 MG/DL (ref 8–23)
CALCIUM SERPL-MCNC: 9 MG/DL (ref 8.8–10.2)
CHLORIDE SERPL-SCNC: 106 MMOL/L (ref 98–107)
CREAT SERPL-MCNC: 1.11 MG/DL (ref 0.67–1.17)
DEPRECATED HCO3 PLAS-SCNC: 24 MMOL/L (ref 22–29)
EOSINOPHIL # BLD AUTO: 0.1 10E3/UL (ref 0–0.7)
EOSINOPHIL NFR BLD AUTO: 2 %
ERYTHROCYTE [DISTWIDTH] IN BLOOD BY AUTOMATED COUNT: 12.8 % (ref 10–15)
GFR SERPL CREATININE-BSD FRML MDRD: 66 ML/MIN/1.73M2
GLUCOSE SERPL-MCNC: 152 MG/DL (ref 70–99)
HCT VFR BLD AUTO: 40.5 % (ref 40–53)
HGB BLD-MCNC: 14.3 G/DL (ref 13.3–17.7)
IMM GRANULOCYTES # BLD: 0 10E3/UL
IMM GRANULOCYTES NFR BLD: 1 %
LYMPHOCYTES # BLD AUTO: 1.4 10E3/UL (ref 0.8–5.3)
LYMPHOCYTES NFR BLD AUTO: 22 %
MCH RBC QN AUTO: 31.8 PG (ref 26.5–33)
MCHC RBC AUTO-ENTMCNC: 35.3 G/DL (ref 31.5–36.5)
MCV RBC AUTO: 90 FL (ref 78–100)
MONOCYTES # BLD AUTO: 0.4 10E3/UL (ref 0–1.3)
MONOCYTES NFR BLD AUTO: 6 %
NEUTROPHILS # BLD AUTO: 4.3 10E3/UL (ref 1.6–8.3)
NEUTROPHILS NFR BLD AUTO: 69 %
NRBC # BLD AUTO: 0 10E3/UL
NRBC BLD AUTO-RTO: 0 /100
PLATELET # BLD AUTO: 133 10E3/UL (ref 150–450)
POTASSIUM SERPL-SCNC: 4.3 MMOL/L (ref 3.4–5.3)
PROT SERPL-MCNC: 6.4 G/DL (ref 6.4–8.3)
RBC # BLD AUTO: 4.49 10E6/UL (ref 4.4–5.9)
SODIUM SERPL-SCNC: 139 MMOL/L (ref 136–145)
TSH SERPL DL<=0.005 MIU/L-ACNC: 1 UIU/ML (ref 0.3–4.2)
WBC # BLD AUTO: 6.3 10E3/UL (ref 4–11)

## 2023-02-20 PROCEDURE — 85025 COMPLETE CBC W/AUTO DIFF WBC: CPT | Performed by: INTERNAL MEDICINE

## 2023-02-20 PROCEDURE — 80053 COMPREHEN METABOLIC PANEL: CPT | Performed by: INTERNAL MEDICINE

## 2023-02-20 PROCEDURE — 36591 DRAW BLOOD OFF VENOUS DEVICE: CPT | Performed by: INTERNAL MEDICINE

## 2023-02-20 PROCEDURE — 250N000011 HC RX IP 250 OP 636: Performed by: INTERNAL MEDICINE

## 2023-02-20 PROCEDURE — 84443 ASSAY THYROID STIM HORMONE: CPT | Performed by: INTERNAL MEDICINE

## 2023-02-20 RX ORDER — HEPARIN SODIUM,PORCINE 10 UNIT/ML
5 VIAL (ML) INTRAVENOUS
Status: CANCELLED | OUTPATIENT
Start: 2023-02-20

## 2023-02-20 RX ORDER — HEPARIN SODIUM (PORCINE) LOCK FLUSH IV SOLN 100 UNIT/ML 100 UNIT/ML
5 SOLUTION INTRAVENOUS
Status: DISCONTINUED | OUTPATIENT
Start: 2023-02-20 | End: 2023-02-20 | Stop reason: HOSPADM

## 2023-02-20 RX ORDER — HEPARIN SODIUM (PORCINE) LOCK FLUSH IV SOLN 100 UNIT/ML 100 UNIT/ML
5 SOLUTION INTRAVENOUS
Status: CANCELLED | OUTPATIENT
Start: 2023-02-20

## 2023-02-20 RX ADMIN — Medication 5 ML: at 09:09

## 2023-02-20 NOTE — PROGRESS NOTES
Nursing Note:  Derrick Benitez presents today for port labs.    Patient seen by provider today: No   present during visit today: Not Applicable.    Note: N/A.    Intravenous Access:  Labs drawn without difficulty.  Implanted Port.    Discharge Plan:   Patient was sent to home for 2/21 appointment.    Porsha Wild RN

## 2023-02-21 ENCOUNTER — INFUSION THERAPY VISIT (OUTPATIENT)
Dept: INFUSION THERAPY | Facility: CLINIC | Age: 83
End: 2023-02-21
Attending: INTERNAL MEDICINE
Payer: MEDICARE

## 2023-02-21 ENCOUNTER — ONCOLOGY VISIT (OUTPATIENT)
Dept: ONCOLOGY | Facility: CLINIC | Age: 83
End: 2023-02-21
Attending: INTERNAL MEDICINE
Payer: MEDICARE

## 2023-02-21 VITALS
BODY MASS INDEX: 29.95 KG/M2 | HEART RATE: 73 BPM | WEIGHT: 191.2 LBS | RESPIRATION RATE: 16 BRPM | OXYGEN SATURATION: 95 % | DIASTOLIC BLOOD PRESSURE: 69 MMHG | TEMPERATURE: 98.2 F | SYSTOLIC BLOOD PRESSURE: 142 MMHG

## 2023-02-21 DIAGNOSIS — C34.31 MALIGNANT NEOPLASM OF LOWER LOBE OF RIGHT LUNG (H): Primary | ICD-10-CM

## 2023-02-21 DIAGNOSIS — C79.51 BONE METASTASES: ICD-10-CM

## 2023-02-21 DIAGNOSIS — Z51.11 ENCOUNTER FOR ANTINEOPLASTIC CHEMOTHERAPY: ICD-10-CM

## 2023-02-21 PROCEDURE — 96413 CHEMO IV INFUSION 1 HR: CPT

## 2023-02-21 PROCEDURE — 258N000003 HC RX IP 258 OP 636: Performed by: NURSE PRACTITIONER

## 2023-02-21 PROCEDURE — G0463 HOSPITAL OUTPT CLINIC VISIT: HCPCS | Mod: 25 | Performed by: NURSE PRACTITIONER

## 2023-02-21 PROCEDURE — 96367 TX/PROPH/DG ADDL SEQ IV INF: CPT

## 2023-02-21 PROCEDURE — 99214 OFFICE O/P EST MOD 30 MIN: CPT | Performed by: NURSE PRACTITIONER

## 2023-02-21 PROCEDURE — 250N000011 HC RX IP 250 OP 636: Performed by: NURSE PRACTITIONER

## 2023-02-21 RX ORDER — NALOXONE HYDROCHLORIDE 0.4 MG/ML
.1-.4 INJECTION, SOLUTION INTRAMUSCULAR; INTRAVENOUS; SUBCUTANEOUS
Status: CANCELLED | OUTPATIENT
Start: 2023-02-21

## 2023-02-21 RX ORDER — ALBUTEROL SULFATE 0.83 MG/ML
2.5 SOLUTION RESPIRATORY (INHALATION)
Status: CANCELLED | OUTPATIENT
Start: 2023-02-21

## 2023-02-21 RX ORDER — HEPARIN SODIUM (PORCINE) LOCK FLUSH IV SOLN 100 UNIT/ML 100 UNIT/ML
5 SOLUTION INTRAVENOUS EVERY 8 HOURS PRN
Status: DISCONTINUED | OUTPATIENT
Start: 2023-02-21 | End: 2023-02-21 | Stop reason: HOSPADM

## 2023-02-21 RX ORDER — HEPARIN SODIUM,PORCINE 10 UNIT/ML
5 VIAL (ML) INTRAVENOUS
Status: CANCELLED | OUTPATIENT
Start: 2023-02-21

## 2023-02-21 RX ORDER — HEPARIN SODIUM (PORCINE) LOCK FLUSH IV SOLN 100 UNIT/ML 100 UNIT/ML
5 SOLUTION INTRAVENOUS
Status: CANCELLED | OUTPATIENT
Start: 2023-02-21

## 2023-02-21 RX ORDER — DIPHENHYDRAMINE HYDROCHLORIDE 50 MG/ML
50 INJECTION INTRAMUSCULAR; INTRAVENOUS
Status: CANCELLED
Start: 2023-02-21

## 2023-02-21 RX ORDER — METHYLPREDNISOLONE SODIUM SUCCINATE 125 MG/2ML
125 INJECTION, POWDER, LYOPHILIZED, FOR SOLUTION INTRAMUSCULAR; INTRAVENOUS
Status: CANCELLED
Start: 2023-02-21

## 2023-02-21 RX ORDER — SODIUM CHLORIDE 9 MG/ML
1000 INJECTION, SOLUTION INTRAVENOUS CONTINUOUS PRN
Status: CANCELLED
Start: 2023-02-21

## 2023-02-21 RX ORDER — ZOLEDRONIC ACID 0.04 MG/ML
4 INJECTION, SOLUTION INTRAVENOUS ONCE
Status: CANCELLED | OUTPATIENT
Start: 2023-02-21 | End: 2023-02-21

## 2023-02-21 RX ORDER — EPINEPHRINE 1 MG/ML
0.3 INJECTION, SOLUTION INTRAMUSCULAR; SUBCUTANEOUS EVERY 5 MIN PRN
Status: CANCELLED | OUTPATIENT
Start: 2023-02-21

## 2023-02-21 RX ORDER — HEPARIN SODIUM (PORCINE) LOCK FLUSH IV SOLN 100 UNIT/ML 100 UNIT/ML
5 SOLUTION INTRAVENOUS EVERY 8 HOURS PRN
Status: CANCELLED | OUTPATIENT
Start: 2023-02-21

## 2023-02-21 RX ORDER — MEPERIDINE HYDROCHLORIDE 25 MG/ML
25 INJECTION INTRAMUSCULAR; INTRAVENOUS; SUBCUTANEOUS EVERY 30 MIN PRN
Status: CANCELLED | OUTPATIENT
Start: 2023-02-21

## 2023-02-21 RX ORDER — ALBUTEROL SULFATE 90 UG/1
1-2 AEROSOL, METERED RESPIRATORY (INHALATION)
Status: CANCELLED
Start: 2023-02-21

## 2023-02-21 RX ORDER — LORAZEPAM 2 MG/ML
0.5 INJECTION INTRAMUSCULAR EVERY 4 HOURS PRN
Status: CANCELLED | OUTPATIENT
Start: 2023-02-21

## 2023-02-21 RX ADMIN — SODIUM CHLORIDE 250 ML: 9 INJECTION, SOLUTION INTRAVENOUS at 10:11

## 2023-02-21 RX ADMIN — ZOLEDRONIC ACID 3.5 MG: 4 INJECTION INTRAVENOUS at 10:50

## 2023-02-21 RX ADMIN — Medication 5 ML: at 11:09

## 2023-02-21 RX ADMIN — SODIUM CHLORIDE 400 MG: 9 INJECTION, SOLUTION INTRAVENOUS at 10:11

## 2023-02-21 ASSESSMENT — PAIN SCALES - GENERAL: PAINLEVEL: NO PAIN (0)

## 2023-02-21 NOTE — PROGRESS NOTES
Infusion Nursing Note:  Derrick Benitez presents today for C21D1 Keytruda, C17D1 Zometa.    Patient seen by provider today: Yes, Seen by De Frausto NP   present during visit today: Not Applicable.    Note: Patient reports to feeling well with no side effects or changes.    Intravenous Access:  Implanted Port.    Treatment Conditions:  Lab Results   Component Value Date    HGB 14.3 02/20/2023    WBC 6.3 02/20/2023    ANEU 3.9 10/17/2022    ANEUTAUTO 4.3 02/20/2023     (L) 02/20/2023      Lab Results   Component Value Date     02/20/2023    POTASSIUM 4.3 02/20/2023    MAG 2.1 10/30/2014    CR 1.11 02/20/2023    ANTHONY 9.0 02/20/2023    BILITOTAL 0.5 02/20/2023    ALBUMIN 3.6 02/20/2023    ALT 9 (L) 02/20/2023    AST 18 02/20/2023     Results reviewed, labs MET treatment parameters, ok to proceed with treatment.    Post Infusion Assessment:  Patient tolerated infusion without incident.  Blood return noted pre and post infusion.  Site patent and intact, free from redness, edema or discomfort.  No evidence of extravasations.  Access discontinued per protocol.     Discharge Plan:   Patient declined prescription refills.  Discharge instructions reviewed with: Patient.  Patient and/or family verbalized understanding of discharge instructions and all questions answered.  AVS to patient via [email or web address].  Patient will return 4/3/23 for labs for next appointment.   Patient discharged in stable condition accompanied by: self.  Departure Mode: Ambulatory.      Brii Sheets RN

## 2023-02-21 NOTE — LETTER
2/21/2023         RE: Derrick Benitez  [address]  [facility] [address]        Dear Colleague,    Thank you for referring your patient, Derrick Benitez, to the [facility]. Please see a copy of my visit note below.    Oncology/Hematology Visit Note  Feb 21, 2023    Reason for Visit:   Lung cancer  Metastatic lung squamous cell carcinoma  Completed carboplatin Taxol and pembrolizumab x 6 cycle on 03/10/2021  03/31/2021-on maintenance with single agent with pembrolizumab  10/17/2022-PET scan did not reveal evidence of malignancy  Patient is currently getting treatment with pembrolizumab every 6 weeks      Interval History:  Patient reports he has been tolerating treatment well.  He denies fever chills sweats cough shortness of breath chest pain nausea vomiting diarrhea abdominal pain or bleeding    Review of Systems:  14 point ROS of systems including Constitutional, Eyes, Respiratory, Cardiovascular, Gastroenterology, Genitourinary, Integumentary, Muscularskeletal, Psychiatric were all negative except for pertinent positives noted in my HPI.    Physical Examination:  Physical Exam  Eyes:      Pupils: Pupils are equal, round, and reactive to light.   Cardiovascular:      Rate and Rhythm: Normal rate.      Pulses: Normal pulses.   Pulmonary:      Effort: Pulmonary effort is normal.   Abdominal:      General: Abdomen is flat.   Musculoskeletal:      Cervical back: Normal range of motion.   Skin:     General: Skin is warm.   Neurological:      General: No focal deficit present.      Mental Status: He is alert.   Psychiatric:         Mood and Affect: Mood normal.       Laboratory Data:  CBC CMP and TSH results reviewed    Assessment and Plan:  This is a 82-year-old male with    Lung cancer  -Metastatic lung squamous cell carcinoma   status post carboplatinTaxol and pembrolizumab-completed 6 cycles in March 2021  -Currently on maintenance with immunotherapy pembrolizumab every 6  weeks  10/17/2022-PET scan did not reveal evidence of malignancy  Patient is currently getting treatment with pembrolizumab every 6 weeks  -Labs reviewed  Okay to proceed with Keytruda  I will message Dr. Larsen to see when he wants imaging again      Bone mets  -Patient currently on Zometa every 6 weeks   Labs reviewed okay to give Zometa  -Continue with vitamin D and calcium  Okay to give Zometa today         Thrombocytopenia chronic-  Continue to monitor closely  Complications of thrombocytopenia reviewed  Call our clinic in the event of bleeding, bruising fall or injury        Patient advised to call our clinic in the event of fever chills sweats cough shortness of breath chest pain nausea vomiting diarrhea abdominal pain bleeding or any changes in health condition.    DARSHANA Meza CNP  [facility]     Chart documentation with Dragon Voice recognition Software. Although reviewed after completion, some words and grammatical errors may remain.            Again, thank you for allowing me to participate in the care of your patient.        Sincerely,        DARSHANA Meza CNP

## 2023-02-21 NOTE — PROGRESS NOTES
Oncology/Hematology Visit Note  Feb 21, 2023    Reason for Visit:   Lung cancer  Metastatic lung squamous cell carcinoma  Completed carboplatin Taxol and pembrolizumab x 6 cycle on 03/10/2021  03/31/2021-on maintenance with single agent with pembrolizumab  10/17/2022-PET scan did not reveal evidence of malignancy  Patient is currently getting treatment with pembrolizumab every 6 weeks      Interval History:  Patient reports he has been tolerating treatment well.  He denies fever chills sweats cough shortness of breath chest pain nausea vomiting diarrhea abdominal pain or bleeding    Review of Systems:  14 point ROS of systems including Constitutional, Eyes, Respiratory, Cardiovascular, Gastroenterology, Genitourinary, Integumentary, Muscularskeletal, Psychiatric were all negative except for pertinent positives noted in my HPI.    Physical Examination:  Physical Exam  Eyes:      Pupils: Pupils are equal, round, and reactive to light.   Cardiovascular:      Rate and Rhythm: Normal rate.      Pulses: Normal pulses.   Pulmonary:      Effort: Pulmonary effort is normal.   Abdominal:      General: Abdomen is flat.   Musculoskeletal:      Cervical back: Normal range of motion.   Skin:     General: Skin is warm.   Neurological:      General: No focal deficit present.      Mental Status: He is alert.   Psychiatric:         Mood and Affect: Mood normal.       Laboratory Data:  CBC CMP and TSH results reviewed    Assessment and Plan:  This is a 82-year-old male with    Lung cancer  -Metastatic lung squamous cell carcinoma   status post carboplatinTaxol and pembrolizumab-completed 6 cycles in March 2021  -Currently on maintenance with immunotherapy pembrolizumab every 6 weeks  10/17/2022-PET scan did not reveal evidence of malignancy  Patient is currently getting treatment with pembrolizumab every 6 weeks  -Labs reviewed  Okay to proceed with Keytruda  I will message Dr. Larsen to see when he wants imaging again      Bone  mets  -Patient currently on Zometa every 6 weeks   Labs reviewed okay to give Zometa  -Continue with vitamin D and calcium  Okay to give Zometa today         Thrombocytopenia chronic-  Continue to monitor closely  Complications of thrombocytopenia reviewed  Call our clinic in the event of bleeding, bruising fall or injury        Addendum  Reviewed with Dr. Larsen -schedule for CT scan chest abdomen pelvis in April prior to visit with Dr. Larsen      Patient advised to call our clinic in the event of fever chills sweats cough shortness of breath chest pain nausea vomiting diarrhea abdominal pain bleeding or any changes in health condition.    DARSHANA Meza [facility]     Chart documentation with Dragon Voice recognition Software. Although reviewed after completion, some words and grammatical errors may remain.

## 2023-03-25 ENCOUNTER — HEALTH MAINTENANCE LETTER (OUTPATIENT)
Age: 83
End: 2023-03-25

## 2023-03-30 ENCOUNTER — OFFICE VISIT (OUTPATIENT)
Dept: URGENT CARE | Facility: URGENT CARE | Age: 83
End: 2023-03-30
Payer: MEDICARE

## 2023-03-30 VITALS
SYSTOLIC BLOOD PRESSURE: 108 MMHG | OXYGEN SATURATION: 93 % | HEART RATE: 87 BPM | DIASTOLIC BLOOD PRESSURE: 57 MMHG | TEMPERATURE: 99.3 F | RESPIRATION RATE: 28 BRPM

## 2023-03-30 DIAGNOSIS — J40 BRONCHITIS: Primary | ICD-10-CM

## 2023-03-30 PROCEDURE — U0005 INFEC AGEN DETEC AMPLI PROBE: HCPCS

## 2023-03-30 PROCEDURE — 99214 OFFICE O/P EST MOD 30 MIN: CPT | Mod: CS

## 2023-03-30 PROCEDURE — U0003 INFECTIOUS AGENT DETECTION BY NUCLEIC ACID (DNA OR RNA); SEVERE ACUTE RESPIRATORY SYNDROME CORONAVIRUS 2 (SARS-COV-2) (CORONAVIRUS DISEASE [COVID-19]), AMPLIFIED PROBE TECHNIQUE, MAKING USE OF HIGH THROUGHPUT TECHNOLOGIES AS DESCRIBED BY CMS-2020-01-R: HCPCS

## 2023-03-30 RX ORDER — AZITHROMYCIN 250 MG/1
TABLET, FILM COATED ORAL
Qty: 6 TABLET | Refills: 0 | Status: SHIPPED | OUTPATIENT
Start: 2023-03-30 | End: 2023-04-04

## 2023-03-30 NOTE — PROGRESS NOTES
Assessment & Plan     Cough    - Symptomatic COVID-19 Virus (Coronavirus) by PCR Nose    Bronchitis    - azithromycin (ZITHROMAX) 250 MG tablet  Dispense: 6 tablet; Refill: 0             No follow-ups on file.    [facility] Urgent Care Provider  [facility] SHANIQUE Meza is a 82 year old male who presents to clinic today for the following health issues:  Chief Complaint   Patient presents with     Cough     Since 2 days ago, had a runny nose also- wants a COVID test      HPI    Runny nose.  Cough.  Used inhaler and thinks helped. Used not very often.  Used some cold/flu medicine.  No fever.  2 days.    Has lung cancer.        Review of Systems        Objective    /57 (BP Location: Right arm, Patient Position: Sitting, Cuff Size: Adult Large)   Pulse 87   Temp 99.3  F (37.4  C) (Tympanic)   Resp 28   SpO2 93%   Physical Exam  Vitals and nursing note reviewed.   Constitutional:       Appearance: Normal appearance.   HENT:      Right Ear: Tympanic membrane normal.      Left Ear: Tympanic membrane normal.      Mouth/Throat:      Mouth: Mucous membranes are moist.      Pharynx: Posterior oropharyngeal erythema present.   Eyes:      Pupils: Pupils are equal, round, and reactive to light.   Cardiovascular:      Rate and Rhythm: Normal rate and regular rhythm.      Pulses: Normal pulses.      Heart sounds: Normal heart sounds.   Pulmonary:      Effort: Pulmonary effort is normal.      Breath sounds: Normal breath sounds.   Musculoskeletal:      Cervical back: Neck supple.   Neurological:      Mental Status: He is alert.

## 2023-03-31 ENCOUNTER — LAB (OUTPATIENT)
Dept: INFUSION THERAPY | Facility: CLINIC | Age: 83
End: 2023-03-31
Attending: INTERNAL MEDICINE
Payer: MEDICARE

## 2023-03-31 DIAGNOSIS — C34.31 MALIGNANT NEOPLASM OF LOWER LOBE OF RIGHT LUNG (H): ICD-10-CM

## 2023-03-31 DIAGNOSIS — Z51.11 ENCOUNTER FOR ANTINEOPLASTIC CHEMOTHERAPY: Primary | ICD-10-CM

## 2023-03-31 LAB
ALBUMIN SERPL BCG-MCNC: 3.7 G/DL (ref 3.5–5.2)
ALP SERPL-CCNC: 71 U/L (ref 40–129)
ALT SERPL W P-5'-P-CCNC: 9 U/L (ref 10–50)
ANION GAP SERPL CALCULATED.3IONS-SCNC: 10 MMOL/L (ref 7–15)
AST SERPL W P-5'-P-CCNC: 18 U/L (ref 10–50)
BASOPHILS # BLD AUTO: 0 10E3/UL (ref 0–0.2)
BASOPHILS NFR BLD AUTO: 0 %
BILIRUB SERPL-MCNC: 0.5 MG/DL
BUN SERPL-MCNC: 17.5 MG/DL (ref 8–23)
CALCIUM SERPL-MCNC: 8 MG/DL (ref 8.8–10.2)
CHLORIDE SERPL-SCNC: 107 MMOL/L (ref 98–107)
CREAT SERPL-MCNC: 1.41 MG/DL (ref 0.67–1.17)
DEPRECATED HCO3 PLAS-SCNC: 21 MMOL/L (ref 22–29)
EOSINOPHIL # BLD AUTO: 0 10E3/UL (ref 0–0.7)
EOSINOPHIL NFR BLD AUTO: 0 %
ERYTHROCYTE [DISTWIDTH] IN BLOOD BY AUTOMATED COUNT: 12.8 % (ref 10–15)
GFR SERPL CREATININE-BSD FRML MDRD: 49 ML/MIN/1.73M2
GLUCOSE SERPL-MCNC: 167 MG/DL (ref 70–99)
HCT VFR BLD AUTO: 40.9 % (ref 40–53)
HGB BLD-MCNC: 13.8 G/DL (ref 13.3–17.7)
IMM GRANULOCYTES # BLD: 0 10E3/UL
IMM GRANULOCYTES NFR BLD: 1 %
LYMPHOCYTES # BLD AUTO: 0.6 10E3/UL (ref 0.8–5.3)
LYMPHOCYTES NFR BLD AUTO: 10 %
MCH RBC QN AUTO: 31.8 PG (ref 26.5–33)
MCHC RBC AUTO-ENTMCNC: 33.7 G/DL (ref 31.5–36.5)
MCV RBC AUTO: 94 FL (ref 78–100)
MONOCYTES # BLD AUTO: 0.6 10E3/UL (ref 0–1.3)
MONOCYTES NFR BLD AUTO: 10 %
NEUTROPHILS # BLD AUTO: 4.6 10E3/UL (ref 1.6–8.3)
NEUTROPHILS NFR BLD AUTO: 79 %
NRBC # BLD AUTO: 0 10E3/UL
NRBC BLD AUTO-RTO: 0 /100
PLATELET # BLD AUTO: 110 10E3/UL (ref 150–450)
POTASSIUM SERPL-SCNC: 4.2 MMOL/L (ref 3.4–5.3)
PROT SERPL-MCNC: 6.6 G/DL (ref 6.4–8.3)
RBC # BLD AUTO: 4.34 10E6/UL (ref 4.4–5.9)
SARS-COV-2 RNA RESP QL NAA+PROBE: POSITIVE
SODIUM SERPL-SCNC: 138 MMOL/L (ref 136–145)
TSH SERPL DL<=0.005 MIU/L-ACNC: 0.65 UIU/ML (ref 0.3–4.2)
WBC # BLD AUTO: 5.8 10E3/UL (ref 4–11)

## 2023-03-31 PROCEDURE — 85025 COMPLETE CBC W/AUTO DIFF WBC: CPT | Performed by: INTERNAL MEDICINE

## 2023-03-31 PROCEDURE — 84443 ASSAY THYROID STIM HORMONE: CPT | Performed by: INTERNAL MEDICINE

## 2023-03-31 PROCEDURE — 36591 DRAW BLOOD OFF VENOUS DEVICE: CPT | Performed by: INTERNAL MEDICINE

## 2023-03-31 PROCEDURE — 80053 COMPREHEN METABOLIC PANEL: CPT | Performed by: INTERNAL MEDICINE

## 2023-03-31 RX ORDER — HEPARIN SODIUM,PORCINE 10 UNIT/ML
5 VIAL (ML) INTRAVENOUS
Status: CANCELLED | OUTPATIENT
Start: 2023-03-31

## 2023-03-31 RX ORDER — HEPARIN SODIUM,PORCINE 10 UNIT/ML
5 VIAL (ML) INTRAVENOUS
Status: DISCONTINUED | OUTPATIENT
Start: 2023-03-31 | End: 2023-03-31 | Stop reason: HOSPADM

## 2023-03-31 RX ORDER — HEPARIN SODIUM (PORCINE) LOCK FLUSH IV SOLN 100 UNIT/ML 100 UNIT/ML
5 SOLUTION INTRAVENOUS
Status: CANCELLED | OUTPATIENT
Start: 2023-03-31

## 2023-03-31 RX ORDER — HEPARIN SODIUM (PORCINE) LOCK FLUSH IV SOLN 100 UNIT/ML 100 UNIT/ML
5 SOLUTION INTRAVENOUS
Status: DISCONTINUED | OUTPATIENT
Start: 2023-03-31 | End: 2023-03-31 | Stop reason: HOSPADM

## 2023-03-31 NOTE — PROGRESS NOTES
Nursing Note:  Derrick SALAS Emmanuel presents today for labs.    Patient seen by provider today: No   present during visit today: Not Applicable.    Note: N/A.    Intravenous Access:  Lab draw site right AC, Needle type BF, Gauge 21.    Discharge Plan:   Patient was sent to home for tuesday appointment.    Yuly Neil RN

## 2023-04-01 ENCOUNTER — TELEPHONE (OUTPATIENT)
Dept: NURSING | Facility: CLINIC | Age: 83
End: 2023-04-01
Payer: MEDICARE

## 2023-04-01 NOTE — TELEPHONE ENCOUNTER
Coronavirus (COVID-19) Notification    Caller Name (Patient, parent, daughter/son, grandparent, etc)   Patient     Reason for call  Notify of Positive Coronavirus (COVID-19) lab results, assess symptoms,  review [facility] recommendations    Lab Result    Lab test:  2019-nCoV rRt-PCR or SARS-CoV-2 PCR    Oropharyngeal AND/OR nasopharyngeal swabs is POSITIVE for 2019-nCoV RNA/SARS-COV-2 PCR (COVID-19 virus)      Gather patient reported symptoms   Assessment   Current Symptoms at time of phone call, reported by patient: (if no symptoms, document: No symptoms]  Feeling good/started treatment w[facility] this morning   Date of symptom(s) onset (if applicable)  n/a     If at time of call, Patients symptoms have worsened, the Patient should contact 911 or have someone drive them to Emergency Dept promptly:      If Patient calling 911, inform 911 personal that you have tested positive for the Coronavirus (COVID-19).  Place mask on and await 911 to arrive.    If Emergency Dept, If possible, please have another adult drive you to the Emergency Dept but you need to wear mask when in contact with other people.      Treatment Options:   Is patient interested in discussing COVID treatment? No.    Patient just started the anti-viral treatment with [facility]      Review information with Patient    Your result was positive. This means you have COVID-19 (coronavirus).    How can I protect others?    These guidelines are for isolating before returning to work, school or .    If you DO have symptoms    Stay home and away from others     For at least 5 days after your symptoms started, AND    You are fever free for 24 hours (with no medicine that reduces fever), AND    Your other symptoms are better    Wear a mask for 10 full days anytime you are around others    If you DON'T have symptoms    Stay home and away from others for at least 5 days after your positive test    Wear a mask for 10 full days anytime you  are around others    There may be different guidelines for healthcare facilities.  Please check with the specific sites before arriving.    If you have been told by a doctor that you were severely ill with COVID-19 or are immunocompromised, you should isolate for at least 10 days.    You should not go back to work until you meet the guidelines above for ending your home isolation. You don't need to be retested for COVID-19 before going back to work--studies show that you won't spread the virus if it's been at least 10 days since your symptoms started (or 20 days, if you have a weak immune system).    Employers, schools, and daycares: This is an official notice for this person's medical guidelines for returning in-person.  They must meet the above guidelines before going back to work, school or  in person.    You will receive a positive COVID-19 letter via Cont[employer] or the mail soon with additional self-care information.    Would you like me to review some of that information with you now?  No    If you were tested for an upcoming procedure, please contact your provider for next steps.    Sammi Hines

## 2023-04-03 PROBLEM — C79.51 MALIGNANT NEOPLASM METASTATIC TO BONE (H): Status: ACTIVE | Noted: 2020-12-16

## 2023-04-21 ENCOUNTER — TELEPHONE (OUTPATIENT)
Dept: MEDSURG UNIT | Facility: CLINIC | Age: 83
End: 2023-04-21
Payer: MEDICARE

## 2023-04-21 ENCOUNTER — LAB (OUTPATIENT)
Dept: INFUSION THERAPY | Facility: CLINIC | Age: 83
End: 2023-04-21
Attending: INTERNAL MEDICINE
Payer: MEDICARE

## 2023-04-21 DIAGNOSIS — C34.31 MALIGNANT NEOPLASM OF LOWER LOBE OF RIGHT LUNG (H): ICD-10-CM

## 2023-04-21 DIAGNOSIS — Z51.11 ENCOUNTER FOR ANTINEOPLASTIC CHEMOTHERAPY: Primary | ICD-10-CM

## 2023-04-21 LAB
ALBUMIN SERPL BCG-MCNC: 3.5 G/DL (ref 3.5–5.2)
ALP SERPL-CCNC: 64 U/L (ref 40–129)
ALT SERPL W P-5'-P-CCNC: 9 U/L (ref 10–50)
ANION GAP SERPL CALCULATED.3IONS-SCNC: 9 MMOL/L (ref 7–15)
AST SERPL W P-5'-P-CCNC: 15 U/L (ref 10–50)
BASOPHILS # BLD AUTO: 0 10E3/UL (ref 0–0.2)
BASOPHILS NFR BLD AUTO: 0 %
BILIRUB SERPL-MCNC: 0.4 MG/DL
BUN SERPL-MCNC: 12.6 MG/DL (ref 8–23)
CALCIUM SERPL-MCNC: 7.8 MG/DL (ref 8.8–10.2)
CHLORIDE SERPL-SCNC: 108 MMOL/L (ref 98–107)
CREAT SERPL-MCNC: 1.07 MG/DL (ref 0.67–1.17)
DEPRECATED HCO3 PLAS-SCNC: 24 MMOL/L (ref 22–29)
EOSINOPHIL # BLD AUTO: 0.1 10E3/UL (ref 0–0.7)
EOSINOPHIL NFR BLD AUTO: 2 %
ERYTHROCYTE [DISTWIDTH] IN BLOOD BY AUTOMATED COUNT: 12.7 % (ref 10–15)
GFR SERPL CREATININE-BSD FRML MDRD: 69 ML/MIN/1.73M2
GLUCOSE SERPL-MCNC: 149 MG/DL (ref 70–99)
HCT VFR BLD AUTO: 40.6 % (ref 40–53)
HGB BLD-MCNC: 13.8 G/DL (ref 13.3–17.7)
IMM GRANULOCYTES # BLD: 0 10E3/UL
IMM GRANULOCYTES NFR BLD: 0 %
LYMPHOCYTES # BLD AUTO: 1.1 10E3/UL (ref 0.8–5.3)
LYMPHOCYTES NFR BLD AUTO: 24 %
MCH RBC QN AUTO: 31.7 PG (ref 26.5–33)
MCHC RBC AUTO-ENTMCNC: 34 G/DL (ref 31.5–36.5)
MCV RBC AUTO: 93 FL (ref 78–100)
MONOCYTES # BLD AUTO: 0.3 10E3/UL (ref 0–1.3)
MONOCYTES NFR BLD AUTO: 7 %
NEUTROPHILS # BLD AUTO: 3 10E3/UL (ref 1.6–8.3)
NEUTROPHILS NFR BLD AUTO: 67 %
NRBC # BLD AUTO: 0 10E3/UL
NRBC BLD AUTO-RTO: 0 /100
PLATELET # BLD AUTO: 122 10E3/UL (ref 150–450)
POTASSIUM SERPL-SCNC: 4.2 MMOL/L (ref 3.4–5.3)
PROT SERPL-MCNC: 6.1 G/DL (ref 6.4–8.3)
RBC # BLD AUTO: 4.36 10E6/UL (ref 4.4–5.9)
SODIUM SERPL-SCNC: 141 MMOL/L (ref 136–145)
TSH SERPL DL<=0.005 MIU/L-ACNC: 0.82 UIU/ML (ref 0.3–4.2)
WBC # BLD AUTO: 4.5 10E3/UL (ref 4–11)

## 2023-04-21 PROCEDURE — 36591 DRAW BLOOD OFF VENOUS DEVICE: CPT | Performed by: INTERNAL MEDICINE

## 2023-04-21 PROCEDURE — 85025 COMPLETE CBC W/AUTO DIFF WBC: CPT | Performed by: INTERNAL MEDICINE

## 2023-04-21 PROCEDURE — 80053 COMPREHEN METABOLIC PANEL: CPT | Performed by: INTERNAL MEDICINE

## 2023-04-21 PROCEDURE — 84443 ASSAY THYROID STIM HORMONE: CPT | Performed by: INTERNAL MEDICINE

## 2023-04-21 PROCEDURE — 250N000011 HC RX IP 250 OP 636: Performed by: INTERNAL MEDICINE

## 2023-04-21 RX ORDER — HEPARIN SODIUM (PORCINE) LOCK FLUSH IV SOLN 100 UNIT/ML 100 UNIT/ML
5 SOLUTION INTRAVENOUS
Status: DISCONTINUED | OUTPATIENT
Start: 2023-04-21 | End: 2023-04-21 | Stop reason: HOSPADM

## 2023-04-21 RX ORDER — HEPARIN SODIUM,PORCINE 10 UNIT/ML
5 VIAL (ML) INTRAVENOUS
Status: DISCONTINUED | OUTPATIENT
Start: 2023-04-21 | End: 2023-04-21 | Stop reason: HOSPADM

## 2023-04-21 RX ORDER — HEPARIN SODIUM (PORCINE) LOCK FLUSH IV SOLN 100 UNIT/ML 100 UNIT/ML
5 SOLUTION INTRAVENOUS
Status: CANCELLED | OUTPATIENT
Start: 2023-04-21

## 2023-04-21 RX ORDER — HEPARIN SODIUM,PORCINE 10 UNIT/ML
5 VIAL (ML) INTRAVENOUS
Status: CANCELLED | OUTPATIENT
Start: 2023-04-21

## 2023-04-21 RX ADMIN — Medication 5 ML: at 09:39

## 2023-04-21 NOTE — PROGRESS NOTES
Nursing Note:  Derrick Bentiez presents today for port labs.    Patient seen by provider today: No   present during visit today: Not Applicable.    Note: N/A.    Intravenous Access:  Labs drawn without difficulty.  Implanted Port.    Discharge Plan:   Patient was sent to home for next week's appointment.    Archana Hall RN

## 2023-04-25 ENCOUNTER — HOSPITAL ENCOUNTER (OUTPATIENT)
Facility: CLINIC | Age: 83
Discharge: HOME | End: 2023-04-25
Admitting: RADIOLOGY
Payer: MEDICARE

## 2023-04-25 ENCOUNTER — HOSPITAL ENCOUNTER (OUTPATIENT)
Dept: CT IMAGING | Facility: CLINIC | Age: 83
Discharge: HOME | End: 2023-04-25
Attending: NURSE PRACTITIONER
Payer: MEDICARE

## 2023-04-25 DIAGNOSIS — C34.31 MALIGNANT NEOPLASM OF LOWER LOBE OF RIGHT LUNG (H): Primary | ICD-10-CM

## 2023-04-25 DIAGNOSIS — C34.31 MALIGNANT NEOPLASM OF LOWER LOBE OF RIGHT LUNG (H): ICD-10-CM

## 2023-04-25 DIAGNOSIS — C79.51 MALIGNANT NEOPLASM METASTATIC TO BONE (H): ICD-10-CM

## 2023-04-25 PROCEDURE — 250N000009 HC RX 250: Performed by: NURSE PRACTITIONER

## 2023-04-25 PROCEDURE — 999N000154 HC STATISTIC RADIOLOGY XRAY, US, CT, MAR, NM

## 2023-04-25 PROCEDURE — 250N000011 HC RX IP 250 OP 636: Performed by: NURSE PRACTITIONER

## 2023-04-25 PROCEDURE — G1010 CDSM STANSON: HCPCS

## 2023-04-25 PROCEDURE — 250N000011 HC RX IP 250 OP 636: Performed by: INTERNAL MEDICINE

## 2023-04-25 RX ORDER — HEPARIN SODIUM (PORCINE) LOCK FLUSH IV SOLN 100 UNIT/ML 100 UNIT/ML
5 SOLUTION INTRAVENOUS
Status: CANCELLED | OUTPATIENT
Start: 2023-04-25

## 2023-04-25 RX ORDER — IOPAMIDOL 755 MG/ML
93 INJECTION, SOLUTION INTRAVASCULAR ONCE
Status: COMPLETED | OUTPATIENT
Start: 2023-04-25 | End: 2023-04-25

## 2023-04-25 RX ORDER — HEPARIN SODIUM,PORCINE 10 UNIT/ML
5 VIAL (ML) INTRAVENOUS
Status: CANCELLED | OUTPATIENT
Start: 2023-04-25

## 2023-04-25 RX ORDER — HEPARIN SODIUM,PORCINE 10 UNIT/ML
5 VIAL (ML) INTRAVENOUS
Status: DISCONTINUED | OUTPATIENT
Start: 2023-04-25 | End: 2023-04-25 | Stop reason: HOSPADM

## 2023-04-25 RX ORDER — HEPARIN SODIUM (PORCINE) LOCK FLUSH IV SOLN 100 UNIT/ML 100 UNIT/ML
5 SOLUTION INTRAVENOUS
Status: DISCONTINUED | OUTPATIENT
Start: 2023-04-25 | End: 2023-04-25 | Stop reason: HOSPADM

## 2023-04-25 RX ADMIN — Medication 5 ML: at 10:37

## 2023-04-25 RX ADMIN — IOPAMIDOL 93 ML: 755 INJECTION, SOLUTION INTRAVENOUS at 10:33

## 2023-04-25 RX ADMIN — SODIUM CHLORIDE 67 ML: 9 INJECTION, SOLUTION INTRAVENOUS at 10:33

## 2023-04-25 NOTE — PROGRESS NOTES
Port accessed for CT scan without difficulty, blood return noted, saline locked.     Post procedure port was heparin locked and deaccessed per protocol, tolerated well. Instructed patient to have port reflushed with heparin in 28 days if not accessed prior, patient v/u.

## 2023-04-26 ENCOUNTER — INFUSION THERAPY VISIT (OUTPATIENT)
Dept: INFUSION THERAPY | Facility: CLINIC | Age: 83
End: 2023-04-26
Attending: INTERNAL MEDICINE
Payer: MEDICARE

## 2023-04-26 ENCOUNTER — ONCOLOGY VISIT (OUTPATIENT)
Dept: ONCOLOGY | Facility: CLINIC | Age: 83
End: 2023-04-26
Attending: INTERNAL MEDICINE
Payer: MEDICARE

## 2023-04-26 VITALS
WEIGHT: 194 LBS | DIASTOLIC BLOOD PRESSURE: 76 MMHG | SYSTOLIC BLOOD PRESSURE: 157 MMHG | BODY MASS INDEX: 30.38 KG/M2 | OXYGEN SATURATION: 94 % | RESPIRATION RATE: 16 BRPM | TEMPERATURE: 98.4 F | HEART RATE: 73 BPM

## 2023-04-26 DIAGNOSIS — C34.31 MALIGNANT NEOPLASM OF LOWER LOBE OF RIGHT LUNG (H): ICD-10-CM

## 2023-04-26 DIAGNOSIS — Z51.11 ENCOUNTER FOR ANTINEOPLASTIC CHEMOTHERAPY: Primary | ICD-10-CM

## 2023-04-26 DIAGNOSIS — Z51.11 ENCOUNTER FOR ANTINEOPLASTIC CHEMOTHERAPY: ICD-10-CM

## 2023-04-26 DIAGNOSIS — G62.0 PERIPHERAL NEUROPATHY DUE TO CHEMOTHERAPY (H): Primary | ICD-10-CM

## 2023-04-26 DIAGNOSIS — C79.51 MALIGNANT NEOPLASM METASTATIC TO BONE (H): ICD-10-CM

## 2023-04-26 DIAGNOSIS — T45.1X5A PERIPHERAL NEUROPATHY DUE TO CHEMOTHERAPY (H): Primary | ICD-10-CM

## 2023-04-26 PROCEDURE — 250N000011 HC RX IP 250 OP 636: Performed by: INTERNAL MEDICINE

## 2023-04-26 PROCEDURE — 258N000003 HC RX IP 258 OP 636: Performed by: INTERNAL MEDICINE

## 2023-04-26 PROCEDURE — G0463 HOSPITAL OUTPT CLINIC VISIT: HCPCS | Mod: 25 | Performed by: INTERNAL MEDICINE

## 2023-04-26 PROCEDURE — 96413 CHEMO IV INFUSION 1 HR: CPT

## 2023-04-26 PROCEDURE — G0463 HOSPITAL OUTPT CLINIC VISIT: HCPCS | Performed by: INTERNAL MEDICINE

## 2023-04-26 PROCEDURE — 96367 TX/PROPH/DG ADDL SEQ IV INF: CPT

## 2023-04-26 PROCEDURE — 99214 OFFICE O/P EST MOD 30 MIN: CPT | Performed by: INTERNAL MEDICINE

## 2023-04-26 RX ORDER — ALBUTEROL SULFATE 90 UG/1
1-2 AEROSOL, METERED RESPIRATORY (INHALATION)
Status: CANCELLED
Start: 2023-04-26

## 2023-04-26 RX ORDER — HEPARIN SODIUM (PORCINE) LOCK FLUSH IV SOLN 100 UNIT/ML 100 UNIT/ML
5 SOLUTION INTRAVENOUS
Status: CANCELLED | OUTPATIENT
Start: 2023-04-26

## 2023-04-26 RX ORDER — HEPARIN SODIUM (PORCINE) LOCK FLUSH IV SOLN 100 UNIT/ML 100 UNIT/ML
5 SOLUTION INTRAVENOUS EVERY 8 HOURS PRN
Status: DISCONTINUED | OUTPATIENT
Start: 2023-04-26 | End: 2023-04-26 | Stop reason: HOSPADM

## 2023-04-26 RX ORDER — GABAPENTIN 300 MG/1
300 CAPSULE ORAL 3 TIMES DAILY
Qty: 90 CAPSULE | Refills: 3 | Status: SHIPPED | OUTPATIENT
Start: 2023-04-26 | End: 2023-07-18

## 2023-04-26 RX ORDER — METHYLPREDNISOLONE SODIUM SUCCINATE 125 MG/2ML
125 INJECTION, POWDER, LYOPHILIZED, FOR SOLUTION INTRAMUSCULAR; INTRAVENOUS
Status: CANCELLED
Start: 2023-04-26

## 2023-04-26 RX ORDER — EPINEPHRINE 1 MG/ML
0.3 INJECTION, SOLUTION INTRAMUSCULAR; SUBCUTANEOUS EVERY 5 MIN PRN
Status: CANCELLED | OUTPATIENT
Start: 2023-04-26

## 2023-04-26 RX ORDER — ZOLEDRONIC ACID 0.04 MG/ML
4 INJECTION, SOLUTION INTRAVENOUS ONCE
Status: CANCELLED | OUTPATIENT
Start: 2023-04-26 | End: 2023-04-26

## 2023-04-26 RX ORDER — LORAZEPAM 2 MG/ML
0.5 INJECTION INTRAMUSCULAR EVERY 4 HOURS PRN
Status: CANCELLED | OUTPATIENT
Start: 2023-04-26

## 2023-04-26 RX ORDER — HEPARIN SODIUM (PORCINE) LOCK FLUSH IV SOLN 100 UNIT/ML 100 UNIT/ML
5 SOLUTION INTRAVENOUS EVERY 8 HOURS PRN
Status: CANCELLED | OUTPATIENT
Start: 2023-04-26

## 2023-04-26 RX ORDER — ACETAMINOPHEN 325 MG/1
325-650 TABLET ORAL EVERY 6 HOURS PRN
COMMUNITY

## 2023-04-26 RX ORDER — SODIUM CHLORIDE 9 MG/ML
1000 INJECTION, SOLUTION INTRAVENOUS CONTINUOUS PRN
Status: CANCELLED
Start: 2023-04-26

## 2023-04-26 RX ORDER — ALBUTEROL SULFATE 0.83 MG/ML
2.5 SOLUTION RESPIRATORY (INHALATION)
Status: CANCELLED | OUTPATIENT
Start: 2023-04-26

## 2023-04-26 RX ORDER — HEPARIN SODIUM,PORCINE 10 UNIT/ML
5 VIAL (ML) INTRAVENOUS
Status: CANCELLED | OUTPATIENT
Start: 2023-04-26

## 2023-04-26 RX ORDER — MEPERIDINE HYDROCHLORIDE 25 MG/ML
25 INJECTION INTRAMUSCULAR; INTRAVENOUS; SUBCUTANEOUS EVERY 30 MIN PRN
Status: CANCELLED | OUTPATIENT
Start: 2023-04-26

## 2023-04-26 RX ORDER — DIPHENHYDRAMINE HYDROCHLORIDE 50 MG/ML
50 INJECTION INTRAMUSCULAR; INTRAVENOUS
Status: CANCELLED
Start: 2023-04-26

## 2023-04-26 RX ORDER — NALOXONE HYDROCHLORIDE 0.4 MG/ML
.1-.4 INJECTION, SOLUTION INTRAMUSCULAR; INTRAVENOUS; SUBCUTANEOUS
Status: CANCELLED | OUTPATIENT
Start: 2023-04-26

## 2023-04-26 RX ADMIN — SODIUM CHLORIDE 250 ML: 9 INJECTION, SOLUTION INTRAVENOUS at 11:21

## 2023-04-26 RX ADMIN — ZOLEDRONIC ACID 3.5 MG: 4 INJECTION INTRAVENOUS at 11:56

## 2023-04-26 RX ADMIN — SODIUM CHLORIDE 400 MG: 9 INJECTION, SOLUTION INTRAVENOUS at 11:21

## 2023-04-26 RX ADMIN — Medication 5 ML: at 12:14

## 2023-04-26 ASSESSMENT — PAIN SCALES - GENERAL: PAINLEVEL: NO PAIN (0)

## 2023-04-26 NOTE — PROGRESS NOTES
Infusion Nursing Note:  Derrick Benitez presents today for Keytruda/Zometa.    Patient seen by provider today: Yes: Chava   present during visit today: Not Applicable.    Note: N/A.      Intravenous Access:  Implanted Port.    Treatment Conditions:  Lab Results   Component Value Date    HGB 13.8 04/21/2023    WBC 4.5 04/21/2023    ANEU 3.9 10/17/2022    ANEUTAUTO 3.0 04/21/2023     (L) 04/21/2023      Lab Results   Component Value Date     04/21/2023    POTASSIUM 4.2 04/21/2023    MAG 2.1 10/30/2014    CR 1.07 04/21/2023    ANTHONY 7.8 (L) 04/21/2023    BILITOTAL 0.4 04/21/2023    ALBUMIN 3.5 04/21/2023    ALT 9 (L) 04/21/2023    AST 15 04/21/2023     Results reviewed, labs MET treatment parameters, ok to proceed with treatment.  TSH=0.82.      Post Infusion Assessment:  Patient tolerated infusion without incident.  Blood return noted pre and post infusion.  Site patent and intact, free from redness, edema or discomfort.  No evidence of extravasations.  Access discontinued per protocol.       Discharge Plan:   Discharge instructions reviewed with: Patient.  Patient and/or family verbalized understanding of discharge instructions and all questions answered.  AVS to patient via [employer].  Patient will return 6/5/23 for next appointment.   Patient discharged in stable condition accompanied by: self.  Departure Mode: Ambulatory.      Musa Taylor RN

## 2023-04-26 NOTE — PROGRESS NOTES
ONCOLOGIC HISTORY:  Mr. Benitez is a gentleman with non-small cell lung cancer, favor squamous cell carcinoma.   -NGS and PDL staining could not be done because of small sample.   -Guardant 360 does not reveal any actionable alteration.  1.  CT chest angiogram on 09/07/2020 revealed right lower lobe mass.   2.  CT-guided biopsy was done on 09/18/2020.  Pathology revealed non-small cell lung cancer, favor squamous cell carcinoma.  Sample size was small.  PDL staining and NGS panel could not be done.   3.  Brain MRI on 09/26/2020 did not reveal any brain metastasis.   4.  PET scan on 10/05/2020 revealed hypermetabolic right lung mass and multiple hypermetabolic bone lesions.   5.  The patient started on carboplatin and Taxol on 11/25/2020.   -Pembrolizumab added with cycle 2.   6. PET scan on 10/17/2022 reveals FDG avid sclerotic lesions involving the right posterior seventh rib slightly above background levels. Favored to simply represent sequelae of post treatment change.     SUBJECTIVE:  Mr. Benitez is an 82-year-old gentleman with metastatic non-small cell lung cancer on pembrolizumab. He is tolerating it well.     CT scan on 04/25/2023:  1.  Stable skeletal metastases.  2.  No new sites of disease in the chest, abdomen, or pelvis. No change from previous.    He is overall doing good.  He has mild generalized weakness which would go along with his age and treatment.  No headache.  No dizziness. No chest pain.  No shortness of breath.  No cough.  No abdominal pain.  No nausea or vomiting.  Appetite is good.  No urinary complaints.  No bowel problem.  No bleeding.  No fever or night sweats.  He has mild tingling in the toes. It is improving on gabapentin. All other review of systems is negative.     PHYSICAL EXAMINATION:   GENERAL:  Alert and oriented x 3.   VITAL SIGNS:  Reviewed.  ECOG PS of 1.     Rest of the system is not examined.     ASSESSMENT:    1.  An 82-year-old gentleman with metastatic non-small cell lung  cancer on pembrolizumab. No evidence of disease.    2.  Grade 1 peripheral neuropathy. Stable  3.  Mild generalized weakness.  4.  Thrombocytopenia. Stable.  5.  Aortic aneurysm.     PLAN:  1.  Patient is doing well from metastatic lung cancer.  CT scan was reviewed with him.  No evidence of malignancy.  There are stable sclerotic skeletal metastasis.  They were non-hypermetabolic on previous PET scan.    2.  Patient is on pembrolizumab every 6 weeks.  He is tolerating it well.  He will continue on it.    In next 3 to 4 months, we will plan on getting a PET scan.  If PET scan does not reveal any evidence of malignancy, we can consider stopping pembrolizumab.  He is agreeable with this plan.    3.  Labs were reviewed with him.  There are few abnormalities including mild thrombocytopenia.  We will continue to monitor it.    4.  He will continue on Zometa.  We will change it to every 6 months. No dental or jaw related symptoms. He will continue to follow-up with dentist.  Zometa will be discontinued when we discontinue pembrolizumab.     He will continue on calcium and vitamin D twice a day.     3.  He will continue on gabapentin for grade 1 neuropathy. It is helping.     4.  He has aortic aneurysm.  He will continue to follow-up with vascular surgery.     5.  He had few questions which were all answered.  He will be seen with next treatment.     Total visit time of 30 minutes.  Time is spent in today's visit, review of chart/investigations today, monitoring for toxicity of high risk medication and documentation today.

## 2023-04-26 NOTE — LETTER
"    4/26/2023         RE: Derrick Benitez  [address]  [facility] [address]        Dear Colleague,    Thank you for referring your patient, Derrick Benitez, to the [facility]. Please see a copy of my visit note below.    Oncology Rooming Note    April 26, 2023 10:49 AM   Derrick Benitez is a 82 year old male who presents for:    Chief Complaint   Patient presents with     Oncology Clinic Visit     Initial Vitals: BP (!) 157/76   Pulse 73   Temp 98.4  F (36.9  C) (Oral)   Resp 16   Wt 88 kg (194 lb)   SpO2 94%   BMI 30.38 kg/m   Estimated body mass index is 30.38 kg/m  as calculated from the following:    Height as of 1/10/23: 1.702 m (5' 7\").    Weight as of this encounter: 88 kg (194 lb). Body surface area is 2.04 meters squared.  No Pain (0) Comment: Data Unavailable   No LMP for male patient.  Allergies reviewed: Yes  Medications reviewed: Yes    Medications: Medication refills not needed today.  Pharmacy name entered into EPIC:    [facility] [place], [place] - [address]  [facility] [address] [place], [place] - [address] [place] [address] [address]    Clinical concerns:  doctor was notified.      Corie Vieira [facility]              ONCOLOGIC HISTORY:  Mr. Benitez is a gentleman with non-small cell lung cancer, favor squamous cell carcinoma.   -NGS and PDL staining could not be done because of small sample.   -Guardant 360 does not reveal any actionable alteration.  1.  CT chest angiogram on 09/07/2020 revealed right lower lobe mass.   2.  CT-guided biopsy was done on 09/18/2020.  Pathology revealed non-small cell lung cancer, favor squamous cell carcinoma.  Sample size was small.  PDL staining and NGS panel could not be done.   3.  Brain MRI on 09/26/2020 did not reveal any brain metastasis.   4.  PET scan on 10/05/2020 revealed hypermetabolic right lung mass and multiple hypermetabolic bone lesions.   5.  The patient started on carboplatin and Taxol on 11/25/2020. "   -Pembrolizumab added with cycle 2.   6. PET scan on 10/17/2022 reveals FDG avid sclerotic lesions involving the right posterior seventh rib slightly above background levels. Favored to simply represent sequelae of post treatment change.     SUBJECTIVE:  Mr. Benitez is an 82-year-old gentleman with metastatic non-small cell lung cancer on pembrolizumab. He is tolerating it well.     CT scan on 04/25/2023:  1.  Stable skeletal metastases.  2.  No new sites of disease in the chest, abdomen, or pelvis. No change from previous.    He is overall doing good.  He has mild generalized weakness which would go along with his age and treatment.  No headache.  No dizziness. No chest pain.  No shortness of breath.  No cough.  No abdominal pain.  No nausea or vomiting.  Appetite is good.  No urinary complaints.  No bowel problem.  No bleeding.  No fever or night sweats.  He has mild tingling in the toes. It is improving on gabapentin. All other review of systems is negative.     PHYSICAL EXAMINATION:   GENERAL:  Alert and oriented x 3.   VITAL SIGNS:  Reviewed.  ECOG PS of 1.     Rest of the system is not examined.     ASSESSMENT:    1.  An 82-year-old gentleman with metastatic non-small cell lung cancer on pembrolizumab. No evidence of disease.    2.  Grade 1 peripheral neuropathy. Stable  3.  Mild generalized weakness.  4.  Thrombocytopenia. Stable.  5.  Aortic aneurysm.     PLAN:  1.  Patient is doing well from metastatic lung cancer.  CT scan was reviewed with him.  No evidence of malignancy.  There are stable sclerotic skeletal metastasis.  They were non-hypermetabolic on previous PET scan.    2.  Patient is on pembrolizumab every 6 weeks.  He is tolerating it well.  He will continue on it.    In next 3 to 4 months, we will plan on getting a PET scan.  If PET scan does not reveal any evidence of malignancy, we can consider stopping pembrolizumab.  He is agreeable with this plan.    3.  Labs were reviewed with him.  There are  few abnormalities including mild thrombocytopenia.  We will continue to monitor it.    4.  He will continue on Zometa.  We will change it to every 6 months. No dental or jaw related symptoms. He will continue to follow-up with dentist.  Zometa will be discontinued when we discontinue pembrolizumab.     He will continue on calcium and vitamin D twice a day.     3.  He will continue on gabapentin for grade 1 neuropathy. It is helping.     4.  He has aortic aneurysm.  He will continue to follow-up with vascular surgery.     5.  He had few questions which were all answered.  He will be seen with next treatment.     Total visit time of 30 minutes.  Time is spent in today's visit, review of chart/investigations today, monitoring for toxicity of high risk medication and documentation today.      Again, thank you for allowing me to participate in the care of your patient.        Sincerely,        Buzz Larsen MD

## 2023-04-26 NOTE — PATIENT INSTRUCTIONS
1. Continue pembrolizumab and zometa every 6 weeks.  2. Continue gabapentin.  3. See me on NP in 6 weeks when he comes for next treatment.

## 2023-04-26 NOTE — PROGRESS NOTES
"Oncology Rooming Note    April 26, 2023 10:49 AM   Derrick Benitez is a 82 year old male who presents for:    Chief Complaint   Patient presents with     Oncology Clinic Visit     Initial Vitals: BP (!) 157/76   Pulse 73   Temp 98.4  F (36.9  C) (Oral)   Resp 16   Wt 88 kg (194 lb)   SpO2 94%   BMI 30.38 kg/m   Estimated body mass index is 30.38 kg/m  as calculated from the following:    Height as of 1/10/23: 1.702 m (5' 7\").    Weight as of this encounter: 88 kg (194 lb). Body surface area is 2.04 meters squared.  No Pain (0) Comment: Data Unavailable   No LMP for male patient.  Allergies reviewed: Yes  Medications reviewed: Yes    Medications: Medication refills not needed today.  Pharmacy name entered into EPIC:    [place], [place] - [address]  [facility] [address] [place] [address]    Clinical concerns:  doctor was notified.      Corie Vieira CMA            "

## 2023-04-27 ENCOUNTER — TELEPHONE (OUTPATIENT)
Dept: OTHER | Facility: CLINIC | Age: 83
End: 2023-04-27

## 2023-04-27 NOTE — TELEPHONE ENCOUNTER
[facility] Call Center    Phone Message    May a detailed message be left on voicemail: yes     Reason for Call: Other: mikael zapata to schedule stress echo

## 2023-05-11 ENCOUNTER — HOSPITAL ENCOUNTER (OUTPATIENT)
Dept: CARDIOLOGY | Facility: CLINIC | Age: 83
Discharge: HOME | End: 2023-05-11
Attending: SURGERY | Admitting: SURGERY
Payer: MEDICARE

## 2023-05-11 DIAGNOSIS — I71.43 INFRARENAL ABDOMINAL AORTIC ANEURYSM (AAA) WITHOUT RUPTURE (H): ICD-10-CM

## 2023-05-11 DIAGNOSIS — I10 ESSENTIAL (PRIMARY) HYPERTENSION: ICD-10-CM

## 2023-05-11 PROCEDURE — 255N000002 HC RX 255 OP 636: Performed by: SURGERY

## 2023-05-11 PROCEDURE — 93017 CV STRESS TEST TRACING ONLY: CPT

## 2023-05-11 PROCEDURE — 93018 CV STRESS TEST I&R ONLY: CPT | Performed by: INTERNAL MEDICINE

## 2023-05-11 PROCEDURE — 93325 DOPPLER ECHO COLOR FLOW MAPG: CPT | Mod: 26 | Performed by: INTERNAL MEDICINE

## 2023-05-11 PROCEDURE — 93350 STRESS TTE ONLY: CPT | Mod: 26 | Performed by: INTERNAL MEDICINE

## 2023-05-11 PROCEDURE — 999N000208 ECHO STRESS ECHOCARDIOGRAM

## 2023-05-11 PROCEDURE — 93016 CV STRESS TEST SUPVJ ONLY: CPT | Performed by: INTERNAL MEDICINE

## 2023-05-11 PROCEDURE — 93321 DOPPLER ECHO F-UP/LMTD STD: CPT | Mod: 26 | Performed by: INTERNAL MEDICINE

## 2023-05-11 RX ADMIN — HUMAN ALBUMIN MICROSPHERES AND PERFLUTREN 9 ML: 10; .22 INJECTION, SOLUTION INTRAVENOUS at 15:36

## 2023-06-05 ENCOUNTER — LAB (OUTPATIENT)
Dept: INFUSION THERAPY | Facility: CLINIC | Age: 83
End: 2023-06-05
Attending: INTERNAL MEDICINE
Payer: MEDICARE

## 2023-06-05 DIAGNOSIS — Z51.11 ENCOUNTER FOR ANTINEOPLASTIC CHEMOTHERAPY: ICD-10-CM

## 2023-06-05 DIAGNOSIS — C34.31 MALIGNANT NEOPLASM OF LOWER LOBE OF RIGHT LUNG (H): Primary | ICD-10-CM

## 2023-06-05 LAB
ALBUMIN SERPL BCG-MCNC: 3.4 G/DL (ref 3.5–5.2)
ALP SERPL-CCNC: 63 U/L (ref 40–129)
ALT SERPL W P-5'-P-CCNC: 12 U/L (ref 10–50)
ANION GAP SERPL CALCULATED.3IONS-SCNC: 8 MMOL/L (ref 7–15)
AST SERPL W P-5'-P-CCNC: 20 U/L (ref 10–50)
BASOPHILS # BLD AUTO: 0 10E3/UL (ref 0–0.2)
BASOPHILS NFR BLD AUTO: 0 %
BILIRUB SERPL-MCNC: 0.3 MG/DL
BUN SERPL-MCNC: 16.6 MG/DL (ref 8–23)
CALCIUM SERPL-MCNC: 8.1 MG/DL (ref 8.8–10.2)
CHLORIDE SERPL-SCNC: 109 MMOL/L (ref 98–107)
CREAT SERPL-MCNC: 1.03 MG/DL (ref 0.67–1.17)
DEPRECATED HCO3 PLAS-SCNC: 22 MMOL/L (ref 22–29)
EOSINOPHIL # BLD AUTO: 0.1 10E3/UL (ref 0–0.7)
EOSINOPHIL NFR BLD AUTO: 2 %
ERYTHROCYTE [DISTWIDTH] IN BLOOD BY AUTOMATED COUNT: 13.2 % (ref 10–15)
GFR SERPL CREATININE-BSD FRML MDRD: 72 ML/MIN/1.73M2
GLUCOSE SERPL-MCNC: 169 MG/DL (ref 70–99)
HCT VFR BLD AUTO: 40 % (ref 40–53)
HGB BLD-MCNC: 13.3 G/DL (ref 13.3–17.7)
IMM GRANULOCYTES # BLD: 0 10E3/UL
IMM GRANULOCYTES NFR BLD: 0 %
LYMPHOCYTES # BLD AUTO: 0.9 10E3/UL (ref 0.8–5.3)
LYMPHOCYTES NFR BLD AUTO: 17 %
MCH RBC QN AUTO: 31.4 PG (ref 26.5–33)
MCHC RBC AUTO-ENTMCNC: 33.3 G/DL (ref 31.5–36.5)
MCV RBC AUTO: 95 FL (ref 78–100)
MONOCYTES # BLD AUTO: 0.3 10E3/UL (ref 0–1.3)
MONOCYTES NFR BLD AUTO: 7 %
NEUTROPHILS # BLD AUTO: 3.7 10E3/UL (ref 1.6–8.3)
NEUTROPHILS NFR BLD AUTO: 74 %
NRBC # BLD AUTO: 0 10E3/UL
NRBC BLD AUTO-RTO: 0 /100
PLATELET # BLD AUTO: 120 10E3/UL (ref 150–450)
POTASSIUM SERPL-SCNC: 4.6 MMOL/L (ref 3.4–5.3)
PROT SERPL-MCNC: 6 G/DL (ref 6.4–8.3)
RBC # BLD AUTO: 4.23 10E6/UL (ref 4.4–5.9)
SODIUM SERPL-SCNC: 139 MMOL/L (ref 136–145)
TSH SERPL DL<=0.005 MIU/L-ACNC: 0.89 UIU/ML (ref 0.3–4.2)
WBC # BLD AUTO: 5.1 10E3/UL (ref 4–11)

## 2023-06-05 PROCEDURE — 85018 HEMOGLOBIN: CPT | Performed by: NURSE PRACTITIONER

## 2023-06-05 PROCEDURE — 84443 ASSAY THYROID STIM HORMONE: CPT | Performed by: NURSE PRACTITIONER

## 2023-06-05 PROCEDURE — 36591 DRAW BLOOD OFF VENOUS DEVICE: CPT | Performed by: NURSE PRACTITIONER

## 2023-06-05 PROCEDURE — 250N000011 HC RX IP 250 OP 636: Performed by: INTERNAL MEDICINE

## 2023-06-05 PROCEDURE — 80053 COMPREHEN METABOLIC PANEL: CPT | Performed by: NURSE PRACTITIONER

## 2023-06-05 RX ORDER — HEPARIN SODIUM,PORCINE 10 UNIT/ML
5 VIAL (ML) INTRAVENOUS
Status: CANCELLED | OUTPATIENT
Start: 2023-06-05

## 2023-06-05 RX ORDER — HEPARIN SODIUM (PORCINE) LOCK FLUSH IV SOLN 100 UNIT/ML 100 UNIT/ML
5 SOLUTION INTRAVENOUS
Status: DISCONTINUED | OUTPATIENT
Start: 2023-06-05 | End: 2023-06-05 | Stop reason: HOSPADM

## 2023-06-05 RX ORDER — HEPARIN SODIUM (PORCINE) LOCK FLUSH IV SOLN 100 UNIT/ML 100 UNIT/ML
5 SOLUTION INTRAVENOUS
Status: CANCELLED | OUTPATIENT
Start: 2023-06-05

## 2023-06-05 RX ADMIN — Medication 5 ML: at 09:30

## 2023-06-06 ENCOUNTER — INFUSION THERAPY VISIT (OUTPATIENT)
Dept: INFUSION THERAPY | Facility: CLINIC | Age: 83
End: 2023-06-06
Attending: INTERNAL MEDICINE
Payer: MEDICARE

## 2023-06-06 ENCOUNTER — ONCOLOGY VISIT (OUTPATIENT)
Dept: ONCOLOGY | Facility: CLINIC | Age: 83
End: 2023-06-06
Attending: INTERNAL MEDICINE
Payer: MEDICARE

## 2023-06-06 VITALS
DIASTOLIC BLOOD PRESSURE: 56 MMHG | SYSTOLIC BLOOD PRESSURE: 148 MMHG | BODY MASS INDEX: 30.39 KG/M2 | HEIGHT: 67 IN | HEART RATE: 72 BPM | TEMPERATURE: 97.7 F | WEIGHT: 193.6 LBS | OXYGEN SATURATION: 95 % | RESPIRATION RATE: 16 BRPM

## 2023-06-06 DIAGNOSIS — C34.31 MALIGNANT NEOPLASM OF LOWER LOBE OF RIGHT LUNG (H): ICD-10-CM

## 2023-06-06 DIAGNOSIS — Z51.11 ENCOUNTER FOR ANTINEOPLASTIC CHEMOTHERAPY: ICD-10-CM

## 2023-06-06 DIAGNOSIS — Z51.11 ENCOUNTER FOR ANTINEOPLASTIC CHEMOTHERAPY: Primary | ICD-10-CM

## 2023-06-06 DIAGNOSIS — C34.31 MALIGNANT NEOPLASM OF LOWER LOBE OF RIGHT LUNG (H): Primary | ICD-10-CM

## 2023-06-06 PROCEDURE — G0463 HOSPITAL OUTPT CLINIC VISIT: HCPCS | Performed by: NURSE PRACTITIONER

## 2023-06-06 PROCEDURE — 96413 CHEMO IV INFUSION 1 HR: CPT

## 2023-06-06 PROCEDURE — 258N000003 HC RX IP 258 OP 636: Performed by: NURSE PRACTITIONER

## 2023-06-06 PROCEDURE — 99214 OFFICE O/P EST MOD 30 MIN: CPT | Performed by: NURSE PRACTITIONER

## 2023-06-06 PROCEDURE — 250N000011 HC RX IP 250 OP 636: Performed by: NURSE PRACTITIONER

## 2023-06-06 RX ORDER — HEPARIN SODIUM (PORCINE) LOCK FLUSH IV SOLN 100 UNIT/ML 100 UNIT/ML
5 SOLUTION INTRAVENOUS EVERY 8 HOURS PRN
Status: DISCONTINUED | OUTPATIENT
Start: 2023-06-06 | End: 2023-06-06 | Stop reason: HOSPADM

## 2023-06-06 RX ORDER — HEPARIN SODIUM,PORCINE 10 UNIT/ML
5 VIAL (ML) INTRAVENOUS
Status: CANCELLED | OUTPATIENT
Start: 2023-06-06

## 2023-06-06 RX ORDER — ALBUTEROL SULFATE 90 UG/1
1-2 AEROSOL, METERED RESPIRATORY (INHALATION)
Status: CANCELLED
Start: 2023-06-06

## 2023-06-06 RX ORDER — MEPERIDINE HYDROCHLORIDE 25 MG/ML
25 INJECTION INTRAMUSCULAR; INTRAVENOUS; SUBCUTANEOUS EVERY 30 MIN PRN
Status: CANCELLED | OUTPATIENT
Start: 2023-06-06

## 2023-06-06 RX ORDER — NALOXONE HYDROCHLORIDE 0.4 MG/ML
.1-.4 INJECTION, SOLUTION INTRAMUSCULAR; INTRAVENOUS; SUBCUTANEOUS
Status: CANCELLED | OUTPATIENT
Start: 2023-06-06

## 2023-06-06 RX ORDER — METHYLPREDNISOLONE SODIUM SUCCINATE 125 MG/2ML
125 INJECTION, POWDER, LYOPHILIZED, FOR SOLUTION INTRAMUSCULAR; INTRAVENOUS
Status: CANCELLED
Start: 2023-06-06

## 2023-06-06 RX ORDER — HEPARIN SODIUM (PORCINE) LOCK FLUSH IV SOLN 100 UNIT/ML 100 UNIT/ML
5 SOLUTION INTRAVENOUS EVERY 8 HOURS PRN
Status: CANCELLED | OUTPATIENT
Start: 2023-06-06

## 2023-06-06 RX ORDER — DIPHENHYDRAMINE HYDROCHLORIDE 50 MG/ML
50 INJECTION INTRAMUSCULAR; INTRAVENOUS
Status: CANCELLED
Start: 2023-06-06

## 2023-06-06 RX ORDER — ALBUTEROL SULFATE 0.83 MG/ML
2.5 SOLUTION RESPIRATORY (INHALATION)
Status: CANCELLED | OUTPATIENT
Start: 2023-06-06

## 2023-06-06 RX ORDER — EPINEPHRINE 1 MG/ML
0.3 INJECTION, SOLUTION INTRAMUSCULAR; SUBCUTANEOUS EVERY 5 MIN PRN
Status: CANCELLED | OUTPATIENT
Start: 2023-06-06

## 2023-06-06 RX ORDER — LORAZEPAM 2 MG/ML
0.5 INJECTION INTRAMUSCULAR EVERY 4 HOURS PRN
Status: CANCELLED | OUTPATIENT
Start: 2023-06-06

## 2023-06-06 RX ORDER — SODIUM CHLORIDE 9 MG/ML
1000 INJECTION, SOLUTION INTRAVENOUS CONTINUOUS PRN
Status: CANCELLED
Start: 2023-06-06

## 2023-06-06 RX ADMIN — SODIUM CHLORIDE 400 MG: 9 INJECTION, SOLUTION INTRAVENOUS at 09:45

## 2023-06-06 RX ADMIN — Medication 5 ML: at 10:24

## 2023-06-06 RX ADMIN — SODIUM CHLORIDE 250 ML: 9 INJECTION, SOLUTION INTRAVENOUS at 09:46

## 2023-06-06 ASSESSMENT — PAIN SCALES - GENERAL: PAINLEVEL: NO PAIN (0)

## 2023-06-06 NOTE — LETTER
"    6/6/2023         RE: Derrick Benitez  [address]  [facility] [address]        Dear Colleague,    Thank you for referring your patient, Derrick Benitez, to the [facility]. Please see a copy of my visit note below.    Oncology Rooming Note    June 6, 2023 9:04 AM   Derrick Benitez is a 82 year old male who presents for:    Chief Complaint   Patient presents with     Oncology Clinic Visit     Malignant neoplasm of lower lobe of right lung (H)     Initial Vitals: BP (!) 148/56   Pulse 72   Temp 97.7  F (36.5  C) (Oral)   Resp 16   Ht 1.702 m (5' 7\")   Wt 87.8 kg (193 lb 9.6 oz)   SpO2 95%   BMI 30.32 kg/m   Estimated body mass index is 30.32 kg/m  as calculated from the following:    Height as of this encounter: 1.702 m (5' 7\").    Weight as of this encounter: 87.8 kg (193 lb 9.6 oz). Body surface area is 2.04 meters squared.  No Pain (0) Comment: Data Unavailable   No LMP for male patient.  Allergies reviewed: Yes  Medications reviewed: Yes    Medications: Medication refills not needed today.  Pharmacy name entered into EPIC:    [place], [place] - [address]  [facility] [address] [place], [place] - [address]    Clinical concerns: None       Angela Bazzi MA                Oncology/Hematology Visit Note  Jun 6, 2023    Reason for Visit:   Lung cancer  Metastatic lung squamous cell carcinoma  Completed carboplatin Taxol and pembrolizumab x 6 cycle on 03/10/2021  03/31/2021-on maintenance with single agent with pembrolizumab  10/17/2022-PET scan did not reveal evidence of malignancy  Patient is currently getting treatment with pembrolizumab every 6 weeks  04/25/23-CT scan revealed stable skeletal metastasis no new sites of disease    Interval History:    Patient reports he continues to feel well.  Reports has been tolerating pembrolizumab well.  Denies fever chills sweats cough shortness of breath chest pain nausea vomiting diarrhea " abdominal pain or bleeding.  Overall reports feeling well      Review of Systems:  14 point ROS of systems including Constitutional, Eyes, Respiratory, Cardiovascular, Gastroenterology, Genitourinary, Integumentary, Muscularskeletal, Psychiatric were all negative except for pertinent positives noted in my HPI.    Physical Examination:  Physical Exam  Eyes:      Pupils: Pupils are equal, round, and reactive to light.   Cardiovascular:      Rate and Rhythm: Normal rate.      Pulses: Normal pulses.   Pulmonary:      Effort: Pulmonary effort is normal.   Abdominal:      General: Abdomen is flat.   Musculoskeletal:      Cervical back: Normal range of motion.   Skin:     General: Skin is warm.   Neurological:      General: No focal deficit present.      Mental Status: He is alert.   Psychiatric:         Mood and Affect: Mood normal.       Laboratory Data:  CBC CMP and TSH results reviewed    Assessment and Plan:      Lung cancer  -Metastatic lung squamous cell carcinoma   status post carboplatinTaxol and pembrolizumab-completed 6 cycles in March 2021  -Currently on maintenance with immunotherapy pembrolizumab every 6 weeks  04/25/23-CT scan revealed stable skeletal metastasis no new sites of disease  Patient reports he has been tolerating pembrolizumab well  Continue with pembrolizumab  Plan for PET scan in 3 to 4 months      Bone mets  -Patient currently on Zometa every 6 months  -Continue with vitamin D and calcium  Okay to give Zometa today         Thrombocytopenia chronic-  Continue to monitor closely  Complications of thrombocytopenia reviewed  Call our clinic in the event of bleeding, bruising fall or injury    Please call our clinic with any changes in health condition or questions        DARSHANA Meza [facility]     Chart documentation with Dragon Voice recognition Software. Although reviewed after completion, some words and grammatical errors may remain.            Again,  thank you for allowing me to participate in the care of your patient.        Sincerely,        DARSHANA Meza CNP

## 2023-06-06 NOTE — PROGRESS NOTES
"Oncology Rooming Note    June 6, 2023 9:04 AM   Derrick Benitez is a 82 year old male who presents for:    Chief Complaint   Patient presents with     Oncology Clinic Visit     Malignant neoplasm of lower lobe of right lung (H)     Initial Vitals: BP (!) 148/56   Pulse 72   Temp 97.7  F (36.5  C) (Oral)   Resp 16   Ht 1.702 m (5' 7\")   Wt 87.8 kg (193 lb 9.6 oz)   SpO2 95%   BMI 30.32 kg/m   Estimated body mass index is 30.32 kg/m  as calculated from the following:    Height as of this encounter: 1.702 m (5' 7\").    Weight as of this encounter: 87.8 kg (193 lb 9.6 oz). Body surface area is 2.04 meters squared.  No Pain (0) Comment: Data Unavailable   No LMP for male patient.  Allergies reviewed: Yes  Medications reviewed: Yes    Medications: Medication refills not needed today.  Pharmacy name entered into EPIC:    [place], [place] - [address]  [facility] [address] [place], [place] - [address] [place], [place] [address]    Clinical concerns: None       Angela Bazzi MA              "

## 2023-06-06 NOTE — PROGRESS NOTES
Oncology/Hematology Visit Note  Jun 6, 2023    Reason for Visit:   Lung cancer  Metastatic lung squamous cell carcinoma  Completed carboplatin Taxol and pembrolizumab x 6 cycle on 03/10/2021  03/31/2021-on maintenance with single agent with pembrolizumab  10/17/2022-PET scan did not reveal evidence of malignancy  Patient is currently getting treatment with pembrolizumab every 6 weeks  04/25/23-CT scan revealed stable skeletal metastasis no new sites of disease    Interval History:    Patient reports he continues to feel well.  Reports has been tolerating pembrolizumab well.  Denies fever chills sweats cough shortness of breath chest pain nausea vomiting diarrhea abdominal pain or bleeding.  Overall reports feeling well      Review of Systems:  14 point ROS of systems including Constitutional, Eyes, Respiratory, Cardiovascular, Gastroenterology, Genitourinary, Integumentary, Muscularskeletal, Psychiatric were all negative except for pertinent positives noted in my HPI.    Physical Examination:  Physical Exam  Eyes:      Pupils: Pupils are equal, round, and reactive to light.   Cardiovascular:      Rate and Rhythm: Normal rate.      Pulses: Normal pulses.   Pulmonary:      Effort: Pulmonary effort is normal.   Abdominal:      General: Abdomen is flat.   Musculoskeletal:      Cervical back: Normal range of motion.   Skin:     General: Skin is warm.   Neurological:      General: No focal deficit present.      Mental Status: He is alert.   Psychiatric:         Mood and Affect: Mood normal.       Laboratory Data:  CBC CMP and TSH results reviewed    Assessment and Plan:      Lung cancer  -Metastatic lung squamous cell carcinoma   status post carboplatinTaxol and pembrolizumab-completed 6 cycles in March 2021  -Currently on maintenance with immunotherapy pembrolizumab every 6 weeks  04/25/23-CT scan revealed stable skeletal metastasis no new sites of disease  Patient reports he has been tolerating pembrolizumab  well  Continue with pembrolizumab  Plan for PET scan in 3 to 4 months      Bone mets  -Patient currently on Zometa every 6 months  -Continue with vitamin D and calcium  Okay to give Zometa today         Thrombocytopenia chronic-  Continue to monitor closely  Complications of thrombocytopenia reviewed  Call our clinic in the event of bleeding, bruising fall or injury    Please call our clinic with any changes in health condition or questions        DARSHANA Meza [facility] [place]     Chart documentation with Dragon Voice recognition Software. Although reviewed after completion, some words and grammatical errors may remain.

## 2023-06-06 NOTE — PROGRESS NOTES
Infusion Nursing Note:  Derrick Bentiez presents today for Keytruda infusion.    Patient seen by provider today: Yes: De NP   present during visit today: Not Applicable.    Note: Zometa changed to every 6 months, not due until October.      Intravenous Access:  Implanted Port.    Treatment Conditions:  Lab Results   Component Value Date    HGB 13.3 06/05/2023    WBC 5.1 06/05/2023    ANEU 3.9 10/17/2022    ANEUTAUTO 3.7 06/05/2023     (L) 06/05/2023      Lab Results   Component Value Date     06/05/2023    POTASSIUM 4.6 06/05/2023    MAG 2.1 10/30/2014    CR 1.03 06/05/2023    ANTHONY 8.1 (L) 06/05/2023    BILITOTAL 0.3 06/05/2023    ALBUMIN 3.4 (L) 06/05/2023    ALT 12 06/05/2023    AST 20 06/05/2023     Results reviewed, labs MET treatment parameters, ok to proceed with treatment.      Post Infusion Assessment:  Patient tolerated infusion without incident.  Blood return noted pre and post infusion.  Site patent and intact, free from redness, edema or discomfort.  No evidence of extravasations.  Access discontinued per protocol.       Discharge Plan:   Patient and/or family verbalized understanding of discharge instructions and all questions answered.  Patient discharged in stable condition accompanied by: self.  Departure Mode: Ambulatory.      Jazmine Xiao RN

## 2023-07-17 ENCOUNTER — LAB (OUTPATIENT)
Dept: INFUSION THERAPY | Facility: CLINIC | Age: 83
End: 2023-07-17
Attending: INTERNAL MEDICINE
Payer: MEDICARE

## 2023-07-17 DIAGNOSIS — Z51.11 ENCOUNTER FOR ANTINEOPLASTIC CHEMOTHERAPY: Primary | ICD-10-CM

## 2023-07-17 DIAGNOSIS — C34.31 MALIGNANT NEOPLASM OF LOWER LOBE OF RIGHT LUNG (H): ICD-10-CM

## 2023-07-17 LAB
ALBUMIN SERPL BCG-MCNC: 3.6 G/DL (ref 3.5–5.2)
ALP SERPL-CCNC: 63 U/L (ref 40–129)
ALT SERPL W P-5'-P-CCNC: 15 U/L (ref 0–70)
ANION GAP SERPL CALCULATED.3IONS-SCNC: 8 MMOL/L (ref 7–15)
AST SERPL W P-5'-P-CCNC: 35 U/L (ref 0–45)
BASOPHILS # BLD AUTO: 0 10E3/UL (ref 0–0.2)
BASOPHILS NFR BLD AUTO: 0 %
BILIRUB SERPL-MCNC: 0.4 MG/DL
BUN SERPL-MCNC: 19.7 MG/DL (ref 8–23)
CALCIUM SERPL-MCNC: 8.3 MG/DL (ref 8.8–10.2)
CHLORIDE SERPL-SCNC: 109 MMOL/L (ref 98–107)
CREAT SERPL-MCNC: 1.08 MG/DL (ref 0.67–1.17)
DEPRECATED HCO3 PLAS-SCNC: 23 MMOL/L (ref 22–29)
EOSINOPHIL # BLD AUTO: 0.1 10E3/UL (ref 0–0.7)
EOSINOPHIL NFR BLD AUTO: 3 %
ERYTHROCYTE [DISTWIDTH] IN BLOOD BY AUTOMATED COUNT: 12.6 % (ref 10–15)
GFR SERPL CREATININE-BSD FRML MDRD: 68 ML/MIN/1.73M2
GLUCOSE SERPL-MCNC: 152 MG/DL (ref 70–99)
HCT VFR BLD AUTO: 39.3 % (ref 40–53)
HGB BLD-MCNC: 13.4 G/DL (ref 13.3–17.7)
IMM GRANULOCYTES # BLD: 0 10E3/UL
IMM GRANULOCYTES NFR BLD: 0 %
LYMPHOCYTES # BLD AUTO: 1.1 10E3/UL (ref 0.8–5.3)
LYMPHOCYTES NFR BLD AUTO: 22 %
MCH RBC QN AUTO: 32.3 PG (ref 26.5–33)
MCHC RBC AUTO-ENTMCNC: 34.1 G/DL (ref 31.5–36.5)
MCV RBC AUTO: 95 FL (ref 78–100)
MONOCYTES # BLD AUTO: 0.4 10E3/UL (ref 0–1.3)
MONOCYTES NFR BLD AUTO: 8 %
NEUTROPHILS # BLD AUTO: 3.2 10E3/UL (ref 1.6–8.3)
NEUTROPHILS NFR BLD AUTO: 67 %
NRBC # BLD AUTO: 0 10E3/UL
NRBC BLD AUTO-RTO: 0 /100
PLATELET # BLD AUTO: 117 10E3/UL (ref 150–450)
POTASSIUM SERPL-SCNC: 4.4 MMOL/L (ref 3.4–5.3)
PROT SERPL-MCNC: 6.1 G/DL (ref 6.4–8.3)
RBC # BLD AUTO: 4.15 10E6/UL (ref 4.4–5.9)
SODIUM SERPL-SCNC: 140 MMOL/L (ref 136–145)
TSH SERPL DL<=0.005 MIU/L-ACNC: 1.16 UIU/ML (ref 0.3–4.2)
WBC # BLD AUTO: 4.8 10E3/UL (ref 4–11)

## 2023-07-17 PROCEDURE — 84443 ASSAY THYROID STIM HORMONE: CPT | Performed by: INTERNAL MEDICINE

## 2023-07-17 PROCEDURE — 80053 COMPREHEN METABOLIC PANEL: CPT | Performed by: INTERNAL MEDICINE

## 2023-07-17 PROCEDURE — 85025 COMPLETE CBC W/AUTO DIFF WBC: CPT | Performed by: INTERNAL MEDICINE

## 2023-07-17 PROCEDURE — 250N000011 HC RX IP 250 OP 636: Mod: JZ | Performed by: INTERNAL MEDICINE

## 2023-07-17 PROCEDURE — 36591 DRAW BLOOD OFF VENOUS DEVICE: CPT | Performed by: INTERNAL MEDICINE

## 2023-07-17 RX ORDER — HEPARIN SODIUM,PORCINE 10 UNIT/ML
5 VIAL (ML) INTRAVENOUS
Status: CANCELLED | OUTPATIENT
Start: 2023-07-17

## 2023-07-17 RX ORDER — HEPARIN SODIUM (PORCINE) LOCK FLUSH IV SOLN 100 UNIT/ML 100 UNIT/ML
5 SOLUTION INTRAVENOUS
Status: DISCONTINUED | OUTPATIENT
Start: 2023-07-17 | End: 2023-07-17 | Stop reason: HOSPADM

## 2023-07-17 RX ORDER — HEPARIN SODIUM (PORCINE) LOCK FLUSH IV SOLN 100 UNIT/ML 100 UNIT/ML
5 SOLUTION INTRAVENOUS
Status: CANCELLED | OUTPATIENT
Start: 2023-07-17

## 2023-07-17 RX ADMIN — Medication 5 ML: at 10:34

## 2023-07-17 NOTE — PROGRESS NOTES
Nursing Note:  Derrick Benitez presents today for Port labs.    Patient seen by provider today: No   present during visit today: Not Applicable.    Note: Port labs drawn today prior to provider and infusion appointment scheduled for 7/18/23.    Intravenous Access:  Implanted Port.    Discharge Plan:   Patient was sent to home in stable condition.    Brii Sheets RN

## 2023-07-18 ENCOUNTER — ONCOLOGY VISIT (OUTPATIENT)
Dept: ONCOLOGY | Facility: CLINIC | Age: 83
End: 2023-07-18
Attending: INTERNAL MEDICINE
Payer: MEDICARE

## 2023-07-18 ENCOUNTER — INFUSION THERAPY VISIT (OUTPATIENT)
Dept: INFUSION THERAPY | Facility: CLINIC | Age: 83
End: 2023-07-18
Attending: INTERNAL MEDICINE
Payer: MEDICARE

## 2023-07-18 VITALS
RESPIRATION RATE: 16 BRPM | HEART RATE: 61 BPM | BODY MASS INDEX: 30.48 KG/M2 | TEMPERATURE: 98.3 F | HEIGHT: 67 IN | OXYGEN SATURATION: 96 % | WEIGHT: 194.2 LBS | DIASTOLIC BLOOD PRESSURE: 70 MMHG | SYSTOLIC BLOOD PRESSURE: 159 MMHG

## 2023-07-18 DIAGNOSIS — Z51.11 ENCOUNTER FOR ANTINEOPLASTIC CHEMOTHERAPY: Primary | ICD-10-CM

## 2023-07-18 DIAGNOSIS — T45.1X5A PERIPHERAL NEUROPATHY DUE TO CHEMOTHERAPY (H): ICD-10-CM

## 2023-07-18 DIAGNOSIS — C34.31 MALIGNANT NEOPLASM OF LOWER LOBE OF RIGHT LUNG (H): ICD-10-CM

## 2023-07-18 DIAGNOSIS — G62.0 PERIPHERAL NEUROPATHY DUE TO CHEMOTHERAPY (H): ICD-10-CM

## 2023-07-18 DIAGNOSIS — Z51.11 ENCOUNTER FOR ANTINEOPLASTIC CHEMOTHERAPY: ICD-10-CM

## 2023-07-18 DIAGNOSIS — C34.31 MALIGNANT NEOPLASM OF LOWER LOBE OF RIGHT LUNG (H): Primary | ICD-10-CM

## 2023-07-18 PROCEDURE — 99215 OFFICE O/P EST HI 40 MIN: CPT | Performed by: INTERNAL MEDICINE

## 2023-07-18 PROCEDURE — 250N000011 HC RX IP 250 OP 636: Mod: JZ | Performed by: INTERNAL MEDICINE

## 2023-07-18 PROCEDURE — 96413 CHEMO IV INFUSION 1 HR: CPT

## 2023-07-18 PROCEDURE — G0463 HOSPITAL OUTPT CLINIC VISIT: HCPCS | Mod: 25 | Performed by: INTERNAL MEDICINE

## 2023-07-18 PROCEDURE — 258N000003 HC RX IP 258 OP 636: Performed by: INTERNAL MEDICINE

## 2023-07-18 RX ORDER — NALOXONE HYDROCHLORIDE 0.4 MG/ML
.1-.4 INJECTION, SOLUTION INTRAMUSCULAR; INTRAVENOUS; SUBCUTANEOUS
Status: CANCELLED | OUTPATIENT
Start: 2023-07-18

## 2023-07-18 RX ORDER — METHYLPREDNISOLONE SODIUM SUCCINATE 125 MG/2ML
125 INJECTION, POWDER, LYOPHILIZED, FOR SOLUTION INTRAMUSCULAR; INTRAVENOUS
Status: CANCELLED
Start: 2023-07-18

## 2023-07-18 RX ORDER — ALBUTEROL SULFATE 0.83 MG/ML
2.5 SOLUTION RESPIRATORY (INHALATION)
Status: CANCELLED | OUTPATIENT
Start: 2023-07-18

## 2023-07-18 RX ORDER — SODIUM CHLORIDE 9 MG/ML
1000 INJECTION, SOLUTION INTRAVENOUS CONTINUOUS PRN
Status: CANCELLED
Start: 2023-07-18

## 2023-07-18 RX ORDER — HEPARIN SODIUM (PORCINE) LOCK FLUSH IV SOLN 100 UNIT/ML 100 UNIT/ML
5 SOLUTION INTRAVENOUS EVERY 8 HOURS PRN
Status: DISCONTINUED | OUTPATIENT
Start: 2023-07-18 | End: 2023-07-18 | Stop reason: HOSPADM

## 2023-07-18 RX ORDER — EPINEPHRINE 1 MG/ML
0.3 INJECTION, SOLUTION INTRAMUSCULAR; SUBCUTANEOUS EVERY 5 MIN PRN
Status: CANCELLED | OUTPATIENT
Start: 2023-07-18

## 2023-07-18 RX ORDER — HEPARIN SODIUM,PORCINE 10 UNIT/ML
5 VIAL (ML) INTRAVENOUS
Status: CANCELLED | OUTPATIENT
Start: 2023-07-18

## 2023-07-18 RX ORDER — ALBUTEROL SULFATE 90 UG/1
1-2 AEROSOL, METERED RESPIRATORY (INHALATION)
Status: CANCELLED
Start: 2023-07-18

## 2023-07-18 RX ORDER — HEPARIN SODIUM (PORCINE) LOCK FLUSH IV SOLN 100 UNIT/ML 100 UNIT/ML
5 SOLUTION INTRAVENOUS EVERY 8 HOURS PRN
Status: CANCELLED | OUTPATIENT
Start: 2023-07-18

## 2023-07-18 RX ORDER — GABAPENTIN 300 MG/1
300 CAPSULE ORAL 2 TIMES DAILY
Qty: 180 CAPSULE | Refills: 3 | Status: SHIPPED | OUTPATIENT
Start: 2023-07-18 | End: 2023-08-10

## 2023-07-18 RX ORDER — MEPERIDINE HYDROCHLORIDE 25 MG/ML
25 INJECTION INTRAMUSCULAR; INTRAVENOUS; SUBCUTANEOUS EVERY 30 MIN PRN
Status: CANCELLED | OUTPATIENT
Start: 2023-07-18

## 2023-07-18 RX ORDER — LORAZEPAM 2 MG/ML
0.5 INJECTION INTRAMUSCULAR EVERY 4 HOURS PRN
Status: CANCELLED | OUTPATIENT
Start: 2023-07-18

## 2023-07-18 RX ORDER — DIPHENHYDRAMINE HYDROCHLORIDE 50 MG/ML
50 INJECTION INTRAMUSCULAR; INTRAVENOUS
Status: CANCELLED
Start: 2023-07-18

## 2023-07-18 RX ADMIN — SODIUM CHLORIDE 400 MG: 9 INJECTION, SOLUTION INTRAVENOUS at 11:33

## 2023-07-18 RX ADMIN — SODIUM CHLORIDE 250 ML: 9 INJECTION, SOLUTION INTRAVENOUS at 11:33

## 2023-07-18 RX ADMIN — Medication 5 ML: at 12:09

## 2023-07-18 ASSESSMENT — PAIN SCALES - GENERAL: PAINLEVEL: NO PAIN (0)

## 2023-07-18 NOTE — PATIENT INSTRUCTIONS
1. Continue pembrolizumab every 6 weeks.  2. Zometa every 6 months.  3. Reduce gabapentin to twice a day.  4. See NP in 6 weeks when he comes for next treatment.  5. See me in 12 weeks when he comes for next treatment.  6. CT scan in 3 months.

## 2023-07-18 NOTE — LETTER
"    7/18/2023         RE: Derrick Benitez  [address]  [facility] [address]        Dear Colleague,    Thank you for referring your patient, Derrick Benitez, to the [facility]. Please see a copy of my visit note below.    Oncology Rooming Note    July 18, 2023 10:32 AM   Derrick Benitez is a 83 year old male who presents for:    Chief Complaint   Patient presents with     Oncology Clinic Visit     Initial Vitals: There were no vitals taken for this visit. Estimated body mass index is 30.32 kg/m  as calculated from the following:    Height as of 6/6/23: 1.702 m (5' 7\").    Weight as of 6/6/23: 87.8 kg (193 lb 9.6 oz). There is no height or weight on file to calculate BSA.  Data Unavailable Comment: Data Unavailable   No LMP for male patient.  Allergies reviewed: Yes  Medications reviewed: Yes    Medications: Medication refills not needed today.  Pharmacy name entered into EPIC:    [place], [place] - [address] [facility] [address] [place], [place] - [address]    Clinical concerns:  doctor was notified.      Brenda Martines MA              ONCOLOGIC HISTORY:  Mr. Benitez is a gentleman with non-small cell lung cancer, favor squamous cell carcinoma.   -NGS and PDL staining could not be done because of small sample.   -Guardant 360 does not reveal any actionable alteration.  1.  CT chest angiogram on 09/07/2020 revealed right lower lobe mass.   2.  CT-guided biopsy was done on 09/18/2020.  Pathology revealed non-small cell lung cancer, favor squamous cell carcinoma.  Sample size was small.  PDL staining and NGS panel could not be done.   3.  Brain MRI on 09/26/2020 did not reveal any brain metastasis.   4.  PET scan on 10/05/2020 revealed hypermetabolic right lung mass and multiple hypermetabolic bone lesions.   5.  The patient started on carboplatin and Taxol on 11/25/2020.   -Pembrolizumab added with cycle 2.   6. PET scan on 10/17/2022 " reveals FDG avid sclerotic lesions involving the right posterior seventh rib slightly above background levels. Favored to simply represent sequelae of post treatment change.    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  He is tolerating it well.  Scans have not revealed any evidence of disease.     Overall, he is doing well for his age.  He has mild generalized weakness but he is still active and able to do things including lawn mowing.  No headache.  No dizziness.  No chest pain.  No shortness of breath.  No abdominal pain, nausea or vomiting.  No bleeding.  Appetite is good.  No numbness or tingling of fingers and toes.  All other review of systems is negative.     PHYSICAL EXAMINATION:   GENERAL:  Alert and oriented x 3.   VITAL SIGNS:  Reviewed.  ECOG PS of 1.     EYES:  No icterus.   NECK:  Supple. No lymphadenopathy.    AXILLAE:  No lymphadenopathy.   LUNGS:  Good air entry bilaterally.  No crackles or wheezing.   HEART:  Regular.  No murmur.   GI: Abdomen is soft.  Nontender. No mass.   EXTREMITIES:  No pedal edema.  No calf swelling or tenderness.   SKIN:  No rash.      LABS:  CBC, CMP and TSH reviewed.     ASSESSMENT:    1.  An 83-year-old gentleman with metastatic non-small cell lung cancer on pembrolizumab.  No evidence of disease.  2.  Peripheral neuropathy.  The patient is asymptomatic on gabapentin.  3.  Mild thrombocytopenia, stable.  4.  Aortic aneurysm, being followed by vascular surgery.     PLAN:    1.  The patient is doing well from lung cancer.  No suspicion of recurrence.     He is on pembrolizumab.  He is tolerating it well.  Will continue on it.  He is not having any side effects from it.     2.  Discussed regarding the scans.  Will get CT chest, abdomen and pelvis in 3 months.    3.  The patient previously had bone metastasis.  Last PET scan did not reveal any active bone lesion.  Will give him Zometa every 6 months.   He is not having dental or jaw-related symptoms.  He does not have any teeth.    4.  Labs were reviewed.  He has mild thrombocytopenia, which is stable.  No anemia or leukopenia.    5.  The patient had peripheral neuropathy. He has been on gabapentin 3 times a day.  He does not have any numbness, tingling or pain in the extremities.  Will reduce gabapentin to twice a day.  If he does not have any flare up of symptoms, will plan to discontinue it in future.    6.  I will see him in 12 weeks.  In between, he will see our nurse practitioner.  I advised him to call us with any questions or concerns.     Total visit time of 40 minutes.  Time spent in today's visit, review of chart/investigations today, monitoring for toxicity of high risk drugs and documentation today.    This office note has been dictated.        Again, thank you for allowing me to participate in the care of your patient.        Sincerely,        Buzz Larsen MD

## 2023-07-18 NOTE — PROGRESS NOTES
Infusion Nursing Note:  Derrick Benitez presents today for C24D1 Keytruda.    Patient seen by provider today: Yes: Dr Larsen   present during visit today: Not Applicable.    Note: N/A.      Intravenous Access:  Implanted Port.    Treatment Conditions:  Lab Results   Component Value Date    HGB 13.4 07/17/2023    WBC 4.8 07/17/2023    ANEU 3.9 10/17/2022    ANEUTAUTO 3.2 07/17/2023     (L) 07/17/2023      Lab Results   Component Value Date     07/17/2023    POTASSIUM 4.4 07/17/2023    MAG 2.1 10/30/2014    CR 1.08 07/17/2023    ANTHONY 8.3 (L) 07/17/2023    BILITOTAL 0.4 07/17/2023    ALBUMIN 3.6 07/17/2023    ALT 15 07/17/2023    AST 35 07/17/2023     Results reviewed, labs MET treatment parameters, ok to proceed with treatment.      Post Infusion Assessment:  Patient tolerated infusion without incident.  Site patent and intact, free from redness, edema or discomfort.  No evidence of extravasations.  Access discontinued per protocol.       Discharge Plan:   Discharge instructions reviewed with: Patient.  Patient and/or family verbalized understanding of discharge instructions and all questions answered.  Patient discharged in stable condition accompanied by: self.  Departure Mode: Ambulatory.      Zohreh Edward RN

## 2023-07-18 NOTE — PROGRESS NOTES
"Oncology Rooming Note    July 18, 2023 10:32 AM   Derrick Benitez is a 83 year old male who presents for:    Chief Complaint   Patient presents with     Oncology Clinic Visit     Initial Vitals: There were no vitals taken for this visit. Estimated body mass index is 30.32 kg/m  as calculated from the following:    Height as of 6/6/23: 1.702 m (5' 7\").    Weight as of 6/6/23: 87.8 kg (193 lb 9.6 oz). There is no height or weight on file to calculate BSA.  Data Unavailable Comment: Data Unavailable   No LMP for male patient.  Allergies reviewed: Yes  Medications reviewed: Yes    Medications: Medication refills not needed today.  Pharmacy name entered into EPIC:    [place], [place] - [address]  [facility] [address] [place], [place] - [address] [place] [address] [address]    Clinical concerns:  doctor was notified.      Brenda Martines MA            "

## 2023-07-27 NOTE — PROGRESS NOTES
ONCOLOGIC HISTORY:  Mr. Benitez is a gentleman with non-small cell lung cancer, favor squamous cell carcinoma.   -NGS and PDL staining could not be done because of small sample.   -Guardant 360 does not reveal any actionable alteration.  1.  CT chest angiogram on 09/07/2020 revealed right lower lobe mass.   2.  CT-guided biopsy was done on 09/18/2020.  Pathology revealed non-small cell lung cancer, favor squamous cell carcinoma.  Sample size was small.  PDL staining and NGS panel could not be done.   3.  Brain MRI on 09/26/2020 did not reveal any brain metastasis.   4.  PET scan on 10/05/2020 revealed hypermetabolic right lung mass and multiple hypermetabolic bone lesions.   5.  The patient started on carboplatin and Taxol on 11/25/2020.   -Pembrolizumab added with cycle 2.   6. PET scan on 10/17/2022 reveals FDG avid sclerotic lesions involving the right posterior seventh rib slightly above background levels. Favored to simply represent sequelae of post treatment change.    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  He is tolerating it well.  Scans have not revealed any evidence of disease.     Overall, he is doing well for his age.  He has mild generalized weakness but he is still active and able to do things including lawn mowing.  No headache.  No dizziness.  No chest pain.  No shortness of breath.  No abdominal pain, nausea or vomiting.  No bleeding.  Appetite is good.  No numbness or tingling of fingers and toes.  All other review of systems is negative.     PHYSICAL EXAMINATION:   GENERAL:  Alert and oriented x 3.   VITAL SIGNS:  Reviewed.  ECOG PS of 1.     EYES:  No icterus.   NECK:  Supple. No lymphadenopathy.    AXILLAE:  No lymphadenopathy.   LUNGS:  Good air entry bilaterally.  No crackles or wheezing.   HEART:  Regular.  No murmur.   GI: Abdomen is soft.  Nontender. No mass.   EXTREMITIES:  No pedal edema.  No calf swelling or  tenderness.   SKIN:  No rash.      LABS:  CBC, CMP and TSH reviewed.     ASSESSMENT:    1.  An 83-year-old gentleman with metastatic non-small cell lung cancer on pembrolizumab.  No evidence of disease.  2.  Peripheral neuropathy.  The patient is asymptomatic on gabapentin.  3.  Mild thrombocytopenia, stable.  4.  Aortic aneurysm, being followed by vascular surgery.     PLAN:    1.  The patient is doing well from lung cancer.  No suspicion of recurrence.     He is on pembrolizumab.  He is tolerating it well.  Will continue on it.  He is not having any side effects from it.     2.  Discussed regarding the scans.  Will get CT chest, abdomen and pelvis in 3 months.    3.  The patient previously had bone metastasis.  Last PET scan did not reveal any active bone lesion.  Will give him Zometa every 6 months.  He is not having dental or jaw-related symptoms.  He does not have any teeth.    4.  Labs were reviewed.  He has mild thrombocytopenia, which is stable.  No anemia or leukopenia.    5.  The patient had peripheral neuropathy. He has been on gabapentin 3 times a day.  He does not have any numbness, tingling or pain in the extremities.  Will reduce gabapentin to twice a day.  If he does not have any flare up of symptoms, will plan to discontinue it in future.    6.  I will see him in 12 weeks.  In between, he will see our nurse practitioner.  I advised him to call us with any questions or concerns.     Total visit time of 40 minutes.  Time spent in today's visit, review of chart/investigations today, monitoring for toxicity of high risk drugs and documentation today.

## 2023-08-10 ENCOUNTER — ONCOLOGY VISIT (OUTPATIENT)
Dept: ONCOLOGY | Facility: CLINIC | Age: 83
End: 2023-08-10
Attending: STUDENT IN AN ORGANIZED HEALTH CARE EDUCATION/TRAINING PROGRAM
Payer: MEDICARE

## 2023-08-10 VITALS
BODY MASS INDEX: 30.32 KG/M2 | OXYGEN SATURATION: 95 % | SYSTOLIC BLOOD PRESSURE: 144 MMHG | DIASTOLIC BLOOD PRESSURE: 58 MMHG | HEART RATE: 66 BPM | RESPIRATION RATE: 16 BRPM | WEIGHT: 193.6 LBS

## 2023-08-10 DIAGNOSIS — C34.31 MALIGNANT NEOPLASM OF LOWER LOBE OF RIGHT LUNG (H): Primary | ICD-10-CM

## 2023-08-10 DIAGNOSIS — G62.0 PERIPHERAL NEUROPATHY DUE TO CHEMOTHERAPY (H): ICD-10-CM

## 2023-08-10 DIAGNOSIS — Z51.5 ENCOUNTER FOR PALLIATIVE CARE: ICD-10-CM

## 2023-08-10 DIAGNOSIS — C79.51 MALIGNANT NEOPLASM METASTATIC TO BONE (H): ICD-10-CM

## 2023-08-10 DIAGNOSIS — T45.1X5A PERIPHERAL NEUROPATHY DUE TO CHEMOTHERAPY (H): ICD-10-CM

## 2023-08-10 PROCEDURE — 99214 OFFICE O/P EST MOD 30 MIN: CPT | Performed by: STUDENT IN AN ORGANIZED HEALTH CARE EDUCATION/TRAINING PROGRAM

## 2023-08-10 PROCEDURE — G0463 HOSPITAL OUTPT CLINIC VISIT: HCPCS | Performed by: STUDENT IN AN ORGANIZED HEALTH CARE EDUCATION/TRAINING PROGRAM

## 2023-08-10 RX ORDER — GABAPENTIN 300 MG/1
300 CAPSULE ORAL 3 TIMES DAILY
Qty: 180 CAPSULE | Refills: 3 | COMMUNITY
Start: 2023-08-10 | End: 2023-10-10

## 2023-08-10 ASSESSMENT — PAIN SCALES - GENERAL: PAINLEVEL: NO PAIN (0)

## 2023-08-10 NOTE — PROGRESS NOTES
"Palliative Care Progress Note    Patient Name: Derrick Benitez  Primary Provider: Clarisse Golden    Chief Complaint/Patient ID: Derrick Benitez is a 83 year old male with PMHx of NSCLC  - RLL mass dx , no PDL staining could be done on bx. Bone mets at time of dx.  Carbo, taxol, pembro x 2020. Now on maintenance with pembrolizumab Q6 Weeks.    Last Palliative care appointment: 2022 with me.     Reviewed: Yes, only Rx's for gabapentin.    Interim History:  Derrick Benitez is an 83 year old male who is seen today for a follow up visit with Palliative Care.     Reviewed oncology note from .  Continues on Keytruda with no suspicion or evidence of recurrence.  Will plan to repeat imaging with CT CAP in October.  Given how well-controlled his neuropathy was, he decided to decrease gabapentin to twice a day from 3 times a day.  If symptoms remained okay, he could continue tapering.    States he has been \"monkeying around\" with gabapentin dosing and found that the decrease dose actually did worsen neuropathy symptoms. Would like to return to 300mg TID.    He has mild generalized weakness but he is still active and able to do things including lawn mowing. No other side effects/symptoms.    Appetite is good. Sleeping well.     Social History:  Lives with his wife.  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   Social History     Tobacco Use    Smoking status: Former     Packs/day: 3.00     Years: 40.00     Pack years: 120.00     Types: Cigarettes     Quit date: 10/28/2004     Years since quittin.7    Smokeless tobacco: Never   Substance Use Topics    Alcohol use: No     Comment: states he quit 30 years ago    Drug use: No     Family History- Reviewed in Epic.    Allergies   Allergen Reactions    Losartan Other (See Comments)     Fatigue, weakness in legs    Methenamine      Advanced Care Planning: HCD scanned in chart.  Names his wife as primary HCA, followed by his son, followed by his daughter.     Medications- " Reviewed in [facility].    Past Medical History- Reviewed in [facility].    Past Surgical History- Reviewed in Epic.    Review of Systems:   ROS: 10 point ROS neg other than the symptoms noted above in the HPI.    Physical Exam:   BP (!) 144/58 (BP Location: Right arm, Patient Position: Sitting, Cuff Size: Adult Regular)   Pulse 66   Resp 16   Wt 87.8 kg (193 lb 9.6 oz)   SpO2 95%   BMI 30.32 kg/m    Constitutional: Alert, pleasant, no apparent distress. Sitting up in chair.  Eyes: Sclera non-icteric, no eye discharge.  ENT: No nasal discharge. Ears grossly normal.  Respiratory: Unlabored respirations. Speaking in full sentences.  Musculoskeletal: Extremities appear normal- no gross deformities noted. No edema noted on upper body.   Skin: No suspicious lesions or rashes on visible skin.  Neurologic: Clear speech, no aphasia. No facial droop.  Psychiatric: Mentation appears normal, appropriate attention. Affect normal/bright. Does not appear anxious or depressed.    Key Data Reviewed:  LABS: 7/17/2023- Cr 1.08, GFR 68, Albumin 3.6, LFTs WNL. WBC 4.8, Hgb 13.4, Plts 117.     IMAGING: CT CAP 4/25/2023- IMPRESSION:  1.  Stable skeletal metastases.  2.  No new sites of disease in the chest, abdomen, or pelvis. No change from previous.    Impression & Recommendations & Counseling:  Derrick Benitez is a 83 year old male with history of metastatic NSCLCA complicated by pain, mostly CIPN/acute taxane pain syndrome.  Now on maintenance pembrolizumab treatment.     Neuropathy pain - Worsened again with decreasing gabapentin. Agree with increasing dose again.  -Historically updated Rx in his chart to reflect 300mg TID        Follow up: Annually         Total time spent on day of encounter is 22 mins, including reviewing record, review of above studies, above visit with patient (FTF 11:22-11:33AM), symptomatic discussion of neutopathy, including medication adjustments/prescription management, and documentation.     Zulema KEANE  DO Simon  Palliative Medicine   [id number]    Some chart documentation performed using Dragon Voice recognition Software. Although reviewed after completion, some words and grammatical errors may remain.

## 2023-08-10 NOTE — PROGRESS NOTES
"Oncology Rooming Note    August 10, 2023 11:15 AM   Derrick Benitez is a 83 year old male who presents for:    Chief Complaint   Patient presents with    Oncology Clinic Visit     Initial Vitals: BP (!) 144/58 (BP Location: Right arm, Patient Position: Sitting, Cuff Size: Adult Regular)   Pulse 66   Resp 16   Wt 87.8 kg (193 lb 9.6 oz)   SpO2 95%   BMI 30.32 kg/m   Estimated body mass index is 30.32 kg/m  as calculated from the following:    Height as of 7/18/23: 1.702 m (5' 7\").    Weight as of this encounter: 87.8 kg (193 lb 9.6 oz). Body surface area is 2.04 meters squared.  No Pain (0) Comment: Data Unavailable   No LMP for male patient.  Allergies reviewed: Yes  Medications reviewed: Yes    Medications: Medication refills not needed today.  Pharmacy name entered into EPIC:    [place], [place] - [address]  [facility] [address] [place], [place] - [address] [place] [address]    Clinical concerns:   doctor  was notified.      Corie Vieira [facility]              "

## 2023-08-10 NOTE — PATIENT INSTRUCTIONS
Recommendations:  -Ok to increase gabapentin back to 1 capsule 3 times daily. I historically updated your prescription in the future for future refills.    Follow up: One year       Reasons to Call    If you are having worsening/uncontrolled symptoms we want you to call!    You or your other physicians make any changes to medications we have prescribed.  -Please call for refills 4-5 days before you will run out of medication.    Important Phone Numbers, including: Refills, scheduling, and general questions     Palliative Care RN: Amy Johnston - [phone]  *After hours or on weekends. Will connect you with on call MD. [phone]

## 2023-08-10 NOTE — LETTER
"    8/10/2023         RE: Derrick Benitez  [address]  [facility] [address]        Dear Colleague,    Thank you for referring your patient, Derrick Benitez, to the [facility] CENTER [place]. Please see a copy of my visit note below.    Palliative Care Progress Note    Patient Name: Derrick Benitez  Primary Provider: Clarisse Golden    Chief Complaint/Patient ID: Derrick Benitez is a 83 year old male with PMHx of NSCLC  - RLL mass dx , no PDL staining could be done on bx. Bone mets at time of dx.  Carbo, taxol, pembro x 2020. Now on maintenance with pembrolizumab Q6 Weeks.    Last Palliative care appointment: 2022 with me.     Reviewed: Yes, only Rx's for gabapentin.    Interim History:  Derrick Benitez is an 83 year old male who is seen today for a follow up visit with Palliative Care.     Reviewed oncology note from .  Continues on Keytruda with no suspicion or evidence of recurrence.  Will plan to repeat imaging with CT CAP in October.  Given how well-controlled his neuropathy was, he decided to decrease gabapentin to twice a day from 3 times a day.  If symptoms remained okay, he could continue tapering.    States he has been \"monkeying around\" with gabapentin dosing and found that the decrease dose actually did worsen neuropathy symptoms. Would like to return to 300mg TID.    He has mild generalized weakness but he is still active and able to do things including lawn mowing. No other side effects/symptoms.    Appetite is good. Sleeping well.     Social History:  Lives with his wife.  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   Social History     Tobacco Use     Smoking status: Former     Packs/day: 3.00     Years: 40.00     Pack years: 120.00     Types: Cigarettes     Quit date: 10/28/2004     Years since quittin.7     Smokeless tobacco: Never   Substance Use Topics     Alcohol use: No     Comment: states he quit 30 years ago     Drug use: No     Family History- Reviewed in " Epic.    Allergies   Allergen Reactions     Losartan Other (See Comments)     Fatigue, weakness in legs     Methenamine      Advanced Care Planning: HCD scanned in chart.  Names his wife as primary HCA, followed by his son, followed by his daughter.     Medications- Reviewed in Epic.    Past Medical History- Reviewed in [facility].    Past Surgical History- Reviewed in Epic.    Review of Systems:   ROS: 10 point ROS neg other than the symptoms noted above in the HPI.    Physical Exam:   BP (!) 144/58 (BP Location: Right arm, Patient Position: Sitting, Cuff Size: Adult Regular)   Pulse 66   Resp 16   Wt 87.8 kg (193 lb 9.6 oz)   SpO2 95%   BMI 30.32 kg/m    Constitutional: Alert, pleasant, no apparent distress. Sitting up in chair.  Eyes: Sclera non-icteric, no eye discharge.  ENT: No nasal discharge. Ears grossly normal.  Respiratory: Unlabored respirations. Speaking in full sentences.  Musculoskeletal: Extremities appear normal- no gross deformities noted. No edema noted on upper body.   Skin: No suspicious lesions or rashes on visible skin.  Neurologic: Clear speech, no aphasia. No facial droop.  Psychiatric: Mentation appears normal, appropriate attention. Affect normal/bright. Does not appear anxious or depressed.    Key Data Reviewed:  LABS: 7/17/2023- Cr 1.08, GFR 68, Albumin 3.6, LFTs WNL. WBC 4.8, Hgb 13.4, Plts 117.     IMAGING: CT CAP 4/25/2023- IMPRESSION:  1.  Stable skeletal metastases.  2.  No new sites of disease in the chest, abdomen, or pelvis. No change from previous.    Impression & Recommendations & Counseling:  Derrick Benitez is a 83 year old male with history of metastatic NSCLCA complicated by pain, mostly CIPN/acute taxane pain syndrome.  Now on maintenance pembrolizumab treatment.     Neuropathy pain - Worsened again with decreasing gabapentin. Agree with increasing dose again.  -Historically updated Rx in his chart to reflect 300mg TID        Follow up: Annually         Total time spent on day  "of encounter is 22 mins, including reviewing record, review of above studies, above visit with patient (FTF 11:22-11:33AM), symptomatic discussion of neutopathy, including medication adjustments/prescription management, and documentation.     Zulema Montes DO  Palliative Medicine   [id number]    Some chart documentation performed using Dragon Voice recognition Software. Although reviewed after completion, some words and grammatical errors may remain.      Oncology Rooming Note    August 10, 2023 11:15 AM   Derrick Benitez is a 83 year old male who presents for:    Chief Complaint   Patient presents with     Oncology Clinic Visit     Initial Vitals: BP (!) 144/58 (BP Location: Right arm, Patient Position: Sitting, Cuff Size: Adult Regular)   Pulse 66   Resp 16   Wt 87.8 kg (193 lb 9.6 oz)   SpO2 95%   BMI 30.32 kg/m   Estimated body mass index is 30.32 kg/m  as calculated from the following:    Height as of 7/18/23: 1.702 m (5' 7\").    Weight as of this encounter: 87.8 kg (193 lb 9.6 oz). Body surface area is 2.04 meters squared.  No Pain (0) Comment: Data Unavailable   No LMP for male patient.  Allergies reviewed: Yes  Medications reviewed: Yes    Medications: Medication refills not needed today.  Pharmacy name entered into EPIC:    [place], [place] - [address]  [facility] [address] [place], [place] - [address] [place] [address] [address]    Clinical concerns:   doctor  was notified.      Corie Vieira [facility]                Again, thank you for allowing me to participate in the care of your patient.        Sincerely,        Zulema Montes DO  "

## 2023-08-19 ENCOUNTER — HEALTH MAINTENANCE LETTER (OUTPATIENT)
Age: 83
End: 2023-08-19

## 2023-08-28 ENCOUNTER — LAB (OUTPATIENT)
Dept: INFUSION THERAPY | Facility: CLINIC | Age: 83
End: 2023-08-28
Attending: INTERNAL MEDICINE
Payer: MEDICARE

## 2023-08-28 DIAGNOSIS — Z51.11 ENCOUNTER FOR ANTINEOPLASTIC CHEMOTHERAPY: ICD-10-CM

## 2023-08-28 DIAGNOSIS — C34.31 MALIGNANT NEOPLASM OF LOWER LOBE OF RIGHT LUNG (H): Primary | ICD-10-CM

## 2023-08-28 LAB
ALBUMIN SERPL BCG-MCNC: 3.7 G/DL (ref 3.5–5.2)
ALP SERPL-CCNC: 73 U/L (ref 40–129)
ALT SERPL W P-5'-P-CCNC: 15 U/L (ref 0–70)
ANION GAP SERPL CALCULATED.3IONS-SCNC: 9 MMOL/L (ref 7–15)
AST SERPL W P-5'-P-CCNC: 23 U/L (ref 0–45)
BASOPHILS # BLD AUTO: 0 10E3/UL (ref 0–0.2)
BASOPHILS NFR BLD AUTO: 0 %
BILIRUB SERPL-MCNC: 0.4 MG/DL
BUN SERPL-MCNC: 18.7 MG/DL (ref 8–23)
CALCIUM SERPL-MCNC: 8.7 MG/DL (ref 8.8–10.2)
CHLORIDE SERPL-SCNC: 107 MMOL/L (ref 98–107)
CREAT SERPL-MCNC: 1.25 MG/DL (ref 0.67–1.17)
DEPRECATED HCO3 PLAS-SCNC: 22 MMOL/L (ref 22–29)
EOSINOPHIL # BLD AUTO: 0.1 10E3/UL (ref 0–0.7)
EOSINOPHIL NFR BLD AUTO: 2 %
ERYTHROCYTE [DISTWIDTH] IN BLOOD BY AUTOMATED COUNT: 12.5 % (ref 10–15)
GFR SERPL CREATININE-BSD FRML MDRD: 57 ML/MIN/1.73M2
GLUCOSE SERPL-MCNC: 123 MG/DL (ref 70–99)
HCT VFR BLD AUTO: 41.8 % (ref 40–53)
HGB BLD-MCNC: 14 G/DL (ref 13.3–17.7)
IMM GRANULOCYTES # BLD: 0 10E3/UL
IMM GRANULOCYTES NFR BLD: 0 %
LYMPHOCYTES # BLD AUTO: 1.3 10E3/UL (ref 0.8–5.3)
LYMPHOCYTES NFR BLD AUTO: 25 %
MCH RBC QN AUTO: 31.5 PG (ref 26.5–33)
MCHC RBC AUTO-ENTMCNC: 33.5 G/DL (ref 31.5–36.5)
MCV RBC AUTO: 94 FL (ref 78–100)
MONOCYTES # BLD AUTO: 0.4 10E3/UL (ref 0–1.3)
MONOCYTES NFR BLD AUTO: 8 %
NEUTROPHILS # BLD AUTO: 3.6 10E3/UL (ref 1.6–8.3)
NEUTROPHILS NFR BLD AUTO: 65 %
NRBC # BLD AUTO: 0 10E3/UL
NRBC BLD AUTO-RTO: 0 /100
PLATELET # BLD AUTO: 127 10E3/UL (ref 150–450)
POTASSIUM SERPL-SCNC: 4.6 MMOL/L (ref 3.4–5.3)
PROT SERPL-MCNC: 6.5 G/DL (ref 6.4–8.3)
RBC # BLD AUTO: 4.44 10E6/UL (ref 4.4–5.9)
SODIUM SERPL-SCNC: 138 MMOL/L (ref 136–145)
TSH SERPL DL<=0.005 MIU/L-ACNC: 0.94 UIU/ML (ref 0.3–4.2)
WBC # BLD AUTO: 5.5 10E3/UL (ref 4–11)

## 2023-08-28 PROCEDURE — 80053 COMPREHEN METABOLIC PANEL: CPT | Performed by: INTERNAL MEDICINE

## 2023-08-28 PROCEDURE — 250N000011 HC RX IP 250 OP 636: Mod: JZ | Performed by: INTERNAL MEDICINE

## 2023-08-28 PROCEDURE — 84443 ASSAY THYROID STIM HORMONE: CPT | Performed by: INTERNAL MEDICINE

## 2023-08-28 PROCEDURE — 85025 COMPLETE CBC W/AUTO DIFF WBC: CPT | Performed by: INTERNAL MEDICINE

## 2023-08-28 PROCEDURE — 36591 DRAW BLOOD OFF VENOUS DEVICE: CPT | Performed by: INTERNAL MEDICINE

## 2023-08-28 RX ORDER — HEPARIN SODIUM (PORCINE) LOCK FLUSH IV SOLN 100 UNIT/ML 100 UNIT/ML
5 SOLUTION INTRAVENOUS
Status: CANCELLED | OUTPATIENT
Start: 2023-08-28

## 2023-08-28 RX ORDER — HEPARIN SODIUM,PORCINE 10 UNIT/ML
5 VIAL (ML) INTRAVENOUS
Status: DISCONTINUED | OUTPATIENT
Start: 2023-08-28 | End: 2023-08-28 | Stop reason: HOSPADM

## 2023-08-28 RX ORDER — HEPARIN SODIUM (PORCINE) LOCK FLUSH IV SOLN 100 UNIT/ML 100 UNIT/ML
5 SOLUTION INTRAVENOUS
Status: DISCONTINUED | OUTPATIENT
Start: 2023-08-28 | End: 2023-08-28 | Stop reason: HOSPADM

## 2023-08-28 RX ORDER — HEPARIN SODIUM,PORCINE 10 UNIT/ML
5 VIAL (ML) INTRAVENOUS
Status: CANCELLED | OUTPATIENT
Start: 2023-08-28

## 2023-08-28 RX ADMIN — Medication 5 ML: at 12:26

## 2023-08-28 NOTE — PROGRESS NOTES
Nursing Note:  Derrick SALAS Emmanuel presents today for labs.    Patient seen by provider today: No   present during visit today: Not Applicable.    Note: N/A.    Intravenous Access:  Implanted Port.    Discharge Plan:   Patient was d/c'd home for tomorrow's appointment.    Yuly Neil RN

## 2023-08-29 ENCOUNTER — HOSPITAL ENCOUNTER (OUTPATIENT)
Facility: CLINIC | Age: 83
End: 2023-08-29
Payer: MEDICARE

## 2023-08-29 ENCOUNTER — INFUSION THERAPY VISIT (OUTPATIENT)
Dept: INFUSION THERAPY | Facility: CLINIC | Age: 83
End: 2023-08-29
Attending: INTERNAL MEDICINE
Payer: MEDICARE

## 2023-08-29 ENCOUNTER — ONCOLOGY VISIT (OUTPATIENT)
Dept: ONCOLOGY | Facility: CLINIC | Age: 83
End: 2023-08-29
Attending: NURSE PRACTITIONER
Payer: MEDICARE

## 2023-08-29 VITALS
DIASTOLIC BLOOD PRESSURE: 68 MMHG | HEART RATE: 67 BPM | WEIGHT: 195 LBS | SYSTOLIC BLOOD PRESSURE: 154 MMHG | BODY MASS INDEX: 30.54 KG/M2 | OXYGEN SATURATION: 96 % | TEMPERATURE: 98 F | RESPIRATION RATE: 17 BRPM

## 2023-08-29 DIAGNOSIS — C34.31 MALIGNANT NEOPLASM OF LOWER LOBE OF RIGHT LUNG (H): Primary | ICD-10-CM

## 2023-08-29 DIAGNOSIS — Z51.11 ENCOUNTER FOR ANTINEOPLASTIC CHEMOTHERAPY: ICD-10-CM

## 2023-08-29 PROCEDURE — 96413 CHEMO IV INFUSION 1 HR: CPT

## 2023-08-29 PROCEDURE — 99214 OFFICE O/P EST MOD 30 MIN: CPT | Performed by: NURSE PRACTITIONER

## 2023-08-29 PROCEDURE — 258N000003 HC RX IP 258 OP 636: Performed by: NURSE PRACTITIONER

## 2023-08-29 PROCEDURE — 250N000011 HC RX IP 250 OP 636: Mod: JZ | Performed by: NURSE PRACTITIONER

## 2023-08-29 PROCEDURE — G0463 HOSPITAL OUTPT CLINIC VISIT: HCPCS | Mod: 25 | Performed by: NURSE PRACTITIONER

## 2023-08-29 RX ORDER — HEPARIN SODIUM,PORCINE 10 UNIT/ML
5 VIAL (ML) INTRAVENOUS
Status: CANCELLED | OUTPATIENT
Start: 2023-08-29

## 2023-08-29 RX ORDER — HEPARIN SODIUM (PORCINE) LOCK FLUSH IV SOLN 100 UNIT/ML 100 UNIT/ML
5 SOLUTION INTRAVENOUS EVERY 8 HOURS PRN
Status: DISCONTINUED | OUTPATIENT
Start: 2023-08-29 | End: 2023-08-29 | Stop reason: HOSPADM

## 2023-08-29 RX ORDER — NALOXONE HYDROCHLORIDE 0.4 MG/ML
.1-.4 INJECTION, SOLUTION INTRAMUSCULAR; INTRAVENOUS; SUBCUTANEOUS
Status: CANCELLED | OUTPATIENT
Start: 2023-08-29

## 2023-08-29 RX ORDER — ALBUTEROL SULFATE 0.83 MG/ML
2.5 SOLUTION RESPIRATORY (INHALATION)
Status: CANCELLED | OUTPATIENT
Start: 2023-08-29

## 2023-08-29 RX ORDER — LORAZEPAM 2 MG/ML
0.5 INJECTION INTRAMUSCULAR EVERY 4 HOURS PRN
Status: CANCELLED | OUTPATIENT
Start: 2023-08-29

## 2023-08-29 RX ORDER — SODIUM CHLORIDE 9 MG/ML
1000 INJECTION, SOLUTION INTRAVENOUS CONTINUOUS PRN
Status: CANCELLED
Start: 2023-08-29

## 2023-08-29 RX ORDER — HEPARIN SODIUM (PORCINE) LOCK FLUSH IV SOLN 100 UNIT/ML 100 UNIT/ML
5 SOLUTION INTRAVENOUS EVERY 8 HOURS PRN
Status: CANCELLED | OUTPATIENT
Start: 2023-08-29

## 2023-08-29 RX ORDER — ALBUTEROL SULFATE 90 UG/1
1-2 AEROSOL, METERED RESPIRATORY (INHALATION)
Status: CANCELLED
Start: 2023-08-29

## 2023-08-29 RX ORDER — MEPERIDINE HYDROCHLORIDE 25 MG/ML
25 INJECTION INTRAMUSCULAR; INTRAVENOUS; SUBCUTANEOUS EVERY 30 MIN PRN
Status: CANCELLED | OUTPATIENT
Start: 2023-08-29

## 2023-08-29 RX ORDER — METHYLPREDNISOLONE SODIUM SUCCINATE 125 MG/2ML
125 INJECTION, POWDER, LYOPHILIZED, FOR SOLUTION INTRAMUSCULAR; INTRAVENOUS
Status: CANCELLED
Start: 2023-08-29

## 2023-08-29 RX ORDER — EPINEPHRINE 1 MG/ML
0.3 INJECTION, SOLUTION INTRAMUSCULAR; SUBCUTANEOUS EVERY 5 MIN PRN
Status: CANCELLED | OUTPATIENT
Start: 2023-08-29

## 2023-08-29 RX ORDER — DIPHENHYDRAMINE HYDROCHLORIDE 50 MG/ML
50 INJECTION INTRAMUSCULAR; INTRAVENOUS
Status: CANCELLED
Start: 2023-08-29

## 2023-08-29 RX ADMIN — Medication 5 ML: at 10:39

## 2023-08-29 RX ADMIN — SODIUM CHLORIDE 400 MG: 9 INJECTION, SOLUTION INTRAVENOUS at 09:40

## 2023-08-29 RX ADMIN — SODIUM CHLORIDE 1000 ML: 9 INJECTION, SOLUTION INTRAVENOUS at 09:25

## 2023-08-29 ASSESSMENT — PAIN SCALES - GENERAL: PAINLEVEL: NO PAIN (0)

## 2023-08-29 NOTE — LETTER
"    8/29/2023         RE: Derrick Benitez  [address]  [facility] [address]        Dear Colleague,    Thank you for referring your patient, Derrick Benitez, to the [facility] CANCER [facility]. Please see a copy of my visit note below.    Oncology Rooming Note    August 29, 2023 8:54 AM   Derrick Benitez is a 83 year old male who presents for:    Chief Complaint   Patient presents with     Oncology Clinic Visit     Initial Vitals: BP (!) 154/68   Pulse 67   Temp 98  F (36.7  C) (Oral)   Resp 17   Wt 88.5 kg (195 lb)   SpO2 96%   BMI 30.54 kg/m   Estimated body mass index is 30.54 kg/m  as calculated from the following:    Height as of 7/18/23: 1.702 m (5' 7\").    Weight as of this encounter: 88.5 kg (195 lb). Body surface area is 2.05 meters squared.  No Pain (0) Comment: Data Unavailable   No LMP for male patient.  Allergies reviewed: Yes  Medications reviewed: Yes    Medications: Medication refills not needed today.  Pharmacy name entered into EPIC:    [facility] [place], [place] [facility] [address]  [facility] [id number] [place], [place] - [address] [place] [address]    Clinical concerns:   NP was notified.      Corie Vieira [facility]              Oncology/Hematology Visit Note  Aug 29, 2023    Reason for Visit:   Lung cancer  Metastatic lung squamous cell carcinoma  Completed carboplatin Taxol and pembrolizumab x 6 cycle on 03/10/2021  03/31/2021-on maintenance with single agent with pembrolizumab  10/17/2022-PET scan did not reveal evidence of malignancy  Patient is currently getting treatment with pembrolizumab every 6 weeks  04/25/23-CT scan revealed stable skeletal metastasis no new sites of disease    Interval History:  patient continues to feel well.  Denies fever chills sweats cough shortness of breath chest pain nausea vomiting diarrhea abdominal pain bleeding.  Denies urinary symptoms denies edema    Review of Systems:  14 point ROS of systems including Constitutional, Eyes, " Respiratory, Cardiovascular, Gastroenterology, Genitourinary, Integumentary, Muscularskeletal, Psychiatric were all negative except for pertinent positives noted in my HPI.    Physical Examination:  Physical Exam  Eyes:      Pupils: Pupils are equal, round, and reactive to light.   Cardiovascular:      Rate and Rhythm: Normal rate.      Pulses: Normal pulses.   Pulmonary:      Effort: Pulmonary effort is normal.   Abdominal:      General: Abdomen is flat.   Musculoskeletal:      Cervical back: Normal range of motion.   Skin:     General: Skin is warm.   Neurological:      General: No focal deficit present.      Mental Status: He is alert.   Psychiatric:         Mood and Affect: Mood normal.       Laboratory Data:  CBC CMP and TSH results reviewed    Assessment and Plan:      Lung cancer  -Metastatic lung squamous cell carcinoma   status post carboplatinTaxol and pembrolizumab-completed 6 cycles in March 2021  -Currently on maintenance with immunotherapy pembrolizumab every 6 weeks  04/25/23-CT scan revealed stable skeletal metastasis no new sites of disease  Patient reports he has been tolerating pembrolizumab well  Continue with pembrolizumab  10/23-patient is scheduled for CT scan      Bone mets  -Patient currently on Zometa every 6 months  -Continue with vitamin D and calcium  Zometa is due in October        Thrombocytopenia chronic-  Continue to monitor closely  Complications of thrombocytopenia reviewed  Call our clinic in the event of bleeding, bruising fall or injury      NADIRA  Suspect dehydration patient denies urinary symptoms  We will give 1 L NS bolus hydration today and repeat creatinine    Elevated blood sugar  Follow-up with primary care physician    Hypertension  On amlodipine follow-up with primary care physician    Please call our clinic with any changes in health condition or questions        DARSHANA Meza [facility]     Chart documentation with Dragon Voice  recognition Software. Although reviewed after completion, some words and grammatical errors may remain.          Again, thank you for allowing me to participate in the care of your patient.        Sincerely,        DARSHANA Meza CNP

## 2023-08-29 NOTE — PROGRESS NOTES
"Oncology Rooming Note    August 29, 2023 8:54 AM   Derrick Benitez is a 83 year old male who presents for:    Chief Complaint   Patient presents with    Oncology Clinic Visit     Initial Vitals: BP (!) 154/68   Pulse 67   Temp 98  F (36.7  C) (Oral)   Resp 17   Wt 88.5 kg (195 lb)   SpO2 96%   BMI 30.54 kg/m   Estimated body mass index is 30.54 kg/m  as calculated from the following:    Height as of 7/18/23: 1.702 m (5' 7\").    Weight as of this encounter: 88.5 kg (195 lb). Body surface area is 2.05 meters squared.  No Pain (0) Comment: Data Unavailable   No LMP for male patient.  Allergies reviewed: Yes  Medications reviewed: Yes    Medications: Medication refills not needed today.  Pharmacy name entered into EPIC:    [place], [place] - [address]  [facility] [id number] [place] [address]    Clinical concerns:   NP was notified.      Corie Vieira CMA            "

## 2023-08-29 NOTE — PROGRESS NOTES
Infusion Nursing Note:  Derrick Benitez presents today for C25D1 keytruda  Patient seen by provider today: Yes: De, MED   present during visit today: Not Applicable.    Note: Liter of NS infused per De due to elevated creatinine.      Intravenous Access:  Implanted Port.    Treatment Conditions:  Lab Results   Component Value Date    HGB 14.0 08/28/2023    WBC 5.5 08/28/2023    ANEU 3.9 10/17/2022    ANEUTAUTO 3.6 08/28/2023     (L) 08/28/2023        Lab Results   Component Value Date     08/28/2023    POTASSIUM 4.6 08/28/2023    MAG 2.1 10/30/2014    CR 1.25 (H) 08/28/2023    ANTHONY 8.7 (L) 08/28/2023    BILITOTAL 0.4 08/28/2023    ALBUMIN 3.7 08/28/2023    ALT 15 08/28/2023    AST 23 08/28/2023       Results reviewed, labs MET treatment parameters, ok to proceed with treatment.      Post Infusion Assessment:  Patient tolerated infusion without incident.  Blood return noted pre and post infusion.  Site patent and intact, free from redness, edema or discomfort.  No evidence of extravasations.  Access discontinued per protocol.       Discharge Plan:   AVS to patient via [employer]HART.  Patient will return as [facility] - patient will call soon is appts do not show up in [employer] for next appointment.   Patient discharged in stable condition accompanied by: self.  Departure Mode: Ambulatory.      Alberto Merida RN

## 2023-08-29 NOTE — PROGRESS NOTES
Oncology/Hematology Visit Note  Aug 29, 2023    Reason for Visit:   Lung cancer  Metastatic lung squamous cell carcinoma  Completed carboplatin Taxol and pembrolizumab x 6 cycle on 03/10/2021  03/31/2021-on maintenance with single agent with pembrolizumab  10/17/2022-PET scan did not reveal evidence of malignancy  Patient is currently getting treatment with pembrolizumab every 6 weeks  04/25/23-CT scan revealed stable skeletal metastasis no new sites of disease    Interval History:  patient continues to feel well.  Denies fever chills sweats cough shortness of breath chest pain nausea vomiting diarrhea abdominal pain bleeding.  Denies urinary symptoms denies edema    Review of Systems:  14 point ROS of systems including Constitutional, Eyes, Respiratory, Cardiovascular, Gastroenterology, Genitourinary, Integumentary, Muscularskeletal, Psychiatric were all negative except for pertinent positives noted in my HPI.    Physical Examination:  Physical Exam  Eyes:      Pupils: Pupils are equal, round, and reactive to light.   Cardiovascular:      Rate and Rhythm: Normal rate.      Pulses: Normal pulses.   Pulmonary:      Effort: Pulmonary effort is normal.   Abdominal:      General: Abdomen is flat.   Musculoskeletal:      Cervical back: Normal range of motion.   Skin:     General: Skin is warm.   Neurological:      General: No focal deficit present.      Mental Status: He is alert.   Psychiatric:         Mood and Affect: Mood normal.       Laboratory Data:  CBC CMP and TSH results reviewed    Assessment and Plan:      Lung cancer  -Metastatic lung squamous cell carcinoma   status post carboplatinTaxol and pembrolizumab-completed 6 cycles in March 2021  -Currently on maintenance with immunotherapy pembrolizumab every 6 weeks  04/25/23-CT scan revealed stable skeletal metastasis no new sites of disease  Patient reports he has been tolerating pembrolizumab well  Continue with pembrolizumab  10/23-patient is scheduled for CT  scan      Bone mets  -Patient currently on Zometa every 6 months  -Continue with vitamin D and calcium  Zometa is due in October        Thrombocytopenia chronic-  Continue to monitor closely  Complications of thrombocytopenia reviewed  Call our clinic in the event of bleeding, bruising fall or injury      NADIRA  Suspect dehydration patient denies urinary symptoms  We will give 1 L NS bolus hydration today and repeat creatinine    Elevated blood sugar  Follow-up with primary care physician    Hypertension  On amlodipine follow-up with primary care physician    Please call our clinic with any changes in health condition or questions        DARSHANA Meza [facility]     Chart documentation with Dragon Voice recognition Software. Although reviewed after completion, some words and grammatical errors may remain.

## 2023-10-03 ENCOUNTER — TELEPHONE (OUTPATIENT)
Dept: MEDSURG UNIT | Facility: CLINIC | Age: 83
End: 2023-10-03
Payer: MEDICARE

## 2023-10-09 ENCOUNTER — LAB (OUTPATIENT)
Dept: INFUSION THERAPY | Facility: CLINIC | Age: 83
End: 2023-10-09
Attending: INTERNAL MEDICINE
Payer: MEDICARE

## 2023-10-09 DIAGNOSIS — Z51.11 ENCOUNTER FOR ANTINEOPLASTIC CHEMOTHERAPY: ICD-10-CM

## 2023-10-09 DIAGNOSIS — C34.31 MALIGNANT NEOPLASM OF LOWER LOBE OF RIGHT LUNG (H): Primary | ICD-10-CM

## 2023-10-09 LAB
ALBUMIN SERPL BCG-MCNC: 3.6 G/DL (ref 3.5–5.2)
ALP SERPL-CCNC: 69 U/L (ref 40–129)
ALT SERPL W P-5'-P-CCNC: 14 U/L (ref 0–70)
ANION GAP SERPL CALCULATED.3IONS-SCNC: 10 MMOL/L (ref 7–15)
AST SERPL W P-5'-P-CCNC: 19 U/L (ref 0–45)
BASO+EOS+MONOS # BLD AUTO: ABNORMAL 10*3/UL
BASO+EOS+MONOS NFR BLD AUTO: ABNORMAL %
BASOPHILS # BLD AUTO: 0 10E3/UL (ref 0–0.2)
BASOPHILS NFR BLD AUTO: 0 %
BILIRUB SERPL-MCNC: 0.3 MG/DL
BUN SERPL-MCNC: 15.7 MG/DL (ref 8–23)
CALCIUM SERPL-MCNC: 8.5 MG/DL (ref 8.8–10.2)
CHLORIDE SERPL-SCNC: 110 MMOL/L (ref 98–107)
CREAT SERPL-MCNC: 1.1 MG/DL (ref 0.67–1.17)
DEPRECATED HCO3 PLAS-SCNC: 22 MMOL/L (ref 22–29)
EGFRCR SERPLBLD CKD-EPI 2021: 66 ML/MIN/1.73M2
EOSINOPHIL # BLD AUTO: 0.1 10E3/UL (ref 0–0.7)
EOSINOPHIL NFR BLD AUTO: 2 %
ERYTHROCYTE [DISTWIDTH] IN BLOOD BY AUTOMATED COUNT: 12.9 % (ref 10–15)
GLUCOSE SERPL-MCNC: 106 MG/DL (ref 70–99)
HCT VFR BLD AUTO: 40.5 % (ref 40–53)
HGB BLD-MCNC: 13.9 G/DL (ref 13.3–17.7)
IMM GRANULOCYTES # BLD: 0 10E3/UL
IMM GRANULOCYTES NFR BLD: 0 %
LYMPHOCYTES # BLD AUTO: 1.4 10E3/UL (ref 0.8–5.3)
LYMPHOCYTES NFR BLD AUTO: 23 %
MCH RBC QN AUTO: 32.4 PG (ref 26.5–33)
MCHC RBC AUTO-ENTMCNC: 34.3 G/DL (ref 31.5–36.5)
MCV RBC AUTO: 94 FL (ref 78–100)
MONOCYTES # BLD AUTO: 0.5 10E3/UL (ref 0–1.3)
MONOCYTES NFR BLD AUTO: 9 %
NEUTROPHILS # BLD AUTO: 4 10E3/UL (ref 1.6–8.3)
NEUTROPHILS NFR BLD AUTO: 66 %
NRBC # BLD AUTO: 0 10E3/UL
NRBC BLD AUTO-RTO: 0 /100
PLATELET # BLD AUTO: 121 10E3/UL (ref 150–450)
POTASSIUM SERPL-SCNC: 4.6 MMOL/L (ref 3.4–5.3)
PROT SERPL-MCNC: 6.3 G/DL (ref 6.4–8.3)
RBC # BLD AUTO: 4.29 10E6/UL (ref 4.4–5.9)
SODIUM SERPL-SCNC: 142 MMOL/L (ref 135–145)
TSH SERPL DL<=0.005 MIU/L-ACNC: 0.87 UIU/ML (ref 0.3–4.2)
WBC # BLD AUTO: 6 10E3/UL (ref 4–11)

## 2023-10-09 PROCEDURE — 80053 COMPREHEN METABOLIC PANEL: CPT | Performed by: INTERNAL MEDICINE

## 2023-10-09 PROCEDURE — 84443 ASSAY THYROID STIM HORMONE: CPT | Performed by: INTERNAL MEDICINE

## 2023-10-09 PROCEDURE — 85025 COMPLETE CBC W/AUTO DIFF WBC: CPT | Performed by: INTERNAL MEDICINE

## 2023-10-09 PROCEDURE — 250N000011 HC RX IP 250 OP 636: Mod: JZ | Performed by: INTERNAL MEDICINE

## 2023-10-09 PROCEDURE — 36591 DRAW BLOOD OFF VENOUS DEVICE: CPT | Performed by: INTERNAL MEDICINE

## 2023-10-09 RX ORDER — HEPARIN SODIUM (PORCINE) LOCK FLUSH IV SOLN 100 UNIT/ML 100 UNIT/ML
5 SOLUTION INTRAVENOUS
Status: CANCELLED | OUTPATIENT
Start: 2023-10-09

## 2023-10-09 RX ORDER — HEPARIN SODIUM,PORCINE 10 UNIT/ML
5 VIAL (ML) INTRAVENOUS
Status: CANCELLED | OUTPATIENT
Start: 2023-10-09

## 2023-10-09 RX ORDER — HEPARIN SODIUM (PORCINE) LOCK FLUSH IV SOLN 100 UNIT/ML 100 UNIT/ML
5 SOLUTION INTRAVENOUS
Status: DISCONTINUED | OUTPATIENT
Start: 2023-10-09 | End: 2023-10-09 | Stop reason: HOSPADM

## 2023-10-09 RX ADMIN — Medication 5 ML: at 13:28

## 2023-10-10 ENCOUNTER — ONCOLOGY VISIT (OUTPATIENT)
Dept: ONCOLOGY | Facility: CLINIC | Age: 83
End: 2023-10-10
Attending: NURSE PRACTITIONER
Payer: MEDICARE

## 2023-10-10 ENCOUNTER — INFUSION THERAPY VISIT (OUTPATIENT)
Dept: INFUSION THERAPY | Facility: CLINIC | Age: 83
End: 2023-10-10
Attending: INTERNAL MEDICINE
Payer: MEDICARE

## 2023-10-10 VITALS
SYSTOLIC BLOOD PRESSURE: 162 MMHG | RESPIRATION RATE: 16 BRPM | WEIGHT: 198 LBS | TEMPERATURE: 98.5 F | BODY MASS INDEX: 31.08 KG/M2 | HEIGHT: 67 IN | DIASTOLIC BLOOD PRESSURE: 72 MMHG | OXYGEN SATURATION: 94 % | HEART RATE: 77 BPM

## 2023-10-10 DIAGNOSIS — T45.1X5A PERIPHERAL NEUROPATHY DUE TO CHEMOTHERAPY (H): ICD-10-CM

## 2023-10-10 DIAGNOSIS — Z51.11 ENCOUNTER FOR ANTINEOPLASTIC CHEMOTHERAPY: ICD-10-CM

## 2023-10-10 DIAGNOSIS — C79.51 MALIGNANT NEOPLASM METASTATIC TO BONE (H): ICD-10-CM

## 2023-10-10 DIAGNOSIS — C34.31 MALIGNANT NEOPLASM OF LOWER LOBE OF RIGHT LUNG (H): Primary | ICD-10-CM

## 2023-10-10 DIAGNOSIS — G62.0 PERIPHERAL NEUROPATHY DUE TO CHEMOTHERAPY (H): ICD-10-CM

## 2023-10-10 PROCEDURE — 250N000011 HC RX IP 250 OP 636: Mod: JZ | Performed by: NURSE PRACTITIONER

## 2023-10-10 PROCEDURE — 258N000003 HC RX IP 258 OP 636: Performed by: NURSE PRACTITIONER

## 2023-10-10 PROCEDURE — G0463 HOSPITAL OUTPT CLINIC VISIT: HCPCS | Performed by: NURSE PRACTITIONER

## 2023-10-10 PROCEDURE — 99213 OFFICE O/P EST LOW 20 MIN: CPT | Performed by: NURSE PRACTITIONER

## 2023-10-10 PROCEDURE — 96367 TX/PROPH/DG ADDL SEQ IV INF: CPT

## 2023-10-10 PROCEDURE — 96413 CHEMO IV INFUSION 1 HR: CPT

## 2023-10-10 RX ORDER — ZOLEDRONIC ACID 0.04 MG/ML
4 INJECTION, SOLUTION INTRAVENOUS ONCE
Status: CANCELLED | OUTPATIENT
Start: 2023-10-23 | End: 2023-10-23

## 2023-10-10 RX ORDER — EPINEPHRINE 1 MG/ML
0.3 INJECTION, SOLUTION INTRAMUSCULAR; SUBCUTANEOUS EVERY 5 MIN PRN
Status: CANCELLED | OUTPATIENT
Start: 2023-10-10

## 2023-10-10 RX ORDER — SODIUM CHLORIDE 9 MG/ML
1000 INJECTION, SOLUTION INTRAVENOUS CONTINUOUS PRN
Status: CANCELLED
Start: 2023-10-10

## 2023-10-10 RX ORDER — GABAPENTIN 300 MG/1
300 CAPSULE ORAL 3 TIMES DAILY
Qty: 270 CAPSULE | Refills: 0 | Status: SHIPPED | OUTPATIENT
Start: 2023-10-10 | End: 2023-11-21

## 2023-10-10 RX ORDER — NALOXONE HYDROCHLORIDE 0.4 MG/ML
.1-.4 INJECTION, SOLUTION INTRAMUSCULAR; INTRAVENOUS; SUBCUTANEOUS
Status: CANCELLED | OUTPATIENT
Start: 2023-10-10

## 2023-10-10 RX ORDER — ALBUTEROL SULFATE 0.83 MG/ML
2.5 SOLUTION RESPIRATORY (INHALATION)
Status: CANCELLED | OUTPATIENT
Start: 2023-10-10

## 2023-10-10 RX ORDER — HEPARIN SODIUM (PORCINE) LOCK FLUSH IV SOLN 100 UNIT/ML 100 UNIT/ML
5 SOLUTION INTRAVENOUS
Status: CANCELLED | OUTPATIENT
Start: 2023-10-23

## 2023-10-10 RX ORDER — HEPARIN SODIUM (PORCINE) LOCK FLUSH IV SOLN 100 UNIT/ML 100 UNIT/ML
5 SOLUTION INTRAVENOUS EVERY 8 HOURS PRN
Status: CANCELLED | OUTPATIENT
Start: 2023-10-10

## 2023-10-10 RX ORDER — ALBUTEROL SULFATE 90 UG/1
1-2 AEROSOL, METERED RESPIRATORY (INHALATION)
Status: CANCELLED
Start: 2023-10-10

## 2023-10-10 RX ORDER — HEPARIN SODIUM (PORCINE) LOCK FLUSH IV SOLN 100 UNIT/ML 100 UNIT/ML
5 SOLUTION INTRAVENOUS EVERY 8 HOURS PRN
Status: DISCONTINUED | OUTPATIENT
Start: 2023-10-10 | End: 2023-10-10 | Stop reason: HOSPADM

## 2023-10-10 RX ORDER — HEPARIN SODIUM,PORCINE 10 UNIT/ML
5 VIAL (ML) INTRAVENOUS
Status: CANCELLED | OUTPATIENT
Start: 2023-10-10

## 2023-10-10 RX ORDER — HEPARIN SODIUM,PORCINE 10 UNIT/ML
5 VIAL (ML) INTRAVENOUS
Status: CANCELLED | OUTPATIENT
Start: 2023-10-23

## 2023-10-10 RX ORDER — DIPHENHYDRAMINE HYDROCHLORIDE 50 MG/ML
50 INJECTION INTRAMUSCULAR; INTRAVENOUS
Status: CANCELLED
Start: 2023-10-10

## 2023-10-10 RX ORDER — METHYLPREDNISOLONE SODIUM SUCCINATE 125 MG/2ML
125 INJECTION, POWDER, LYOPHILIZED, FOR SOLUTION INTRAMUSCULAR; INTRAVENOUS
Status: CANCELLED
Start: 2023-10-10

## 2023-10-10 RX ORDER — MEPERIDINE HYDROCHLORIDE 25 MG/ML
25 INJECTION INTRAMUSCULAR; INTRAVENOUS; SUBCUTANEOUS EVERY 30 MIN PRN
Status: CANCELLED | OUTPATIENT
Start: 2023-10-10

## 2023-10-10 RX ORDER — LORAZEPAM 2 MG/ML
0.5 INJECTION INTRAMUSCULAR EVERY 4 HOURS PRN
Status: CANCELLED | OUTPATIENT
Start: 2023-10-10

## 2023-10-10 RX ADMIN — Medication 5 ML: at 10:44

## 2023-10-10 RX ADMIN — ZOLEDRONIC ACID 3.5 MG: 4 INJECTION, SOLUTION, CONCENTRATE INTRAVENOUS at 10:26

## 2023-10-10 RX ADMIN — SODIUM CHLORIDE 1000 ML: 9 INJECTION, SOLUTION INTRAVENOUS at 09:52

## 2023-10-10 RX ADMIN — SODIUM CHLORIDE 400 MG: 9 INJECTION, SOLUTION INTRAVENOUS at 09:52

## 2023-10-10 ASSESSMENT — PAIN SCALES - GENERAL: PAINLEVEL: NO PAIN (0)

## 2023-10-10 NOTE — PROGRESS NOTES
Infusion Nursing Note:  Derrick Benitez presents today for Keytruda & Zometa.    Patient seen by provider today: Yes: De Frausto CNP   present during visit today: Not Applicable.    Note: N/A.      Intravenous Access:  Implanted Port.    Treatment Conditions:  Lab Results   Component Value Date    HGB 13.9 10/09/2023    WBC 6.0 10/09/2023    ANEU 3.9 10/17/2022    ANEUTAUTO 4.0 10/09/2023     (L) 10/09/2023        Lab Results   Component Value Date     10/09/2023    POTASSIUM 4.6 10/09/2023    MAG 2.1 10/30/2014    CR 1.10 10/09/2023    ANTHONY 8.5 (L) 10/09/2023    BILITOTAL 0.3 10/09/2023    ALBUMIN 3.6 10/09/2023    ALT 14 10/09/2023    AST 19 10/09/2023       Results reviewed, labs MET treatment parameters, ok to proceed with treatment.      Post Infusion Assessment:  Patient tolerated infusion without incident.  Blood return noted pre and post infusion.  Site patent and intact, free from redness, edema or discomfort.  No evidence of extravasations.  Access discontinued per protocol.       Discharge Plan:   Discharge instructions reviewed with: Patient.  Patient and/or family verbalized understanding of discharge instructions and all questions answered.  Patient discharged in stable condition accompanied by: self.  Departure Mode: Ambulatory.      Tatiana Aguiar RN

## 2023-10-10 NOTE — LETTER
"    10/10/2023         RE: Derrick Benitez  [address]  [facility] [address]        Dear Colleague,    Thank you for referring your patient, Derrick Benitez, to the [facility]CER [facility]. Please see a copy of my visit note below.    Oncology Rooming Note    October 10, 2023 8:54 AM   Derrick Benitez is a 83 year old male who presents for:    Chief Complaint   Patient presents with     Oncology Clinic Visit     Initial Vitals: Resp 16   Ht 1.702 m (5' 7\")   Wt 89.8 kg (198 lb)   BMI 31.01 kg/m   Estimated body mass index is 31.01 kg/m  as calculated from the following:    Height as of this encounter: 1.702 m (5' 7\").    Weight as of this encounter: 89.8 kg (198 lb). Body surface area is 2.06 meters squared.  Data Unavailable Comment: Data Unavailable   No LMP for male patient.  Allergies reviewed: Yes  Medications reviewed: Yes    Medications: Medication refills not needed today.  Pharmacy name entered into EPIC:    [facility] [place], [place] - [address]  [facility] [id number] [place], [place] - [address] [place] [address] [address]    PT would like his Gabapentin for every 90 days instead of 30 days supply.      Brenda Martines MA              Oncology/Hematology Visit Note  Oct 10, 2023    Reason for Visit:   Lung cancer  Metastatic lung squamous cell carcinoma  Completed carboplatin Taxol and pembrolizumab x 6 cycle on 03/10/2021  03/31/2021-on maintenance with single agent with pembrolizumab  10/17/2022-PET scan did not reveal evidence of malignancy  Patient is currently getting treatment with pembrolizumab every 6 weeks  04/25/23-CT scan revealed stable skeletal metastasis no new sites of disease    Interval History:   patient reports feeling well.  Denies fever chills sweats cough shortness of breath chest pain nausea vomiting diarrhea abdominal pain or bleeding  Reports has been tolerating treatment well      Review of Systems:  14 point ROS of systems including Constitutional, " Eyes, Respiratory, Cardiovascular, Gastroenterology, Genitourinary, Integumentary, Muscularskeletal, Psychiatric were all negative except for pertinent positives noted in my HPI.    Physical Examination:  Physical Exam  Eyes:      Pupils: Pupils are equal, round, and reactive to light.   Cardiovascular:      Rate and Rhythm: Normal rate.      Pulses: Normal pulses.   Pulmonary:      Effort: Pulmonary effort is normal.   Abdominal:      General: Abdomen is flat.   Musculoskeletal:      Cervical back: Normal range of motion.   Skin:     General: Skin is warm.   Neurological:      General: No focal deficit present.      Mental Status: He is alert.   Psychiatric:         Mood and Affect: Mood normal.       Laboratory Data:  CBC CMP and TSH results reviewed    Assessment and Plan:      Lung cancer  -Metastatic lung squamous cell carcinoma   status post carboplatinTaxol and pembrolizumab-completed 6 cycles in March 2021  -Currently on maintenance with immunotherapy pembrolizumab every 6 weeks  04/25/23-CT scan revealed stable skeletal metastasis no new sites of disease  Patient reports he has been tolerating pembrolizumab well  Continue with pembrolizumab  10/23/23-patient is scheduled for CT scan  Patient scheduled with Dr. Larsen with next cycle of pembrolizumab    Bone mets  -Patient currently on Zometa every 6 months  -Okay to give Zometa today schedule for Zometa today 6 months  -Continue with vitamin D and calcium  Zometa is due in October      Thrombocytopenia chronic-  Continue to monitor closely  Complications of thrombocytopenia reviewed  Call our clinic in the event of bleeding, bruising fall or injury      Hypertension  On amlodipine follow-up with primary care physician    Please call our clinic with any changes in health condition or questions        DARSHANA Meza [facility]     Chart documentation with Dragon Voice recognition Software. Although reviewed after  completion, some words and grammatical errors may remain.          Again, thank you for allowing me to participate in the care of your patient.        Sincerely,        DARSHANA Meza CNP

## 2023-10-10 NOTE — PROGRESS NOTES
"Oncology Rooming Note    October 10, 2023 8:54 AM   Derrick Benitez is a 83 year old male who presents for:    Chief Complaint   Patient presents with    Oncology Clinic Visit     Initial Vitals: Resp 16   Ht 1.702 m (5' 7\")   Wt 89.8 kg (198 lb)   BMI 31.01 kg/m   Estimated body mass index is 31.01 kg/m  as calculated from the following:    Height as of this encounter: 1.702 m (5' 7\").    Weight as of this encounter: 89.8 kg (198 lb). Body surface area is 2.06 meters squared.  Data Unavailable Comment: Data Unavailable   No LMP for male patient.  Allergies reviewed: Yes  Medications reviewed: Yes    Medications: Medication refills not needed today.  Pharmacy name entered into EPIC:    [place], [place] - [address]  [facility] [id number] [place], [place] - [address]    PT would like his Gabapentin for every 90 days instead of 30 days supply.      Brenda Martines MA            "

## 2023-10-10 NOTE — PROGRESS NOTES
Oncology/Hematology Visit Note  Oct 10, 2023    Reason for Visit:   Lung cancer  Metastatic lung squamous cell carcinoma  Completed carboplatin Taxol and pembrolizumab x 6 cycle on 03/10/2021  03/31/2021-on maintenance with single agent with pembrolizumab  10/17/2022-PET scan did not reveal evidence of malignancy  Patient is currently getting treatment with pembrolizumab every 6 weeks  04/25/23-CT scan revealed stable skeletal metastasis no new sites of disease    Interval History:   patient reports feeling well.  Denies fever chills sweats cough shortness of breath chest pain nausea vomiting diarrhea abdominal pain or bleeding  Reports has been tolerating treatment well      Review of Systems:  14 point ROS of systems including Constitutional, Eyes, Respiratory, Cardiovascular, Gastroenterology, Genitourinary, Integumentary, Muscularskeletal, Psychiatric were all negative except for pertinent positives noted in my HPI.    Physical Examination:  Physical Exam  Eyes:      Pupils: Pupils are equal, round, and reactive to light.   Cardiovascular:      Rate and Rhythm: Normal rate.      Pulses: Normal pulses.   Pulmonary:      Effort: Pulmonary effort is normal.   Abdominal:      General: Abdomen is flat.   Musculoskeletal:      Cervical back: Normal range of motion.   Skin:     General: Skin is warm.   Neurological:      General: No focal deficit present.      Mental Status: He is alert.   Psychiatric:         Mood and Affect: Mood normal.       Laboratory Data:  CBC CMP and TSH results reviewed    Assessment and Plan:      Lung cancer  -Metastatic lung squamous cell carcinoma   status post carboplatinTaxol and pembrolizumab-completed 6 cycles in March 2021  -Currently on maintenance with immunotherapy pembrolizumab every 6 weeks  04/25/23-CT scan revealed stable skeletal metastasis no new sites of disease  Patient reports he has been tolerating pembrolizumab well  Continue with pembrolizumab  10/23/23-patient is  scheduled for CT scan  Patient scheduled with Dr. Larsen with next cycle of pembrolizumab    Bone mets  -Patient currently on Zometa every 6 months  -Okay to give Zometa today schedule for Zometa today 6 months  -Continue with vitamin D and calcium  Zometa is due in October      Thrombocytopenia chronic-  Continue to monitor closely  Complications of thrombocytopenia reviewed  Call our clinic in the event of bleeding, bruising fall or injury      Hypertension  On amlodipine follow-up with primary care physician    Please call our clinic with any changes in health condition or questions        DARSHANA Meza [facility]     Chart documentation with Dragon Voice recognition Software. Although reviewed after completion, some words and grammatical errors may remain.

## 2023-10-16 ENCOUNTER — TELEPHONE (OUTPATIENT)
Dept: MEDSURG UNIT | Facility: CLINIC | Age: 83
End: 2023-10-16
Payer: MEDICARE

## 2023-10-16 ENCOUNTER — OFFICE VISIT (OUTPATIENT)
Dept: URGENT CARE | Facility: URGENT CARE | Age: 83
End: 2023-10-16
Payer: MEDICARE

## 2023-10-16 ENCOUNTER — ANCILLARY PROCEDURE (OUTPATIENT)
Dept: GENERAL RADIOLOGY | Facility: CLINIC | Age: 83
End: 2023-10-16
Attending: EMERGENCY MEDICINE
Payer: MEDICARE

## 2023-10-16 VITALS
SYSTOLIC BLOOD PRESSURE: 148 MMHG | DIASTOLIC BLOOD PRESSURE: 63 MMHG | TEMPERATURE: 98.6 F | WEIGHT: 198 LBS | HEART RATE: 67 BPM | OXYGEN SATURATION: 92 % | BODY MASS INDEX: 31.01 KG/M2

## 2023-10-16 DIAGNOSIS — J18.9 PNEUMONIA OF RIGHT MIDDLE LOBE DUE TO INFECTIOUS ORGANISM: Primary | ICD-10-CM

## 2023-10-16 DIAGNOSIS — C34.31 MALIGNANT NEOPLASM OF LOWER LOBE OF RIGHT LUNG (H): ICD-10-CM

## 2023-10-16 DIAGNOSIS — J40 BRONCHITIS: ICD-10-CM

## 2023-10-16 PROCEDURE — 71046 X-RAY EXAM CHEST 2 VIEWS: CPT | Mod: TC | Performed by: RADIOLOGY

## 2023-10-16 PROCEDURE — 99214 OFFICE O/P EST MOD 30 MIN: CPT | Performed by: EMERGENCY MEDICINE

## 2023-10-16 NOTE — PROGRESS NOTES
Assessment & Plan     Diagnosis:    ICD-10-CM    1. Pneumonia of right middle lobe due to infectious organism  J18.9 XR Chest 2 Views     amoxicillin-clavulanate (AUGMENTIN) 875-125 MG tablet      2. Malignant neoplasm of lower lobe of right lung (H)  C34.31           Medical Decision Making:  Derrick Benitez is a 83 year old male who presents for evaluation of productive cough.  History, physical exam and imaging studies are consistent with pneumonia of the riddle middle lobe v bronchial thickening; radiology notes as per below. Given rales/rhonchi on exam will treat for pneumonia. There are no signs of complications of pneumonia at this point such as septic shock, bacteremia, empyema, hypoxia (O2 saturation has been between 92-95% on room air here), respiratory failure or compromise.  Supportive outpatient management is therefore indicated.  This seems to be community acquired pneumonia and there are no risk factors at this point for coverage of antibiotic-resistant strains.  I doubt PE, dissection, acute coronary syndrome, or other worrisome etiology for patients symptoms. Patient will follow-up with primary care physician regardless of disease course within 2 days maximum.  Signs for visit to ED were discussed with patient and pneumonia precautions given for home.  Questions answered.      Fredo Condon PA-C  [facility] URGENT CARE    Subjective     Derrick Benitez is a 83 year old male who presents to clinic today for the following health issues:  Chief Complaint   Patient presents with    Urgent Care    Cough     Coughing started yesterday, nose running, feels a rattle in chest.       HPI    Onset of symptoms was 2 day(s) ago.  Course of illness is worsening.    Severity moderate  Current and Associated symptoms: runny nose, stuffy nose, cough - productive, and fatigue  Denies fever, wheezing, shortness of breath, sore throat, facial pain/pressure, headache, nausea, vomiting, diarrhea, not eating, and not  sleeping well  Treatment measures tried include Tylenol/Ibuprofen and Decongestants  Predisposing factors include HX of lung cancer.     Patient denies any chest pain, shortness of breath, leg swelling, nausea, vomiting or difficulties swallowing food/fluids at this time.       Review of Systems    See HPI    Objective      Vitals: BP (!) 148/63   Pulse 67   Temp 98.6  F (37  C) (Tympanic)   Wt 89.8 kg (198 lb)   SpO2 92%   BMI 31.01 kg/m    Resp: 16    Patient Vitals for the past 24 hrs:   BP Temp Temp src Pulse SpO2 Weight   10/16/23 1717 (!) 148/63 98.6  F (37  C) Tympanic 67 92 % 89.8 kg (198 lb)       Vital signs reviewed by: Fredo Condon PA-C    Physical Exam   Constitutional: Alert and active. Non-toxic appearing.  No acute distress.  HENT:   Right Ear: External ear normal. TM: normal  Left Ear: External ear normal. TM: normal  Nose: Nose normal.    Eyes: Conjunctivae, EOM and lids are normal.   Mouth: Mucous membranes are moist. Posterior oropharynx is clear. No exudates. Normal tongue and tonsil. Uvula is midline.  Cardiovascular: Regular rate and rhythm  Pulmonary/Chest: rales/rhonchi in the right middle and lower lung fields. Effort normal. No respiratory distress. No wheezing.  GI: Abdomen is soft and non-tender throughout.   Musculoskeletal: Normal range of motion. No lower extremity tenderness or asymmetric edema.  Neurological: Alert and oriented x3.  Skin: No rash noted on visualized skin.       Labs/Imaging:  Results for orders placed or performed in visit on 10/16/23   XR Chest 2 Views     Status: None    Narrative    EXAM: XR CHEST 2 VIEWS  LOCATION: [facility]  DATE: 10/16/2023    INDICATION:  Bronchitis  COMPARISON: 09/18/2020      Impression    IMPRESSION: Right IJ port terminates at the cavoatrial junction. Patchy bibasilar atelectasis and/or scarring. Mild bronchial wall thickening. No effusions. Heart size is at the upper limits of normal. No pulmonary edema.  Degenerative changes of the   spine.     Reading per radiology        Fredo Condon PA-C, October 16, 2023

## 2023-10-27 ENCOUNTER — HOSPITAL ENCOUNTER (EMERGENCY)
Facility: CLINIC | Age: 83
Discharge: HOME | End: 2023-10-27
Attending: EMERGENCY MEDICINE | Admitting: EMERGENCY MEDICINE
Payer: MEDICARE

## 2023-10-27 ENCOUNTER — APPOINTMENT (OUTPATIENT)
Dept: CT IMAGING | Facility: CLINIC | Age: 83
End: 2023-10-27
Attending: EMERGENCY MEDICINE
Payer: MEDICARE

## 2023-10-27 VITALS
SYSTOLIC BLOOD PRESSURE: 146 MMHG | BODY MASS INDEX: 30.61 KG/M2 | DIASTOLIC BLOOD PRESSURE: 69 MMHG | OXYGEN SATURATION: 95 % | WEIGHT: 195 LBS | RESPIRATION RATE: 16 BRPM | HEIGHT: 67 IN | TEMPERATURE: 96.8 F | HEART RATE: 66 BPM

## 2023-10-27 DIAGNOSIS — G24.5 EYE TWITCH: ICD-10-CM

## 2023-10-27 PROCEDURE — 70450 CT HEAD/BRAIN W/O DYE: CPT | Mod: MG

## 2023-10-27 PROCEDURE — 99284 EMERGENCY DEPT VISIT MOD MDM: CPT | Mod: 25

## 2023-10-27 ASSESSMENT — ACTIVITIES OF DAILY LIVING (ADL): ADLS_ACUITY_SCORE: 35

## 2023-10-27 ASSESSMENT — VISUAL ACUITY: OU: NORMAL ACUITY

## 2023-10-27 NOTE — DISCHARGE INSTRUCTIONS
Please come back to the emergency room immediately with any concerns you have or if you have any new symptoms that develop such as facial droop, new slurred speech, any vision changes or any new symptoms that develop at all.  You have told us that you had today symptoms for at least 1 to 2 years, and your CAT scan is negative without any sign of a stroke over that time.  Please come back with any other concerns

## 2023-10-27 NOTE — ED TRIAGE NOTES
Pt sent here from eye clinic where he was told to come because he has a slight L sided facial droop. Pt states this is not new and has been there for 2 years. Has facial nerve paralysis and told staff this but wanted to get seen to rule out stroke. Patient denies new symptoms or pain.      Triage Assessment (Adult)       Row Name 10/27/23 1120          Respiratory WDL    Respiratory WDL WDL        Cardiac WDL    Cardiac WDL WDL        Cognitive/Neuro/Behavioral WDL    Cognitive/Neuro/Behavioral WDL WDL

## 2023-10-27 NOTE — ED PROVIDER NOTES
History     Chief Complaint:  Facial Droop (/)       HPI   Derrick Benitez is a 83 year old male who presents with what he is told is a left-sided facial droop.  The patient states that he was getting his eyes checked earlier today, and was noted to have a droopy left eyelid, and twitching.  He notes that he has had both of these for the last 2 years and does not endorse any new symptoms of any kind.  It sounds like he was then sent to his primary care doctor, and the decision was made to send to the ER for possible stroke.  Again the patient is quite clear he has not noticed any new findings of any kind in the last 1 to 2 years and denies any facial numbness or tingling.  He has no drooling, states that his speech is normal to him      Independent Historian:   No    Review of External Notes: Yes I reviewed today's ophthalmology note and office visit      Allergies:  Losartan  Methenamine     Medications:    acetaminophen (TYLENOL) 325 MG tablet  albuterol (PROAIR HFA/PROVENTIL HFA/VENTOLIN HFA) 108 (90 BASE) MCG/ACT Inhaler  AMLODIPINE BESYLATE PO  blood glucose (ACCU-CHEK SMARTVIEW) test strip  calcium carbonate-vitamin D (OS-ANTHONY) 500-400 MG-UNIT tablet  Ferrous Sulfate (IRON SUPPLEMENT PO)  FINASTERIDE PO  gabapentin (NEURONTIN) 300 MG capsule  ibuprofen (ADVIL/MOTRIN) 200 MG capsule  potassium gluconate 2.5 MEQ TABS  Tadalafil (CIALIS PO)  tamsulosin (FLOMAX) 0.4 MG capsule  tolterodine ER (DETROL LA) 2 MG 24 hr capsule        Past Medical History:    Past Medical History:   Diagnosis Date    Calcific tendonitis of left hand     CKD (chronic kidney disease)     Enlarged prostate 10/28/2014    Hypertension     Lung cancer metastatic to bone (H) 09/2020       Past Surgical History:    Past Surgical History:   Procedure Laterality Date    BIOPSY CT GUIDED (LOCATION)      Lung biopsy - R LL    EXTRACTION(S) DENTAL      IR CHEST PORT PLACEMENT > 5 YRS OF AGE  11/24/2020        Family History:    family history is not  "on file.    Social History:   reports that he quit smoking about 19 years ago. His smoking use included cigarettes. He has a 120.00 pack-year smoking history. He has never used smokeless tobacco. He reports that he does not drink alcohol and does not use drugs.  PCP: Clarisse Golden     Physical Exam   Patient Vitals for the past 24 hrs:   BP Temp Temp src Pulse Resp SpO2 Height Weight   10/27/23 1125 (!) 161/128 -- -- -- -- -- -- --   10/27/23 1120 -- 96.8  F (36  C) Temporal 77 18 95 % 1.702 m (5' 7\") 88.5 kg (195 lb)        Physical Exam  Vitals: reviewed by me  General: Pt seen on [facility], cooperative, and alert to conversation  Eyes: Tracking well, clear conjunctiva BL  ENT: MMM, midline trachea.   Lungs: No tachypnea, no accessory muscle use. No respiratory distress.   CV: Rate as above  MSK: no joint effusion.  No evidence of trauma  Skin: No rash  Neuro: Clear speech and no facial droop.  Cranial nerves II through XII are intact bilaterally.  Bilateral upper and bilateral lower extremities with sensation tact light touch and 5 out of 5 motor throughout.  Psych: Not RIS, no e/o AH/VH          Emergency Department Course         Imaging:  CT Head w/o Contrast   Final Result   IMPRESSION:      No acute findings. Chronic changes as above.      ALIYA GREER MD            SYSTEM ID:  [id number]         Report per radiology  Emergency Department Course & Assessments:      Independent Interpretation (X-rays, CTs, rhythm strip):  Yes I have independently reviewed the patient's CT scan of the head, no obvious hemorrhage noted         Social Determinants of Health affecting care:   None      Disposition:  The patient was discharged to home.     Impression & Plan        Medical Decision Making:  This is a very pleasant 83-year-old male who presents to the emergency room out of concern from his primary care doctor for a possible facial droop.  I may appreciate some mild facial asymmetry, but he is very clearly " with his cranial nerves intact, has no ptosis, is able to generate bilateral smile and he is quite clear that his face has looked this way for at least several years.  The CT scan of the head does not show any evidence of a stroke, and I think over this timeframe that a CT scan of the head is sufficien in the absence of other symptoms.  Patient himself tells me he feels comfortable going home and has no further questions or concerns here, I have asked him to follow-up with his regular doctor in the next 1 week and he feels comfortable doing this.  He knows to come back if he does develop any acute neurologic symptoms of any kind however.    Diagnosis:    ICD-10-CM    1. Eye twitch  G24.5              10/27/2023   Maxim Bruno*        Maxim Bruno MD  10/27/23 1327

## 2023-10-31 ENCOUNTER — TELEPHONE (OUTPATIENT)
Dept: MEDSURG UNIT | Facility: CLINIC | Age: 83
End: 2023-10-31
Payer: MEDICARE

## 2023-10-31 NOTE — TELEPHONE ENCOUNTER
Chart reviewed for upcoming appt. VAD flush orders in therapy plan.     Demi Smith RN on 10/31/2023 at 1:45 PM     no

## 2023-11-08 ENCOUNTER — HOSPITAL ENCOUNTER (OUTPATIENT)
Dept: CT IMAGING | Facility: CLINIC | Age: 83
Discharge: HOME | End: 2023-11-08
Attending: INTERNAL MEDICINE | Admitting: INTERNAL MEDICINE
Payer: MEDICARE

## 2023-11-08 ENCOUNTER — HOSPITAL ENCOUNTER (OUTPATIENT)
Facility: CLINIC | Age: 83
Discharge: HOME | End: 2023-11-08
Admitting: INTERNAL MEDICINE
Payer: MEDICARE

## 2023-11-08 DIAGNOSIS — C34.31 MALIGNANT NEOPLASM OF LOWER LOBE OF RIGHT LUNG (H): ICD-10-CM

## 2023-11-08 DIAGNOSIS — C34.31 MALIGNANT NEOPLASM OF LOWER LOBE OF RIGHT LUNG (H): Primary | ICD-10-CM

## 2023-11-08 PROCEDURE — 74177 CT ABD & PELVIS W/CONTRAST: CPT | Mod: MG

## 2023-11-08 PROCEDURE — G1010 CDSM STANSON: HCPCS

## 2023-11-08 PROCEDURE — 250N000011 HC RX IP 250 OP 636: Mod: JZ

## 2023-11-08 PROCEDURE — 999N000154 HC STATISTIC RADIOLOGY XRAY, US, CT, MAR, NM

## 2023-11-08 PROCEDURE — 250N000011 HC RX IP 250 OP 636: Performed by: INTERNAL MEDICINE

## 2023-11-08 PROCEDURE — 250N000009 HC RX 250: Performed by: INTERNAL MEDICINE

## 2023-11-08 RX ORDER — IOPAMIDOL 755 MG/ML
95 INJECTION, SOLUTION INTRAVASCULAR ONCE
Status: COMPLETED | OUTPATIENT
Start: 2023-11-08 | End: 2023-11-08

## 2023-11-08 RX ORDER — HEPARIN SODIUM (PORCINE) LOCK FLUSH IV SOLN 100 UNIT/ML 100 UNIT/ML
5 SOLUTION INTRAVENOUS
Status: DISCONTINUED | OUTPATIENT
Start: 2023-11-08 | End: 2023-11-08 | Stop reason: HOSPADM

## 2023-11-08 RX ORDER — HEPARIN SODIUM,PORCINE 10 UNIT/ML
5 VIAL (ML) INTRAVENOUS
Status: DISCONTINUED | OUTPATIENT
Start: 2023-11-08 | End: 2023-11-08 | Stop reason: HOSPADM

## 2023-11-08 RX ADMIN — Medication 5 ML: at 11:20

## 2023-11-08 RX ADMIN — IOPAMIDOL 95 ML: 755 INJECTION, SOLUTION INTRAVENOUS at 11:13

## 2023-11-08 RX ADMIN — SODIUM CHLORIDE 67 ML: 9 INJECTION, SOLUTION INTRAVENOUS at 11:14

## 2023-11-08 ASSESSMENT — ACTIVITIES OF DAILY LIVING (ADL)
ADLS_ACUITY_SCORE: 35
ADLS_ACUITY_SCORE: 35

## 2023-11-10 NOTE — RESULT ENCOUNTER NOTE
Dear Mr. Benitez,    CT scan is same as before. No evidence of cancer.    Please, call me with any questions.    Buzz Larsen MD

## 2023-11-20 ENCOUNTER — LAB (OUTPATIENT)
Dept: INFUSION THERAPY | Facility: CLINIC | Age: 83
End: 2023-11-20
Attending: INTERNAL MEDICINE
Payer: MEDICARE

## 2023-11-20 DIAGNOSIS — Z51.11 ENCOUNTER FOR ANTINEOPLASTIC CHEMOTHERAPY: ICD-10-CM

## 2023-11-20 DIAGNOSIS — C34.31 MALIGNANT NEOPLASM OF LOWER LOBE OF RIGHT LUNG (H): Primary | ICD-10-CM

## 2023-11-20 LAB
ALBUMIN SERPL BCG-MCNC: 3.5 G/DL (ref 3.5–5.2)
ALP SERPL-CCNC: 71 U/L (ref 40–150)
ALT SERPL W P-5'-P-CCNC: 12 U/L (ref 0–70)
ANION GAP SERPL CALCULATED.3IONS-SCNC: 8 MMOL/L (ref 7–15)
AST SERPL W P-5'-P-CCNC: 22 U/L (ref 0–45)
BASOPHILS # BLD AUTO: 0 10E3/UL (ref 0–0.2)
BASOPHILS NFR BLD AUTO: 0 %
BILIRUB SERPL-MCNC: 0.4 MG/DL
BUN SERPL-MCNC: 16.7 MG/DL (ref 8–23)
CALCIUM SERPL-MCNC: 8.3 MG/DL (ref 8.8–10.2)
CHLORIDE SERPL-SCNC: 108 MMOL/L (ref 98–107)
CREAT SERPL-MCNC: 1.19 MG/DL (ref 0.67–1.17)
DEPRECATED HCO3 PLAS-SCNC: 23 MMOL/L (ref 22–29)
EGFRCR SERPLBLD CKD-EPI 2021: 60 ML/MIN/1.73M2
EOSINOPHIL # BLD AUTO: 0.2 10E3/UL (ref 0–0.7)
EOSINOPHIL NFR BLD AUTO: 3 %
ERYTHROCYTE [DISTWIDTH] IN BLOOD BY AUTOMATED COUNT: 12.8 % (ref 10–15)
GLUCOSE SERPL-MCNC: 168 MG/DL (ref 70–99)
HCT VFR BLD AUTO: 40.7 % (ref 40–53)
HGB BLD-MCNC: 13.6 G/DL (ref 13.3–17.7)
IMM GRANULOCYTES # BLD: 0 10E3/UL
IMM GRANULOCYTES NFR BLD: 0 %
LYMPHOCYTES # BLD AUTO: 1.2 10E3/UL (ref 0.8–5.3)
LYMPHOCYTES NFR BLD AUTO: 23 %
MCH RBC QN AUTO: 31.9 PG (ref 26.5–33)
MCHC RBC AUTO-ENTMCNC: 33.4 G/DL (ref 31.5–36.5)
MCV RBC AUTO: 95 FL (ref 78–100)
MONOCYTES # BLD AUTO: 0.4 10E3/UL (ref 0–1.3)
MONOCYTES NFR BLD AUTO: 7 %
NEUTROPHILS # BLD AUTO: 3.4 10E3/UL (ref 1.6–8.3)
NEUTROPHILS NFR BLD AUTO: 67 %
NRBC # BLD AUTO: 0 10E3/UL
NRBC BLD AUTO-RTO: 0 /100
PLATELET # BLD AUTO: 129 10E3/UL (ref 150–450)
POTASSIUM SERPL-SCNC: 4.5 MMOL/L (ref 3.4–5.3)
PROT SERPL-MCNC: 6 G/DL (ref 6.4–8.3)
RBC # BLD AUTO: 4.27 10E6/UL (ref 4.4–5.9)
SODIUM SERPL-SCNC: 139 MMOL/L (ref 135–145)
TSH SERPL DL<=0.005 MIU/L-ACNC: 1.84 UIU/ML (ref 0.3–4.2)
WBC # BLD AUTO: 5.1 10E3/UL (ref 4–11)

## 2023-11-20 PROCEDURE — 36591 DRAW BLOOD OFF VENOUS DEVICE: CPT | Performed by: INTERNAL MEDICINE

## 2023-11-20 PROCEDURE — 80053 COMPREHEN METABOLIC PANEL: CPT | Performed by: INTERNAL MEDICINE

## 2023-11-20 PROCEDURE — 250N000011 HC RX IP 250 OP 636: Mod: JZ | Performed by: INTERNAL MEDICINE

## 2023-11-20 PROCEDURE — 85025 COMPLETE CBC W/AUTO DIFF WBC: CPT | Performed by: INTERNAL MEDICINE

## 2023-11-20 PROCEDURE — 84443 ASSAY THYROID STIM HORMONE: CPT | Performed by: INTERNAL MEDICINE

## 2023-11-20 RX ORDER — HEPARIN SODIUM (PORCINE) LOCK FLUSH IV SOLN 100 UNIT/ML 100 UNIT/ML
5 SOLUTION INTRAVENOUS
Status: DISCONTINUED | OUTPATIENT
Start: 2023-11-20 | End: 2023-11-20 | Stop reason: HOSPADM

## 2023-11-20 RX ADMIN — Medication 5 ML: at 12:42

## 2023-11-21 ENCOUNTER — INFUSION THERAPY VISIT (OUTPATIENT)
Dept: INFUSION THERAPY | Facility: CLINIC | Age: 83
End: 2023-11-21
Attending: INTERNAL MEDICINE
Payer: MEDICARE

## 2023-11-21 ENCOUNTER — ONCOLOGY VISIT (OUTPATIENT)
Dept: ONCOLOGY | Facility: CLINIC | Age: 83
End: 2023-11-21
Attending: INTERNAL MEDICINE
Payer: MEDICARE

## 2023-11-21 VITALS
HEART RATE: 64 BPM | SYSTOLIC BLOOD PRESSURE: 146 MMHG | TEMPERATURE: 97.3 F | HEIGHT: 67 IN | BODY MASS INDEX: 30.92 KG/M2 | DIASTOLIC BLOOD PRESSURE: 68 MMHG | RESPIRATION RATE: 16 BRPM | WEIGHT: 197 LBS | OXYGEN SATURATION: 92 %

## 2023-11-21 DIAGNOSIS — C34.31 MALIGNANT NEOPLASM OF LOWER LOBE OF RIGHT LUNG (H): Primary | ICD-10-CM

## 2023-11-21 DIAGNOSIS — G62.0 PERIPHERAL NEUROPATHY DUE TO CHEMOTHERAPY (H): ICD-10-CM

## 2023-11-21 DIAGNOSIS — C79.51 MALIGNANT NEOPLASM METASTATIC TO BONE (H): ICD-10-CM

## 2023-11-21 DIAGNOSIS — Z51.11 ENCOUNTER FOR ANTINEOPLASTIC CHEMOTHERAPY: ICD-10-CM

## 2023-11-21 DIAGNOSIS — T45.1X5A PERIPHERAL NEUROPATHY DUE TO CHEMOTHERAPY (H): ICD-10-CM

## 2023-11-21 PROCEDURE — 250N000011 HC RX IP 250 OP 636: Mod: JZ | Performed by: INTERNAL MEDICINE

## 2023-11-21 PROCEDURE — G0463 HOSPITAL OUTPT CLINIC VISIT: HCPCS | Mod: 25 | Performed by: INTERNAL MEDICINE

## 2023-11-21 PROCEDURE — 96413 CHEMO IV INFUSION 1 HR: CPT

## 2023-11-21 PROCEDURE — 99214 OFFICE O/P EST MOD 30 MIN: CPT | Performed by: INTERNAL MEDICINE

## 2023-11-21 PROCEDURE — 258N000003 HC RX IP 258 OP 636: Performed by: INTERNAL MEDICINE

## 2023-11-21 RX ORDER — ALBUTEROL SULFATE 0.83 MG/ML
2.5 SOLUTION RESPIRATORY (INHALATION)
Status: CANCELLED | OUTPATIENT
Start: 2023-11-21

## 2023-11-21 RX ORDER — LORAZEPAM 2 MG/ML
0.5 INJECTION INTRAMUSCULAR EVERY 4 HOURS PRN
Status: CANCELLED | OUTPATIENT
Start: 2023-11-21

## 2023-11-21 RX ORDER — HEPARIN SODIUM (PORCINE) LOCK FLUSH IV SOLN 100 UNIT/ML 100 UNIT/ML
5 SOLUTION INTRAVENOUS
Status: CANCELLED | OUTPATIENT
Start: 2024-04-20

## 2023-11-21 RX ORDER — EPINEPHRINE 1 MG/ML
0.3 INJECTION, SOLUTION INTRAMUSCULAR; SUBCUTANEOUS EVERY 5 MIN PRN
Status: CANCELLED | OUTPATIENT
Start: 2023-11-21

## 2023-11-21 RX ORDER — HEPARIN SODIUM (PORCINE) LOCK FLUSH IV SOLN 100 UNIT/ML 100 UNIT/ML
5 SOLUTION INTRAVENOUS EVERY 8 HOURS PRN
Status: CANCELLED | OUTPATIENT
Start: 2023-11-21

## 2023-11-21 RX ORDER — SODIUM CHLORIDE 9 MG/ML
1000 INJECTION, SOLUTION INTRAVENOUS CONTINUOUS PRN
Status: CANCELLED
Start: 2023-11-21

## 2023-11-21 RX ORDER — MEPERIDINE HYDROCHLORIDE 25 MG/ML
25 INJECTION INTRAMUSCULAR; INTRAVENOUS; SUBCUTANEOUS EVERY 30 MIN PRN
Status: CANCELLED | OUTPATIENT
Start: 2023-11-21

## 2023-11-21 RX ORDER — METHYLPREDNISOLONE SODIUM SUCCINATE 125 MG/2ML
125 INJECTION, POWDER, LYOPHILIZED, FOR SOLUTION INTRAMUSCULAR; INTRAVENOUS
Status: CANCELLED
Start: 2023-11-21

## 2023-11-21 RX ORDER — HEPARIN SODIUM,PORCINE 10 UNIT/ML
5 VIAL (ML) INTRAVENOUS
Status: CANCELLED | OUTPATIENT
Start: 2023-11-21

## 2023-11-21 RX ORDER — ZOLEDRONIC ACID 0.04 MG/ML
4 INJECTION, SOLUTION INTRAVENOUS ONCE
Status: CANCELLED | OUTPATIENT
Start: 2024-04-20 | End: 2024-04-20

## 2023-11-21 RX ORDER — DIPHENHYDRAMINE HYDROCHLORIDE 50 MG/ML
50 INJECTION INTRAMUSCULAR; INTRAVENOUS
Status: CANCELLED
Start: 2023-11-21

## 2023-11-21 RX ORDER — HEPARIN SODIUM,PORCINE 10 UNIT/ML
5 VIAL (ML) INTRAVENOUS
Status: CANCELLED | OUTPATIENT
Start: 2024-04-20

## 2023-11-21 RX ORDER — GABAPENTIN 300 MG/1
300 CAPSULE ORAL 2 TIMES DAILY
Qty: 180 CAPSULE | Refills: 1 | Status: SHIPPED | OUTPATIENT
Start: 2023-11-21 | End: 2024-02-13

## 2023-11-21 RX ORDER — NALOXONE HYDROCHLORIDE 0.4 MG/ML
.1-.4 INJECTION, SOLUTION INTRAMUSCULAR; INTRAVENOUS; SUBCUTANEOUS
Status: CANCELLED | OUTPATIENT
Start: 2023-11-21

## 2023-11-21 RX ORDER — HEPARIN SODIUM (PORCINE) LOCK FLUSH IV SOLN 100 UNIT/ML 100 UNIT/ML
5 SOLUTION INTRAVENOUS EVERY 8 HOURS PRN
Status: DISCONTINUED | OUTPATIENT
Start: 2023-11-21 | End: 2023-11-21 | Stop reason: HOSPADM

## 2023-11-21 RX ORDER — ALBUTEROL SULFATE 90 UG/1
1-2 AEROSOL, METERED RESPIRATORY (INHALATION)
Status: CANCELLED
Start: 2023-11-21

## 2023-11-21 RX ADMIN — SODIUM CHLORIDE 400 MG: 9 INJECTION, SOLUTION INTRAVENOUS at 09:55

## 2023-11-21 RX ADMIN — SODIUM CHLORIDE 1000 ML: 9 INJECTION, SOLUTION INTRAVENOUS at 09:35

## 2023-11-21 RX ADMIN — Medication 5 ML: at 10:24

## 2023-11-21 ASSESSMENT — PAIN SCALES - GENERAL: PAINLEVEL: NO PAIN (0)

## 2023-11-21 NOTE — PROGRESS NOTES
Infusion Nursing Note:  Derrick Benitez presents today for C27D1 Keytruda.    Patient seen by provider today: Yes: Dr. Larsen   present during visit today: Not Applicable.    Note: N/A.      Intravenous Access:  Implanted Port.    Treatment Conditions:  Lab Results   Component Value Date     11/20/2023    POTASSIUM 4.5 11/20/2023    MAG 2.1 10/30/2014    CR 1.19 (H) 11/20/2023    ANTHONY 8.3 (L) 11/20/2023    BILITOTAL 0.4 11/20/2023    ALBUMIN 3.5 11/20/2023    ALT 12 11/20/2023    AST 22 11/20/2023       Results reviewed, labs MET treatment parameters, ok to proceed with treatment.      Post Infusion Assessment:  Patient tolerated infusion without incident.  Blood return noted pre and post infusion.  Site patent and intact, free from redness, edema or discomfort.  No evidence of extravasations.  Access discontinued per protocol.       Discharge Plan:   Discharge instructions reviewed with: Patient.  Patient and/or family verbalized understanding of discharge instructions and all questions answered.  Patient discharged in stable condition accompanied by: self.  Departure Mode: Ambulatory.      Tatiana Aguiar RN

## 2023-11-21 NOTE — PATIENT INSTRUCTIONS
1. Continue pembrolizumab every 6 weeks.  2. Zometa every 6 months.  3. Take gabapentin to twice a day.  4. See NP in 6 weeks when he comes for treatment.  5. See me in 12 weeks when he comes for treatment.

## 2023-11-21 NOTE — PROGRESS NOTES
"Oncology Rooming Note    November 21, 2023 8:51 AM   Derrick Benitez is a 83 year old male who presents for:    Chief Complaint   Patient presents with    Oncology Clinic Visit     Initial Vitals: Resp 16   Ht 1.702 m (5' 7\")   Wt 89.4 kg (197 lb)   BMI 30.85 kg/m   Estimated body mass index is 30.85 kg/m  as calculated from the following:    Height as of this encounter: 1.702 m (5' 7\").    Weight as of this encounter: 89.4 kg (197 lb). Body surface area is 2.06 meters squared.  No Pain (0) Comment: Data Unavailable   No LMP for male patient.  Allergies reviewed: Yes  Medications reviewed: Yes    Medications: Medication refills not needed today.  Pharmacy name entered into EPIC:    [place], [place] - [address]  [facility] [id number] [place], [place] - [address] [place] [address] [address]        Brenda Martines MA            "

## 2023-11-21 NOTE — PROGRESS NOTES
ONCOLOGIC HISTORY:  Mr. Benitez is a gentleman with non-small cell lung cancer, favor squamous cell carcinoma.   -NGS and PDL staining could not be done because of small sample.   -Guardant 360 does not reveal any actionable alteration.  1.  CT chest angiogram on 09/07/2020 revealed right lower lobe mass.   2.  CT-guided biopsy was done on 09/18/2020.  Pathology revealed non-small cell lung cancer, favor squamous cell carcinoma.  Sample size was small.  PDL staining and NGS panel could not be done.   3.  Brain MRI on 09/26/2020 did not reveal any brain metastasis.   4.  PET scan on 10/05/2020 revealed hypermetabolic right lung mass and multiple hypermetabolic bone lesions.   5.  The patient started on carboplatin and Taxol on 11/25/2020.   -Pembrolizumab added with cycle 2.   6. PET scan on 10/17/2022 reveals FDG avid sclerotic lesions involving the right posterior seventh rib slightly above background levels. Favored to simply represent sequelae of post treatment change.     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 He is tolerating it well.      CT scan on 11/08/2023:  1.  Stable sclerotic bony lesions.  2.  Stable pulmonary nodules and posttreatment fibrosis.  3.  No new lesions.     He is doing well for his age.  He has mild generalized weakness. He is able to do all things including lawn mowing.  No headache.  No dizziness.  No chest pain.  No shortness of breath.  No abdominal pain, nausea or vomiting. Appetite is good. No urinary or bowel complaints. No bleeding. Intermittent numbness in feet.  All other review of systems is negative.     PHYSICAL EXAMINATION:   GENERAL:  Alert and oriented x 3.   VITAL SIGNS:  Reviewed.  ECOG PS of 1.     Rest of the system not examined.     LABS:  CBC, CMP and TSH reviewed.     ASSESSMENT:    1.  An 83-year-old gentleman with metastatic non-small cell lung cancer on pembrolizumab.  No evidence of  disease.  2.  Grade 1 peripheral neuropathy on gabapentin.  3.  Mild thrombocytopenia, stable.  4.  Elevated creatinine.  5.  Aortic aneurysm, being followed by vascular surgery.     PLAN:    1. Continue pembrolizumab every 6 weeks.  2. Zometa every 6 months.  3. Take gabapentin to twice a day.  4. See NP in 6 weeks when he comes for treatment.  5. See me in 12 weeks when he comes for treatment.    DISCUSSION:  1.  CT scan was reviewed with the patient.  No evidence of cancer.    For lung cancer, will continue on pembrolizumab.  He is tolerating it well.    2.  He will continue on Zometa every 6 months.  He is not having any jaw related symptoms.  He does not have any teeth.  He will continue on calcium and vitamin D twice a day.    3.  We will continue on gabapentin for neuropathy. It is helping.    4.  Labs were reviewed with him.  He has mild thrombocytopenia which is stable.  Creatinine mildly elevated.  Advised him to drink plenty of fluid.    5.  He had a few questions all answered.  I will see him in 12 weeks.  In between he will see our DAMEON.     Total visit time of 30 minutes.  Time spent in today's visit, review of chart/investigations today, monitoring for toxicity of high risk drugs and documentation today.

## 2023-11-21 NOTE — RESULT ENCOUNTER NOTE
Dear Mr. Benitez,    Blood tests are stable.    Please, call me with any questions.    Buzz Larsen MD

## 2023-11-21 NOTE — LETTER
"    11/21/2023         RE: Derrick Benitez  [address]  [facility] [address]        Dear Colleague,    Thank you for referring your patient, Derrick Benitez, to the [facility]. Please see a copy of my visit note below.    Oncology Rooming Note    November 21, 2023 8:51 AM   Derrick Benitez is a 83 year old male who presents for:    Chief Complaint   Patient presents with     Oncology Clinic Visit     Initial Vitals: Resp 16   Ht 1.702 m (5' 7\")   Wt 89.4 kg (197 lb)   BMI 30.85 kg/m   Estimated body mass index is 30.85 kg/m  as calculated from the following:    Height as of this encounter: 1.702 m (5' 7\").    Weight as of this encounter: 89.4 kg (197 lb). Body surface area is 2.06 meters squared.  No Pain (0) Comment: Data Unavailable   No LMP for male patient.  Allergies reviewed: Yes  Medications reviewed: Yes    Medications: Medication refills not needed today.  Pharmacy name entered into EPIC:    [facility] [place], [place] - [address]  [facility] [id number] [place], [place] - [address] [place] [address]        Brenda Martines MA              ONCOLOGIC HISTORY:  Mr. Benitez is a gentleman with non-small cell lung cancer, favor squamous cell carcinoma.   -NGS and PDL staining could not be done because of small sample.   -Guardant 360 does not reveal any actionable alteration.  1.  CT chest angiogram on 09/07/2020 revealed right lower lobe mass.   2.  CT-guided biopsy was done on 09/18/2020.  Pathology revealed non-small cell lung cancer, favor squamous cell carcinoma.  Sample size was small.  PDL staining and NGS panel could not be done.   3.  Brain MRI on 09/26/2020 did not reveal any brain metastasis.   4.  PET scan on 10/05/2020 revealed hypermetabolic right lung mass and multiple hypermetabolic bone lesions.   5.  The patient started on carboplatin and Taxol on 11/25/2020.   -Pembrolizumab added with cycle 2.   6. PET scan on 10/17/2022 reveals FDG avid sclerotic " lesions involving the right posterior seventh rib slightly above background levels. Favored to simply represent sequelae of post treatment change.     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 He is tolerating it well.      CT scan on 11/08/2023:  1.  Stable sclerotic bony lesions.  2.  Stable pulmonary nodules and posttreatment fibrosis.  3.  No new lesions.     He is doing well for his age.  He has mild generalized weakness. He is able to do all things including lawn mowing.  No headache.  No dizziness.  No chest pain.  No shortness of breath.  No abdominal pain, nausea or vomiting. Appetite is good. No urinary or bowel complaints. No bleeding. Intermittent numbness in feet.  All other review of systems is negative.     PHYSICAL EXAMINATION:   GENERAL:  Alert and oriented x 3.   VITAL SIGNS:  Reviewed.  ECOG PS of 1.     Rest of the system not examined.     LABS:  CBC, CMP and TSH reviewed.     ASSESSMENT:    1.  An 83-year-old gentleman with metastatic non-small cell lung cancer on pembrolizumab.  No evidence of disease.  2.  Grade 1 peripheral neuropathy on gabapentin.  3.  Mild thrombocytopenia, stable.  4.  Elevated creatinine.  5.  Aortic aneurysm, being followed by vascular surgery.     PLAN:    1. Continue pembrolizumab every 6 weeks.  2. Zometa every 6 months.  3. Take gabapentin to twice a day.  4. See NP in 6 weeks when he comes for treatment.  5. See me in 12 weeks when he comes for treatment.    DISCUSSION:  1.  CT scan was reviewed with the patient.  No evidence of cancer.    For lung cancer, will continue on pembrolizumab.  He is tolerating it well.    2.  He will continue on Zometa every 6 months.  He is not having any jaw related symptoms.  He does not have any teeth.  He will continue on calcium and vitamin D twice a day.    3.  We will continue on gabapentin for neuropathy. It is helping.    4.  Labs were reviewed with him.   He has mild thrombocytopenia which is stable.  Creatinine mildly elevated.  Advised him to drink plenty of fluid.    5.  He had a few questions all answered.  I will see him in 12 weeks.  In between he will see our DAMEON.     Total visit time of 30 minutes.  Time spent in today's visit, review of chart/investigations today, monitoring for toxicity of high risk drugs and documentation today.      Again, thank you for allowing me to participate in the care of your patient.        Sincerely,        Buzz Larsen MD

## 2023-12-29 ENCOUNTER — LAB (OUTPATIENT)
Dept: INFUSION THERAPY | Facility: CLINIC | Age: 83
End: 2023-12-29
Attending: INTERNAL MEDICINE
Payer: MEDICARE

## 2023-12-29 ENCOUNTER — ONCOLOGY VISIT (OUTPATIENT)
Dept: ONCOLOGY | Facility: CLINIC | Age: 83
End: 2023-12-29
Attending: INTERNAL MEDICINE
Payer: MEDICARE

## 2023-12-29 VITALS
WEIGHT: 198 LBS | TEMPERATURE: 97.7 F | DIASTOLIC BLOOD PRESSURE: 67 MMHG | HEIGHT: 67 IN | SYSTOLIC BLOOD PRESSURE: 155 MMHG | OXYGEN SATURATION: 95 % | RESPIRATION RATE: 16 BRPM | BODY MASS INDEX: 31.08 KG/M2 | HEART RATE: 62 BPM

## 2023-12-29 DIAGNOSIS — Z51.11 ENCOUNTER FOR ANTINEOPLASTIC CHEMOTHERAPY: ICD-10-CM

## 2023-12-29 DIAGNOSIS — C34.31 MALIGNANT NEOPLASM OF LOWER LOBE OF RIGHT LUNG (H): Primary | ICD-10-CM

## 2023-12-29 LAB
ALBUMIN SERPL BCG-MCNC: 3.7 G/DL (ref 3.5–5.2)
ALP SERPL-CCNC: 74 U/L (ref 40–150)
ALT SERPL W P-5'-P-CCNC: 12 U/L (ref 0–70)
ANION GAP SERPL CALCULATED.3IONS-SCNC: 8 MMOL/L (ref 7–15)
AST SERPL W P-5'-P-CCNC: 29 U/L (ref 0–45)
BASOPHILS # BLD AUTO: 0 10E3/UL (ref 0–0.2)
BASOPHILS NFR BLD AUTO: 0 %
BILIRUB SERPL-MCNC: 0.4 MG/DL
BUN SERPL-MCNC: 12.5 MG/DL (ref 8–23)
CALCIUM SERPL-MCNC: 8.4 MG/DL (ref 8.8–10.2)
CHLORIDE SERPL-SCNC: 108 MMOL/L (ref 98–107)
CREAT SERPL-MCNC: 0.98 MG/DL (ref 0.67–1.17)
DEPRECATED HCO3 PLAS-SCNC: 24 MMOL/L (ref 22–29)
EGFRCR SERPLBLD CKD-EPI 2021: 76 ML/MIN/1.73M2
EOSINOPHIL # BLD AUTO: 0.1 10E3/UL (ref 0–0.7)
EOSINOPHIL NFR BLD AUTO: 2 %
ERYTHROCYTE [DISTWIDTH] IN BLOOD BY AUTOMATED COUNT: 12.6 % (ref 10–15)
GLUCOSE SERPL-MCNC: 154 MG/DL (ref 70–99)
HCT VFR BLD AUTO: 42 % (ref 40–53)
HGB BLD-MCNC: 14.4 G/DL (ref 13.3–17.7)
IMM GRANULOCYTES # BLD: 0 10E3/UL
IMM GRANULOCYTES NFR BLD: 0 %
LYMPHOCYTES # BLD AUTO: 0.9 10E3/UL (ref 0.8–5.3)
LYMPHOCYTES NFR BLD AUTO: 17 %
MCH RBC QN AUTO: 31.7 PG (ref 26.5–33)
MCHC RBC AUTO-ENTMCNC: 34.3 G/DL (ref 31.5–36.5)
MCV RBC AUTO: 93 FL (ref 78–100)
MONOCYTES # BLD AUTO: 0.4 10E3/UL (ref 0–1.3)
MONOCYTES NFR BLD AUTO: 7 %
NEUTROPHILS # BLD AUTO: 4 10E3/UL (ref 1.6–8.3)
NEUTROPHILS NFR BLD AUTO: 74 %
NRBC # BLD AUTO: 0 10E3/UL
NRBC BLD AUTO-RTO: 0 /100
PLATELET # BLD AUTO: 135 10E3/UL (ref 150–450)
POTASSIUM SERPL-SCNC: 4.9 MMOL/L (ref 3.4–5.3)
PROT SERPL-MCNC: 6.5 G/DL (ref 6.4–8.3)
RBC # BLD AUTO: 4.54 10E6/UL (ref 4.4–5.9)
SODIUM SERPL-SCNC: 140 MMOL/L (ref 135–145)
TSH SERPL DL<=0.005 MIU/L-ACNC: 0.84 UIU/ML (ref 0.3–4.2)
WBC # BLD AUTO: 5.4 10E3/UL (ref 4–11)

## 2023-12-29 PROCEDURE — 84443 ASSAY THYROID STIM HORMONE: CPT | Performed by: INTERNAL MEDICINE

## 2023-12-29 PROCEDURE — 85025 COMPLETE CBC W/AUTO DIFF WBC: CPT | Performed by: INTERNAL MEDICINE

## 2023-12-29 PROCEDURE — G0463 HOSPITAL OUTPT CLINIC VISIT: HCPCS | Performed by: NURSE PRACTITIONER

## 2023-12-29 PROCEDURE — 250N000011 HC RX IP 250 OP 636: Performed by: INTERNAL MEDICINE

## 2023-12-29 PROCEDURE — 99214 OFFICE O/P EST MOD 30 MIN: CPT | Performed by: NURSE PRACTITIONER

## 2023-12-29 PROCEDURE — 36591 DRAW BLOOD OFF VENOUS DEVICE: CPT | Performed by: INTERNAL MEDICINE

## 2023-12-29 PROCEDURE — 80053 COMPREHEN METABOLIC PANEL: CPT | Performed by: INTERNAL MEDICINE

## 2023-12-29 RX ORDER — EPINEPHRINE 1 MG/ML
0.3 INJECTION, SOLUTION INTRAMUSCULAR; SUBCUTANEOUS EVERY 5 MIN PRN
Status: CANCELLED | OUTPATIENT
Start: 2024-01-02

## 2023-12-29 RX ORDER — HEPARIN SODIUM,PORCINE 10 UNIT/ML
5 VIAL (ML) INTRAVENOUS
Status: DISCONTINUED | OUTPATIENT
Start: 2023-12-29 | End: 2023-12-29 | Stop reason: HOSPADM

## 2023-12-29 RX ORDER — SODIUM CHLORIDE 9 MG/ML
1000 INJECTION, SOLUTION INTRAVENOUS CONTINUOUS PRN
Status: CANCELLED
Start: 2024-01-02

## 2023-12-29 RX ORDER — HEPARIN SODIUM (PORCINE) LOCK FLUSH IV SOLN 100 UNIT/ML 100 UNIT/ML
5 SOLUTION INTRAVENOUS EVERY 8 HOURS PRN
Status: CANCELLED | OUTPATIENT
Start: 2024-01-02

## 2023-12-29 RX ORDER — ALBUTEROL SULFATE 0.83 MG/ML
2.5 SOLUTION RESPIRATORY (INHALATION)
Status: CANCELLED | OUTPATIENT
Start: 2024-01-02

## 2023-12-29 RX ORDER — LORAZEPAM 2 MG/ML
0.5 INJECTION INTRAMUSCULAR EVERY 4 HOURS PRN
Status: CANCELLED | OUTPATIENT
Start: 2024-01-02

## 2023-12-29 RX ORDER — HEPARIN SODIUM,PORCINE 10 UNIT/ML
5 VIAL (ML) INTRAVENOUS
Status: CANCELLED | OUTPATIENT
Start: 2024-01-02

## 2023-12-29 RX ORDER — MEPERIDINE HYDROCHLORIDE 25 MG/ML
25 INJECTION INTRAMUSCULAR; INTRAVENOUS; SUBCUTANEOUS EVERY 30 MIN PRN
Status: CANCELLED | OUTPATIENT
Start: 2024-01-02

## 2023-12-29 RX ORDER — HEPARIN SODIUM (PORCINE) LOCK FLUSH IV SOLN 100 UNIT/ML 100 UNIT/ML
5 SOLUTION INTRAVENOUS
Status: DISCONTINUED | OUTPATIENT
Start: 2023-12-29 | End: 2023-12-29 | Stop reason: HOSPADM

## 2023-12-29 RX ORDER — ALBUTEROL SULFATE 90 UG/1
1-2 AEROSOL, METERED RESPIRATORY (INHALATION)
Status: CANCELLED
Start: 2024-01-02

## 2023-12-29 RX ORDER — NALOXONE HYDROCHLORIDE 0.4 MG/ML
.1-.4 INJECTION, SOLUTION INTRAMUSCULAR; INTRAVENOUS; SUBCUTANEOUS
Status: CANCELLED | OUTPATIENT
Start: 2024-01-02

## 2023-12-29 RX ORDER — METHYLPREDNISOLONE SODIUM SUCCINATE 125 MG/2ML
125 INJECTION, POWDER, LYOPHILIZED, FOR SOLUTION INTRAMUSCULAR; INTRAVENOUS
Status: CANCELLED
Start: 2024-01-02

## 2023-12-29 RX ORDER — DIPHENHYDRAMINE HYDROCHLORIDE 50 MG/ML
50 INJECTION INTRAMUSCULAR; INTRAVENOUS
Status: CANCELLED
Start: 2024-01-02

## 2023-12-29 RX ADMIN — Medication 5 ML: at 12:35

## 2023-12-29 ASSESSMENT — PAIN SCALES - GENERAL: PAINLEVEL: NO PAIN (0)

## 2023-12-29 NOTE — PROGRESS NOTES
Oncology/Hematology Visit Note  Dec 29, 2023    Reason for Visit:   Lung cancer  Metastatic lung squamous cell carcinoma  Completed carboplatin Taxol and pembrolizumab x 6 cycle on 03/10/2021  03/31/2021-on maintenance with single agent with pembrolizumab  10/17/2022-PET scan did not reveal evidence of malignancy  Patient is currently getting treatment with pembrolizumab every 6 weeks  04/25/23-CT scan revealed stable skeletal metastasis no new sites of disease  11/08/2023-CT scan revealed stable bony lesions    Interval History:  Patient reports overall feeling well denies fever chills sweats cough shortness of breath chest pain nausea vomiting diarrhea abdominal pain or bleeding      Review of Systems:  14 point ROS of systems including Constitutional, Eyes, Respiratory, Cardiovascular, Gastroenterology, Genitourinary, Integumentary, Muscularskeletal, Psychiatric were all negative except for pertinent positives noted in my HPI.    Physical Examination:  Physical Exam  Eyes:      Pupils: Pupils are equal, round, and reactive to light.   Cardiovascular:      Rate and Rhythm: Normal rate.      Pulses: Normal pulses.   Pulmonary:      Effort: Pulmonary effort is normal.   Abdominal:      General: Abdomen is flat.   Musculoskeletal:      Cervical back: Normal range of motion.   Skin:     General: Skin is warm.   Neurological:      General: No focal deficit present.      Mental Status: He is alert.   Psychiatric:         Mood and Affect: Mood normal.       Laboratory Data:  CBC CMP and TSH results reviewed    Assessment and Plan:    Lung cancer  -Metastatic lung squamous cell carcinoma   status post carboplatinTaxol and pembrolizumab-completed 6 cycles in March 2021  -Currently on maintenance with immunotherapy pembrolizumab every 6 weeks  11/08/2023-CT scan revealed stable bony lesions  Patient reports he has been tolerating treatment well  Labs reviewed  Ok to proceed with treatment.  Patient is scheduled in February  with next pembrolizumab      Bone mets  -Patient currently on Zometa every 6 months  -Next Zometa is due in April 2024  -Continue with vitamin D and calcium      Thrombocytopenia chronic-  Continue to monitor closely  Complications of thrombocytopenia reviewed  Call our clinic in the event of bleeding, bruising fall or injury      Hypertension  On amlodipine follow-up with primary care physician    Please call our clinic with any changes in health condition or questions        DARSHANA Meza [facility] [place]     Chart documentation with Dragon Voice recognition Software. Although reviewed after completion, some words and grammatical errors may remain.

## 2023-12-29 NOTE — PROGRESS NOTES
Nursing Note:  Derrick Benitez presents today for Port Labs.    Patient seen by provider today: Yes: De Frausto   present during visit today: Not Applicable.    Note: N/A.    Intravenous Access:  Labs drawn without difficulty.  Implanted Port.    Discharge Plan:   Patient was sent to [facility] for 1:30 PM appointment.    Fili Luz RN

## 2023-12-29 NOTE — LETTER
"    12/29/2023         RE: Derrick Benitez  [address]  [facility] [address]        Dear Colleague,    Thank you for referring your patient, Derrick Benitez, to the [facility]. Please see a copy of my visit note below.    Oncology Rooming Note    December 29, 2023 12:46 PM   Derrick Benitez is a 83 year old male who presents for:    Chief Complaint   Patient presents with     Oncology Clinic Visit     Malignant neoplasm of lower lobe of right lung (H)     Initial Vitals: BP (!) 155/67   Pulse 62   Temp 97.7  F (36.5  C)   Resp 16   Ht 1.702 m (5' 7\")   Wt 89.8 kg (198 lb)   SpO2 95%   BMI 31.01 kg/m   Estimated body mass index is 31.01 kg/m  as calculated from the following:    Height as of this encounter: 1.702 m (5' 7\").    Weight as of this encounter: 89.8 kg (198 lb). Body surface area is 2.06 meters squared.  No Pain (0) Comment: Data Unavailable   No LMP for male patient.  Allergies reviewed: Yes  Medications reviewed: Yes    Medications: Medication refills not needed today.  Pharm[facility] entered into EPIC:    [facility] [place] [place] - [address]  [facility] [address] [place], [place] [facility] [address] [facility]    Frailty Screening:   Is the patient here for a new oncology consult visit in cancer care? 2. No      Clinical concerns: None       Angela Bazzi MA        Oncology/Hematology Visit Note  Dec 29, 2023    Reason for Visit:   Lung cancer  Metastatic lung squamous cell carcinoma  Completed carboplatin Taxol and pembrolizumab x 6 cycle on 03/10/2021  03/31/2021-on maintenance with single agent with pembrolizumab  10/17/2022-PET scan did not reveal evidence of malignancy  Patient is currently getting treatment with pembrolizumab every 6 weeks  04/25/23-CT scan revealed stable skeletal metastasis no new sites of disease  11/08/2023-CT scan revealed stable bony lesions    Interval History:  Patient reports overall feeling well denies fever chills " sweats cough shortness of breath chest pain nausea vomiting diarrhea abdominal pain or bleeding      Review of Systems:  14 point ROS of systems including Constitutional, Eyes, Respiratory, Cardiovascular, Gastroenterology, Genitourinary, Integumentary, Muscularskeletal, Psychiatric were all negative except for pertinent positives noted in my HPI.    Physical Examination:  Physical Exam  Eyes:      Pupils: Pupils are equal, round, and reactive to light.   Cardiovascular:      Rate and Rhythm: Normal rate.      Pulses: Normal pulses.   Pulmonary:      Effort: Pulmonary effort is normal.   Abdominal:      General: Abdomen is flat.   Musculoskeletal:      Cervical back: Normal range of motion.   Skin:     General: Skin is warm.   Neurological:      General: No focal deficit present.      Mental Status: He is alert.   Psychiatric:         Mood and Affect: Mood normal.       Laboratory Data:  CBC CMP and TSH results reviewed    Assessment and Plan:    Lung cancer  -Metastatic lung squamous cell carcinoma   status post carboplatinTaxol and pembrolizumab-completed 6 cycles in March 2021  -Currently on maintenance with immunotherapy pembrolizumab every 6 weeks  11/08/2023-CT scan revealed stable bony lesions  Patient reports he has been tolerating treatment well  Labs reviewed  Ok to proceed with treatment.  Patient is scheduled in February with next pembrolizumab      Bone mets  -Patient currently on Zometa every 6 months  -Next Zometa is due in April 2024  -Continue with vitamin D and calcium      Thrombocytopenia chronic-  Continue to monitor closely  Complications of thrombocytopenia reviewed  Call our clinic in the event of bleeding, bruising fall or injury      Hypertension  On amlodipine follow-up with primary care physician    Please call our clinic with any changes in health condition or questions        DARSHANA Meza [facility] [place]     Chart documentation with Dragon Voice  recognition Software. Although reviewed after completion, some words and grammatical errors may remain.          Again, thank you for allowing me to participate in the care of your patient.        Sincerely,        DARSHANA Meza CNP

## 2023-12-29 NOTE — PROGRESS NOTES
Oncology/Hematology Visit Note  Dec 29, 2023    Reason for Visit:   Lung cancer  Metastatic lung squamous cell carcinoma  Completed carboplatin Taxol and pembrolizumab x 6 cycle on 03/10/2021  03/31/2021-on maintenance with single agent with pembrolizumab  10/17/2022-PET scan did not reveal evidence of malignancy  Patient is currently getting treatment with pembrolizumab every 6 weeks  04/25/23-CT scan revealed stable skeletal metastasis no new sites of disease    Interval History:   patient reports feeling well.  Denies fever chills sweats cough shortness of breath chest pain nausea vomiting diarrhea abdominal pain or bleeding  Reports has been tolerating treatment well      Review of Systems:  14 point ROS of systems including Constitutional, Eyes, Respiratory, Cardiovascular, Gastroenterology, Genitourinary, Integumentary, Muscularskeletal, Psychiatric were all negative except for pertinent positives noted in my HPI.    Physical Examination:  Physical Exam  Eyes:      Pupils: Pupils are equal, round, and reactive to light.   Cardiovascular:      Rate and Rhythm: Normal rate.      Pulses: Normal pulses.   Pulmonary:      Effort: Pulmonary effort is normal.   Abdominal:      General: Abdomen is flat.   Musculoskeletal:      Cervical back: Normal range of motion.   Skin:     General: Skin is warm.   Neurological:      General: No focal deficit present.      Mental Status: He is alert.   Psychiatric:         Mood and Affect: Mood normal.       Laboratory Data:  CBC CMP and TSH results reviewed    Assessment and Plan:      Lung cancer  -Metastatic lung squamous cell carcinoma   status post carboplatinTaxol and pembrolizumab-completed 6 cycles in March 2021  -Currently on maintenance with immunotherapy pembrolizumab every 6 weeks      Bone mets  -Patient currently on Zometa every 6 months  Next May that is due sometime in April 2024  -Continue with vitamin D and calcium      Thrombocytopenia chronic-stable  Continue to  monitor closely  Complications of thrombocytopenia reviewed  Call our clinic in the event of bleeding, bruising fall or injury      Hypertension  On amlodipine follow-up with primary care physician    Please call our clinic with any changes in health condition or questions        DARSHANA Meza [facility]     Chart documentation with Dragon Voice recognition Software. Although reviewed after completion, some words and grammatical errors may remain.

## 2023-12-29 NOTE — PROGRESS NOTES
"Oncology Rooming Note    December 29, 2023 12:46 PM   Derrick Benitez is a 83 year old male who presents for:    Chief Complaint   Patient presents with    Oncology Clinic Visit     Malignant neoplasm of lower lobe of right lung (H)     Initial Vitals: BP (!) 155/67   Pulse 62   Temp 97.7  F (36.5  C)   Resp 16   Ht 1.702 m (5' 7\")   Wt 89.8 kg (198 lb)   SpO2 95%   BMI 31.01 kg/m   Estimated body mass index is 31.01 kg/m  as calculated from the following:    Height as of this encounter: 1.702 m (5' 7\").    Weight as of this encounter: 89.8 kg (198 lb). Body surface area is 2.06 meters squared.  No Pain (0) Comment: Data Unavailable   No LMP for male patient.  Allergies reviewed: Yes  Medications reviewed: Yes    Medications: Medication refills not needed today.  Pharmacy name entered into EPIC:    [place], [place] - [address]  [facility] [id number] [place], [place] - [address]    Frailty Screening:   Is the patient here for a new oncology consult visit in cancer care? 2. No      Clinical concerns: None       Angela Bazzi MA      "

## 2024-01-02 ENCOUNTER — INFUSION THERAPY VISIT (OUTPATIENT)
Dept: INFUSION THERAPY | Facility: CLINIC | Age: 84
End: 2024-01-02
Attending: INTERNAL MEDICINE
Payer: MEDICARE

## 2024-01-02 VITALS
RESPIRATION RATE: 18 BRPM | HEART RATE: 75 BPM | TEMPERATURE: 98.2 F | SYSTOLIC BLOOD PRESSURE: 128 MMHG | DIASTOLIC BLOOD PRESSURE: 62 MMHG | OXYGEN SATURATION: 92 %

## 2024-01-02 DIAGNOSIS — C34.31 MALIGNANT NEOPLASM OF LOWER LOBE OF RIGHT LUNG (H): Primary | ICD-10-CM

## 2024-01-02 DIAGNOSIS — Z51.11 ENCOUNTER FOR ANTINEOPLASTIC CHEMOTHERAPY: ICD-10-CM

## 2024-01-02 PROCEDURE — 250N000011 HC RX IP 250 OP 636: Mod: JZ | Performed by: NURSE PRACTITIONER

## 2024-01-02 PROCEDURE — 96413 CHEMO IV INFUSION 1 HR: CPT

## 2024-01-02 PROCEDURE — 258N000003 HC RX IP 258 OP 636: Performed by: NURSE PRACTITIONER

## 2024-01-02 RX ORDER — HEPARIN SODIUM (PORCINE) LOCK FLUSH IV SOLN 100 UNIT/ML 100 UNIT/ML
5 SOLUTION INTRAVENOUS EVERY 8 HOURS PRN
Status: DISCONTINUED | OUTPATIENT
Start: 2024-01-02 | End: 2024-01-02 | Stop reason: HOSPADM

## 2024-01-02 RX ADMIN — SODIUM CHLORIDE 1000 ML: 9 INJECTION, SOLUTION INTRAVENOUS at 13:50

## 2024-01-02 RX ADMIN — Medication 5 ML: at 13:53

## 2024-01-02 RX ADMIN — SODIUM CHLORIDE 400 MG: 9 INJECTION, SOLUTION INTRAVENOUS at 13:51

## 2024-01-02 ASSESSMENT — PAIN SCALES - GENERAL: PAINLEVEL: NO PAIN (0)

## 2024-01-02 NOTE — PROGRESS NOTES
Infusion Nursing Note:  Derrick Benitez presents today for C28 D1 Keytruda.    Patient seen by provider today: No   present during visit today: Not Applicable.    Note: N/A.      Intravenous Access:  Implanted Port.    Treatment Conditions:  Lab Results   Component Value Date    HGB 14.4 12/29/2023    WBC 5.4 12/29/2023    ANEU 3.9 10/17/2022    ANEUTAUTO 4.0 12/29/2023     (L) 12/29/2023        Lab Results   Component Value Date     12/29/2023    POTASSIUM 4.9 12/29/2023    MAG 2.1 10/30/2014    CR 0.98 12/29/2023    ANTHONY 8.4 (L) 12/29/2023    BILITOTAL 0.4 12/29/2023    ALBUMIN 3.7 12/29/2023    ALT 12 12/29/2023    AST 29 12/29/2023       Results reviewed, labs MET treatment parameters, ok to proceed with treatment.      Post Infusion Assessment:  Patient tolerated infusion without incident.  Blood return noted pre and post infusion.  Site patent and intact, free from redness, edema or discomfort.  No evidence of extravasations.  Access discontinued per protocol.       Discharge Plan:   Discharge instructions reviewed with: Patient.  Patient and/or family verbalized understanding of discharge instructions and all questions answered.  Patient discharged in stable condition accompanied by: self.  Departure Mode: Ambulatory.      Tatiana Aguiar RN

## 2024-01-06 ENCOUNTER — HEALTH MAINTENANCE LETTER (OUTPATIENT)
Age: 84
End: 2024-01-06

## 2024-02-12 ENCOUNTER — LAB (OUTPATIENT)
Dept: INFUSION THERAPY | Facility: CLINIC | Age: 84
End: 2024-02-12
Attending: INTERNAL MEDICINE
Payer: MEDICARE

## 2024-02-12 DIAGNOSIS — Z51.11 ENCOUNTER FOR ANTINEOPLASTIC CHEMOTHERAPY: ICD-10-CM

## 2024-02-12 DIAGNOSIS — C34.31 MALIGNANT NEOPLASM OF LOWER LOBE OF RIGHT LUNG (H): Primary | ICD-10-CM

## 2024-02-12 LAB
ALBUMIN SERPL BCG-MCNC: 3.6 G/DL (ref 3.5–5.2)
ALP SERPL-CCNC: 72 U/L (ref 40–150)
ALT SERPL W P-5'-P-CCNC: 13 U/L (ref 0–70)
ANION GAP SERPL CALCULATED.3IONS-SCNC: 6 MMOL/L (ref 7–15)
AST SERPL W P-5'-P-CCNC: 23 U/L (ref 0–45)
BASOPHILS # BLD AUTO: 0 10E3/UL (ref 0–0.2)
BASOPHILS NFR BLD AUTO: 0 %
BILIRUB SERPL-MCNC: 0.4 MG/DL
BUN SERPL-MCNC: 20.2 MG/DL (ref 8–23)
CALCIUM SERPL-MCNC: 8.6 MG/DL (ref 8.8–10.2)
CHLORIDE SERPL-SCNC: 109 MMOL/L (ref 98–107)
CREAT SERPL-MCNC: 1.13 MG/DL (ref 0.67–1.17)
DEPRECATED HCO3 PLAS-SCNC: 26 MMOL/L (ref 22–29)
EGFRCR SERPLBLD CKD-EPI 2021: 64 ML/MIN/1.73M2
EOSINOPHIL # BLD AUTO: 0.1 10E3/UL (ref 0–0.7)
EOSINOPHIL NFR BLD AUTO: 3 %
ERYTHROCYTE [DISTWIDTH] IN BLOOD BY AUTOMATED COUNT: 12.9 % (ref 10–15)
GLUCOSE SERPL-MCNC: 138 MG/DL (ref 70–99)
HCT VFR BLD AUTO: 40 % (ref 40–53)
HGB BLD-MCNC: 13.9 G/DL (ref 13.3–17.7)
IMM GRANULOCYTES # BLD: 0 10E3/UL
IMM GRANULOCYTES NFR BLD: 0 %
LYMPHOCYTES # BLD AUTO: 1.1 10E3/UL (ref 0.8–5.3)
LYMPHOCYTES NFR BLD AUTO: 20 %
MCH RBC QN AUTO: 32.3 PG (ref 26.5–33)
MCHC RBC AUTO-ENTMCNC: 34.8 G/DL (ref 31.5–36.5)
MCV RBC AUTO: 93 FL (ref 78–100)
MONOCYTES # BLD AUTO: 0.4 10E3/UL (ref 0–1.3)
MONOCYTES NFR BLD AUTO: 8 %
NEUTROPHILS # BLD AUTO: 3.5 10E3/UL (ref 1.6–8.3)
NEUTROPHILS NFR BLD AUTO: 69 %
NRBC # BLD AUTO: 0 10E3/UL
NRBC BLD AUTO-RTO: 0 /100
PLATELET # BLD AUTO: 125 10E3/UL (ref 150–450)
POTASSIUM SERPL-SCNC: 4.3 MMOL/L (ref 3.4–5.3)
PROT SERPL-MCNC: 6 G/DL (ref 6.4–8.3)
RBC # BLD AUTO: 4.3 10E6/UL (ref 4.4–5.9)
SODIUM SERPL-SCNC: 141 MMOL/L (ref 135–145)
TSH SERPL DL<=0.005 MIU/L-ACNC: 1.07 UIU/ML (ref 0.3–4.2)
WBC # BLD AUTO: 5.1 10E3/UL (ref 4–11)

## 2024-02-12 PROCEDURE — 250N000011 HC RX IP 250 OP 636: Performed by: INTERNAL MEDICINE

## 2024-02-12 PROCEDURE — 84443 ASSAY THYROID STIM HORMONE: CPT | Performed by: INTERNAL MEDICINE

## 2024-02-12 PROCEDURE — 85025 COMPLETE CBC W/AUTO DIFF WBC: CPT | Performed by: INTERNAL MEDICINE

## 2024-02-12 PROCEDURE — 36591 DRAW BLOOD OFF VENOUS DEVICE: CPT | Performed by: INTERNAL MEDICINE

## 2024-02-12 PROCEDURE — 82040 ASSAY OF SERUM ALBUMIN: CPT | Performed by: INTERNAL MEDICINE

## 2024-02-12 RX ORDER — HEPARIN SODIUM (PORCINE) LOCK FLUSH IV SOLN 100 UNIT/ML 100 UNIT/ML
5 SOLUTION INTRAVENOUS
Status: DISCONTINUED | OUTPATIENT
Start: 2024-02-12 | End: 2024-02-12 | Stop reason: HOSPADM

## 2024-02-12 RX ADMIN — Medication 5 ML: at 10:37

## 2024-02-12 NOTE — PROGRESS NOTES
Nursing Note:  Derrick Benitez presents today for Port Labs.    Patient seen by provider today: No   present during visit today: Not Applicable.    Note: Patient's labs drawn today for treatment tomorrow.    Intravenous Access:  Implanted Port.    Discharge Plan:   Patient was sent to home in stable condition.    Brii Sheets RN

## 2024-02-13 ENCOUNTER — ONCOLOGY VISIT (OUTPATIENT)
Dept: ONCOLOGY | Facility: CLINIC | Age: 84
End: 2024-02-13
Attending: INTERNAL MEDICINE
Payer: MEDICARE

## 2024-02-13 ENCOUNTER — INFUSION THERAPY VISIT (OUTPATIENT)
Dept: INFUSION THERAPY | Facility: CLINIC | Age: 84
End: 2024-02-13
Attending: INTERNAL MEDICINE
Payer: MEDICARE

## 2024-02-13 VITALS
HEIGHT: 67 IN | SYSTOLIC BLOOD PRESSURE: 160 MMHG | RESPIRATION RATE: 16 BRPM | TEMPERATURE: 98.1 F | HEART RATE: 62 BPM | OXYGEN SATURATION: 94 % | DIASTOLIC BLOOD PRESSURE: 79 MMHG | WEIGHT: 199.8 LBS | BODY MASS INDEX: 31.36 KG/M2

## 2024-02-13 DIAGNOSIS — T45.1X5A PERIPHERAL NEUROPATHY DUE TO CHEMOTHERAPY (H): ICD-10-CM

## 2024-02-13 DIAGNOSIS — Z51.11 ENCOUNTER FOR ANTINEOPLASTIC CHEMOTHERAPY: ICD-10-CM

## 2024-02-13 DIAGNOSIS — G62.0 PERIPHERAL NEUROPATHY DUE TO CHEMOTHERAPY (H): ICD-10-CM

## 2024-02-13 DIAGNOSIS — C34.31 MALIGNANT NEOPLASM OF LOWER LOBE OF RIGHT LUNG (H): Primary | ICD-10-CM

## 2024-02-13 DIAGNOSIS — C79.51 MALIGNANT NEOPLASM METASTATIC TO BONE (H): ICD-10-CM

## 2024-02-13 PROCEDURE — 99215 OFFICE O/P EST HI 40 MIN: CPT | Performed by: INTERNAL MEDICINE

## 2024-02-13 PROCEDURE — 250N000011 HC RX IP 250 OP 636: Performed by: INTERNAL MEDICINE

## 2024-02-13 PROCEDURE — G0463 HOSPITAL OUTPT CLINIC VISIT: HCPCS | Performed by: INTERNAL MEDICINE

## 2024-02-13 PROCEDURE — 258N000003 HC RX IP 258 OP 636: Performed by: INTERNAL MEDICINE

## 2024-02-13 PROCEDURE — 96413 CHEMO IV INFUSION 1 HR: CPT

## 2024-02-13 RX ORDER — GABAPENTIN 300 MG/1
300 CAPSULE ORAL 3 TIMES DAILY
Qty: 270 CAPSULE | Refills: 1 | Status: SHIPPED | OUTPATIENT
Start: 2024-02-13

## 2024-02-13 RX ORDER — LORAZEPAM 2 MG/ML
0.5 INJECTION INTRAMUSCULAR EVERY 4 HOURS PRN
Status: CANCELLED | OUTPATIENT
Start: 2024-02-13

## 2024-02-13 RX ORDER — METHYLPREDNISOLONE SODIUM SUCCINATE 125 MG/2ML
125 INJECTION, POWDER, LYOPHILIZED, FOR SOLUTION INTRAMUSCULAR; INTRAVENOUS
Status: CANCELLED
Start: 2024-02-13

## 2024-02-13 RX ORDER — SODIUM CHLORIDE 9 MG/ML
1000 INJECTION, SOLUTION INTRAVENOUS CONTINUOUS PRN
Status: CANCELLED
Start: 2024-02-13

## 2024-02-13 RX ORDER — NALOXONE HYDROCHLORIDE 0.4 MG/ML
.1-.4 INJECTION, SOLUTION INTRAMUSCULAR; INTRAVENOUS; SUBCUTANEOUS
Status: CANCELLED | OUTPATIENT
Start: 2024-02-13

## 2024-02-13 RX ORDER — HEPARIN SODIUM (PORCINE) LOCK FLUSH IV SOLN 100 UNIT/ML 100 UNIT/ML
5 SOLUTION INTRAVENOUS EVERY 8 HOURS PRN
Status: CANCELLED | OUTPATIENT
Start: 2024-02-13

## 2024-02-13 RX ORDER — ALBUTEROL SULFATE 90 UG/1
1-2 AEROSOL, METERED RESPIRATORY (INHALATION)
Status: CANCELLED
Start: 2024-02-13

## 2024-02-13 RX ORDER — EPINEPHRINE 1 MG/ML
0.3 INJECTION, SOLUTION INTRAMUSCULAR; SUBCUTANEOUS EVERY 5 MIN PRN
Status: CANCELLED | OUTPATIENT
Start: 2024-02-13

## 2024-02-13 RX ORDER — MEPERIDINE HYDROCHLORIDE 25 MG/ML
25 INJECTION INTRAMUSCULAR; INTRAVENOUS; SUBCUTANEOUS EVERY 30 MIN PRN
Status: CANCELLED | OUTPATIENT
Start: 2024-02-13

## 2024-02-13 RX ORDER — HEPARIN SODIUM (PORCINE) LOCK FLUSH IV SOLN 100 UNIT/ML 100 UNIT/ML
5 SOLUTION INTRAVENOUS EVERY 8 HOURS PRN
Status: DISCONTINUED | OUTPATIENT
Start: 2024-02-13 | End: 2024-02-13 | Stop reason: HOSPADM

## 2024-02-13 RX ORDER — HEPARIN SODIUM,PORCINE 10 UNIT/ML
5 VIAL (ML) INTRAVENOUS
Status: CANCELLED | OUTPATIENT
Start: 2024-02-13

## 2024-02-13 RX ORDER — ALBUTEROL SULFATE 0.83 MG/ML
2.5 SOLUTION RESPIRATORY (INHALATION)
Status: CANCELLED | OUTPATIENT
Start: 2024-02-13

## 2024-02-13 RX ORDER — DIPHENHYDRAMINE HYDROCHLORIDE 50 MG/ML
50 INJECTION INTRAMUSCULAR; INTRAVENOUS
Status: CANCELLED
Start: 2024-02-13

## 2024-02-13 RX ADMIN — SODIUM CHLORIDE 1000 ML: 9 INJECTION, SOLUTION INTRAVENOUS at 10:29

## 2024-02-13 RX ADMIN — Medication 5 ML: at 11:24

## 2024-02-13 RX ADMIN — SODIUM CHLORIDE 400 MG: 9 INJECTION, SOLUTION INTRAVENOUS at 10:45

## 2024-02-13 ASSESSMENT — PAIN SCALES - GENERAL: PAINLEVEL: NO PAIN (0)

## 2024-02-13 NOTE — LETTER
"    2/13/2024         RE: Derrick Benitez  [address]  [facility] [address]        Dear Colleague,    Thank you for referring your patient, Derrick Benitez, to the [facility]. Please see a copy of my visit note below.    Oncology Rooming Note    February 13, 2024 9:48 AM   Derrick Benitez is a 83 year old male who presents for:    Chief Complaint   Patient presents with     Oncology Clinic Visit     Initial Vitals: Resp 16   Ht 1.702 m (5' 7\")   Wt 90.6 kg (199 lb 12.8 oz)   BMI 31.29 kg/m   Estimated body mass index is 31.29 kg/m  as calculated from the following:    Height as of this encounter: 1.702 m (5' 7\").    Weight as of this encounter: 90.6 kg (199 lb 12.8 oz). Body surface area is 2.07 meters squared.  No Pain (0) Comment: Data Unavailable   No LMP for male patient.  Allergies reviewed: Yes  Medications reviewed: Yes    Medications: Medication refills not needed today.  Pharmacy name entered into EPIC:    [facility] [place], [place] - [address]  [facility] [id number] [place], [place] - [address] [place] [address] [address]    Frailty Screening:   Is the patient here for a new oncology consult visit in cancer care? 2. No          Brenda Martines MA              ONCOLOGIC HISTORY:  Mr. Benitez is a gentleman with non-small cell lung cancer, favor squamous cell carcinoma.   -NGS and PDL staining could not be done because of small sample.   -Guardant 360 does not reveal any actionable alteration.  1.  CT chest angiogram on 09/07/2020 revealed right lower lobe mass.   2.  CT-guided biopsy was done on 09/18/2020.  Pathology revealed non-small cell lung cancer, favor squamous cell carcinoma.  Sample size was small.  PDL staining and NGS panel could not be done.   3.  Brain MRI on 09/26/2020 did not reveal any brain metastasis.   4.  PET scan on 10/05/2020 revealed hypermetabolic right lung mass and multiple hypermetabolic bone lesions.   5.  Carboplatin and Taxol x 6 between " 11/25/2020 and 03/10/2021.   -Pembrolizumab added with cycle 2.   6. PET scan on 10/17/2022 reveals FDG avid sclerotic lesions involving the right posterior seventh rib slightly above background levels. Favored to simply represent sequelae of post treatment change.     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 He is tolerating it well. CT scan on 11/08/2023 revealed stable disease.    He has peripheral neuropathy. He has numbness in toes. Gabapentin helps.     He has mild generalized weakness. He is able to do all his activities.  No headache.  No dizziness.  No chest pain.  No shortness of breath.  No abdominal pain, nausea or vomiting. Appetite is good. No urinary or bowel complaints. No bleeding. All other review of systems is negative.     PHYSICAL EXAMINATION:   GENERAL:  Alert and oriented x 3.   VITAL SIGNS:  Reviewed.  ECOG PS of 1.     Rest of the system not examined.     LABS:  CBC, CMP and TSH reviewed.     ASSESSMENT:    1.  An 83-year-old gentleman with metastatic non-small cell lung cancer on pembrolizumab.  No evidence of disease.  2.  Grade 1 peripheral neuropathy on gabapentin.  3.  Mild thrombocytopenia, stable.  4.  Hypocalcemia.  5.  Aortic aneurysm, being followed by vascular surgery.     PLAN:    1. Continue pembrolizumab every 6 weeks.  2. Zometa every 6 months.  3. Take gabapentin to three times a day.  4. PET scan in mid-march.  5. See me in 6 weeks when he comes for treatment.     DISCUSSION:  1.  Patient is overall doing well.  No clinical suspicion of progression of lung cancer.    He is on pembrolizumab.  He will continue on that.  He is tolerating it well.    2.  Will get a PET scan in about a month.  If there is no evidence of malignancy, I will discuss with him regarding stopping pembrolizumab.  Further discussion after the PET scan.    3.  He has grade 1 peripheral neuropathy.  Gabapentin helps.  He has been  taking it twice a day.  Advised him to take it 3 times a day.    4.  Labs were reviewed with him.  His platelet is low but is stable.  WBC and hemoglobin is normal.  Labs are good for treatment.    He has mild hypocalcemia.  Advised him to continue taking oral calcium.    5.  He had a few questions which were all answered.  I will see him in 6 weeks time.    Total visit time of 40 minutes.  Time spent in today's visit, review of chart/investigations today, monitoring for toxicity of high risk drugs and documentation today.      Again, thank you for allowing me to participate in the care of your patient.        Sincerely,        Buzz Larsen MD

## 2024-02-13 NOTE — PROGRESS NOTES
ONCOLOGIC HISTORY:  Mr. Benitez is a gentleman with non-small cell lung cancer, favor squamous cell carcinoma.   -NGS and PDL staining could not be done because of small sample.   -Guardant 360 does not reveal any actionable alteration.  1.  CT chest angiogram on 09/07/2020 revealed right lower lobe mass.   2.  CT-guided biopsy was done on 09/18/2020.  Pathology revealed non-small cell lung cancer, favor squamous cell carcinoma.  Sample size was small.  PDL staining and NGS panel could not be done.   3.  Brain MRI on 09/26/2020 did not reveal any brain metastasis.   4.  PET scan on 10/05/2020 revealed hypermetabolic right lung mass and multiple hypermetabolic bone lesions.   5.  Carboplatin and Taxol x 6 between 11/25/2020 and 03/10/2021.   -Pembrolizumab added with cycle 2.   6. PET scan on 10/17/2022 reveals FDG avid sclerotic lesions involving the right posterior seventh rib slightly above background levels. Favored to simply represent sequelae of post treatment change.     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 He is tolerating it well. CT scan on 11/08/2023 revealed stable disease.    He has peripheral neuropathy. He has numbness in toes. Gabapentin helps.     He has mild generalized weakness. He is able to do all his activities.  No headache.  No dizziness.  No chest pain.  No shortness of breath.  No abdominal pain, nausea or vomiting. Appetite is good. No urinary or bowel complaints. No bleeding. All other review of systems is negative.     PHYSICAL EXAMINATION:   GENERAL:  Alert and oriented x 3.   VITAL SIGNS:  Reviewed.  ECOG PS of 1.     Rest of the system not examined.     LABS:  CBC, CMP and TSH reviewed.     ASSESSMENT:    1.  An 83-year-old gentleman with metastatic non-small cell lung cancer on pembrolizumab.  No evidence of disease.  2.  Grade 1 peripheral neuropathy on gabapentin.  3.  Mild thrombocytopenia, stable.  4.   Hypocalcemia.  5.  Aortic aneurysm, being followed by vascular surgery.     PLAN:    1. Continue pembrolizumab every 6 weeks.  2. Zometa every 6 months.  3. Take gabapentin to three times a day.  4. PET scan in mid-march.  5. See me in 6 weeks when he comes for treatment.     DISCUSSION:  1.  Patient is overall doing well.  No clinical suspicion of progression of lung cancer.    He is on pembrolizumab.  He will continue on that.  He is tolerating it well.    2.  Will get a PET scan in about a month.  If there is no evidence of malignancy, I will discuss with him regarding stopping pembrolizumab.  Further discussion after the PET scan.    3.  He has grade 1 peripheral neuropathy.  Gabapentin helps.  He has been taking it twice a day.  Advised him to take it 3 times a day.    4.  Labs were reviewed with him.  His platelet is low but is stable.  WBC and hemoglobin is normal.  Labs are good for treatment.    He has mild hypocalcemia.  Advised him to continue taking oral calcium.    5.  He had a few questions which were all answered.  I will see him in 6 weeks time.    Total visit time of 40 minutes.  Time spent in today's visit, review of chart/investigations today, monitoring for toxicity of high risk drugs and documentation today.

## 2024-02-13 NOTE — PROGRESS NOTES
Infusion Nursing Note:  eDrrick Benitez presents today for C29D1: Keytruda.    Patient seen by provider today: Yes: Dr. Larsen   present during visit today: Not Applicable.    Note: Patient reports to feeling well today with no new concerns since meeting with Dr. Larsen.      Intravenous Access:  Implanted Port.    Treatment Conditions:  Lab Results   Component Value Date    HGB 13.9 02/12/2024    WBC 5.1 02/12/2024    ANEU 3.9 10/17/2022    ANEUTAUTO 3.5 02/12/2024     (L) 02/12/2024        Lab Results   Component Value Date     02/12/2024    POTASSIUM 4.3 02/12/2024    MAG 2.1 10/30/2014    CR 1.13 02/12/2024    ANTHONY 8.6 (L) 02/12/2024    BILITOTAL 0.4 02/12/2024    ALBUMIN 3.6 02/12/2024    ALT 13 02/12/2024    AST 23 02/12/2024       Results reviewed, labs MET treatment parameters, ok to proceed with treatment.      Post Infusion Assessment:  Patient tolerated infusion without incident.  Blood return noted pre and post infusion.  Site patent and intact, free from redness, edema or discomfort.  No evidence of extravasations.  Access discontinued per protocol.       Discharge Plan:   Patient declined prescription refills.  Discharge instructions reviewed with: Patient.  Patient and/or family verbalized understanding of discharge instructions and all questions answered.  AVS to patient via [employer].  Patient has no future appointments scheduled at this time.  Will be notifies by scheduling of future appointments.  Next infusion due on 3/26/24 with labs prior.  Patient discharged in stable condition accompanied by: self.  Departure Mode: Ambulatory.      Brii Sheets RN

## 2024-02-13 NOTE — PATIENT INSTRUCTIONS
1. Continue pembrolizumab every 6 weeks.  2. Zometa every 6 months.  3. Take gabapentin to three times a day.  4. PET scan in mid-march.  5. See me in 6 weeks when he comes for treatment.

## 2024-02-13 NOTE — PROGRESS NOTES
"Oncology Rooming Note    February 13, 2024 9:48 AM   Derrick Benitez is a 83 year old male who presents for:    Chief Complaint   Patient presents with    Oncology Clinic Visit     Initial Vitals: Resp 16   Ht 1.702 m (5' 7\")   Wt 90.6 kg (199 lb 12.8 oz)   BMI 31.29 kg/m   Estimated body mass index is 31.29 kg/m  as calculated from the following:    Height as of this encounter: 1.702 m (5' 7\").    Weight as of this encounter: 90.6 kg (199 lb 12.8 oz). Body surface area is 2.07 meters squared.  No Pain (0) Comment: Data Unavailable   No LMP for male patient.  Allergies reviewed: Yes  Medications reviewed: Yes    Medications: Medication refills not needed today.  Pharmacy name entered into EPIC:    [place], [place] - [address]  [facility] [id number] [place], [place] - [address] [place] [address] [address]    Frailty Screening:   Is the patient here for a new oncology consult visit in cancer care? 2. No          Brenda Martines MA            "

## 2024-02-27 ENCOUNTER — TELEPHONE (OUTPATIENT)
Dept: ONCOLOGY | Facility: CLINIC | Age: 84
End: 2024-02-27
Payer: MEDICARE

## 2024-03-16 ENCOUNTER — HEALTH MAINTENANCE LETTER (OUTPATIENT)
Age: 84
End: 2024-03-16

## 2024-03-18 ENCOUNTER — HOSPITAL ENCOUNTER (OUTPATIENT)
Dept: PET IMAGING | Facility: CLINIC | Age: 84
Discharge: HOME | End: 2024-03-18
Attending: INTERNAL MEDICINE | Admitting: INTERNAL MEDICINE
Payer: MEDICARE

## 2024-03-18 DIAGNOSIS — C34.31 MALIGNANT NEOPLASM OF LOWER LOBE OF RIGHT LUNG (H): ICD-10-CM

## 2024-03-18 DIAGNOSIS — C79.51 MALIGNANT NEOPLASM METASTATIC TO BONE (H): ICD-10-CM

## 2024-03-18 LAB
CREAT BLD-MCNC: 1.3 MG/DL (ref 0.7–1.3)
EGFRCR SERPLBLD CKD-EPI 2021: 54 ML/MIN/1.73M2

## 2024-03-18 PROCEDURE — 71260 CT THORAX DX C+: CPT

## 2024-03-18 PROCEDURE — 78816 PET IMAGE W/CT FULL BODY: CPT | Mod: MG,PS

## 2024-03-18 PROCEDURE — A9552 F18 FDG: HCPCS | Performed by: INTERNAL MEDICINE

## 2024-03-18 PROCEDURE — 343N000001 HC RX 343: Performed by: INTERNAL MEDICINE

## 2024-03-18 PROCEDURE — 250N000011 HC RX IP 250 OP 636: Performed by: INTERNAL MEDICINE

## 2024-03-18 PROCEDURE — 82565 ASSAY OF CREATININE: CPT

## 2024-03-18 RX ORDER — IOPAMIDOL 755 MG/ML
10-135 INJECTION, SOLUTION INTRAVASCULAR ONCE
Status: COMPLETED | OUTPATIENT
Start: 2024-03-18 | End: 2024-03-18

## 2024-03-18 RX ORDER — HEPARIN SODIUM (PORCINE) LOCK FLUSH IV SOLN 100 UNIT/ML 100 UNIT/ML
500 SOLUTION INTRAVENOUS ONCE
Status: COMPLETED | OUTPATIENT
Start: 2024-03-18 | End: 2024-03-18

## 2024-03-18 RX ADMIN — FLUDEOXYGLUCOSE F-18 13.61 MILLICURIE: 500 INJECTION, SOLUTION INTRAVENOUS at 10:25

## 2024-03-18 RX ADMIN — Medication 500 UNITS: at 10:26

## 2024-03-18 RX ADMIN — IOPAMIDOL 111 ML: 755 INJECTION, SOLUTION INTRAVENOUS at 10:26

## 2024-03-23 NOTE — RESULT ENCOUNTER NOTE
Dear Mr. Benitez,    PET scan is good. No suspicion of cancer.    Please, call me with any questions.    Buzz Larsen MD

## 2024-03-25 ENCOUNTER — INFUSION THERAPY VISIT (OUTPATIENT)
Dept: INFUSION THERAPY | Facility: CLINIC | Age: 84
End: 2024-03-25
Attending: INTERNAL MEDICINE
Payer: MEDICARE

## 2024-03-25 DIAGNOSIS — Z51.11 ENCOUNTER FOR ANTINEOPLASTIC CHEMOTHERAPY: ICD-10-CM

## 2024-03-25 DIAGNOSIS — C34.31 MALIGNANT NEOPLASM OF LOWER LOBE OF RIGHT LUNG (H): Primary | ICD-10-CM

## 2024-03-25 LAB
ALBUMIN SERPL BCG-MCNC: 3.7 G/DL (ref 3.5–5.2)
ALP SERPL-CCNC: 67 U/L (ref 40–150)
ALT SERPL W P-5'-P-CCNC: 14 U/L (ref 0–70)
ANION GAP SERPL CALCULATED.3IONS-SCNC: 9 MMOL/L (ref 7–15)
AST SERPL W P-5'-P-CCNC: 23 U/L (ref 0–45)
BASOPHILS # BLD AUTO: 0 10E3/UL (ref 0–0.2)
BASOPHILS NFR BLD AUTO: 0 %
BILIRUB SERPL-MCNC: 0.4 MG/DL
BUN SERPL-MCNC: 17.1 MG/DL (ref 8–23)
CALCIUM SERPL-MCNC: 8.6 MG/DL (ref 8.8–10.2)
CHLORIDE SERPL-SCNC: 106 MMOL/L (ref 98–107)
CREAT SERPL-MCNC: 1.35 MG/DL (ref 0.67–1.17)
DEPRECATED HCO3 PLAS-SCNC: 22 MMOL/L (ref 22–29)
EGFRCR SERPLBLD CKD-EPI 2021: 52 ML/MIN/1.73M2
EOSINOPHIL # BLD AUTO: 0.1 10E3/UL (ref 0–0.7)
EOSINOPHIL NFR BLD AUTO: 1 %
ERYTHROCYTE [DISTWIDTH] IN BLOOD BY AUTOMATED COUNT: 12.8 % (ref 10–15)
GLUCOSE SERPL-MCNC: 112 MG/DL (ref 70–99)
HCT VFR BLD AUTO: 40.2 % (ref 40–53)
HGB BLD-MCNC: 13.9 G/DL (ref 13.3–17.7)
IMM GRANULOCYTES # BLD: 0 10E3/UL
IMM GRANULOCYTES NFR BLD: 0 %
LYMPHOCYTES # BLD AUTO: 1.1 10E3/UL (ref 0.8–5.3)
LYMPHOCYTES NFR BLD AUTO: 19 %
MCH RBC QN AUTO: 32.5 PG (ref 26.5–33)
MCHC RBC AUTO-ENTMCNC: 34.6 G/DL (ref 31.5–36.5)
MCV RBC AUTO: 94 FL (ref 78–100)
MONOCYTES # BLD AUTO: 0.4 10E3/UL (ref 0–1.3)
MONOCYTES NFR BLD AUTO: 7 %
NEUTROPHILS # BLD AUTO: 4.1 10E3/UL (ref 1.6–8.3)
NEUTROPHILS NFR BLD AUTO: 73 %
NRBC # BLD AUTO: 0 10E3/UL
NRBC BLD AUTO-RTO: 0 /100
PLATELET # BLD AUTO: 135 10E3/UL (ref 150–450)
POTASSIUM SERPL-SCNC: 4.8 MMOL/L (ref 3.4–5.3)
PROT SERPL-MCNC: 6.4 G/DL (ref 6.4–8.3)
RBC # BLD AUTO: 4.28 10E6/UL (ref 4.4–5.9)
SODIUM SERPL-SCNC: 137 MMOL/L (ref 135–145)
TSH SERPL DL<=0.005 MIU/L-ACNC: 2.24 UIU/ML (ref 0.3–4.2)
WBC # BLD AUTO: 5.7 10E3/UL (ref 4–11)

## 2024-03-25 PROCEDURE — 84443 ASSAY THYROID STIM HORMONE: CPT | Performed by: INTERNAL MEDICINE

## 2024-03-25 PROCEDURE — 84155 ASSAY OF PROTEIN SERUM: CPT | Performed by: INTERNAL MEDICINE

## 2024-03-25 PROCEDURE — 250N000011 HC RX IP 250 OP 636: Performed by: INTERNAL MEDICINE

## 2024-03-25 PROCEDURE — 36591 DRAW BLOOD OFF VENOUS DEVICE: CPT | Performed by: INTERNAL MEDICINE

## 2024-03-25 PROCEDURE — 85025 COMPLETE CBC W/AUTO DIFF WBC: CPT | Performed by: INTERNAL MEDICINE

## 2024-03-25 RX ORDER — HEPARIN SODIUM,PORCINE 10 UNIT/ML
5 VIAL (ML) INTRAVENOUS
Status: CANCELLED | OUTPATIENT
Start: 2024-03-25

## 2024-03-25 RX ORDER — HEPARIN SODIUM (PORCINE) LOCK FLUSH IV SOLN 100 UNIT/ML 100 UNIT/ML
5 SOLUTION INTRAVENOUS
Status: CANCELLED | OUTPATIENT
Start: 2024-03-25

## 2024-03-25 RX ORDER — HEPARIN SODIUM (PORCINE) LOCK FLUSH IV SOLN 100 UNIT/ML 100 UNIT/ML
5 SOLUTION INTRAVENOUS
Status: DISCONTINUED | OUTPATIENT
Start: 2024-03-25 | End: 2024-03-25 | Stop reason: HOSPADM

## 2024-03-25 RX ADMIN — Medication 5 ML: at 13:33

## 2024-03-25 NOTE — PROGRESS NOTES
Nursing Note:  Derrick Benitez presents today for port labs.    Patient seen by provider today: No   present during visit today: Not Applicable.    Note: N/A.    Intravenous Access:  Labs drawn without difficulty.  Implanted Port.    Discharge Plan:   Patient was sent home.    Tatiana Aguiar RN

## 2024-03-26 ENCOUNTER — ONCOLOGY VISIT (OUTPATIENT)
Dept: ONCOLOGY | Facility: CLINIC | Age: 84
End: 2024-03-26
Attending: INTERNAL MEDICINE
Payer: MEDICARE

## 2024-03-26 ENCOUNTER — INFUSION THERAPY VISIT (OUTPATIENT)
Dept: INFUSION THERAPY | Facility: CLINIC | Age: 84
End: 2024-03-26
Attending: INTERNAL MEDICINE
Payer: MEDICARE

## 2024-03-26 VITALS
HEIGHT: 67 IN | SYSTOLIC BLOOD PRESSURE: 135 MMHG | WEIGHT: 197 LBS | BODY MASS INDEX: 30.92 KG/M2 | RESPIRATION RATE: 16 BRPM | TEMPERATURE: 97.9 F | OXYGEN SATURATION: 92 % | DIASTOLIC BLOOD PRESSURE: 64 MMHG | HEART RATE: 74 BPM

## 2024-03-26 DIAGNOSIS — C79.51 MALIGNANT NEOPLASM METASTATIC TO BONE (H): ICD-10-CM

## 2024-03-26 DIAGNOSIS — C34.31 MALIGNANT NEOPLASM OF LOWER LOBE OF RIGHT LUNG (H): Primary | ICD-10-CM

## 2024-03-26 DIAGNOSIS — Z51.11 ENCOUNTER FOR ANTINEOPLASTIC CHEMOTHERAPY: ICD-10-CM

## 2024-03-26 PROCEDURE — 258N000003 HC RX IP 258 OP 636: Performed by: INTERNAL MEDICINE

## 2024-03-26 PROCEDURE — 96413 CHEMO IV INFUSION 1 HR: CPT

## 2024-03-26 PROCEDURE — 99215 OFFICE O/P EST HI 40 MIN: CPT | Performed by: INTERNAL MEDICINE

## 2024-03-26 PROCEDURE — G0463 HOSPITAL OUTPT CLINIC VISIT: HCPCS | Mod: 25 | Performed by: INTERNAL MEDICINE

## 2024-03-26 PROCEDURE — 250N000011 HC RX IP 250 OP 636: Mod: JZ | Performed by: INTERNAL MEDICINE

## 2024-03-26 RX ORDER — HEPARIN SODIUM,PORCINE 10 UNIT/ML
5 VIAL (ML) INTRAVENOUS
OUTPATIENT
Start: 2024-10-17

## 2024-03-26 RX ORDER — SODIUM CHLORIDE 9 MG/ML
1000 INJECTION, SOLUTION INTRAVENOUS CONTINUOUS PRN
Status: CANCELLED
Start: 2024-03-26

## 2024-03-26 RX ORDER — ALBUTEROL SULFATE 90 UG/1
1-2 AEROSOL, METERED RESPIRATORY (INHALATION)
Status: CANCELLED
Start: 2024-05-07

## 2024-03-26 RX ORDER — ZOLEDRONIC ACID 0.04 MG/ML
4 INJECTION, SOLUTION INTRAVENOUS ONCE
OUTPATIENT
Start: 2024-10-17 | End: 2024-10-17

## 2024-03-26 RX ORDER — EPINEPHRINE 1 MG/ML
0.3 INJECTION, SOLUTION INTRAMUSCULAR; SUBCUTANEOUS EVERY 5 MIN PRN
Status: CANCELLED | OUTPATIENT
Start: 2024-03-26

## 2024-03-26 RX ORDER — LORAZEPAM 2 MG/ML
0.5 INJECTION INTRAMUSCULAR EVERY 4 HOURS PRN
Status: CANCELLED | OUTPATIENT
Start: 2024-03-26

## 2024-03-26 RX ORDER — EPINEPHRINE 1 MG/ML
0.3 INJECTION, SOLUTION INTRAMUSCULAR; SUBCUTANEOUS EVERY 5 MIN PRN
Status: CANCELLED | OUTPATIENT
Start: 2024-05-07

## 2024-03-26 RX ORDER — ALBUTEROL SULFATE 90 UG/1
1-2 AEROSOL, METERED RESPIRATORY (INHALATION)
Status: CANCELLED
Start: 2024-03-26

## 2024-03-26 RX ORDER — ALBUTEROL SULFATE 0.83 MG/ML
2.5 SOLUTION RESPIRATORY (INHALATION)
Status: CANCELLED | OUTPATIENT
Start: 2024-03-26

## 2024-03-26 RX ORDER — HEPARIN SODIUM,PORCINE 10 UNIT/ML
5 VIAL (ML) INTRAVENOUS
Status: CANCELLED | OUTPATIENT
Start: 2024-03-26

## 2024-03-26 RX ORDER — HEPARIN SODIUM (PORCINE) LOCK FLUSH IV SOLN 100 UNIT/ML 100 UNIT/ML
5 SOLUTION INTRAVENOUS
OUTPATIENT
Start: 2024-10-17

## 2024-03-26 RX ORDER — MEPERIDINE HYDROCHLORIDE 25 MG/ML
25 INJECTION INTRAMUSCULAR; INTRAVENOUS; SUBCUTANEOUS EVERY 30 MIN PRN
Status: CANCELLED | OUTPATIENT
Start: 2024-03-26

## 2024-03-26 RX ORDER — HEPARIN SODIUM (PORCINE) LOCK FLUSH IV SOLN 100 UNIT/ML 100 UNIT/ML
5 SOLUTION INTRAVENOUS EVERY 8 HOURS PRN
Status: CANCELLED | OUTPATIENT
Start: 2024-05-07

## 2024-03-26 RX ORDER — DIPHENHYDRAMINE HYDROCHLORIDE 50 MG/ML
50 INJECTION INTRAMUSCULAR; INTRAVENOUS
Status: CANCELLED
Start: 2024-03-26

## 2024-03-26 RX ORDER — NALOXONE HYDROCHLORIDE 0.4 MG/ML
.1-.4 INJECTION, SOLUTION INTRAMUSCULAR; INTRAVENOUS; SUBCUTANEOUS
Status: CANCELLED | OUTPATIENT
Start: 2024-05-07

## 2024-03-26 RX ORDER — HEPARIN SODIUM (PORCINE) LOCK FLUSH IV SOLN 100 UNIT/ML 100 UNIT/ML
5 SOLUTION INTRAVENOUS EVERY 8 HOURS PRN
Status: DISCONTINUED | OUTPATIENT
Start: 2024-03-26 | End: 2024-03-26 | Stop reason: HOSPADM

## 2024-03-26 RX ORDER — METHYLPREDNISOLONE SODIUM SUCCINATE 125 MG/2ML
125 INJECTION, POWDER, LYOPHILIZED, FOR SOLUTION INTRAMUSCULAR; INTRAVENOUS
Status: CANCELLED
Start: 2024-05-07

## 2024-03-26 RX ORDER — HEPARIN SODIUM (PORCINE) LOCK FLUSH IV SOLN 100 UNIT/ML 100 UNIT/ML
5 SOLUTION INTRAVENOUS EVERY 8 HOURS PRN
Status: CANCELLED | OUTPATIENT
Start: 2024-03-26

## 2024-03-26 RX ORDER — DIPHENHYDRAMINE HYDROCHLORIDE 50 MG/ML
50 INJECTION INTRAMUSCULAR; INTRAVENOUS
Status: CANCELLED
Start: 2024-05-07

## 2024-03-26 RX ORDER — HEPARIN SODIUM,PORCINE 10 UNIT/ML
5 VIAL (ML) INTRAVENOUS
Status: CANCELLED | OUTPATIENT
Start: 2024-05-07

## 2024-03-26 RX ORDER — NALOXONE HYDROCHLORIDE 0.4 MG/ML
.1-.4 INJECTION, SOLUTION INTRAMUSCULAR; INTRAVENOUS; SUBCUTANEOUS
Status: CANCELLED | OUTPATIENT
Start: 2024-03-26

## 2024-03-26 RX ORDER — METHYLPREDNISOLONE SODIUM SUCCINATE 125 MG/2ML
125 INJECTION, POWDER, LYOPHILIZED, FOR SOLUTION INTRAMUSCULAR; INTRAVENOUS
Status: CANCELLED
Start: 2024-03-26

## 2024-03-26 RX ORDER — ALBUTEROL SULFATE 0.83 MG/ML
2.5 SOLUTION RESPIRATORY (INHALATION)
Status: CANCELLED | OUTPATIENT
Start: 2024-05-07

## 2024-03-26 RX ORDER — MEPERIDINE HYDROCHLORIDE 25 MG/ML
25 INJECTION INTRAMUSCULAR; INTRAVENOUS; SUBCUTANEOUS EVERY 30 MIN PRN
Status: CANCELLED | OUTPATIENT
Start: 2024-05-07

## 2024-03-26 RX ORDER — LORAZEPAM 2 MG/ML
0.5 INJECTION INTRAMUSCULAR EVERY 4 HOURS PRN
Status: CANCELLED | OUTPATIENT
Start: 2024-05-07

## 2024-03-26 RX ORDER — SODIUM CHLORIDE 9 MG/ML
1000 INJECTION, SOLUTION INTRAVENOUS CONTINUOUS PRN
Status: CANCELLED
Start: 2024-05-07

## 2024-03-26 RX ADMIN — SODIUM CHLORIDE 1000 ML: 9 INJECTION, SOLUTION INTRAVENOUS at 11:06

## 2024-03-26 RX ADMIN — Medication 5 ML: at 12:05

## 2024-03-26 RX ADMIN — SODIUM CHLORIDE 400 MG: 9 INJECTION, SOLUTION INTRAVENOUS at 11:07

## 2024-03-26 ASSESSMENT — PAIN SCALES - GENERAL: PAINLEVEL: NO PAIN (0)

## 2024-03-26 NOTE — PROGRESS NOTES
"Oncology Rooming Note    March 26, 2024 10:17 AM   Derrick Benitez is a 83 year old male who presents for:    Chief Complaint   Patient presents with    Oncology Clinic Visit     Initial Vitals: Resp 16   Ht 1.702 m (5' 7\")   Wt 89.4 kg (197 lb)   BMI 30.85 kg/m   Estimated body mass index is 30.85 kg/m  as calculated from the following:    Height as of this encounter: 1.702 m (5' 7\").    Weight as of this encounter: 89.4 kg (197 lb). Body surface area is 2.06 meters squared.  No Pain (0) Comment: Data Unavailable   No LMP for male patient.  Allergies reviewed: Yes  Medications reviewed: Yes    Medications: Medication refills not needed today.  Pharmacy name entered into EPIC:    [place], [place] - [address]  [facility] [id number] [place], [place] - [address] [place] [address] [address]    Frailty Screening:   Is the patient here for a new oncology consult visit in cancer care? 2. No        Brenda Martines MA            "

## 2024-03-26 NOTE — PROGRESS NOTES
ONCOLOGIC HISTORY:  Mr. Benitez is a gentleman with non-small cell lung cancer, favor squamous cell carcinoma.   -NGS and PDL staining could not be done because of small sample.   -Guardant 360 does not reveal any actionable alteration.  1.  CT chest angiogram on 09/07/2020 revealed right lower lobe mass.   2.  CT-guided biopsy was done on 09/18/2020.  Pathology revealed non-small cell lung cancer, favor squamous cell carcinoma.  Sample size was small.  PDL staining and NGS panel could not be done.   3.  Brain MRI on 09/26/2020 did not reveal any brain metastasis.   4.  PET scan on 10/05/2020 revealed hypermetabolic right lung mass and multiple hypermetabolic bone lesions.   5.  Carboplatin and Taxol x 6 between 11/25/2020 and 03/10/2021.   -Pembrolizumab added with cycle 2.   6. PET scan on 10/17/2022 reveals FDG avid sclerotic lesions involving the right posterior seventh rib slightly above background levels. Favored to simply represent sequelae of post treatment change.     SUBJECTIVE:    Mr. 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 He is tolerating it well.    PET scan on 03/18/2024:  1.  No convincing evidence of progressive metastatic disease.   2.  Mildly FDG avid sclerotic lesions of the right seventh rib are again seen. Slightly increased FDG uptake of the right lateral seventh rib lesion. Findings may reflect posttreatment inflammation or indolent metastases.    He has grade 1 peripheral neuropathy. He gets intermittent numbness in toes and feet. He is decreasing gabapentin. Now he takes it twice a day.     He has mild generalized weakness. He is able to do all his activities.  No headache.  No dizziness.  No chest pain.  No shortness of breath.  No abdominal pain, nausea or vomiting. Appetite is good. No urinary or bowel complaints. No bleeding. All other review of systems is negative.     PHYSICAL EXAMINATION:   GENERAL:  Alert and oriented x 3.   VITAL  SIGNS:  Reviewed.  ECOG PS of 1.     Rest of the system not examined.     LABS:  CBC, CMP and TSH reviewed.     ASSESSMENT:    1.  An 83-year-old gentleman with metastatic non-small cell lung cancer on pembrolizumab.  No evidence of disease.  2.  Grade 1 peripheral neuropathy on gabapentin.  3.  Mild thrombocytopenia, stable.  4.  Hypocalcemia.  5.  Aortic aneurysm, being followed by vascular surgery.     PLAN:    1. Continue pembrolizumab every 6 weeks.  2. Continue Zometa every 6 months.  3. Take gabapentin twice a day.  4. See DAMEON with next treatment.  5. See me in 3 months.     DISCUSSION:  1.  Patient is doing well.  PET scan was reviewed with him.  Explained to him there is no evidence of malignancy.  There is mildly FDG avid sclerotic lesion in the right seventh rib.  Patient had metastatic disease in the right seventh rib at diagnosis.  Explained to the patient that rib lesion has responded to treatment.  Now it is  sclerotic.    2.  Patient is on pembrolizumab.  He is tolerating it well.  Discussed regarding continuing pembrolizumab versus stopping pembrolizumab.  Patient does not want to stop it.  He is worried about cancer getting worse.  He will continue on pembrolizumab.    3.  He has grade 1 peripheral neuropathy.  He is on gabapentin twice a day.  He was on it 3 times a day. I told the patient that if neuropathy gets better, he can further decrease gabapentin to once a day and subsequently stop it.    4.  Calcium is mildly low.  Advised him to take oral calcium twice a day.    5.  He is on Zometa every 6 months which will be continued.  He is not having dental or jaw related symptoms.    6.  He had multiple questions which were all answered.  I reassured the patient that he is doing well from metastatic lung cancer.  I will see him in 3 months.  In between he will see our DAMEON.     Total visit time of 40 minutes.  Time spent in today's visit, review of chart/investigations today, monitoring for  toxicity of high risk drugs and documentation today.

## 2024-03-26 NOTE — PATIENT INSTRUCTIONS
1. Continue pembrolizumab every 6 weeks.  2. Continue Zometa every 6 months.  3. Take gabapentin twice a day.  4. See DAMEON with next treatment.  5. See me in 3 months.

## 2024-03-26 NOTE — PROGRESS NOTES
Infusion Nursing Note:  Derrick Benitez presents today for C30D1 Keytruda.    Patient seen by provider today: Yes: Dr Larsen   present during visit today: Not Applicable.    Note: NA      Intravenous Access:  Implanted Port.    Treatment Conditions:  Lab Results   Component Value Date     03/25/2024    POTASSIUM 4.8 03/25/2024    MAG 2.1 10/30/2014    CR 1.35 (H) 03/25/2024    ANTHONY 8.6 (L) 03/25/2024    BILITOTAL 0.4 03/25/2024    ALBUMIN 3.7 03/25/2024    ALT 14 03/25/2024    AST 23 03/25/2024       Results reviewed, labs MET treatment parameters, ok to proceed with treatment.      Post Infusion Assessment:  Patient tolerated infusion without incident.  Blood return noted pre and post infusion.  Site patent and intact, free from redness, edema or discomfort.  No evidence of extravasations.  Access discontinued per protocol.       Discharge Plan:   Discharge instructions reviewed with: Patient.  Patient and/or family verbalized understanding of discharge instructions and all questions answered.  Patient discharged in stable condition accompanied by: self.  Departure Mode: Ambulatory.      Zohreh Edward RN

## 2024-03-26 NOTE — LETTER
"    3/26/2024         RE: Derrick Benitez  [address]  [facility] [address]        Dear Colleague,    Thank you for referring your patient, Derrick Benitez, to the [facility]. Please see a copy of my visit note below.    Oncology Rooming Note    March 26, 2024 10:17 AM   Derrick Benitez is a 83 year old male who presents for:    Chief Complaint   Patient presents with     Oncology Clinic Visit     Initial Vitals: Resp 16   Ht 1.702 m (5' 7\")   Wt 89.4 kg (197 lb)   BMI 30.85 kg/m   Estimated body mass index is 30.85 kg/m  as calculated from the following:    Height as of this encounter: 1.702 m (5' 7\").    Weight as of this encounter: 89.4 kg (197 lb). Body surface area is 2.06 meters squared.  No Pain (0) Comment: Data Unavailable   No LMP for male patient.  Allergies reviewed: Yes  Medications reviewed: Yes    Medications: Medication refills not needed today.  Pharmacy name entered into EPIC:    [facility] [place], [place] - [address]  [facility] [address] [place], [place] - [address]    Frailty Screening:   Is the patient here for a new oncology consult visit in cancer care? 2. No        Brenda Martines MA              ONCOLOGIC HISTORY:  Mr. Benitez is a gentleman with non-small cell lung cancer, favor squamous cell carcinoma.   -NGS and PDL staining could not be done because of small sample.   -Guardant 360 does not reveal any actionable alteration.  1.  CT chest angiogram on 09/07/2020 revealed right lower lobe mass.   2.  CT-guided biopsy was done on 09/18/2020.  Pathology revealed non-small cell lung cancer, favor squamous cell carcinoma.  Sample size was small.  PDL staining and NGS panel could not be done.   3.  Brain MRI on 09/26/2020 did not reveal any brain metastasis.   4.  PET scan on 10/05/2020 revealed hypermetabolic right lung mass and multiple hypermetabolic bone lesions.   5.  Carboplatin and Taxol x 6 between 11/25/2020 and " 03/10/2021.   -Pembrolizumab added with cycle 2.   6. PET scan on 10/17/2022 reveals FDG avid sclerotic lesions involving the right posterior seventh rib slightly above background levels. Favored to simply represent sequelae of post treatment change.     SUBJECTIVE:    Mr. 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 He is tolerating it well.    PET scan on 03/18/2024:  1.  No convincing evidence of progressive metastatic disease.   2.  Mildly FDG avid sclerotic lesions of the right seventh rib are again seen. Slightly increased FDG uptake of the right lateral seventh rib lesion. Findings may reflect posttreatment inflammation or indolent metastases.    He has grade 1 peripheral neuropathy. He gets intermittent numbness in toes and feet. He is decreasing gabapentin. Now he takes it twice a day.     He has mild generalized weakness. He is able to do all his activities.  No headache.  No dizziness.  No chest pain.  No shortness of breath.  No abdominal pain, nausea or vomiting. Appetite is good. No urinary or bowel complaints. No bleeding. All other review of systems is negative.     PHYSICAL EXAMINATION:   GENERAL:  Alert and oriented x 3.   VITAL SIGNS:  Reviewed.  ECOG PS of 1.     Rest of the system not examined.     LABS:  CBC, CMP and TSH reviewed.     ASSESSMENT:    1.  An 83-year-old gentleman with metastatic non-small cell lung cancer on pembrolizumab.  No evidence of disease.  2.  Grade 1 peripheral neuropathy on gabapentin.  3.  Mild thrombocytopenia, stable.  4.  Hypocalcemia.  5.  Aortic aneurysm, being followed by vascular surgery.     PLAN:    1. Continue pembrolizumab every 6 weeks.  2. Continue Zometa every 6 months.  3. Take gabapentin twice a day.  4. See DAMEON with next treatment.  5. See me in 3 months.     DISCUSSION:  1.  Patient is doing well.  PET scan was reviewed with him.  Explained to him there is no evidence of malignancy.  There is mildly  FDG avid sclerotic lesion in the right seventh rib.  Patient had metastatic disease in the right seventh rib at diagnosis.  Explained to the patient that rib lesion has responded to treatment.  Now it is  sclerotic.    2.  Patient is on pembrolizumab.  He is tolerating it well.  Discussed regarding continuing pembrolizumab versus stopping pembrolizumab.  Patient does not want to stop it.  He is worried about cancer getting worse.  He will continue on pembrolizumab.    3.  He has grade 1 peripheral neuropathy.  He is on gabapentin twice a day.  He was on it 3 times a day. I told the patient that if neuropathy gets better, he can further decrease gabapentin to once a day and subsequently stop it.    4.  Calcium is mildly low.  Advised him to take oral calcium twice a day.    5.  He is on Zometa every 6 months which will be continued.  He is not having dental or jaw related symptoms.    6.  He had multiple questions which were all answered.  I reassured the patient that he is doing well from metastatic lung cancer.  I will see him in 3 months.  In between he will see our DAMEON.     Total visit time of 40 minutes.  Time spent in today's visit, review of chart/investigations today, monitoring for toxicity of high risk drugs and documentation today.      Again, thank you for allowing me to participate in the care of your patient.        Sincerely,        Buzz Larsen MD

## 2024-04-19 ENCOUNTER — LAB (OUTPATIENT)
Dept: INFUSION THERAPY | Facility: CLINIC | Age: 84
End: 2024-04-19
Attending: INTERNAL MEDICINE
Payer: MEDICARE

## 2024-04-19 VITALS
RESPIRATION RATE: 18 BRPM | HEART RATE: 65 BPM | SYSTOLIC BLOOD PRESSURE: 145 MMHG | OXYGEN SATURATION: 94 % | DIASTOLIC BLOOD PRESSURE: 60 MMHG | TEMPERATURE: 97.6 F

## 2024-04-19 DIAGNOSIS — C34.31 MALIGNANT NEOPLASM OF LOWER LOBE OF RIGHT LUNG (H): Primary | ICD-10-CM

## 2024-04-19 DIAGNOSIS — C79.51 MALIGNANT NEOPLASM METASTATIC TO BONE (H): ICD-10-CM

## 2024-04-19 LAB
ALBUMIN SERPL BCG-MCNC: 3.6 G/DL (ref 3.5–5.2)
CALCIUM SERPL-MCNC: 8.4 MG/DL (ref 8.8–10.2)
CREAT SERPL-MCNC: 1.09 MG/DL (ref 0.67–1.17)
EGFRCR SERPLBLD CKD-EPI 2021: 67 ML/MIN/1.73M2

## 2024-04-19 PROCEDURE — 36591 DRAW BLOOD OFF VENOUS DEVICE: CPT | Performed by: INTERNAL MEDICINE

## 2024-04-19 PROCEDURE — 250N000011 HC RX IP 250 OP 636: Performed by: INTERNAL MEDICINE

## 2024-04-19 PROCEDURE — 258N000003 HC RX IP 258 OP 636: Performed by: INTERNAL MEDICINE

## 2024-04-19 PROCEDURE — 96365 THER/PROPH/DIAG IV INF INIT: CPT

## 2024-04-19 PROCEDURE — 82565 ASSAY OF CREATININE: CPT | Performed by: INTERNAL MEDICINE

## 2024-04-19 PROCEDURE — 82310 ASSAY OF CALCIUM: CPT | Performed by: INTERNAL MEDICINE

## 2024-04-19 PROCEDURE — 82040 ASSAY OF SERUM ALBUMIN: CPT | Performed by: INTERNAL MEDICINE

## 2024-04-19 RX ORDER — ZOLEDRONIC ACID 0.04 MG/ML
4 INJECTION, SOLUTION INTRAVENOUS ONCE
Status: COMPLETED | OUTPATIENT
Start: 2024-04-19 | End: 2024-04-19

## 2024-04-19 RX ORDER — HEPARIN SODIUM (PORCINE) LOCK FLUSH IV SOLN 100 UNIT/ML 100 UNIT/ML
5 SOLUTION INTRAVENOUS
Status: DISCONTINUED | OUTPATIENT
Start: 2024-04-19 | End: 2024-04-19 | Stop reason: HOSPADM

## 2024-04-19 RX ADMIN — Medication 5 ML: at 12:17

## 2024-04-19 RX ADMIN — SODIUM CHLORIDE 250 ML: 9 INJECTION, SOLUTION INTRAVENOUS at 11:58

## 2024-04-19 RX ADMIN — ZOLEDRONIC ACID 4 MG: 0.04 INJECTION, SOLUTION INTRAVENOUS at 11:58

## 2024-04-19 ASSESSMENT — PAIN SCALES - GENERAL: PAINLEVEL: NO PAIN (0)

## 2024-04-19 NOTE — PROGRESS NOTES
Nursing Note:  Derrick Benitez presents today for port labs.    Patient seen by provider today: No   present during visit today: Not Applicable.    Note: N/A.    Intravenous Access:  Labs drawn without difficulty.  Implanted Port.    Discharge Plan:   Patient was sent to [facility] for infusion appointment.    Tatiana Aguiar RN

## 2024-05-07 ENCOUNTER — ONCOLOGY VISIT (OUTPATIENT)
Dept: ONCOLOGY | Facility: CLINIC | Age: 84
End: 2024-05-07
Attending: INTERNAL MEDICINE
Payer: MEDICARE

## 2024-05-07 ENCOUNTER — INFUSION THERAPY VISIT (OUTPATIENT)
Dept: INFUSION THERAPY | Facility: CLINIC | Age: 84
End: 2024-05-07
Attending: NURSE PRACTITIONER
Payer: MEDICARE

## 2024-05-07 VITALS
SYSTOLIC BLOOD PRESSURE: 126 MMHG | BODY MASS INDEX: 29.6 KG/M2 | WEIGHT: 189 LBS | DIASTOLIC BLOOD PRESSURE: 57 MMHG | OXYGEN SATURATION: 97 % | HEART RATE: 65 BPM | TEMPERATURE: 97.6 F | RESPIRATION RATE: 16 BRPM

## 2024-05-07 DIAGNOSIS — C34.31 MALIGNANT NEOPLASM OF LOWER LOBE OF RIGHT LUNG (H): Primary | ICD-10-CM

## 2024-05-07 DIAGNOSIS — Z51.11 ENCOUNTER FOR ANTINEOPLASTIC CHEMOTHERAPY: ICD-10-CM

## 2024-05-07 LAB
ALBUMIN SERPL BCG-MCNC: 3.5 G/DL (ref 3.5–5.2)
ALP SERPL-CCNC: 65 U/L (ref 40–150)
ALT SERPL W P-5'-P-CCNC: 13 U/L (ref 0–70)
ANION GAP SERPL CALCULATED.3IONS-SCNC: 8 MMOL/L (ref 7–15)
AST SERPL W P-5'-P-CCNC: 19 U/L (ref 0–45)
BASOPHILS # BLD AUTO: 0 10E3/UL (ref 0–0.2)
BASOPHILS NFR BLD AUTO: 0 %
BILIRUB SERPL-MCNC: 0.4 MG/DL
BUN SERPL-MCNC: 18.2 MG/DL (ref 8–23)
CALCIUM SERPL-MCNC: 8.7 MG/DL (ref 8.8–10.2)
CHLORIDE SERPL-SCNC: 110 MMOL/L (ref 98–107)
CREAT SERPL-MCNC: 1.13 MG/DL (ref 0.67–1.17)
DEPRECATED HCO3 PLAS-SCNC: 22 MMOL/L (ref 22–29)
EGFRCR SERPLBLD CKD-EPI 2021: 64 ML/MIN/1.73M2
EOSINOPHIL # BLD AUTO: 0.1 10E3/UL (ref 0–0.7)
EOSINOPHIL NFR BLD AUTO: 3 %
ERYTHROCYTE [DISTWIDTH] IN BLOOD BY AUTOMATED COUNT: 12.6 % (ref 10–15)
GLUCOSE SERPL-MCNC: 147 MG/DL (ref 70–99)
HCT VFR BLD AUTO: 40.3 % (ref 40–53)
HGB BLD-MCNC: 13.6 G/DL (ref 13.3–17.7)
IMM GRANULOCYTES # BLD: 0 10E3/UL
IMM GRANULOCYTES NFR BLD: 0 %
LYMPHOCYTES # BLD AUTO: 0.9 10E3/UL (ref 0.8–5.3)
LYMPHOCYTES NFR BLD AUTO: 19 %
MCH RBC QN AUTO: 31.6 PG (ref 26.5–33)
MCHC RBC AUTO-ENTMCNC: 33.7 G/DL (ref 31.5–36.5)
MCV RBC AUTO: 94 FL (ref 78–100)
MONOCYTES # BLD AUTO: 0.4 10E3/UL (ref 0–1.3)
MONOCYTES NFR BLD AUTO: 7 %
NEUTROPHILS # BLD AUTO: 3.5 10E3/UL (ref 1.6–8.3)
NEUTROPHILS NFR BLD AUTO: 71 %
NRBC # BLD AUTO: 0 10E3/UL
NRBC BLD AUTO-RTO: 0 /100
PLATELET # BLD AUTO: 125 10E3/UL (ref 150–450)
POTASSIUM SERPL-SCNC: 4.3 MMOL/L (ref 3.4–5.3)
PROT SERPL-MCNC: 5.9 G/DL (ref 6.4–8.3)
RBC # BLD AUTO: 4.31 10E6/UL (ref 4.4–5.9)
SODIUM SERPL-SCNC: 140 MMOL/L (ref 135–145)
TSH SERPL DL<=0.005 MIU/L-ACNC: 0.83 UIU/ML (ref 0.3–4.2)
WBC # BLD AUTO: 5 10E3/UL (ref 4–11)

## 2024-05-07 PROCEDURE — 84155 ASSAY OF PROTEIN SERUM: CPT | Performed by: INTERNAL MEDICINE

## 2024-05-07 PROCEDURE — 84443 ASSAY THYROID STIM HORMONE: CPT | Performed by: INTERNAL MEDICINE

## 2024-05-07 PROCEDURE — 99214 OFFICE O/P EST MOD 30 MIN: CPT | Performed by: NURSE PRACTITIONER

## 2024-05-07 PROCEDURE — 250N000011 HC RX IP 250 OP 636: Performed by: INTERNAL MEDICINE

## 2024-05-07 PROCEDURE — 258N000003 HC RX IP 258 OP 636: Performed by: INTERNAL MEDICINE

## 2024-05-07 PROCEDURE — 36591 DRAW BLOOD OFF VENOUS DEVICE: CPT | Performed by: INTERNAL MEDICINE

## 2024-05-07 PROCEDURE — 96413 CHEMO IV INFUSION 1 HR: CPT

## 2024-05-07 PROCEDURE — G0463 HOSPITAL OUTPT CLINIC VISIT: HCPCS | Performed by: NURSE PRACTITIONER

## 2024-05-07 PROCEDURE — 85025 COMPLETE CBC W/AUTO DIFF WBC: CPT | Performed by: INTERNAL MEDICINE

## 2024-05-07 PROCEDURE — 82040 ASSAY OF SERUM ALBUMIN: CPT | Performed by: INTERNAL MEDICINE

## 2024-05-07 RX ORDER — HEPARIN SODIUM (PORCINE) LOCK FLUSH IV SOLN 100 UNIT/ML 100 UNIT/ML
5 SOLUTION INTRAVENOUS EVERY 8 HOURS PRN
Status: DISCONTINUED | OUTPATIENT
Start: 2024-05-07 | End: 2024-05-07 | Stop reason: HOSPADM

## 2024-05-07 RX ADMIN — Medication 5 ML: at 11:15

## 2024-05-07 RX ADMIN — SODIUM CHLORIDE 400 MG: 9 INJECTION, SOLUTION INTRAVENOUS at 10:24

## 2024-05-07 RX ADMIN — SODIUM CHLORIDE 1000 ML: 9 INJECTION, SOLUTION INTRAVENOUS at 10:05

## 2024-05-07 ASSESSMENT — PAIN SCALES - GENERAL: PAINLEVEL: NO PAIN (0)

## 2024-05-07 NOTE — LETTER
5/7/2024         RE: Derrick Benitez  [address]  [facility] [address]        Dear Colleague,    Thank you for referring your patient, Derrick Benitez, to the [facility]. Please see a copy of my visit note below.    Hematology-Oncology Follow-up Note  [facility]     Today's Date: 05/07/24     Reason for Visit:  Metastatic nonn-small cell lung carcinoma  Follows with Dr. Larsen  Completed carboplatin Taxol and pembrolizumab x 6 cycle on 03/10/2021  03/31/2021-on maintenance with single agent with pembrolizumab  10/17/2022-PET scan did not reveal evidence of malignancy  Patient is currently getting treatment with pembrolizumab every 6 weeks  04/25/23-CT scan revealed stable skeletal metastasis no new sites of disease  11/08/2023-CT scan revealed stable bony lesions  03/18/2024- PET:  1.  No convincing evidence of progressive metastatic disease.   2.  Mildly FDG avid sclerotic lesions of the right seventh rib are again seen. Slightly increased FDG uptake of the right lateral seventh rib lesion. Findings may reflect posttreatment inflammation or indolent metastases  -3/36/2024 Day1 Cycle 30 pembrolizumab  -4/19/24 received Zometa 4 mg      Interval History:  Mr. Benitez reports that he is doing well. He presents to clinic 5/7/2024 for Day 1 Cycle 31 of pembrolizumab. He continues to enjoy landscaping activities. Reports that neuropathy is well controlled, taking Gabapentin twice daily hs.  He denies any fever or chills, chest pain, shortness of breath or dizziness. No nausea/vomiting, diarrhea, melena or hematochezia, or hematuria.       Review of Systems:  14 point ROS of systems including Constitutional, Eyes, Respiratory, Cardiovascular, Gastroenterology, Genitourinary, Integumentary, Muscularskeletal, Psychiatric were all negative except for pertinent positives noted in my HPI.       MEDICATIONS:  Reviewed       PHYSICAL EXAM:  Vital signs:  Temp: 97.6  F (36.4   C) Temp src: Oral BP: 126/57 Pulse: 65   Resp: 16 SpO2: 97 %       Weight: 85.7 kg (189 lb)    GENERAL/CONSTITUTIONAL: Well appearing, well groomed male in no acute distress.  Skin: port placed right supraclavicular region; no rashes or erythema noted  RESPIRATORY: Clear to auscultation bilaterally. No crackles or wheezing.   CARDIOVASCULAR: Regular rate and rhythm without murmurs, gallops, or rubs. No edema  GASTROINTESTINAL: No hepatosplenomegaly, masses, or tenderness. The patient has normal bowel sounds. No guarding.  No distention.  MUSCULOSKELETAL: Warm and well-perfused, no cyanosis, clubbing, or edema.  LYMPH: No anterior cervical, posterior cervical, supraclavicular, axillary or inguinal adenopathy.   Psych: normal mood and affect, answering questions appropriately.     Labs:  Reviewed    ASSESSMENT/ PLAN :    Derrick Benitez is a 83 year old male with metastatic non-small cell lung cancer currently on pembrolizumab.  -He follows with Dr. Larsen   -status post carboplatinTaxol and pembrolizumab-completed 6 cycles in March 2021  -Currently on maintenance with immunotherapy pembrolizumab 400 mg every 6 weeks  11/08/2023-CT scan revealed stable bony lesions  -PET 2024- No convincing evidence of progressive metastatic disease.   2.  Mildly FDG avid sclerotic lesions of the right seventh rib are again seen. Slightly increased FDG uptake of the right lateral seventh rib lesion. Findings may reflect posttreatment inflammation or indolent metastases  - He reports that he is tolerating treatment well, and wishes to continue with his treatments.  - Day 1 Cycle 31 pembrolizumab 5/7/2024  -He is scheduled to receive Zometa 10/2024  - He has a CTAP scheduled for 6/13/2024  - Follow up is scheduled with Dr. Larsen 6/18/2024    Neuropathy  Stable. Taking Gabapentin BID hs    Bone Mets  Currently receiving Zometa every 6 months  -Next Zometa October 2024    Thrombocytopenia chronic  Continue to monitor   Reviewed with patient  red flags of thrombocytopenia  Call clinic with any bleeding events, falls, bruising or injury    Hypocalcemia  Continue to monitor    Hypertension  Followed by primary  Currently taking Amlodipine 10 mg daily;no edema noted on physical examination.          Sonya Moya PA-C on 5/7/2024 at 10:27 AM   I saw patient concurrently with VERO Rm I agree with note above DARSHANA Meza CNP          Chart documentation with Dragon Voice recognition Software. Although reviewed after completion, some words and grammatical errors may remain.       Again, thank you for allowing me to participate in the care of your patient.        Sincerely,        DARSHANA Meza CNP

## 2024-05-07 NOTE — PROGRESS NOTES
Infusion Nursing Note:  Derrick MARITZA Benitez presents today for port labs.    Patient seen by provider today: Yes: De Frausto NP.   present during visit today: Not Applicable.    Note: N/A.    Intravenous Access:  Labs drawn without difficulty.  Implanted Port.    Treatment Conditions:  Not Applicable. Labs pending at time of discharge.      Post Infusion Assessment:  Site patent and intact, free from redness, edema or discomfort.  No evidence of extravasations.  Port access left in place for infusion today.      Discharge Plan:   Patient discharged in stable condition accompanied by: self.  Departure Mode: Ambulatory.    Jenny Manzo RN

## 2024-05-07 NOTE — PROGRESS NOTES
Hematology-Oncology Follow-up Note  [facility]     Today's Date: 05/07/24     Reason for Visit:  Metastatic nonn-small cell lung carcinoma  Follows with Dr. Larsen  Completed carboplatin Taxol and pembrolizumab x 6 cycle on 03/10/2021  03/31/2021-on maintenance with single agent with pembrolizumab  10/17/2022-PET scan did not reveal evidence of malignancy  Patient is currently getting treatment with pembrolizumab every 6 weeks  04/25/23-CT scan revealed stable skeletal metastasis no new sites of disease  11/08/2023-CT scan revealed stable bony lesions  03/18/2024- PET:  1.  No convincing evidence of progressive metastatic disease.   2.  Mildly FDG avid sclerotic lesions of the right seventh rib are again seen. Slightly increased FDG uptake of the right lateral seventh rib lesion. Findings may reflect posttreatment inflammation or indolent metastases  -3/36/2024 Day1 Cycle 30 pembrolizumab  -4/19/24 received Zometa 4 mg      Interval History:  Mr. Benitez reports that he is doing well. He presents to clinic 5/7/2024 for Day 1 Cycle 31 of pembrolizumab. He continues to enjoy landscaping activities. Reports that neuropathy is well controlled, taking Gabapentin twice daily hs.  He denies any fever or chills, chest pain, shortness of breath or dizziness. No nausea/vomiting, diarrhea, melena or hematochezia, or hematuria.       Review of Systems:  14 point ROS of systems including Constitutional, Eyes, Respiratory, Cardiovascular, Gastroenterology, Genitourinary, Integumentary, Muscularskeletal, Psychiatric were all negative except for pertinent positives noted in my HPI.       MEDICATIONS:  Reviewed       PHYSICAL EXAM:  Vital signs:  Temp: 97.6  F (36.4  C) Temp src: Oral BP: 126/57 Pulse: 65   Resp: 16 SpO2: 97 %       Weight: 85.7 kg (189 lb)    GENERAL/CONSTITUTIONAL: Well appearing, well groomed male in no acute distress.  Skin: port placed right supraclavicular region; no rashes or erythema  noted  RESPIRATORY: Clear to auscultation bilaterally. No crackles or wheezing.   CARDIOVASCULAR: Regular rate and rhythm without murmurs, gallops, or rubs. No edema  GASTROINTESTINAL: No hepatosplenomegaly, masses, or tenderness. The patient has normal bowel sounds. No guarding.  No distention.  MUSCULOSKELETAL: Warm and well-perfused, no cyanosis, clubbing, or edema.  LYMPH: No anterior cervical, posterior cervical, supraclavicular, axillary or inguinal adenopathy.   Psych: normal mood and affect, answering questions appropriately.     Labs:  Reviewed    ASSESSMENT/ PLAN :    Derrick Benitez is a 83 year old male with metastatic non-small cell lung cancer currently on pembrolizumab.  -He follows with Dr. Larsen   -status post carboplatinTaxol and pembrolizumab-completed 6 cycles in March 2021  -Currently on maintenance with immunotherapy pembrolizumab 400 mg every 6 weeks  11/08/2023-CT scan revealed stable bony lesions  -PET 2024- No convincing evidence of progressive metastatic disease.   2.  Mildly FDG avid sclerotic lesions of the right seventh rib are again seen. Slightly increased FDG uptake of the right lateral seventh rib lesion. Findings may reflect posttreatment inflammation or indolent metastases  - He reports that he is tolerating treatment well, and wishes to continue with his treatments.  - Day 1 Cycle 31 pembrolizumab 5/7/2024'  Labs reviewed okay to proceed with treatment  - Follow up is scheduled with Dr. Larsen 6/18/2024    Neuropathy  Stable. Taking Gabapentin BID hs    Bone Mets  Currently receiving Zometa every 6 months  -Next Zometa is due in  October 2024    Thrombocytopenia chronic  Continue to monitor   Reviewed with patient red flags of thrombocytopenia  Call clinic with any bleeding events, falls, bruising or injury    Hypocalcemia  Continue to monitor    Hypertension  Followed by primary  Currently taking Amlodipine 10 mg daily;no edema noted on physical examination.          Sonya Moya PA-C  on 5/7/2024 at 10:27 AM   I saw patient concurrently with VERO Rm I agree with note above DARSHANA Meza CNP          Chart documentation with Dragon Voice recognition Software. Although reviewed after completion, some words and grammatical errors may remain.

## 2024-05-25 ENCOUNTER — HEALTH MAINTENANCE LETTER (OUTPATIENT)
Age: 84
End: 2024-05-25

## 2024-06-17 ENCOUNTER — LAB (OUTPATIENT)
Dept: INFUSION THERAPY | Facility: CLINIC | Age: 84
End: 2024-06-17
Attending: NURSE PRACTITIONER
Payer: MEDICARE

## 2024-06-17 DIAGNOSIS — C34.31 MALIGNANT NEOPLASM OF LOWER LOBE OF RIGHT LUNG (H): Primary | ICD-10-CM

## 2024-06-17 DIAGNOSIS — Z51.11 ENCOUNTER FOR ANTINEOPLASTIC CHEMOTHERAPY: ICD-10-CM

## 2024-06-17 LAB
ALBUMIN SERPL BCG-MCNC: 3.5 G/DL (ref 3.5–5.2)
ALP SERPL-CCNC: 68 U/L (ref 40–150)
ALT SERPL W P-5'-P-CCNC: 13 U/L (ref 0–70)
ANION GAP SERPL CALCULATED.3IONS-SCNC: 9 MMOL/L (ref 7–15)
AST SERPL W P-5'-P-CCNC: 21 U/L (ref 0–45)
BASOPHILS # BLD AUTO: 0 10E3/UL (ref 0–0.2)
BASOPHILS NFR BLD AUTO: 0 %
BILIRUB SERPL-MCNC: 0.4 MG/DL
BUN SERPL-MCNC: 15.7 MG/DL (ref 8–23)
CALCIUM SERPL-MCNC: 8.7 MG/DL (ref 8.8–10.2)
CHLORIDE SERPL-SCNC: 110 MMOL/L (ref 98–107)
CREAT SERPL-MCNC: 1.01 MG/DL (ref 0.67–1.17)
DEPRECATED HCO3 PLAS-SCNC: 23 MMOL/L (ref 22–29)
EGFRCR SERPLBLD CKD-EPI 2021: 73 ML/MIN/1.73M2
EOSINOPHIL # BLD AUTO: 0.2 10E3/UL (ref 0–0.7)
EOSINOPHIL NFR BLD AUTO: 3 %
ERYTHROCYTE [DISTWIDTH] IN BLOOD BY AUTOMATED COUNT: 12.4 % (ref 10–15)
GLUCOSE SERPL-MCNC: 154 MG/DL (ref 70–99)
HCT VFR BLD AUTO: 42.1 % (ref 40–53)
HGB BLD-MCNC: 14.3 G/DL (ref 13.3–17.7)
IMM GRANULOCYTES # BLD: 0 10E3/UL
IMM GRANULOCYTES NFR BLD: 0 %
LYMPHOCYTES # BLD AUTO: 0.9 10E3/UL (ref 0.8–5.3)
LYMPHOCYTES NFR BLD AUTO: 16 %
MCH RBC QN AUTO: 32.4 PG (ref 26.5–33)
MCHC RBC AUTO-ENTMCNC: 34 G/DL (ref 31.5–36.5)
MCV RBC AUTO: 96 FL (ref 78–100)
MONOCYTES # BLD AUTO: 0.5 10E3/UL (ref 0–1.3)
MONOCYTES NFR BLD AUTO: 8 %
NEUTROPHILS # BLD AUTO: 4.4 10E3/UL (ref 1.6–8.3)
NEUTROPHILS NFR BLD AUTO: 74 %
NRBC # BLD AUTO: 0 10E3/UL
NRBC BLD AUTO-RTO: 0 /100
PLATELET # BLD AUTO: 132 10E3/UL (ref 150–450)
POTASSIUM SERPL-SCNC: 4.5 MMOL/L (ref 3.4–5.3)
PROT SERPL-MCNC: 6.1 G/DL (ref 6.4–8.3)
RBC # BLD AUTO: 4.41 10E6/UL (ref 4.4–5.9)
SODIUM SERPL-SCNC: 142 MMOL/L (ref 135–145)
TSH SERPL DL<=0.005 MIU/L-ACNC: 0.78 UIU/ML (ref 0.3–4.2)
WBC # BLD AUTO: 6 10E3/UL (ref 4–11)

## 2024-06-17 PROCEDURE — 82374 ASSAY BLOOD CARBON DIOXIDE: CPT | Performed by: NURSE PRACTITIONER

## 2024-06-17 PROCEDURE — 250N000011 HC RX IP 250 OP 636: Mod: JZ | Performed by: INTERNAL MEDICINE

## 2024-06-17 PROCEDURE — 84155 ASSAY OF PROTEIN SERUM: CPT | Performed by: NURSE PRACTITIONER

## 2024-06-17 PROCEDURE — 84443 ASSAY THYROID STIM HORMONE: CPT | Performed by: NURSE PRACTITIONER

## 2024-06-17 PROCEDURE — 36591 DRAW BLOOD OFF VENOUS DEVICE: CPT | Performed by: NURSE PRACTITIONER

## 2024-06-17 PROCEDURE — 85041 AUTOMATED RBC COUNT: CPT | Performed by: NURSE PRACTITIONER

## 2024-06-17 PROCEDURE — 82247 BILIRUBIN TOTAL: CPT | Performed by: NURSE PRACTITIONER

## 2024-06-17 RX ORDER — HEPARIN SODIUM (PORCINE) LOCK FLUSH IV SOLN 100 UNIT/ML 100 UNIT/ML
5 SOLUTION INTRAVENOUS
Status: DISCONTINUED | OUTPATIENT
Start: 2024-06-17 | End: 2024-06-17 | Stop reason: HOSPADM

## 2024-06-17 RX ORDER — HEPARIN SODIUM (PORCINE) LOCK FLUSH IV SOLN 100 UNIT/ML 100 UNIT/ML
5 SOLUTION INTRAVENOUS
Status: CANCELLED | OUTPATIENT
Start: 2024-06-17

## 2024-06-17 RX ORDER — HEPARIN SODIUM,PORCINE 10 UNIT/ML
5 VIAL (ML) INTRAVENOUS
Status: CANCELLED | OUTPATIENT
Start: 2024-06-17

## 2024-06-17 RX ADMIN — Medication 5 ML: at 10:40

## 2024-06-18 ENCOUNTER — INFUSION THERAPY VISIT (OUTPATIENT)
Dept: INFUSION THERAPY | Facility: CLINIC | Age: 84
End: 2024-06-18
Attending: INTERNAL MEDICINE
Payer: MEDICARE

## 2024-06-18 ENCOUNTER — ONCOLOGY VISIT (OUTPATIENT)
Dept: ONCOLOGY | Facility: CLINIC | Age: 84
End: 2024-06-18
Attending: INTERNAL MEDICINE
Payer: MEDICARE

## 2024-06-18 VITALS
RESPIRATION RATE: 16 BRPM | BODY MASS INDEX: 29.54 KG/M2 | SYSTOLIC BLOOD PRESSURE: 136 MMHG | DIASTOLIC BLOOD PRESSURE: 56 MMHG | TEMPERATURE: 98 F | HEART RATE: 67 BPM | OXYGEN SATURATION: 94 % | WEIGHT: 188.6 LBS

## 2024-06-18 DIAGNOSIS — C79.51 MALIGNANT NEOPLASM METASTATIC TO BONE (H): ICD-10-CM

## 2024-06-18 DIAGNOSIS — Z51.11 ENCOUNTER FOR ANTINEOPLASTIC CHEMOTHERAPY: ICD-10-CM

## 2024-06-18 DIAGNOSIS — C34.31 MALIGNANT NEOPLASM OF LOWER LOBE OF RIGHT LUNG (H): Primary | ICD-10-CM

## 2024-06-18 PROCEDURE — 250N000011 HC RX IP 250 OP 636: Mod: JZ | Performed by: INTERNAL MEDICINE

## 2024-06-18 PROCEDURE — 258N000003 HC RX IP 258 OP 636: Mod: JZ | Performed by: INTERNAL MEDICINE

## 2024-06-18 PROCEDURE — G0463 HOSPITAL OUTPT CLINIC VISIT: HCPCS | Mod: 25 | Performed by: INTERNAL MEDICINE

## 2024-06-18 PROCEDURE — 99214 OFFICE O/P EST MOD 30 MIN: CPT | Performed by: INTERNAL MEDICINE

## 2024-06-18 PROCEDURE — 96413 CHEMO IV INFUSION 1 HR: CPT

## 2024-06-18 RX ORDER — EPINEPHRINE 1 MG/ML
0.3 INJECTION, SOLUTION INTRAMUSCULAR; SUBCUTANEOUS EVERY 5 MIN PRN
Status: CANCELLED | OUTPATIENT
Start: 2024-07-30

## 2024-06-18 RX ORDER — METHYLPREDNISOLONE SODIUM SUCCINATE 125 MG/2ML
125 INJECTION, POWDER, LYOPHILIZED, FOR SOLUTION INTRAMUSCULAR; INTRAVENOUS
Status: CANCELLED
Start: 2024-07-30

## 2024-06-18 RX ORDER — HEPARIN SODIUM (PORCINE) LOCK FLUSH IV SOLN 100 UNIT/ML 100 UNIT/ML
5 SOLUTION INTRAVENOUS EVERY 8 HOURS PRN
Status: CANCELLED | OUTPATIENT
Start: 2024-07-30

## 2024-06-18 RX ORDER — NALOXONE HYDROCHLORIDE 0.4 MG/ML
.1-.4 INJECTION, SOLUTION INTRAMUSCULAR; INTRAVENOUS; SUBCUTANEOUS
Status: CANCELLED | OUTPATIENT
Start: 2024-07-30

## 2024-06-18 RX ORDER — HEPARIN SODIUM (PORCINE) LOCK FLUSH IV SOLN 100 UNIT/ML 100 UNIT/ML
5 SOLUTION INTRAVENOUS EVERY 8 HOURS PRN
Status: DISCONTINUED | OUTPATIENT
Start: 2024-06-18 | End: 2024-06-18 | Stop reason: HOSPADM

## 2024-06-18 RX ORDER — ALBUTEROL SULFATE 0.83 MG/ML
2.5 SOLUTION RESPIRATORY (INHALATION)
Status: CANCELLED | OUTPATIENT
Start: 2024-07-30

## 2024-06-18 RX ORDER — LORAZEPAM 2 MG/ML
0.5 INJECTION INTRAMUSCULAR EVERY 4 HOURS PRN
Status: CANCELLED | OUTPATIENT
Start: 2024-06-18

## 2024-06-18 RX ORDER — DIPHENHYDRAMINE HYDROCHLORIDE 50 MG/ML
50 INJECTION INTRAMUSCULAR; INTRAVENOUS
Status: CANCELLED
Start: 2024-06-18

## 2024-06-18 RX ORDER — METHYLPREDNISOLONE SODIUM SUCCINATE 125 MG/2ML
125 INJECTION, POWDER, LYOPHILIZED, FOR SOLUTION INTRAMUSCULAR; INTRAVENOUS
Status: CANCELLED
Start: 2024-06-18

## 2024-06-18 RX ORDER — HEPARIN SODIUM,PORCINE 10 UNIT/ML
5 VIAL (ML) INTRAVENOUS
Status: CANCELLED | OUTPATIENT
Start: 2024-06-18

## 2024-06-18 RX ORDER — HEPARIN SODIUM (PORCINE) LOCK FLUSH IV SOLN 100 UNIT/ML 100 UNIT/ML
5 SOLUTION INTRAVENOUS EVERY 8 HOURS PRN
Status: CANCELLED | OUTPATIENT
Start: 2024-06-18

## 2024-06-18 RX ORDER — ALBUTEROL SULFATE 0.83 MG/ML
2.5 SOLUTION RESPIRATORY (INHALATION)
Status: CANCELLED | OUTPATIENT
Start: 2024-06-18

## 2024-06-18 RX ORDER — MEPERIDINE HYDROCHLORIDE 25 MG/ML
25 INJECTION INTRAMUSCULAR; INTRAVENOUS; SUBCUTANEOUS EVERY 30 MIN PRN
Status: CANCELLED | OUTPATIENT
Start: 2024-06-18

## 2024-06-18 RX ORDER — LORAZEPAM 2 MG/ML
0.5 INJECTION INTRAMUSCULAR EVERY 4 HOURS PRN
Status: CANCELLED | OUTPATIENT
Start: 2024-07-30

## 2024-06-18 RX ORDER — ALBUTEROL SULFATE 90 UG/1
1-2 AEROSOL, METERED RESPIRATORY (INHALATION)
Status: CANCELLED
Start: 2024-07-30

## 2024-06-18 RX ORDER — EPINEPHRINE 1 MG/ML
0.3 INJECTION, SOLUTION INTRAMUSCULAR; SUBCUTANEOUS EVERY 5 MIN PRN
Status: CANCELLED | OUTPATIENT
Start: 2024-06-18

## 2024-06-18 RX ORDER — ALBUTEROL SULFATE 90 UG/1
1-2 AEROSOL, METERED RESPIRATORY (INHALATION)
Status: CANCELLED
Start: 2024-06-18

## 2024-06-18 RX ORDER — SODIUM CHLORIDE 9 MG/ML
1000 INJECTION, SOLUTION INTRAVENOUS CONTINUOUS PRN
Status: CANCELLED
Start: 2024-06-18

## 2024-06-18 RX ORDER — NALOXONE HYDROCHLORIDE 0.4 MG/ML
.1-.4 INJECTION, SOLUTION INTRAMUSCULAR; INTRAVENOUS; SUBCUTANEOUS
Status: CANCELLED | OUTPATIENT
Start: 2024-06-18

## 2024-06-18 RX ORDER — SODIUM CHLORIDE 9 MG/ML
1000 INJECTION, SOLUTION INTRAVENOUS CONTINUOUS PRN
Status: CANCELLED
Start: 2024-07-30

## 2024-06-18 RX ORDER — MEPERIDINE HYDROCHLORIDE 25 MG/ML
25 INJECTION INTRAMUSCULAR; INTRAVENOUS; SUBCUTANEOUS EVERY 30 MIN PRN
Status: CANCELLED | OUTPATIENT
Start: 2024-07-30

## 2024-06-18 RX ORDER — DIPHENHYDRAMINE HYDROCHLORIDE 50 MG/ML
50 INJECTION INTRAMUSCULAR; INTRAVENOUS
Status: CANCELLED
Start: 2024-07-30

## 2024-06-18 RX ORDER — HEPARIN SODIUM,PORCINE 10 UNIT/ML
5 VIAL (ML) INTRAVENOUS
Status: CANCELLED | OUTPATIENT
Start: 2024-07-30

## 2024-06-18 RX ADMIN — SODIUM CHLORIDE 400 MG: 9 INJECTION, SOLUTION INTRAVENOUS at 11:41

## 2024-06-18 RX ADMIN — Medication 5 ML: at 12:44

## 2024-06-18 RX ADMIN — SODIUM CHLORIDE 1000 ML: 9 INJECTION, SOLUTION INTRAVENOUS at 11:29

## 2024-06-18 ASSESSMENT — PAIN SCALES - GENERAL: PAINLEVEL: NO PAIN (0)

## 2024-06-18 NOTE — PROGRESS NOTES
Infusion Nursing Note:  Derrick Benitez presents today for keytruda.    Patient seen by provider today: Yes: SHEILA   present during visit today: Not Applicable.    Note: N/A.      Intravenous Access:  Implanted Port.    Treatment Conditions:  Lab Results   Component Value Date     06/17/2024    POTASSIUM 4.5 06/17/2024    MAG 2.1 10/30/2014    CR 1.01 06/17/2024    ANTHONY 8.7 (L) 06/17/2024    BILITOTAL 0.4 06/17/2024    ALBUMIN 3.5 06/17/2024    ALT 13 06/17/2024    AST 21 06/17/2024       Results reviewed, labs MET treatment parameters, ok to proceed with treatment.      Post Infusion Assessment:  Patient tolerated infusion without incident.  Blood return noted pre and post infusion.  Site patent and intact, free from redness, edema or discomfort.  No evidence of extravasations.  Access discontinued per protocol.       Discharge Plan:   Discharge instructions reviewed with: Patient.  Patient and/or family verbalized understanding of discharge instructions and all questions answered.  Copy of AVS reviewed with patient and/or family.  Patient will return 7/29 for next appointment.  Patient discharged in stable condition accompanied by: self.  Departure Mode: Ambulatory.      Archana Hall RN

## 2024-06-18 NOTE — NURSING NOTE
"Oncology Rooming Note    June 18, 2024 10:55 AM   Derrick Benitez is a 83 year old male who presents for:    Chief Complaint   Patient presents with    Oncology Clinic Visit     Initial Vitals: /56   Pulse 67   Temp 98  F (36.7  C) (Oral)   Resp 16   Wt 85.5 kg (188 lb 9.6 oz)   SpO2 94%   BMI 29.54 kg/m   Estimated body mass index is 29.54 kg/m  as calculated from the following:    Height as of 3/26/24: 1.702 m (5' 7\").    Weight as of this encounter: 85.5 kg (188 lb 9.6 oz). Body surface area is 2.01 meters squared.  No Pain (0) Comment: Data Unavailable   No LMP for male patient.  Allergies reviewed: Yes  Medications reviewed: Yes    Medications: Medication refills not needed today.  Pharmacy name entered into EPIC:    [place], [place] - [address]  [facility] [address] [place], [place] - [address] [place] [address]    Frailty Screening:   Is the patient here for a new oncology consult visit in cancer care? 2. No      Clinical concerns: follow up       Crystal Rojas            "

## 2024-06-18 NOTE — LETTER
6/18/2024      Derrick Benitez  [address]  [facility] [address]      Dear Colleague,    Thank you for referring your patient, Derrick Benitez, to the [facility] [place]. Please see a copy of my visit note below.    ONCOLOGIC HISTORY:  Mr. Benitez is a gentleman with non-small cell lung cancer, favor squamous cell carcinoma.   -NGS and PDL staining could not be done because of small sample.   -Guardant 360 does not reveal any actionable alteration.  1.  CT chest angiogram on 09/07/2020 revealed right lower lobe mass.   2.  CT-guided biopsy was done on 09/18/2020.  Pathology revealed non-small cell lung cancer, favor squamous cell carcinoma.  Sample size was small.  PDL staining and NGS panel could not be done.   3.  Brain MRI on 09/26/2020 did not reveal any brain metastasis.   4.  PET scan on 10/05/2020 revealed hypermetabolic right lung mass and multiple hypermetabolic bone lesions.   5.  Carboplatin and Taxol x 6 between 11/25/2020 and 03/10/2021.   -Pembrolizumab added with cycle 2.   6. PET scan on 10/17/2022 reveals FDG avid sclerotic lesions involving the right posterior seventh rib slightly above background levels. Favored to simply represent sequelae of post treatment change.  -PET scan on 03/18/2024 does not reveal any evidence of malignancy.  There is mildly FDG avid sclerotic lesions of the right seventh rib are again seen. Slightly increased FDG uptake of the right lateral seventh rib lesion. Findings may reflect posttreatment inflammation or indolent metastases.      SUBJECTIVE:    Mr. 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 He is tolerating it well. PET scan on 03/18/2024 did not reveal any evidence of cancer.     His overall condition is stable. He has grade 1 peripheral neuropathy. He gets intermittent numbness in toes, feet and ankle. No neuropathy in upper extremity. He is on gabapentin twice a day. He has mild generalized  weakness. He is able to do all his activities.      No headache.  No dizziness.  No chest pain.  No shortness of breath.  No abdominal pain, nausea or vomiting. Appetite is good. No urinary or bowel complaints. No bleeding. No recent infection. All other review of systems is negative.     PHYSICAL EXAMINATION:   GENERAL:  Alert and oriented x 3.   VITAL SIGNS:  Reviewed.  ECOG PS of 1.     Rest of the system not examined.     LABS:  CBC, CMP and TSH reviewed.     ASSESSMENT:    1.  An 83-year-old gentleman with metastatic non-small cell lung cancer on pembrolizumab.  No evidence of disease.  2.  Grade 1 peripheral neuropathy on gabapentin.  3.  Mild thrombocytopenia, stable.  4.  Hypocalcemia.  5.  Aortic aneurysm, being followed by vascular surgery.     PLAN:    1. Continue pembrolizumab every 6 weeks.  2. Continue Zometa every 6 months.  3. Continue gabapentin twice a day.  4. CT scan soon.  5. See me or DAMEON with next treatment.     DISCUSSION:  1.  Patient is doing well from lung cancer.  He will continue on pembrolizumab.  He is tolerating it well.    2.  Will get CT chest, abdomen and pelvis for follow-up.    3.  He has grade 1 peripheral neuropathy.  He will continue on gabapentin.      4.  He will continue on Zometa every 6 months.  He does not have any dental or jaw related symptoms.  Advised him to continue taking over-the-counter calcium and vitamin D twice a day.    5.  He had a few questions which were all answered.  He will be seen with next treatment.     Total visit time of 30 minutes.  Time spent in today's visit, review of chart/investigations today, monitoring for toxicity of high risk drugs and documentation today.      Again, thank you for allowing me to participate in the care of your patient.        Sincerely,        Buzz Larsen MD

## 2024-06-18 NOTE — PROGRESS NOTES
ONCOLOGIC HISTORY:  Mr. Benitez is a gentleman with non-small cell lung cancer, favor squamous cell carcinoma.   -NGS and PDL staining could not be done because of small sample.   -Guardant 360 does not reveal any actionable alteration.  1.  CT chest angiogram on 09/07/2020 revealed right lower lobe mass.   2.  CT-guided biopsy was done on 09/18/2020.  Pathology revealed non-small cell lung cancer, favor squamous cell carcinoma.  Sample size was small.  PDL staining and NGS panel could not be done.   3.  Brain MRI on 09/26/2020 did not reveal any brain metastasis.   4.  PET scan on 10/05/2020 revealed hypermetabolic right lung mass and multiple hypermetabolic bone lesions.   5.  Carboplatin and Taxol x 6 between 11/25/2020 and 03/10/2021.   -Pembrolizumab added with cycle 2.   6. PET scan on 10/17/2022 reveals FDG avid sclerotic lesions involving the right posterior seventh rib slightly above background levels. Favored to simply represent sequelae of post treatment change.  -PET scan on 03/18/2024 does not reveal any evidence of malignancy.  There is mildly FDG avid sclerotic lesions of the right seventh rib are again seen. Slightly increased FDG uptake of the right lateral seventh rib lesion. Findings may reflect posttreatment inflammation or indolent metastases.      SUBJECTIVE:    Mr. 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 He is tolerating it well. PET scan on 03/18/2024 did not reveal any evidence of cancer.     His overall condition is stable. He has grade 1 peripheral neuropathy. He gets intermittent numbness in toes, feet and ankle. No neuropathy in upper extremity. He is on gabapentin twice a day. He has mild generalized weakness. He is able to do all his activities.      No headache.  No dizziness.  No chest pain.  No shortness of breath.  No abdominal pain, nausea or vomiting. Appetite is good. No urinary or bowel complaints. No bleeding. No recent  infection. All other review of systems is negative.     PHYSICAL EXAMINATION:   GENERAL:  Alert and oriented x 3.   VITAL SIGNS:  Reviewed.  ECOG PS of 1.     Rest of the system not examined.     LABS:  CBC, CMP and TSH reviewed.     ASSESSMENT:    1.  An 83-year-old gentleman with metastatic non-small cell lung cancer on pembrolizumab.  No evidence of disease.  2.  Grade 1 peripheral neuropathy on gabapentin.  3.  Mild thrombocytopenia, stable.  4.  Hypocalcemia.  5.  Aortic aneurysm, being followed by vascular surgery.     PLAN:    1. Continue pembrolizumab every 6 weeks.  2. Continue Zometa every 6 months.  3. Continue gabapentin twice a day.  4. CT scan soon.  5. See me or DAMEON with next treatment.     DISCUSSION:  1.  Patient is doing well from lung cancer.  He will continue on pembrolizumab.  He is tolerating it well.    2.  Will get CT chest, abdomen and pelvis for follow-up.    3.  He has grade 1 peripheral neuropathy.  He will continue on gabapentin.      4.  He will continue on Zometa every 6 months.  He does not have any dental or jaw related symptoms.  Advised him to continue taking over-the-counter calcium and vitamin D twice a day.    5.  He had a few questions which were all answered.  He will be seen with next treatment.     Total visit time of 30 minutes.  Time spent in today's visit, review of chart/investigations today, monitoring for toxicity of high risk drugs and documentation today.

## 2024-06-18 NOTE — PATIENT INSTRUCTIONS
1. Continue pembrolizumab every 6 weeks.  2. Continue Zometa every 6 months.  3. Continue gabapentin twice a day.  4. CT scan soon.  5. See me or DAMEON with next treatment.

## 2024-06-23 RX ORDER — CHOLECALCIFEROL (VITAMIN D3) 50 MCG
CAPSULE ORAL
COMMUNITY
Start: 2024-06-23

## 2024-07-15 ENCOUNTER — ANCILLARY PROCEDURE (OUTPATIENT)
Dept: CT IMAGING | Facility: CLINIC | Age: 84
End: 2024-07-15
Attending: INTERNAL MEDICINE
Payer: MEDICARE

## 2024-07-15 DIAGNOSIS — C34.31 MALIGNANT NEOPLASM OF LOWER LOBE OF RIGHT LUNG (H): ICD-10-CM

## 2024-07-15 DIAGNOSIS — C79.51 MALIGNANT NEOPLASM METASTATIC TO BONE (H): ICD-10-CM

## 2024-07-15 PROCEDURE — 71260 CT THORAX DX C+: CPT | Mod: MG

## 2024-07-15 PROCEDURE — 250N000011 HC RX IP 250 OP 636: Performed by: INTERNAL MEDICINE

## 2024-07-15 PROCEDURE — 250N000009 HC RX 250: Performed by: INTERNAL MEDICINE

## 2024-07-15 RX ORDER — HEPARIN SODIUM (PORCINE) LOCK FLUSH IV SOLN 100 UNIT/ML 100 UNIT/ML
5 SOLUTION INTRAVENOUS ONCE
Status: COMPLETED | OUTPATIENT
Start: 2024-07-15 | End: 2024-07-15

## 2024-07-15 RX ORDER — IOPAMIDOL 755 MG/ML
95 INJECTION, SOLUTION INTRAVASCULAR ONCE
Status: COMPLETED | OUTPATIENT
Start: 2024-07-15 | End: 2024-07-15

## 2024-07-15 RX ADMIN — IOPAMIDOL 95 ML: 755 INJECTION, SOLUTION INTRAVENOUS at 14:21

## 2024-07-15 RX ADMIN — SODIUM CHLORIDE 67 ML: 9 INJECTION, SOLUTION INTRAVENOUS at 14:21

## 2024-07-15 RX ADMIN — HEPARIN SODIUM (PORCINE) LOCK FLUSH IV SOLN 100 UNIT/ML 5 ML: 100 SOLUTION at 14:20

## 2024-07-16 NOTE — RESULT ENCOUNTER NOTE
Dear  Emmanuel,    CT is good. Same as before. No evidence of cancer. We will review it during appointment.    Please, call me with any questions.    Buzz Larsen MD

## 2024-07-29 ENCOUNTER — LAB (OUTPATIENT)
Dept: INFUSION THERAPY | Facility: CLINIC | Age: 84
End: 2024-07-29
Attending: INTERNAL MEDICINE
Payer: MEDICARE

## 2024-07-29 DIAGNOSIS — C34.31 MALIGNANT NEOPLASM OF LOWER LOBE OF RIGHT LUNG (H): Primary | ICD-10-CM

## 2024-07-29 DIAGNOSIS — Z51.11 ENCOUNTER FOR ANTINEOPLASTIC CHEMOTHERAPY: ICD-10-CM

## 2024-07-29 LAB
ALBUMIN SERPL BCG-MCNC: 3.5 G/DL (ref 3.5–5.2)
ALP SERPL-CCNC: 68 U/L (ref 40–150)
ALT SERPL W P-5'-P-CCNC: 12 U/L (ref 0–70)
ANION GAP SERPL CALCULATED.3IONS-SCNC: 7 MMOL/L (ref 7–15)
AST SERPL W P-5'-P-CCNC: 18 U/L (ref 0–45)
BASOPHILS # BLD AUTO: 0 10E3/UL (ref 0–0.2)
BASOPHILS NFR BLD AUTO: 0 %
BILIRUB SERPL-MCNC: 0.3 MG/DL
BUN SERPL-MCNC: 16.5 MG/DL (ref 8–23)
CALCIUM SERPL-MCNC: 8.2 MG/DL (ref 8.8–10.4)
CHLORIDE SERPL-SCNC: 108 MMOL/L (ref 98–107)
CREAT SERPL-MCNC: 1.05 MG/DL (ref 0.67–1.17)
EGFRCR SERPLBLD CKD-EPI 2021: 70 ML/MIN/1.73M2
EOSINOPHIL # BLD AUTO: 0.1 10E3/UL (ref 0–0.7)
EOSINOPHIL NFR BLD AUTO: 2 %
ERYTHROCYTE [DISTWIDTH] IN BLOOD BY AUTOMATED COUNT: 12.6 % (ref 10–15)
GLUCOSE SERPL-MCNC: 203 MG/DL (ref 70–99)
HCO3 SERPL-SCNC: 23 MMOL/L (ref 22–29)
HCT VFR BLD AUTO: 41.9 % (ref 40–53)
HGB BLD-MCNC: 14.2 G/DL (ref 13.3–17.7)
IMM GRANULOCYTES # BLD: 0 10E3/UL
IMM GRANULOCYTES NFR BLD: 0 %
LYMPHOCYTES # BLD AUTO: 1 10E3/UL (ref 0.8–5.3)
LYMPHOCYTES NFR BLD AUTO: 21 %
MCH RBC QN AUTO: 32 PG (ref 26.5–33)
MCHC RBC AUTO-ENTMCNC: 33.9 G/DL (ref 31.5–36.5)
MCV RBC AUTO: 94 FL (ref 78–100)
MONOCYTES # BLD AUTO: 0.4 10E3/UL (ref 0–1.3)
MONOCYTES NFR BLD AUTO: 7 %
NEUTROPHILS # BLD AUTO: 3.5 10E3/UL (ref 1.6–8.3)
NEUTROPHILS NFR BLD AUTO: 70 %
NRBC # BLD AUTO: 0 10E3/UL
NRBC BLD AUTO-RTO: 0 /100
PLATELET # BLD AUTO: 116 10E3/UL (ref 150–450)
POTASSIUM SERPL-SCNC: 4.4 MMOL/L (ref 3.4–5.3)
PROT SERPL-MCNC: 6 G/DL (ref 6.4–8.3)
RBC # BLD AUTO: 4.44 10E6/UL (ref 4.4–5.9)
SODIUM SERPL-SCNC: 138 MMOL/L (ref 135–145)
TSH SERPL DL<=0.005 MIU/L-ACNC: 1.94 UIU/ML (ref 0.3–4.2)
WBC # BLD AUTO: 5 10E3/UL (ref 4–11)

## 2024-07-29 PROCEDURE — 82040 ASSAY OF SERUM ALBUMIN: CPT | Performed by: INTERNAL MEDICINE

## 2024-07-29 PROCEDURE — 85025 COMPLETE CBC W/AUTO DIFF WBC: CPT | Performed by: INTERNAL MEDICINE

## 2024-07-29 PROCEDURE — 84443 ASSAY THYROID STIM HORMONE: CPT | Performed by: INTERNAL MEDICINE

## 2024-07-29 PROCEDURE — 36591 DRAW BLOOD OFF VENOUS DEVICE: CPT | Performed by: INTERNAL MEDICINE

## 2024-07-29 RX ORDER — HEPARIN SODIUM (PORCINE) LOCK FLUSH IV SOLN 100 UNIT/ML 100 UNIT/ML
5 SOLUTION INTRAVENOUS
Status: DISCONTINUED | OUTPATIENT
Start: 2024-07-29 | End: 2024-07-29 | Stop reason: HOSPADM

## 2024-07-29 RX ORDER — HEPARIN SODIUM,PORCINE 10 UNIT/ML
5 VIAL (ML) INTRAVENOUS
OUTPATIENT
Start: 2024-07-29

## 2024-07-29 RX ORDER — HEPARIN SODIUM (PORCINE) LOCK FLUSH IV SOLN 100 UNIT/ML 100 UNIT/ML
5 SOLUTION INTRAVENOUS
OUTPATIENT
Start: 2024-07-29

## 2024-07-29 NOTE — PROGRESS NOTES
Nursing Note:  Derrick Benitez presents today for port labs for tx 7/30.    Patient seen by provider today: No   present during visit today: Not Applicable.    Note: N/A.    Intravenous Access:  Labs drawn without difficulty.  Implanted Port.    Discharge Plan:   Patient was sent to [facility] for discharge appointment.    Alberto Merida RN

## 2024-07-30 ENCOUNTER — ONCOLOGY VISIT (OUTPATIENT)
Dept: ONCOLOGY | Facility: CLINIC | Age: 84
End: 2024-07-30
Attending: INTERNAL MEDICINE
Payer: MEDICARE

## 2024-07-30 ENCOUNTER — INFUSION THERAPY VISIT (OUTPATIENT)
Dept: INFUSION THERAPY | Facility: CLINIC | Age: 84
End: 2024-07-30
Attending: INTERNAL MEDICINE
Payer: MEDICARE

## 2024-07-30 VITALS
WEIGHT: 188 LBS | OXYGEN SATURATION: 94 % | TEMPERATURE: 98.1 F | DIASTOLIC BLOOD PRESSURE: 59 MMHG | HEART RATE: 54 BPM | BODY MASS INDEX: 29.51 KG/M2 | RESPIRATION RATE: 16 BRPM | HEIGHT: 67 IN | SYSTOLIC BLOOD PRESSURE: 138 MMHG

## 2024-07-30 DIAGNOSIS — C34.31 MALIGNANT NEOPLASM OF LOWER LOBE OF RIGHT LUNG (H): Primary | ICD-10-CM

## 2024-07-30 DIAGNOSIS — Z51.11 ENCOUNTER FOR ANTINEOPLASTIC CHEMOTHERAPY: ICD-10-CM

## 2024-07-30 PROCEDURE — G0463 HOSPITAL OUTPT CLINIC VISIT: HCPCS | Mod: 25 | Performed by: INTERNAL MEDICINE

## 2024-07-30 PROCEDURE — 250N000011 HC RX IP 250 OP 636: Performed by: INTERNAL MEDICINE

## 2024-07-30 PROCEDURE — 99215 OFFICE O/P EST HI 40 MIN: CPT | Performed by: INTERNAL MEDICINE

## 2024-07-30 PROCEDURE — 258N000003 HC RX IP 258 OP 636: Performed by: INTERNAL MEDICINE

## 2024-07-30 PROCEDURE — 96413 CHEMO IV INFUSION 1 HR: CPT

## 2024-07-30 RX ORDER — SODIUM CHLORIDE 9 MG/ML
1000 INJECTION, SOLUTION INTRAVENOUS CONTINUOUS PRN
Status: CANCELLED
Start: 2024-09-10

## 2024-07-30 RX ORDER — MEPERIDINE HYDROCHLORIDE 25 MG/ML
25 INJECTION INTRAMUSCULAR; INTRAVENOUS; SUBCUTANEOUS EVERY 30 MIN PRN
Status: CANCELLED | OUTPATIENT
Start: 2024-09-10

## 2024-07-30 RX ORDER — ALBUTEROL SULFATE 0.83 MG/ML
2.5 SOLUTION RESPIRATORY (INHALATION)
Status: CANCELLED | OUTPATIENT
Start: 2024-09-10

## 2024-07-30 RX ORDER — LORAZEPAM 2 MG/ML
0.5 INJECTION INTRAMUSCULAR EVERY 4 HOURS PRN
Status: CANCELLED | OUTPATIENT
Start: 2024-09-10

## 2024-07-30 RX ORDER — HEPARIN SODIUM (PORCINE) LOCK FLUSH IV SOLN 100 UNIT/ML 100 UNIT/ML
5 SOLUTION INTRAVENOUS EVERY 8 HOURS PRN
Status: DISCONTINUED | OUTPATIENT
Start: 2024-07-30 | End: 2024-07-30 | Stop reason: HOSPADM

## 2024-07-30 RX ORDER — EPINEPHRINE 1 MG/ML
0.3 INJECTION, SOLUTION INTRAMUSCULAR; SUBCUTANEOUS EVERY 5 MIN PRN
Status: CANCELLED | OUTPATIENT
Start: 2024-09-10

## 2024-07-30 RX ORDER — NALOXONE HYDROCHLORIDE 0.4 MG/ML
.1-.4 INJECTION, SOLUTION INTRAMUSCULAR; INTRAVENOUS; SUBCUTANEOUS
Status: CANCELLED | OUTPATIENT
Start: 2024-09-10

## 2024-07-30 RX ORDER — HEPARIN SODIUM,PORCINE 10 UNIT/ML
5 VIAL (ML) INTRAVENOUS
Status: CANCELLED | OUTPATIENT
Start: 2024-09-10

## 2024-07-30 RX ORDER — ALBUTEROL SULFATE 90 UG/1
1-2 AEROSOL, METERED RESPIRATORY (INHALATION)
Status: CANCELLED
Start: 2024-09-10

## 2024-07-30 RX ORDER — DIPHENHYDRAMINE HYDROCHLORIDE 50 MG/ML
50 INJECTION INTRAMUSCULAR; INTRAVENOUS
Status: CANCELLED
Start: 2024-09-10

## 2024-07-30 RX ORDER — METHYLPREDNISOLONE SODIUM SUCCINATE 125 MG/2ML
125 INJECTION, POWDER, LYOPHILIZED, FOR SOLUTION INTRAMUSCULAR; INTRAVENOUS
Status: CANCELLED
Start: 2024-09-10

## 2024-07-30 RX ORDER — HEPARIN SODIUM (PORCINE) LOCK FLUSH IV SOLN 100 UNIT/ML 100 UNIT/ML
5 SOLUTION INTRAVENOUS EVERY 8 HOURS PRN
Status: CANCELLED | OUTPATIENT
Start: 2024-09-10

## 2024-07-30 RX ADMIN — SODIUM CHLORIDE 400 MG: 9 INJECTION, SOLUTION INTRAVENOUS at 14:21

## 2024-07-30 RX ADMIN — SODIUM CHLORIDE 1000 ML: 9 INJECTION, SOLUTION INTRAVENOUS at 14:15

## 2024-07-30 RX ADMIN — Medication 5 ML: at 15:08

## 2024-07-30 ASSESSMENT — PAIN SCALES - GENERAL: PAINLEVEL: NO PAIN (0)

## 2024-07-30 NOTE — LETTER
"7/30/2024      Derrick Benitez  [address]  [facility] [address]      Dear Colleague,    Thank you for referring your patient, Derrick Benitez, to the [facility]. Please see a copy of my visit note below.    Oncology Rooming Note    July 30, 2024 1:15 PM   Derrick Benitez is a 84 year old male who presents for:    Chief Complaint   Patient presents with     Oncology Clinic Visit     Initial Vitals: There were no vitals taken for this visit. Estimated body mass index is 29.54 kg/m  as calculated from the following:    Height as of 3/26/24: 1.702 m (5' 7\").    Weight as of 6/18/24: 85.5 kg (188 lb 9.6 oz). There is no height or weight on file to calculate BSA.  Data Unavailable Comment: Data Unavailable   No LMP for male patient.  Allergies reviewed: Yes  Medications reviewed: Yes    Medications: Medication refills not needed today.  Pharmacy name entered into EPIC:    [place], [place] - [address] [facility] [address] [place], [place] - [address]    Frailty Screening:   Is the patient here for a new oncology consult visit in cancer care? 2. No        Brenda Martines MA              ONCOLOGIC HISTORY:  Mr. Benitez is a gentleman with non-small cell lung cancer, favor squamous cell carcinoma.   -NGS and PDL staining could not be done because of small sample.   -Guardant 360 does not reveal any actionable alteration.  1.  CT chest angiogram on 09/07/2020 revealed right lower lobe mass.   2.  CT-guided biopsy was done on 09/18/2020.  Pathology revealed non-small cell lung cancer, favor squamous cell carcinoma.  Sample size was small.  PDL staining and NGS panel could not be done.   3.  Brain MRI on 09/26/2020 did not reveal any brain metastasis.   4.  PET scan on 10/05/2020 revealed hypermetabolic right lung mass and multiple hypermetabolic bone lesions.   5.  Carboplatin and Taxol x 6 between 11/25/2020 and 03/10/2021.   -Pembrolizumab added with " cycle 2.   6. PET scan on 10/17/2022 reveals FDG avid sclerotic lesions involving the right posterior seventh rib slightly above background levels. Favored to simply represent sequelae of post treatment change.  -PET scan on 03/18/2024 does not reveal any evidence of malignancy.  There is mildly FDG avid sclerotic lesions of the right seventh rib are again seen. Slightly increased FDG uptake of the right lateral seventh rib lesion. Findings may reflect posttreatment inflammation or indolent metastases.      SUBJECTIVE:    Mr. 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 He is tolerating it well. PET scan on 03/18/2024 did not reveal any evidence of cancer.    CT scan on 07/15/2024:  1.  No evidence of new disease in the chest, abdomen, or pelvis.  2.  Stable indeterminate sclerosis of the right seventh rib and T10 vertebral body.  3.  Stable 5.5 cm infrarenal abdominal aorta aneurysm.     He has grade 1 peripheral neuropathy. He gets intermittent numbness in toes. No neuropathy in upper extremity. He is on gabapentin twice a day.     He has mild generalized weakness. He is able to do all his activities.       No headache.  No dizziness.  No chest pain.  No shortness of breath.  No abdominal pain, nausea or vomiting. Appetite is good. No urinary or bowel complaints. No bleeding. All other review of systems is negative.     PHYSICAL EXAMINATION:   GENERAL:  Alert and oriented x 3.   VITAL SIGNS:  Reviewed.  ECOG PS of 1.     Rest of the system not examined.     LABS:  CBC, CMP and TSH reviewed.     ASSESSMENT:    1.  An 84-year-old gentleman with metastatic non-small cell lung cancer on pembrolizumab.  No evidence of disease.  2.  Grade 1 peripheral neuropathy on gabapentin. Stable.  3.  Mild thrombocytopenia, stable.  4.  Hypocalcemia.  5.  Aortic aneurysm, stable. Being followed by vascular surgery.     PLAN:    1. Continue pembrolizumab every 6 weeks.  2. Continue  Zometa every 6 months.  3. Continue gabapentin twice a day.  4. See DAMEON with next treatment.  5. See me in 3 months.     DISCUSSION:  1.  CT scan was reviewed with the patient.  Explained to him there is no evidence of any malignancy.  There are some stable changes.  Explained to him that he is doing very well from lung cancer.  No cancer seen at this time.    2.  He will continue on pembrolizumab every 6 weeks.  He is tolerating it well.  He is not having any side effects.    3.  He has grade 1 neuropathy.  Slightly better.  Now he has some numbness just in the toes.  He is on gabapentin twice a day.  He will continue on it.  He is tolerating it well.    4.  He gets Zometa every 6 months. That will be continued.  He does not have any dental or jaw related symptoms.    His calcium has been mildly low.  Advised him to increase his calcium intake.  Advised him to take at least 1000 mg calcium twice a day.    5.  He has aortic aneurysm which is stable at 5.5 cm.  He will continue to follow-up with the vascular surgery.    6.  He has mild thrombocytopenia which is stable.  Thrombocytopenia has been there even before lung cancer diagnosis.  He does not have any bleeding or bruising complications.  Okay to continue treatment as long as platelet is above 75.    7.  He had a few questions which were all answered.  I will see him in 3 months.  In between he will see our DAMEON.     Total visit time of 40 minutes.  Time spent in today's visit, review of chart/investigations today, monitoring for toxicity of high risk drugs and documentation today.      Again, thank you for allowing me to participate in the care of your patient.        Sincerely,        Buzz Larsen MD

## 2024-07-30 NOTE — PROGRESS NOTES
ONCOLOGIC HISTORY:  Mr. Benitez is a gentleman with non-small cell lung cancer, favor squamous cell carcinoma.   -NGS and PDL staining could not be done because of small sample.   -Guardant 360 does not reveal any actionable alteration.  1.  CT chest angiogram on 09/07/2020 revealed right lower lobe mass.   2.  CT-guided biopsy was done on 09/18/2020.  Pathology revealed non-small cell lung cancer, favor squamous cell carcinoma.  Sample size was small.  PDL staining and NGS panel could not be done.   3.  Brain MRI on 09/26/2020 did not reveal any brain metastasis.   4.  PET scan on 10/05/2020 revealed hypermetabolic right lung mass and multiple hypermetabolic bone lesions.   5.  Carboplatin and Taxol x 6 between 11/25/2020 and 03/10/2021.   -Pembrolizumab added with cycle 2.   6. PET scan on 10/17/2022 reveals FDG avid sclerotic lesions involving the right posterior seventh rib slightly above background levels. Favored to simply represent sequelae of post treatment change.  -PET scan on 03/18/2024 does not reveal any evidence of malignancy.  There is mildly FDG avid sclerotic lesions of the right seventh rib are again seen. Slightly increased FDG uptake of the right lateral seventh rib lesion. Findings may reflect posttreatment inflammation or indolent metastases.      SUBJECTIVE:    Mr. 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 He is tolerating it well. PET scan on 03/18/2024 did not reveal any evidence of cancer.    CT scan on 07/15/2024:  1.  No evidence of new disease in the chest, abdomen, or pelvis.  2.  Stable indeterminate sclerosis of the right seventh rib and T10 vertebral body.  3.  Stable 5.5 cm infrarenal abdominal aorta aneurysm.     He has grade 1 peripheral neuropathy. He gets intermittent numbness in toes. No neuropathy in upper extremity. He is on gabapentin twice a day.     He has mild generalized weakness. He is able to do all his activities.        No headache.  No dizziness.  No chest pain.  No shortness of breath.  No abdominal pain, nausea or vomiting. Appetite is good. No urinary or bowel complaints. No bleeding. All other review of systems is negative.     PHYSICAL EXAMINATION:   GENERAL:  Alert and oriented x 3.   VITAL SIGNS:  Reviewed.  ECOG PS of 1.     Rest of the system not examined.     LABS:  CBC, CMP and TSH reviewed.     ASSESSMENT:    1.  An 84-year-old gentleman with metastatic non-small cell lung cancer on pembrolizumab.  No evidence of disease.  2.  Grade 1 peripheral neuropathy on gabapentin. Stable.  3.  Mild thrombocytopenia, stable.  4.  Hypocalcemia.  5.  Aortic aneurysm, stable. Being followed by vascular surgery.     PLAN:    1. Continue pembrolizumab every 6 weeks.  2. Continue Zometa every 6 months.  3. Continue gabapentin twice a day.  4. See DAMEON with next treatment.  5. See me in 3 months.     DISCUSSION:  1.  CT scan was reviewed with the patient.  Explained to him there is no evidence of any malignancy.  There are some stable changes.  Explained to him that he is doing very well from lung cancer.  No cancer seen at this time.    2.  He will continue on pembrolizumab every 6 weeks.  He is tolerating it well.  He is not having any side effects.    3.  He has grade 1 neuropathy.  Slightly better.  Now he has some numbness just in the toes.  He is on gabapentin twice a day.  He will continue on it.  He is tolerating it well.    4.  He gets Zometa every 6 months. That will be continued.  He does not have any dental or jaw related symptoms.    His calcium has been mildly low.  Advised him to increase his calcium intake.  Advised him to take at least 1000 mg calcium twice a day.    5.  He has aortic aneurysm which is stable at 5.5 cm.  He will continue to follow-up with the vascular surgery.    6.  He has mild thrombocytopenia which is stable.  Thrombocytopenia has been there even before lung cancer diagnosis.  He does not have any  bleeding or bruising complications.  Okay to continue treatment as long as platelet is above 75.    7.  He had a few questions which were all answered.  I will see him in 3 months.  In between he will see our DAMEON.     Total visit time of 40 minutes.  Time spent in today's visit, review of chart/investigations today, monitoring for toxicity of high risk drugs and documentation today.

## 2024-07-30 NOTE — RESULT ENCOUNTER NOTE
Dear  Emmanuel,    Blood tests are stable. They are good for treatment.    Please, call me with any questions.    Buzz Larsen MD

## 2024-07-30 NOTE — PROGRESS NOTES
Infusion Nursing Note:  Derrick Benitez presents today for C33D1 Keytruda.    Patient seen by provider today: Yes: Dr. Larsen   present during visit today: Not Applicable.    Note: N/A.      Intravenous Access:  Implanted Port.    Treatment Conditions:  Lab Results   Component Value Date    HGB 14.2 07/29/2024    WBC 5.0 07/29/2024    ANEU 3.9 10/17/2022    ANEUTAUTO 3.5 07/29/2024     (L) 07/29/2024        Lab Results   Component Value Date     07/29/2024    POTASSIUM 4.4 07/29/2024    MAG 2.1 10/30/2014    CR 1.05 07/29/2024    ANTHONY 8.2 (L) 07/29/2024    BILITOTAL 0.3 07/29/2024    ALBUMIN 3.5 07/29/2024    ALT 12 07/29/2024    AST 18 07/29/2024     TSH 1.94  Results reviewed, labs MET treatment parameters, ok to proceed with treatment.      Post Infusion Assessment:  Patient tolerated infusion without incident.  Blood return noted pre and post infusion.  Site patent and intact, free from redness, edema or discomfort.  No evidence of extravasations.  Access discontinued per protocol.       Discharge Plan:   Discharge instructions reviewed with: Patient.  Patient and/or family verbalized understanding of discharge instructions and all questions answered.  AVS to patient via [employer].  Patient will return in 6 weeks for next appointment.   Patient discharged in stable condition accompanied by: self.  Departure Mode: Ambulatory.      Hollie Pink RN

## 2024-07-30 NOTE — PROGRESS NOTES
"Oncology Rooming Note    July 30, 2024 1:15 PM   Derrick Benitez is a 84 year old male who presents for:    Chief Complaint   Patient presents with    Oncology Clinic Visit     Initial Vitals: There were no vitals taken for this visit. Estimated body mass index is 29.54 kg/m  as calculated from the following:    Height as of 3/26/24: 1.702 m (5' 7\").    Weight as of 6/18/24: 85.5 kg (188 lb 9.6 oz). There is no height or weight on file to calculate BSA.  Data Unavailable Comment: Data Unavailable   No LMP for male patient.  Allergies reviewed: Yes  Medications reviewed: Yes    Medications: Medication refills not needed today.  Pharmacy name entered into EPIC:    [place], [place] - [address]  [facility] [address] [place], [place] - [address] [place] [address] [address]    Frailty Screening:   Is the patient here for a new oncology consult visit in cancer care? 2. No        Brenda Martines MA            "

## 2024-07-30 NOTE — PATIENT INSTRUCTIONS
1. Continue pembrolizumab every 6 weeks.  2. Continue Zometa every 6 months.  3. Continue gabapentin twice a day.  4. See DAMEON with next treatment.  5. See me in 3 months.

## 2024-08-19 ENCOUNTER — OFFICE VISIT (OUTPATIENT)
Dept: URGENT CARE | Facility: URGENT CARE | Age: 84
End: 2024-08-19
Payer: MEDICARE

## 2024-08-19 VITALS
DIASTOLIC BLOOD PRESSURE: 65 MMHG | SYSTOLIC BLOOD PRESSURE: 148 MMHG | OXYGEN SATURATION: 94 % | HEART RATE: 60 BPM | TEMPERATURE: 97.1 F

## 2024-08-19 DIAGNOSIS — L73.9 FOLLICULITIS: Primary | ICD-10-CM

## 2024-08-19 PROCEDURE — 99213 OFFICE O/P EST LOW 20 MIN: CPT

## 2024-08-19 NOTE — PROGRESS NOTES
SUBJECTIVE:   Derrick Benitez is a 84 year old male who presents for lesion removal. We have already discussed this procedure, including option of not performing surgery, technique of surgery and potential for scarring at a recent visit.    OBJECTIVE:   Patient appears well. Vitals are normal.  Skin: 1 cm raised lesion L face near nares. Quiroz visible. No drainage, no surrounding erythema.     ASSESSMENT:   sebaceous cyst    PLAN:   After informed consent was obtained, using alcohol for cleansing and 1% Lidocaine with epinephrine for anesthetic. At this point, the cyst drained purulent material through the pore without requiring an incision. Expressed as much purulent fluid as able, applied antibiotic dressing, and wound care instructions provided. The procedure was well tolerated without complications. Follow up: The patient may return prn.    Rebecca Katz MD  08/19/24  *You can generally contact me quickly via Epic chat*

## 2024-08-29 ENCOUNTER — ONCOLOGY VISIT (OUTPATIENT)
Dept: ONCOLOGY | Facility: CLINIC | Age: 84
End: 2024-08-29
Attending: INTERNAL MEDICINE
Payer: MEDICARE

## 2024-08-29 VITALS
WEIGHT: 191.2 LBS | OXYGEN SATURATION: 90 % | RESPIRATION RATE: 16 BRPM | SYSTOLIC BLOOD PRESSURE: 134 MMHG | DIASTOLIC BLOOD PRESSURE: 58 MMHG | HEART RATE: 63 BPM | BODY MASS INDEX: 29.95 KG/M2

## 2024-08-29 DIAGNOSIS — Z51.5 ENCOUNTER FOR PALLIATIVE CARE: ICD-10-CM

## 2024-08-29 DIAGNOSIS — Z71.89 ADVANCED CARE PLANNING/COUNSELING DISCUSSION: ICD-10-CM

## 2024-08-29 DIAGNOSIS — C34.31 MALIGNANT NEOPLASM OF LOWER LOBE OF RIGHT LUNG (H): Primary | ICD-10-CM

## 2024-08-29 DIAGNOSIS — C79.51 MALIGNANT NEOPLASM METASTATIC TO BONE (H): ICD-10-CM

## 2024-08-29 DIAGNOSIS — T45.1X5A PERIPHERAL NEUROPATHY DUE TO CHEMOTHERAPY (H): ICD-10-CM

## 2024-08-29 DIAGNOSIS — G62.0 PERIPHERAL NEUROPATHY DUE TO CHEMOTHERAPY (H): ICD-10-CM

## 2024-08-29 PROCEDURE — 99214 OFFICE O/P EST MOD 30 MIN: CPT | Performed by: STUDENT IN AN ORGANIZED HEALTH CARE EDUCATION/TRAINING PROGRAM

## 2024-08-29 PROCEDURE — G0463 HOSPITAL OUTPT CLINIC VISIT: HCPCS | Performed by: STUDENT IN AN ORGANIZED HEALTH CARE EDUCATION/TRAINING PROGRAM

## 2024-08-29 ASSESSMENT — PAIN SCALES - GENERAL: PAINLEVEL: NO PAIN (0)

## 2024-08-29 NOTE — PATIENT INSTRUCTIONS
Recommendations:  -Discussed there would be room to increase the gabapentin again if her neuropathy worsens.  -Alternatively, could consider looking into compression stockings or gloves, as these can also help with neuropathy symptoms.  -Discussed continuing to reassess your healthcare directive and healthcare agent in order over time.    Follow up: 1 year, sooner if needed      Reasons to Call    If you are having worsening/uncontrolled symptoms we want you to call!    You or your other physicians make any changes to medications we have prescribed.  -Please call for refills 4-5 days before you will run out of medication.    Important Phone Numbers, including: Refills, scheduling, and general questions     Palliative Care RN: Amy Johnston : [phone]  *For scheduling needs/follow up visits : [phone]  *After hours or on weekends- Will connect you with on call MD : [phone].

## 2024-08-29 NOTE — PROGRESS NOTES
Palliative Care Progress Note    Patient Name: Derrick Benitez  Primary Provider: Clarisse Golden    Chief Complaint/Patient ID: Derrick Benitez is a 84 year old male with PMHx of NSCLC  - RLL mass dx 09/20, no PDL staining could be done on bx. Bone mets at time of dx.  Carbo, taxol, pembro x 11/2020. Now on maintenance with pembrolizumab Q6 Weeks.    Last Palliative care appointment: 8/10/23 with me.      Reviewed: Yes    Social History: Lives with his wife.  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 Has a son and a daughter.     Advanced Care Planning: HCD scanned in chart.  Names his wife as primary HCA, followed by his son, followed by his daughter.     Interim History:  Derrick Benitez is an 84 year old male who is seen today for a follow up visit with Palliative Care.     Reviewed oncology note from 7/30.  Recent imaging shows no evidence of cancer.  Overall stable and tolerating treatment well.  Planning to continue Keytruda every 6 weeks and Zometa every 6 months.    Mild neuropathy and using gabapentin twice a day.  Able to do all of his activities.  He does get pedicures on a somewhat regular basis to keep up with his nail cares, and these are painful sometimes.    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  His wife has been needing more assistance as her health is declined.  Additionally, he has been taking care of his daughter's dog while she is in a correctional facility.    Admits to eating out quite a bit and getting fast food options, though does include [employer] in this.  Neither he nor his wife enjoy cooking very much.  His weight has been stable over the last year.    Bowels are moving normally.  Also reports that his mood has been good.    Physical Exam:   /58   Pulse 63   Resp 16   Wt 86.7 kg (191 lb 3.2 oz)   SpO2 90%   BMI 29.95 kg/m    Constitutional: Alert, pleasant, no apparent distress. Sitting up in chair.  Eyes: Sclera non-icteric, no eye discharge.  ENT: No nasal  discharge. Ears grossly normal.  Respiratory: Unlabored respirations. Speaking in full sentences.  Musculoskeletal: Extremities appear normal- no gross deformities noted. No edema noted on upper body.   Skin: No suspicious lesions or rashes on visible skin.  Neurologic: Clear speech, no aphasia. No facial droop.  Psychiatric: Mentation appears normal, appropriate attention. Affect normal/bright. Does not appear anxious or depressed.    Key Data Reviewed:  LABS:   Lab Results   Component Value Date    WBC 5.0 07/29/2024    HGB 14.2 07/29/2024    HCT 41.9 07/29/2024     (L) 07/29/2024     07/29/2024    POTASSIUM 4.4 07/29/2024    CHLORIDE 108 (H) 07/29/2024    CO2 23 07/29/2024    BUN 16.5 07/29/2024    CR 1.05 07/29/2024     (H) 07/29/2024    DD 1.3 (H) 09/07/2020    AST 18 07/29/2024    ALT 12 07/29/2024    ALKPHOS 68 07/29/2024    BILITOTAL 0.3 07/29/2024    INR 1.08 11/24/2020 7/29/24 - GFR 70, Albumin 3.5    IMAGING: CT CAP 7/15/24- IMPRESSION:  1.  No evidence of new disease in the chest, abdomen, or pelvis.  2.  Stable indeterminate sclerosis of the right seventh rib and T10 vertebral body.  3.  Stable 5.5 cm infrarenal abdominal aorta aneurysm.      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  Now on maintenance pembrolizumab treatment.      Neuropathy pain - Stable.   -Continue gabapentin 300mg BID.  Discussed there would be room to try increasing this again if neuropathy symptoms worsen.  -Also suggested looking into compression stockings and/or gloves if neuropathy symptoms worsen in the fall/winter.    ACP - Reviewed his HCV today primarily for confirmation of his healthcare agents given his wife's health changes and the fact that his daughter is in a correctional facility.  He confirms agent selection day but appreciates the thought and will keep this in  mind going forward.      Follow up: 1 year, sooner if needed.         Total time spent on day of encounter is 28 mins, including reviewing record, review of above studies, above visit with patient (FTF 3:16 PM -3:35 PM), symptomatic discussion of neuropathy, including medication adjustments/prescription management, and documentation.     Zulema Montes, DO  Palliative Medicine   [id number]    Some chart documentation performed using Dragon Voice recognition Software. Although reviewed after completion, some words and grammatical errors may remain.

## 2024-08-29 NOTE — PROGRESS NOTES
"Oncology Rooming Note    August 29, 2024 3:14 PM   Derrick Benitez is a 84 year old male who presents for:    Chief Complaint   Patient presents with    Oncology Clinic Visit     Initial Vitals: There were no vitals taken for this visit. Estimated body mass index is 29.44 kg/m  as calculated from the following:    Height as of 7/30/24: 1.702 m (5' 7\").    Weight as of 7/30/24: 85.3 kg (188 lb). There is no height or weight on file to calculate BSA.  Data Unavailable Comment: Data Unavailable   No LMP for male patient.  Allergies reviewed: Yes  Medications reviewed: Yes    Medications: Medication refills not needed today.  Pharmacy name entered into EPIC:    [place], [place] - [address]  [facility] [address] [place], [place] - [address] [place] [address]    Frailty Screening:   Is the patient here for a new oncology consult visit in cancer care? 2. No      Clinical concerns:   doctor was notified.      Corie Vieira CMA              "

## 2024-08-29 NOTE — LETTER
8/29/2024      Derrick Benitez  [address]  [facility] [address]      Dear Colleague,    Thank you for referring your patient, Derrick Benitez, to the [facility] [place]. Please see a copy of my visit note below.    Palliative Care Progress Note    Patient Name: Derrick Benitez  Primary Provider: Clarisse Golden    Chief Complaint/Patient ID: Derrick Benitez is a 84 year old male with PMHx of NSCLC  - RLL mass dx 09/20, no PDL staining could be done on bx. Bone mets at time of dx.  Carbo, taxol, pembro x 11/2020. Now on maintenance with pembrolizumab Q6 Weeks.    Last Palliative care appointment: 8/10/23 with me.      Reviewed: Yes    Social History: Lives with his wife.  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 Has a son and a daughter.     Advanced Care Planning: HCD scanned in chart.  Names his wife as primary HCA, followed by his son, followed by his daughter.     Interim History:  Derrick Benitez is an 84 year old male who is seen today for a follow up visit with Palliative Care.     Reviewed oncology note from 7/30.  Recent imaging shows no evidence of cancer.  Overall stable and tolerating treatment well.  Planning to continue Keytruda every 6 weeks and Zometa every 6 months.    Mild neuropathy and using gabapentin twice a day.  Able to do all of his activities.  He does get pedicures on a somewhat regular basis to keep up with his nail cares, and these are painful sometimes.    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  His wife has been needing more assistance as her health is declined.  Additionally, he has been taking care of his daughter's dog while she is in a correctional facility.    Admits to eating out quite a bit and getting fast food options, though does include [employer] in this.  Neither he nor his wife enjoy cooking very much.  His weight has been stable over the last year.    Bowels are moving normally.  Also reports that his mood has been good.    Physical Exam:    /58   Pulse 63   Resp 16   Wt 86.7 kg (191 lb 3.2 oz)   SpO2 90%   BMI 29.95 kg/m    Constitutional: Alert, pleasant, no apparent distress. Sitting up in chair.  Eyes: Sclera non-icteric, no eye discharge.  ENT: No nasal discharge. Ears grossly normal.  Respiratory: Unlabored respirations. Speaking in full sentences.  Musculoskeletal: Extremities appear normal- no gross deformities noted. No edema noted on upper body.   Skin: No suspicious lesions or rashes on visible skin.  Neurologic: Clear speech, no aphasia. No facial droop.  Psychiatric: Mentation appears normal, appropriate attention. Affect normal/bright. Does not appear anxious or depressed.    Key Data Reviewed:  LABS:   Lab Results   Component Value Date    WBC 5.0 07/29/2024    HGB 14.2 07/29/2024    HCT 41.9 07/29/2024     (L) 07/29/2024     07/29/2024    POTASSIUM 4.4 07/29/2024    CHLORIDE 108 (H) 07/29/2024    CO2 23 07/29/2024    BUN 16.5 07/29/2024    CR 1.05 07/29/2024     (H) 07/29/2024    DD 1.3 (H) 09/07/2020    AST 18 07/29/2024    ALT 12 07/29/2024    ALKPHOS 68 07/29/2024    BILITOTAL 0.3 07/29/2024    INR 1.08 11/24/2020 7/29/24 - GFR 70, Albumin 3.5    IMAGING: CT CAP 7/15/24- IMPRESSION:  1.  No evidence of new disease in the chest, abdomen, or pelvis.  2.  Stable indeterminate sclerosis of the right seventh rib and T10 vertebral body.  3.  Stable 5.5 cm infrarenal abdominal aorta aneurysm.      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  Now on maintenance pembrolizumab treatment.      Neuropathy pain - Stable.   -Continue gabapentin 300mg BID.  Discussed there would be room to try increasing this again if neuropathy symptoms worsen.  -Also suggested looking into compression stockings and/or gloves if neuropathy symptoms worsen in the fall/winter.    ACP - Reviewed his HCV  "today primarily for confirmation of his healthcare agents given his wife's health changes and the fact that his daughter is in a correctional facility.  He confirms agent selection day but appreciates the thought and will keep this in mind going forward.      Follow up: 1 year, sooner if needed.         Total time spent on day of encounter is 28 mins, including reviewing record, review of above studies, above visit with patient (FTF 3:16 PM -3:35 PM), symptomatic discussion of neuropathy, including medication adjustments/prescription management, and documentation.     Zulema Montes DO  Palliative Medicine   [id number]    Some chart documentation performed using Dragon Voice recognition Software. Although reviewed after completion, some words and grammatical errors may remain.      Oncology Rooming Note    August 29, 2024 3:14 PM   Derrick Benitez is a 84 year old male who presents for:    Chief Complaint   Patient presents with     Oncology Clinic Visit     Initial Vitals: There were no vitals taken for this visit. Estimated body mass index is 29.44 kg/m  as calculated from the following:    Height as of 7/30/24: 1.702 m (5' 7\").    Weight as of 7/30/24: 85.3 kg (188 lb). There is no height or weight on file to calculate BSA.  Data Unavailable Comment: Data Unavailable   No LMP for male patient.  Allergies reviewed: Yes  Medications reviewed: Yes    Medications: Medication refills not needed today.  Pharmacy name entered into EPIC:    [place], [place] - [address]  [id number] [place], [place] - [address] [place] [address] [address]    Frailty Screening:   Is the patient here for a new oncology consult visit in cancer care? 2. No      Clinical concerns:   doctor was notified.      Corie Vieira [facility]                Again, thank you for allowing me to participate in the care of your patient.        Sincerely,        Zulema Montes DO  "

## 2024-09-11 ENCOUNTER — LAB (OUTPATIENT)
Dept: INFUSION THERAPY | Facility: CLINIC | Age: 84
End: 2024-09-11
Attending: INTERNAL MEDICINE
Payer: MEDICARE

## 2024-09-11 ENCOUNTER — ONCOLOGY VISIT (OUTPATIENT)
Dept: ONCOLOGY | Facility: CLINIC | Age: 84
End: 2024-09-11
Attending: INTERNAL MEDICINE
Payer: MEDICARE

## 2024-09-11 VITALS
OXYGEN SATURATION: 94 % | SYSTOLIC BLOOD PRESSURE: 132 MMHG | HEART RATE: 51 BPM | WEIGHT: 192 LBS | BODY MASS INDEX: 30.07 KG/M2 | TEMPERATURE: 98.1 F | DIASTOLIC BLOOD PRESSURE: 57 MMHG | RESPIRATION RATE: 16 BRPM

## 2024-09-11 DIAGNOSIS — C34.31 MALIGNANT NEOPLASM OF LOWER LOBE OF RIGHT LUNG (H): Primary | ICD-10-CM

## 2024-09-11 DIAGNOSIS — Z51.11 ENCOUNTER FOR ANTINEOPLASTIC CHEMOTHERAPY: ICD-10-CM

## 2024-09-11 LAB
ALBUMIN SERPL BCG-MCNC: 3.6 G/DL (ref 3.5–5.2)
ALP SERPL-CCNC: 79 U/L (ref 40–150)
ALT SERPL W P-5'-P-CCNC: 13 U/L (ref 0–70)
ANION GAP SERPL CALCULATED.3IONS-SCNC: 8 MMOL/L (ref 7–15)
AST SERPL W P-5'-P-CCNC: 20 U/L (ref 0–45)
BASOPHILS # BLD AUTO: 0 10E3/UL (ref 0–0.2)
BASOPHILS NFR BLD AUTO: 0 %
BILIRUB SERPL-MCNC: 0.3 MG/DL
BUN SERPL-MCNC: 18.3 MG/DL (ref 8–23)
CALCIUM SERPL-MCNC: 8.2 MG/DL (ref 8.8–10.4)
CHLORIDE SERPL-SCNC: 109 MMOL/L (ref 98–107)
CREAT SERPL-MCNC: 1.11 MG/DL (ref 0.67–1.17)
EGFRCR SERPLBLD CKD-EPI 2021: 65 ML/MIN/1.73M2
EOSINOPHIL # BLD AUTO: 0.1 10E3/UL (ref 0–0.7)
EOSINOPHIL NFR BLD AUTO: 2 %
ERYTHROCYTE [DISTWIDTH] IN BLOOD BY AUTOMATED COUNT: 12.9 % (ref 10–15)
GLUCOSE SERPL-MCNC: 136 MG/DL (ref 70–99)
HCO3 SERPL-SCNC: 23 MMOL/L (ref 22–29)
HCT VFR BLD AUTO: 42.1 % (ref 40–53)
HGB BLD-MCNC: 14.3 G/DL (ref 13.3–17.7)
IMM GRANULOCYTES # BLD: 0 10E3/UL
IMM GRANULOCYTES NFR BLD: 0 %
LYMPHOCYTES # BLD AUTO: 0.9 10E3/UL (ref 0.8–5.3)
LYMPHOCYTES NFR BLD AUTO: 18 %
MCH RBC QN AUTO: 32.1 PG (ref 26.5–33)
MCHC RBC AUTO-ENTMCNC: 34 G/DL (ref 31.5–36.5)
MCV RBC AUTO: 95 FL (ref 78–100)
MONOCYTES # BLD AUTO: 0.4 10E3/UL (ref 0–1.3)
MONOCYTES NFR BLD AUTO: 9 %
NEUTROPHILS # BLD AUTO: 3.5 10E3/UL (ref 1.6–8.3)
NEUTROPHILS NFR BLD AUTO: 71 %
NRBC # BLD AUTO: 0 10E3/UL
NRBC BLD AUTO-RTO: 0 /100
PLATELET # BLD AUTO: 126 10E3/UL (ref 150–450)
POTASSIUM SERPL-SCNC: 4.7 MMOL/L (ref 3.4–5.3)
PROT SERPL-MCNC: 6.3 G/DL (ref 6.4–8.3)
RBC # BLD AUTO: 4.45 10E6/UL (ref 4.4–5.9)
SODIUM SERPL-SCNC: 140 MMOL/L (ref 135–145)
TSH SERPL DL<=0.005 MIU/L-ACNC: 1.4 UIU/ML (ref 0.3–4.2)
WBC # BLD AUTO: 4.9 10E3/UL (ref 4–11)

## 2024-09-11 PROCEDURE — 84443 ASSAY THYROID STIM HORMONE: CPT | Performed by: INTERNAL MEDICINE

## 2024-09-11 PROCEDURE — 99214 OFFICE O/P EST MOD 30 MIN: CPT | Performed by: NURSE PRACTITIONER

## 2024-09-11 PROCEDURE — 258N000003 HC RX IP 258 OP 636: Performed by: INTERNAL MEDICINE

## 2024-09-11 PROCEDURE — 250N000011 HC RX IP 250 OP 636: Performed by: INTERNAL MEDICINE

## 2024-09-11 PROCEDURE — 80053 COMPREHEN METABOLIC PANEL: CPT | Performed by: INTERNAL MEDICINE

## 2024-09-11 PROCEDURE — G0463 HOSPITAL OUTPT CLINIC VISIT: HCPCS | Performed by: NURSE PRACTITIONER

## 2024-09-11 PROCEDURE — 85004 AUTOMATED DIFF WBC COUNT: CPT | Performed by: INTERNAL MEDICINE

## 2024-09-11 PROCEDURE — 96413 CHEMO IV INFUSION 1 HR: CPT

## 2024-09-11 RX ORDER — HEPARIN SODIUM (PORCINE) LOCK FLUSH IV SOLN 100 UNIT/ML 100 UNIT/ML
5 SOLUTION INTRAVENOUS EVERY 8 HOURS PRN
Status: DISCONTINUED | OUTPATIENT
Start: 2024-09-11 | End: 2024-09-11 | Stop reason: HOSPADM

## 2024-09-11 RX ADMIN — SODIUM CHLORIDE 400 MG: 9 INJECTION, SOLUTION INTRAVENOUS at 14:53

## 2024-09-11 RX ADMIN — SODIUM CHLORIDE 1000 ML: 9 INJECTION, SOLUTION INTRAVENOUS at 14:30

## 2024-09-11 RX ADMIN — Medication 5 ML: at 15:45

## 2024-09-11 ASSESSMENT — PAIN SCALES - GENERAL: PAINLEVEL: NO PAIN (0)

## 2024-09-11 NOTE — PROGRESS NOTES
Hematology-Oncology Follow-up Note  [facility]      Reason for Visit:  Metastatic nonn-small cell lung carcinoma  Follows with Dr. Larsen  Completed carboplatin Taxol and pembrolizumab x 6 cycle on 03/10/2021  03/31/2021-on maintenance with single agent with pembrolizumab  10/17/2022-PET scan did not reveal evidence of malignancy  Patient is currently getting treatment with pembrolizumab every 6 weeks  04/25/23-CT scan revealed stable skeletal metastasis no new sites of disease  11/08/2023-CT scan revealed stable bony lesions  03/18/2024- PET:  1.  No convincing evidence of progressive metastatic disease.   2.  Mildly FDG avid sclerotic lesions of the right seventh rib are again seen. Slightly increased FDG uptake of the right lateral seventh rib lesion. Findings may reflect posttreatment inflammation or indolent metastases  -3/36/2024 Day1 Cycle 30 pembrolizumab  -4/19/24 received Zometa 4 mg      Interval History:  Patient reports he continues to feel well.  Denies fever chills sweats cough shortness of breath chest pain nausea vomiting diarrhea abdominal pain or bleeding      Review of Systems:  14 point ROS of systems including Constitutional, Eyes, Respiratory, Cardiovascular, Gastroenterology, Genitourinary, Integumentary, Muscularskeletal, Psychiatric were all negative except for pertinent positives noted in my HPI.       MEDICATIONS:  Reviewed       PHYSICAL EXAM:  Vital signs:  Temp: 97.6  F (36.4  C) Temp src: Oral BP: 126/57 Pulse: 65   Resp: 16 SpO2: 97 %       Weight: 85.7 kg (189 lb)    GENERAL/CONSTITUTIONAL: Well appearing, well groomed male in no acute distress.  Skin: port placed right supraclavicular region; no rashes or erythema noted  RESPIRATORY: Clear to auscultation bilaterally. No crackles or wheezing.   CARDIOVASCULAR: Regular rate and rhythm without murmurs, gallops, or rubs. No edema  GASTROINTESTINAL: No hepatosplenomegaly, masses, or tenderness. The patient has  normal bowel sounds. No guarding.  No distention.  MUSCULOSKELETAL: Warm and well-perfused, no cyanosis, clubbing, or edema.  LYMPH: No anterior cervical, posterior cervical, supraclavicular, axillary or inguinal adenopathy.   Psych: normal mood and affect, answering questions appropriately.     Labs:  Reviewed    ASSESSMENT/ PLAN :   metastatic non-small cell lung cancer currently on pembrolizumab.  -He follows with Dr. Larsen   -status post carboplatinTaxol and pembrolizumab-completed 6 cycles in March 2021  -Currently on maintenance with immunotherapy pembrolizumab 400 mg every 6 weeks  -Labs reviewed okay to proceed with pembrolizumab-side effects reviewed  Patient is scheduled with Dr. Larsen with next cycle of treatment    Neuropathy  Stable. Taking Gabapentin BID hs    Bone Mets  Currently receiving Zometa every 6 months  -Next Zometa is due in  October 2024    Thrombocytopenia chronic-stable  Continue to monitor   Reviewed with patient red flags of thrombocytopenia  Call clinic with any bleeding events, falls, bruising or injury    Hypocalcemia  Continue to monitor       DARSHANA Meza CNP on 5/7/2024 at 10:27 AM   I saw patient concurrently with VERO Rm I agree with note above DARSHANA Meza CNP          Chart documentation with Dragon Voice recognition Software. Although reviewed after completion, some words and grammatical errors may remain.

## 2024-09-11 NOTE — PROGRESS NOTES
Infusion Nursing Note:  Derrick Benitez presents today for C34D1 Keytruda.    Patient seen by provider today: Yes: De Frausto NP   present during visit today: Not Applicable.    Note: N/A.      Intravenous Access:  Implanted Port.    Treatment Conditions:  Lab Results   Component Value Date     09/11/2024    POTASSIUM 4.7 09/11/2024    MAG 2.1 10/30/2014    CR 1.11 09/11/2024    ANTHONY 8.2 (L) 09/11/2024    BILITOTAL 0.3 09/11/2024    ALBUMIN 3.6 09/11/2024    ALT 13 09/11/2024    AST 20 09/11/2024     TSH 1.4  Results reviewed, labs MET treatment parameters, ok to proceed with treatment.      Post Infusion Assessment:  Patient tolerated infusion without incident.  Blood return noted pre and post infusion.  Site patent and intact, free from redness, edema or discomfort.  Access discontinued per protocol.       Discharge Plan:   Discharge instructions reviewed with: Patient.  Patient and/or family verbalized understanding of discharge instructions and all questions answered.  AVS to patient via deltamethodT.  Patient will return 10/23/24 for next appointment.   Patient discharged in stable condition accompanied by: self.  Departure Mode: Ambulatory.      Hollie Pink RN

## 2024-09-11 NOTE — LETTER
9/11/2024      Derrick Benitez  [address]  [facility] [address]      Dear Colleague,    Thank you for referring your patient, Derrick Benitez, to the [facility]. Please see a copy of my visit note below.    Hematology-Oncology Follow-up Note  [facility] [place]      Reason for Visit:  Metastatic nonn-small cell lung carcinoma  Follows with Dr. Larsen  Completed carboplatin Taxol and pembrolizumab x 6 cycle on 03/10/2021  03/31/2021-on maintenance with single agent with pembrolizumab  10/17/2022-PET scan did not reveal evidence of malignancy  Patient is currently getting treatment with pembrolizumab every 6 weeks  04/25/23-CT scan revealed stable skeletal metastasis no new sites of disease  11/08/2023-CT scan revealed stable bony lesions  03/18/2024- PET:  1.  No convincing evidence of progressive metastatic disease.   2.  Mildly FDG avid sclerotic lesions of the right seventh rib are again seen. Slightly increased FDG uptake of the right lateral seventh rib lesion. Findings may reflect posttreatment inflammation or indolent metastases  -3/36/2024 Day1 Cycle 30 pembrolizumab  -4/19/24 received Zometa 4 mg      Interval History:  Patient reports he continues to feel well.  Denies fever chills sweats cough shortness of breath chest pain nausea vomiting diarrhea abdominal pain or bleeding      Review of Systems:  14 point ROS of systems including Constitutional, Eyes, Respiratory, Cardiovascular, Gastroenterology, Genitourinary, Integumentary, Muscularskeletal, Psychiatric were all negative except for pertinent positives noted in my HPI.       MEDICATIONS:  Reviewed       PHYSICAL EXAM:  Vital signs:  Temp: 97.6  F (36.4  C) Temp src: Oral BP: 126/57 Pulse: 65   Resp: 16 SpO2: 97 %       Weight: 85.7 kg (189 lb)    GENERAL/CONSTITUTIONAL: Well appearing, well groomed male in no acute distress.  Skin: port placed right supraclavicular region; no rashes or erythema  noted  RESPIRATORY: Clear to auscultation bilaterally. No crackles or wheezing.   CARDIOVASCULAR: Regular rate and rhythm without murmurs, gallops, or rubs. No edema  GASTROINTESTINAL: No hepatosplenomegaly, masses, or tenderness. The patient has normal bowel sounds. No guarding.  No distention.  MUSCULOSKELETAL: Warm and well-perfused, no cyanosis, clubbing, or edema.  LYMPH: No anterior cervical, posterior cervical, supraclavicular, axillary or inguinal adenopathy.   Psych: normal mood and affect, answering questions appropriately.     Labs:  Reviewed    ASSESSMENT/ PLAN :   metastatic non-small cell lung cancer currently on pembrolizumab.  -He follows with Dr. Larsen   -status post carboplatinTaxol and pembrolizumab-completed 6 cycles in March 2021  -Currently on maintenance with immunotherapy pembrolizumab 400 mg every 6 weeks  -Labs reviewed okay to proceed with pembrolizumab-side effects reviewed  Patient is scheduled with Dr. Larsen with next cycle of treatment    Neuropathy  Stable. Taking Gabapentin BID hs    Bone Mets  Currently receiving Zometa every 6 months  -Next Zometa is due in  October 2024    Thrombocytopenia chronic-stable  Continue to monitor   Reviewed with patient red flags of thrombocytopenia  Call clinic with any bleeding events, falls, bruising or injury    Hypocalcemia  Continue to monitor       DARSHANA Meza CNP on 5/7/2024 at 10:27 AM   I saw patient concurrently with VERO Rm I agree with note above DARSHANA Meza CNP          Chart documentation with Dragon Voice recognition Software. Although reviewed after completion, some words and grammatical errors may remain.         Again, thank you for allowing me to participate in the care of your patient.        Sincerely,        DARSHANA Meza CNP

## 2024-09-11 NOTE — PROGRESS NOTES
Nursing Note:  Derrick Benitez presents today for port labs.    Patient seen by provider today: Yes: De   present during visit today: Not Applicable.    Note: N/A.    Intravenous Access:  Labs drawn without difficulty.  Implanted Port.    Discharge Plan:   Patient was sent to [facility] for provider appointment.    Archana Hall RN

## 2024-10-12 ENCOUNTER — HEALTH MAINTENANCE LETTER (OUTPATIENT)
Age: 84
End: 2024-10-12

## 2024-10-23 ENCOUNTER — INFUSION THERAPY VISIT (OUTPATIENT)
Dept: INFUSION THERAPY | Facility: CLINIC | Age: 84
End: 2024-10-23
Attending: INTERNAL MEDICINE
Payer: MEDICARE

## 2024-10-23 ENCOUNTER — ONCOLOGY VISIT (OUTPATIENT)
Dept: ONCOLOGY | Facility: CLINIC | Age: 84
End: 2024-10-23
Attending: INTERNAL MEDICINE
Payer: MEDICARE

## 2024-10-23 VITALS
WEIGHT: 195.2 LBS | HEART RATE: 62 BPM | BODY MASS INDEX: 30.57 KG/M2 | OXYGEN SATURATION: 93 % | RESPIRATION RATE: 18 BRPM | TEMPERATURE: 97.8 F | SYSTOLIC BLOOD PRESSURE: 136 MMHG | DIASTOLIC BLOOD PRESSURE: 69 MMHG

## 2024-10-23 DIAGNOSIS — C34.31 MALIGNANT NEOPLASM OF LOWER LOBE OF RIGHT LUNG (H): ICD-10-CM

## 2024-10-23 DIAGNOSIS — C34.31 MALIGNANT NEOPLASM OF LOWER LOBE OF RIGHT LUNG (H): Primary | ICD-10-CM

## 2024-10-23 DIAGNOSIS — C79.51 MALIGNANT NEOPLASM METASTATIC TO BONE (H): Primary | ICD-10-CM

## 2024-10-23 DIAGNOSIS — C79.51 MALIGNANT NEOPLASM METASTATIC TO BONE (H): ICD-10-CM

## 2024-10-23 DIAGNOSIS — Z51.11 ENCOUNTER FOR ANTINEOPLASTIC CHEMOTHERAPY: Primary | ICD-10-CM

## 2024-10-23 DIAGNOSIS — Z51.11 ENCOUNTER FOR ANTINEOPLASTIC CHEMOTHERAPY: ICD-10-CM

## 2024-10-23 LAB
ALBUMIN SERPL BCG-MCNC: 3.4 G/DL (ref 3.5–5.2)
ALP SERPL-CCNC: 77 U/L (ref 40–150)
ALT SERPL W P-5'-P-CCNC: 14 U/L (ref 0–70)
ANION GAP SERPL CALCULATED.3IONS-SCNC: 7 MMOL/L (ref 7–15)
AST SERPL W P-5'-P-CCNC: 24 U/L (ref 0–45)
BASOPHILS # BLD AUTO: 0 10E3/UL (ref 0–0.2)
BASOPHILS NFR BLD AUTO: 0 %
BILIRUB SERPL-MCNC: 0.4 MG/DL
BUN SERPL-MCNC: 17.1 MG/DL (ref 8–23)
CALCIUM SERPL-MCNC: 8.4 MG/DL (ref 8.8–10.4)
CHLORIDE SERPL-SCNC: 109 MMOL/L (ref 98–107)
CREAT SERPL-MCNC: 1.02 MG/DL (ref 0.67–1.17)
EGFRCR SERPLBLD CKD-EPI 2021: 72 ML/MIN/1.73M2
EOSINOPHIL # BLD AUTO: 0.2 10E3/UL (ref 0–0.7)
EOSINOPHIL NFR BLD AUTO: 3 %
ERYTHROCYTE [DISTWIDTH] IN BLOOD BY AUTOMATED COUNT: 12.7 % (ref 10–15)
GLUCOSE SERPL-MCNC: 162 MG/DL (ref 70–99)
HCO3 SERPL-SCNC: 25 MMOL/L (ref 22–29)
HCT VFR BLD AUTO: 40.8 % (ref 40–53)
HGB BLD-MCNC: 13.7 G/DL (ref 13.3–17.7)
IMM GRANULOCYTES # BLD: 0 10E3/UL
IMM GRANULOCYTES NFR BLD: 0 %
LYMPHOCYTES # BLD AUTO: 1.2 10E3/UL (ref 0.8–5.3)
LYMPHOCYTES NFR BLD AUTO: 25 %
MCH RBC QN AUTO: 31.4 PG (ref 26.5–33)
MCHC RBC AUTO-ENTMCNC: 33.6 G/DL (ref 31.5–36.5)
MCV RBC AUTO: 93 FL (ref 78–100)
MONOCYTES # BLD AUTO: 0.4 10E3/UL (ref 0–1.3)
MONOCYTES NFR BLD AUTO: 8 %
NEUTROPHILS # BLD AUTO: 3.2 10E3/UL (ref 1.6–8.3)
NEUTROPHILS NFR BLD AUTO: 63 %
NRBC # BLD AUTO: 0 10E3/UL
NRBC BLD AUTO-RTO: 0 /100
PLATELET # BLD AUTO: 119 10E3/UL (ref 150–450)
POTASSIUM SERPL-SCNC: 4.3 MMOL/L (ref 3.4–5.3)
PROT SERPL-MCNC: 6.1 G/DL (ref 6.4–8.3)
RBC # BLD AUTO: 4.37 10E6/UL (ref 4.4–5.9)
SODIUM SERPL-SCNC: 141 MMOL/L (ref 135–145)
TSH SERPL DL<=0.005 MIU/L-ACNC: 1.68 UIU/ML (ref 0.3–4.2)
WBC # BLD AUTO: 5 10E3/UL (ref 4–11)

## 2024-10-23 PROCEDURE — 85025 COMPLETE CBC W/AUTO DIFF WBC: CPT | Performed by: INTERNAL MEDICINE

## 2024-10-23 PROCEDURE — 36591 DRAW BLOOD OFF VENOUS DEVICE: CPT | Performed by: INTERNAL MEDICINE

## 2024-10-23 PROCEDURE — 250N000011 HC RX IP 250 OP 636: Performed by: INTERNAL MEDICINE

## 2024-10-23 PROCEDURE — 84443 ASSAY THYROID STIM HORMONE: CPT | Performed by: INTERNAL MEDICINE

## 2024-10-23 PROCEDURE — 96413 CHEMO IV INFUSION 1 HR: CPT

## 2024-10-23 PROCEDURE — 258N000003 HC RX IP 258 OP 636: Performed by: INTERNAL MEDICINE

## 2024-10-23 PROCEDURE — 99214 OFFICE O/P EST MOD 30 MIN: CPT | Performed by: INTERNAL MEDICINE

## 2024-10-23 PROCEDURE — G0463 HOSPITAL OUTPT CLINIC VISIT: HCPCS | Performed by: INTERNAL MEDICINE

## 2024-10-23 PROCEDURE — 80053 COMPREHEN METABOLIC PANEL: CPT | Performed by: INTERNAL MEDICINE

## 2024-10-23 PROCEDURE — 96375 TX/PRO/DX INJ NEW DRUG ADDON: CPT

## 2024-10-23 PROCEDURE — G2211 COMPLEX E/M VISIT ADD ON: HCPCS | Performed by: INTERNAL MEDICINE

## 2024-10-23 RX ORDER — HEPARIN SODIUM (PORCINE) LOCK FLUSH IV SOLN 100 UNIT/ML 100 UNIT/ML
5 SOLUTION INTRAVENOUS EVERY 8 HOURS PRN
OUTPATIENT
Start: 2024-12-03

## 2024-10-23 RX ORDER — ALBUTEROL SULFATE 90 UG/1
1-2 INHALANT RESPIRATORY (INHALATION)
Start: 2024-12-03

## 2024-10-23 RX ORDER — METHYLPREDNISOLONE SODIUM SUCCINATE 125 MG/2ML
125 INJECTION INTRAMUSCULAR; INTRAVENOUS
Status: CANCELLED
Start: 2024-10-23

## 2024-10-23 RX ORDER — HEPARIN SODIUM,PORCINE 10 UNIT/ML
5 VIAL (ML) INTRAVENOUS
OUTPATIENT
Start: 2024-12-03

## 2024-10-23 RX ORDER — HEPARIN SODIUM (PORCINE) LOCK FLUSH IV SOLN 100 UNIT/ML 100 UNIT/ML
5 SOLUTION INTRAVENOUS EVERY 8 HOURS PRN
Status: CANCELLED | OUTPATIENT
Start: 2024-10-23

## 2024-10-23 RX ORDER — ALBUTEROL SULFATE 0.83 MG/ML
2.5 SOLUTION RESPIRATORY (INHALATION)
Status: CANCELLED | OUTPATIENT
Start: 2024-10-23

## 2024-10-23 RX ORDER — EPINEPHRINE 1 MG/ML
0.3 INJECTION, SOLUTION INTRAMUSCULAR; SUBCUTANEOUS EVERY 5 MIN PRN
Status: CANCELLED | OUTPATIENT
Start: 2024-10-23

## 2024-10-23 RX ORDER — MEPERIDINE HYDROCHLORIDE 25 MG/ML
25 INJECTION INTRAMUSCULAR; INTRAVENOUS; SUBCUTANEOUS EVERY 30 MIN PRN
OUTPATIENT
Start: 2024-12-03

## 2024-10-23 RX ORDER — DIPHENHYDRAMINE HYDROCHLORIDE 50 MG/ML
50 INJECTION INTRAMUSCULAR; INTRAVENOUS
Status: CANCELLED
Start: 2024-10-23

## 2024-10-23 RX ORDER — HEPARIN SODIUM,PORCINE 10 UNIT/ML
5 VIAL (ML) INTRAVENOUS
Status: CANCELLED | OUTPATIENT
Start: 2024-10-23

## 2024-10-23 RX ORDER — HEPARIN SODIUM (PORCINE) LOCK FLUSH IV SOLN 100 UNIT/ML 100 UNIT/ML
5 SOLUTION INTRAVENOUS EVERY 8 HOURS PRN
Status: DISCONTINUED | OUTPATIENT
Start: 2024-10-23 | End: 2024-10-23 | Stop reason: HOSPADM

## 2024-10-23 RX ORDER — ALBUTEROL SULFATE 0.83 MG/ML
2.5 SOLUTION RESPIRATORY (INHALATION)
OUTPATIENT
Start: 2024-12-03

## 2024-10-23 RX ORDER — LORAZEPAM 2 MG/ML
0.5 INJECTION INTRAMUSCULAR EVERY 4 HOURS PRN
OUTPATIENT
Start: 2024-12-03

## 2024-10-23 RX ORDER — NALOXONE HYDROCHLORIDE 0.4 MG/ML
.1-.4 INJECTION, SOLUTION INTRAMUSCULAR; INTRAVENOUS; SUBCUTANEOUS
OUTPATIENT
Start: 2024-12-03

## 2024-10-23 RX ORDER — SODIUM CHLORIDE 9 MG/ML
1000 INJECTION, SOLUTION INTRAVENOUS CONTINUOUS PRN
Status: CANCELLED
Start: 2024-10-23

## 2024-10-23 RX ORDER — NALOXONE HYDROCHLORIDE 0.4 MG/ML
.1-.4 INJECTION, SOLUTION INTRAMUSCULAR; INTRAVENOUS; SUBCUTANEOUS
Status: CANCELLED | OUTPATIENT
Start: 2024-10-23

## 2024-10-23 RX ORDER — MEPERIDINE HYDROCHLORIDE 25 MG/ML
25 INJECTION INTRAMUSCULAR; INTRAVENOUS; SUBCUTANEOUS EVERY 30 MIN PRN
Status: CANCELLED | OUTPATIENT
Start: 2024-10-23

## 2024-10-23 RX ORDER — ALBUTEROL SULFATE 90 UG/1
1-2 INHALANT RESPIRATORY (INHALATION)
Status: CANCELLED
Start: 2024-10-23

## 2024-10-23 RX ORDER — DIPHENHYDRAMINE HYDROCHLORIDE 50 MG/ML
50 INJECTION INTRAMUSCULAR; INTRAVENOUS
Start: 2024-12-03

## 2024-10-23 RX ORDER — LORAZEPAM 2 MG/ML
0.5 INJECTION INTRAMUSCULAR EVERY 4 HOURS PRN
Status: CANCELLED | OUTPATIENT
Start: 2024-10-23

## 2024-10-23 RX ORDER — SODIUM CHLORIDE 9 MG/ML
1000 INJECTION, SOLUTION INTRAVENOUS CONTINUOUS PRN
Start: 2024-12-03

## 2024-10-23 RX ORDER — EPINEPHRINE 1 MG/ML
0.3 INJECTION, SOLUTION INTRAMUSCULAR; SUBCUTANEOUS EVERY 5 MIN PRN
OUTPATIENT
Start: 2024-12-03

## 2024-10-23 RX ORDER — METHYLPREDNISOLONE SODIUM SUCCINATE 125 MG/2ML
125 INJECTION INTRAMUSCULAR; INTRAVENOUS
Start: 2024-12-03

## 2024-10-23 RX ADMIN — Medication 5 ML: at 11:17

## 2024-10-23 RX ADMIN — ZOLEDRONIC ACID 3.5 MG: 4 INJECTION, SOLUTION, CONCENTRATE INTRAVENOUS at 10:30

## 2024-10-23 RX ADMIN — SODIUM CHLORIDE 400 MG: 9 INJECTION, SOLUTION INTRAVENOUS at 10:49

## 2024-10-23 ASSESSMENT — PAIN SCALES - GENERAL: PAINLEVEL_OUTOF10: NO PAIN (0)

## 2024-10-23 NOTE — PATIENT INSTRUCTIONS
1. Continue pembrolizumab every 6 weeks.  2. Continue Zometa every 6 months.  3. Continue gabapentin twice a day.  4. See DAMEON with next treatment.  5. PET scan in early January.  6. See me after PET scan.

## 2024-10-23 NOTE — PROGRESS NOTES
Nursing Note:  Derrick ASLAS Emmanuel presents today for port labs for tx today.    Patient seen by provider today: Yes: Chava   present during visit today: Not Applicable.    Note: N/A.    Intravenous Access:  Labs drawn without difficulty.  Implanted Port.    Discharge Plan:   Patient was sent to [facility] for provider appointment.    Musa Taylor RN

## 2024-10-23 NOTE — LETTER
"10/23/2024      Derrick Benitez  [address]  [facility] [address]      Dear Colleague,    Thank you for referring your patient, Derrick Benitez, to the [facility]. Please see a copy of my visit note below.    Oncology Rooming Note    October 23, 2024 9:40 AM   Derrick Benitez is a 84 year old male who presents for:    Chief Complaint   Patient presents with     Oncology Clinic Visit     Initial Vitals: /69   Pulse 62   Temp 97.8  F (36.6  C) (Oral)   Resp 18   Wt 88.5 kg (195 lb 3.2 oz)   SpO2 93%   BMI 30.57 kg/m   Estimated body mass index is 30.57 kg/m  as calculated from the following:    Height as of 7/30/24: 1.702 m (5' 7\").    Weight as of this encounter: 88.5 kg (195 lb 3.2 oz). Body surface area is 2.05 meters squared.  No Pain (0) Comment: Data Unavailable   No LMP for male patient.  Allergies reviewed: Yes  Medications reviewed: Yes    Medications: Medication refills not needed today.  [facility] into EPIC[facility]    [facility] [place] [place] [facility] [address]  [facility] [address] [place], [place] - [address] [place] [address]    Frailty Screening:   Is the patient here for a new oncology consult visit in cancer care? 2. No      Clinical concerns: no       Shari J. Schoenberger, CMA              ONCOLOGIC HISTORY:  Mr. Benitez is a gentleman with non-small cell lung cancer, favor squamous cell carcinoma.   -NGS and PDL staining could not be done because of small sample.   -Guardant 360 does not reveal any actionable alteration.    1.  CT chest angiogram on 09/07/2020 revealed right lower lobe mass.   2.  CT-guided biopsy was done on 09/18/2020.  Pathology revealed non-small cell lung cancer, favor squamous cell carcinoma.  Sample size was small.  PDL staining and NGS panel could not be done.   3.  Brain MRI on 09/26/2020 did not reveal any brain metastasis.   4.  PET scan on 10/05/2020 revealed hypermetabolic right lung mass and multiple " hypermetabolic bone lesions.   5.  Carboplatin and Taxol x 6 between 11/25/2020 and 03/10/2021.   -Pembrolizumab added with cycle 2.   6. PET scan on 10/17/2022 reveals FDG avid sclerotic lesions involving the right posterior seventh rib slightly above background levels. Favored to simply represent sequelae of post treatment change.  -PET scan on 03/18/2024 does not reveal any evidence of malignancy.  There is mildly FDG avid sclerotic lesions of the right seventh rib are again seen. Slightly increased FDG uptake of the right lateral seventh rib lesion. Findings may reflect posttreatment inflammation or indolent metastases.      SUBJECTIVE:    Mr. 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 He is tolerating it well. PET scan on 03/18/2024 did not reveal any evidence of cancer.     He has grade 1 peripheral neuropathy. He gets intermittent numbness in toes. No neuropathy in upper extremity. He is on gabapentin twice a day.      He has mild generalized weakness. He is able to do all his activities. No falls.     No headache.  No dizziness.  No chest pain.  No shortness of breath.  No abdominal pain, nausea or vomiting. Appetite is good. No urinary or bowel complaints. No bleeding. All other review of systems is negative.     PHYSICAL EXAMINATION:   GENERAL:  Alert and oriented x 3.   VITAL SIGNS:  Reviewed.  ECOG PS of 1.     Rest of the system not examined.     LABS:  CBC, CMP and TSH reviewed.     ASSESSMENT:    1.  An 84-year-old gentleman with metastatic non-small cell lung cancer on pembrolizumab.  No evidence of disease.  2.  Grade 1 peripheral neuropathy on gabapentin. Stable.  3.  Mild thrombocytopenia, stable.  4.  Aortic aneurysm, stable. Being followed by vascular surgery.  5.  Low protein and albumin.     PLAN:    1. Continue pembrolizumab every 6 weeks.  2. Continue Zometa every 6 months.  3. Continue gabapentin twice a day.  4. See DAMEON with next  treatment.  5. PET scan in early January.  6. See me after PET scan.     DISCUSSION:  1.  Discussed with the patient regarding lung cancer.  He is overall doing well.  No clinical suspicion of recurrence.    Patient is on pembrolizumab every 6 weeks.  He is tolerating it well.  He will continue on it.    We discussed regarding duration of pembrolizumab.  Explained to the patient that we can consider stopping it if next PET scan does not reveal any evidence of disease.  He is agreeable with this plan.    Will get a PET scan in early January.  I will see him after that.  If there is no evidence of disease, I will discuss with him regarding stopping pembrolizumab as he has taken it for almost 40 years.    2.  Patient gets Zometa every 6 months.  That will be continued.  He initially had bone metastasis.  He is tolerating it well. He does not have any dental or jaw related symptoms.    He will continue on calcium and vitamin D twice a day.    3.  He will continue on gabapentin for peripheral neuropathy.  There has been no worsening of neuropathy.    4.  Labs were reviewed with him.  There are few abnormalities including thrombocytopenia and low protein and albumin.  Will monitor them.    5.  He had a few questions which were all answered.  I will see him in January with PET scan.  In between he will see our DAMEON.       Total visit time of 40 minutes.  Time spent in today's visit, review of chart/investigations today, monitoring for toxicity of high risk drugs and documentation today.      Again, thank you for allowing me to participate in the care of your patient.        Sincerely,        Buzz Lrasen MD

## 2024-10-23 NOTE — PROGRESS NOTES
Infusion Nursing Note:  Derrick Benitez presents today for keytruda/zometa.    Patient seen by provider today: Yes: Chava   present during visit today: Not Applicable.    Note: N/A.      Intravenous Access:  Implanted Port.    Treatment Conditions:  Lab Results   Component Value Date    HGB 13.7 10/23/2024    WBC 5.0 10/23/2024    ANEU 3.9 10/17/2022    ANEUTAUTO 3.2 10/23/2024     (L) 10/23/2024        Lab Results   Component Value Date     10/23/2024    POTASSIUM 4.3 10/23/2024    MAG 2.1 10/30/2014    CR 1.02 10/23/2024    ANTHONY 8.4 (L) 10/23/2024    BILITOTAL 0.4 10/23/2024    ALBUMIN 3.4 (L) 10/23/2024    ALT 14 10/23/2024    AST 24 10/23/2024       Results reviewed, labs MET treatment parameters, ok to proceed with treatment.      Post Infusion Assessment:  Patient tolerated infusion without incident.  Blood return noted pre and post infusion.  Site patent and intact, free from redness, edema or discomfort.  No evidence of extravasations.  Access discontinued per protocol.       Discharge Plan:   Discharge instructions reviewed with: Patient.  Patient and/or family verbalized understanding of discharge instructions and all questions answered.  Patient discharged in stable condition accompanied by: self.  Departure Mode: Ambulatory.      Archana Hall RN

## 2024-10-23 NOTE — PROGRESS NOTES
"Oncology Rooming Note    October 23, 2024 9:40 AM   Derrick Benitez is a 84 year old male who presents for:    Chief Complaint   Patient presents with    Oncology Clinic Visit     Initial Vitals: /69   Pulse 62   Temp 97.8  F (36.6  C) (Oral)   Resp 18   Wt 88.5 kg (195 lb 3.2 oz)   SpO2 93%   BMI 30.57 kg/m   Estimated body mass index is 30.57 kg/m  as calculated from the following:    Height as of 7/30/24: 1.702 m (5' 7\").    Weight as of this encounter: 88.5 kg (195 lb 3.2 oz). Body surface area is 2.05 meters squared.  No Pain (0) Comment: Data Unavailable   No LMP for male patient.  Allergies reviewed: Yes  Medications reviewed: Yes    Medications: Medication refills not needed today.  Pharmacy name entered into EPIC:    [place], [place] - [address]  [facility] [address] [place] [address]    Frailty Screening:   Is the patient here for a new oncology consult visit in cancer care? 2. No      Clinical concerns: no       Shari J. Schoenberger, GUZMAN            "

## 2024-10-23 NOTE — PROGRESS NOTES
ONCOLOGIC HISTORY:  Mr. Benitez is a gentleman with non-small cell lung cancer, favor squamous cell carcinoma.   -NGS and PDL staining could not be done because of small sample.   -Guardant 360 does not reveal any actionable alteration.    1.  CT chest angiogram on 09/07/2020 revealed right lower lobe mass.   2.  CT-guided biopsy was done on 09/18/2020.  Pathology revealed non-small cell lung cancer, favor squamous cell carcinoma.  Sample size was small.  PDL staining and NGS panel could not be done.   3.  Brain MRI on 09/26/2020 did not reveal any brain metastasis.   4.  PET scan on 10/05/2020 revealed hypermetabolic right lung mass and multiple hypermetabolic bone lesions.   5.  Carboplatin and Taxol x 6 between 11/25/2020 and 03/10/2021.   -Pembrolizumab added with cycle 2.   6. PET scan on 10/17/2022 reveals FDG avid sclerotic lesions involving the right posterior seventh rib slightly above background levels. Favored to simply represent sequelae of post treatment change.  -PET scan on 03/18/2024 does not reveal any evidence of malignancy.  There is mildly FDG avid sclerotic lesions of the right seventh rib are again seen. Slightly increased FDG uptake of the right lateral seventh rib lesion. Findings may reflect posttreatment inflammation or indolent metastases.      SUBJECTIVE:    Mr. 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 He is tolerating it well. PET scan on 03/18/2024 did not reveal any evidence of cancer.     He has grade 1 peripheral neuropathy. He gets intermittent numbness in toes. No neuropathy in upper extremity. He is on gabapentin twice a day.      He has mild generalized weakness. He is able to do all his activities. No falls.     No headache.  No dizziness.  No chest pain.  No shortness of breath.  No abdominal pain, nausea or vomiting. Appetite is good. No urinary or bowel complaints. No bleeding. All other review of systems is negative.      PHYSICAL EXAMINATION:   GENERAL:  Alert and oriented x 3.   VITAL SIGNS:  Reviewed.  ECOG PS of 1.     Rest of the system not examined.     LABS:  CBC, CMP and TSH reviewed.     ASSESSMENT:    1.  An 84-year-old gentleman with metastatic non-small cell lung cancer on pembrolizumab.  No evidence of disease.  2.  Grade 1 peripheral neuropathy on gabapentin. Stable.  3.  Mild thrombocytopenia, stable.  4.  Aortic aneurysm, stable. Being followed by vascular surgery.  5.  Low protein and albumin.     PLAN:    1. Continue pembrolizumab every 6 weeks.  2. Continue Zometa every 6 months.  3. Continue gabapentin twice a day.  4. See DAMEON with next treatment.  5. PET scan in early January.  6. See me after PET scan.     DISCUSSION:  1.  Discussed with the patient regarding lung cancer.  He is overall doing well.  No clinical suspicion of recurrence.    Patient is on pembrolizumab every 6 weeks.  He is tolerating it well.  He will continue on it.    We discussed regarding duration of pembrolizumab.  Explained to the patient that we can consider stopping it if next PET scan does not reveal any evidence of disease.  He is agreeable with this plan.    Will get a PET scan in early January.  I will see him after that.  If there is no evidence of disease, I will discuss with him regarding stopping pembrolizumab as he has taken it for almost 40 years.    2.  Patient gets Zometa every 6 months.  That will be continued.  He initially had bone metastasis.  He is tolerating it well. He does not have any dental or jaw related symptoms.    He will continue on calcium and vitamin D twice a day.    3.  He will continue on gabapentin for peripheral neuropathy.  There has been no worsening of neuropathy.    4.  Labs were reviewed with him.  There are few abnormalities including thrombocytopenia and low protein and albumin.  Will monitor them.    5.  He had a few questions which were all answered.  I will see him in January with PET scan.  In  between he will see our DAMEON.       Total visit time of 40 minutes.  Time spent in today's visit, review of chart/investigations today, monitoring for toxicity of high risk drugs and documentation today.

## 2024-12-04 ENCOUNTER — ONCOLOGY VISIT (OUTPATIENT)
Dept: ONCOLOGY | Facility: CLINIC | Age: 84
End: 2024-12-04
Attending: INTERNAL MEDICINE
Payer: MEDICARE

## 2024-12-04 ENCOUNTER — INFUSION THERAPY VISIT (OUTPATIENT)
Dept: INFUSION THERAPY | Facility: CLINIC | Age: 84
End: 2024-12-04
Attending: INTERNAL MEDICINE
Payer: MEDICARE

## 2024-12-04 VITALS
TEMPERATURE: 98 F | HEART RATE: 65 BPM | OXYGEN SATURATION: 91 % | RESPIRATION RATE: 16 BRPM | DIASTOLIC BLOOD PRESSURE: 61 MMHG | BODY MASS INDEX: 30.67 KG/M2 | WEIGHT: 195.8 LBS | SYSTOLIC BLOOD PRESSURE: 132 MMHG

## 2024-12-04 DIAGNOSIS — Z51.11 ENCOUNTER FOR ANTINEOPLASTIC CHEMOTHERAPY: ICD-10-CM

## 2024-12-04 DIAGNOSIS — C34.31 MALIGNANT NEOPLASM OF LOWER LOBE OF RIGHT LUNG (H): Primary | ICD-10-CM

## 2024-12-04 DIAGNOSIS — C34.31 MALIGNANT NEOPLASM OF LOWER LOBE OF RIGHT LUNG (H): ICD-10-CM

## 2024-12-04 DIAGNOSIS — Z51.11 ENCOUNTER FOR ANTINEOPLASTIC CHEMOTHERAPY: Primary | ICD-10-CM

## 2024-12-04 DIAGNOSIS — C79.51 MALIGNANT NEOPLASM METASTATIC TO BONE (H): Primary | ICD-10-CM

## 2024-12-04 LAB
ALBUMIN SERPL BCG-MCNC: 3.5 G/DL (ref 3.5–5.2)
ALP SERPL-CCNC: 76 U/L (ref 40–150)
ALT SERPL W P-5'-P-CCNC: 14 U/L (ref 0–70)
ANION GAP SERPL CALCULATED.3IONS-SCNC: 8 MMOL/L (ref 7–15)
AST SERPL W P-5'-P-CCNC: 25 U/L (ref 0–45)
BASOPHILS # BLD AUTO: 0 10E3/UL (ref 0–0.2)
BASOPHILS NFR BLD AUTO: 0 %
BILIRUB SERPL-MCNC: 0.4 MG/DL
BUN SERPL-MCNC: 17.2 MG/DL (ref 8–23)
CALCIUM SERPL-MCNC: 8.2 MG/DL (ref 8.8–10.4)
CHLORIDE SERPL-SCNC: 110 MMOL/L (ref 98–107)
CREAT SERPL-MCNC: 1.17 MG/DL (ref 0.67–1.17)
EGFRCR SERPLBLD CKD-EPI 2021: 61 ML/MIN/1.73M2
EOSINOPHIL # BLD AUTO: 0.2 10E3/UL (ref 0–0.7)
EOSINOPHIL NFR BLD AUTO: 3 %
ERYTHROCYTE [DISTWIDTH] IN BLOOD BY AUTOMATED COUNT: 12.7 % (ref 10–15)
GLUCOSE SERPL-MCNC: 166 MG/DL (ref 70–99)
HCO3 SERPL-SCNC: 23 MMOL/L (ref 22–29)
HCT VFR BLD AUTO: 40.9 % (ref 40–53)
HGB BLD-MCNC: 13.6 G/DL (ref 13.3–17.7)
IMM GRANULOCYTES # BLD: 0 10E3/UL
IMM GRANULOCYTES NFR BLD: 0 %
LYMPHOCYTES # BLD AUTO: 1 10E3/UL (ref 0.8–5.3)
LYMPHOCYTES NFR BLD AUTO: 20 %
MCH RBC QN AUTO: 31.5 PG (ref 26.5–33)
MCHC RBC AUTO-ENTMCNC: 33.3 G/DL (ref 31.5–36.5)
MCV RBC AUTO: 95 FL (ref 78–100)
MONOCYTES # BLD AUTO: 0.4 10E3/UL (ref 0–1.3)
MONOCYTES NFR BLD AUTO: 7 %
NEUTROPHILS # BLD AUTO: 3.5 10E3/UL (ref 1.6–8.3)
NEUTROPHILS NFR BLD AUTO: 69 %
NRBC # BLD AUTO: 0 10E3/UL
NRBC BLD AUTO-RTO: 0 /100
PLATELET # BLD AUTO: 121 10E3/UL (ref 150–450)
POTASSIUM SERPL-SCNC: 4.5 MMOL/L (ref 3.4–5.3)
PROT SERPL-MCNC: 6.2 G/DL (ref 6.4–8.3)
RBC # BLD AUTO: 4.32 10E6/UL (ref 4.4–5.9)
SODIUM SERPL-SCNC: 141 MMOL/L (ref 135–145)
TSH SERPL DL<=0.005 MIU/L-ACNC: 1.52 UIU/ML (ref 0.3–4.2)
WBC # BLD AUTO: 5.1 10E3/UL (ref 4–11)

## 2024-12-04 PROCEDURE — 85041 AUTOMATED RBC COUNT: CPT | Performed by: INTERNAL MEDICINE

## 2024-12-04 PROCEDURE — G0463 HOSPITAL OUTPT CLINIC VISIT: HCPCS | Performed by: NURSE PRACTITIONER

## 2024-12-04 PROCEDURE — 99214 OFFICE O/P EST MOD 30 MIN: CPT | Performed by: NURSE PRACTITIONER

## 2024-12-04 PROCEDURE — 258N000003 HC RX IP 258 OP 636: Performed by: INTERNAL MEDICINE

## 2024-12-04 PROCEDURE — 85004 AUTOMATED DIFF WBC COUNT: CPT | Performed by: INTERNAL MEDICINE

## 2024-12-04 PROCEDURE — 84443 ASSAY THYROID STIM HORMONE: CPT | Performed by: INTERNAL MEDICINE

## 2024-12-04 PROCEDURE — 250N000011 HC RX IP 250 OP 636: Mod: JZ | Performed by: INTERNAL MEDICINE

## 2024-12-04 PROCEDURE — 80053 COMPREHEN METABOLIC PANEL: CPT | Performed by: INTERNAL MEDICINE

## 2024-12-04 PROCEDURE — 82040 ASSAY OF SERUM ALBUMIN: CPT | Performed by: INTERNAL MEDICINE

## 2024-12-04 PROCEDURE — 96413 CHEMO IV INFUSION 1 HR: CPT

## 2024-12-04 PROCEDURE — 36591 DRAW BLOOD OFF VENOUS DEVICE: CPT | Performed by: INTERNAL MEDICINE

## 2024-12-04 RX ORDER — HEPARIN SODIUM (PORCINE) LOCK FLUSH IV SOLN 100 UNIT/ML 100 UNIT/ML
5 SOLUTION INTRAVENOUS EVERY 8 HOURS PRN
Status: DISCONTINUED | OUTPATIENT
Start: 2024-12-04 | End: 2024-12-04 | Stop reason: HOSPADM

## 2024-12-04 RX ADMIN — HEPARIN 5 ML: 100 SYRINGE at 11:05

## 2024-12-04 RX ADMIN — SODIUM CHLORIDE 400 MG: 9 INJECTION, SOLUTION INTRAVENOUS at 10:26

## 2024-12-04 ASSESSMENT — PAIN SCALES - GENERAL: PAINLEVEL_OUTOF10: NO PAIN (0)

## 2024-12-04 NOTE — PROGRESS NOTES
Hematology-Oncology Follow-up Note  [facility]      Reason for Visit:  Metastatic nonn-small cell lung carcinoma  Follows with Dr. Larsen  Completed carboplatin Taxol and pembrolizumab x 6 cycle on 03/10/2021  03/31/2021-on maintenance with single agent with pembrolizumab  10/17/2022-PET scan did not reveal evidence of malignancy  Patient is currently getting treatment with pembrolizumab every 6 weeks  04/25/23-CT scan revealed stable skeletal metastasis no new sites of disease  11/08/2023-CT scan revealed stable bony lesions  03/18/2024- PET:  1.  No convincing evidence of progressive metastatic disease.   2.  Mildly FDG avid sclerotic lesions of the right seventh rib are again seen. Slightly increased FDG uptake of the right lateral seventh rib lesion. Findings may reflect posttreatment inflammation or indolent metastases  -3/36/2024 Day1 Cycle 30 pembrolizumab  -4/19/24 received Zometa 4 mg      Interval History:  Overall patient reports feeling well.  Patient reports he has been tolerating treatment well.  Denies fever chills sweats cough shortness of breath chest pain nausea vomiting diarrhea abdominal pain or bleeding      Review of Systems:  14 point ROS of systems including Constitutional, Eyes, Respiratory, Cardiovascular, Gastroenterology, Genitourinary, Integumentary, Muscularskeletal, Psychiatric were all negative except for pertinent positives noted in my HPI.       MEDICATIONS:  Reviewed       PHYSICAL EXAM:  Vital signs:    GENERAL/CONSTITUTIONAL: Well appearing, well groomed male in no acute distress.  Skin: port placed right supraclavicular region; no rashes or erythema noted  RESPIRATORY: Clear to auscultation bilaterally. No crackles or wheezing.   CARDIOVASCULAR: Regular rate and rhythm without murmurs, gallops, or rubs. No edema  GASTROINTESTINAL: No hepatosplenomegaly, masses, or tenderness. The patient has normal bowel sounds. No guarding.  No  distention.  MUSCULOSKELETAL: Warm and well-perfused, no cyanosis, clubbing, or edema.  LYMPH: No anterior cervical, posterior cervical, supraclavicular, axillary or inguinal adenopathy.   Psych: normal mood and affect, answering questions appropriately.     Labs:  CBC CMP and TSH results reviewed    ASSESSMENT/ PLAN :     metastatic non-small cell lung cancer currently on pembrolizumab.  -pt follows with Dr. Larsen   -status post carboplatinTaxol and pembrolizumab-completed 6 cycles in March 2021  -Currently on maintenance with immunotherapy pembrolizumab 400 mg every 6 weeks  Patient reports overall feeling well  -Labs reviewed okay to proceed with pembrolizumab-side effects reviewed  -Patient is scheduled for PET scan in January and follow-up with Dr. Larsen-after that Dr. Larsen will decide on duration of the treatment    Neuropathy  Stable. Taking Gabapentin BID hs    Bone Mets  Currently receiving Zometa every 6 months-.  Next is due in April 2025      Thrombocytopenia chronic-stable  Continue to monitor   Reviewed with patient red flags of thrombocytopenia  Call clinic with any bleeding events, falls, bruising or injury    Hypocalcemia  Continue to monitor    DARSHANA Meza CNP            Chart documentation with Dragon Voice recognition Software. Although reviewed after completion, some words and grammatical errors may remain.

## 2024-12-04 NOTE — PROGRESS NOTES
Nursing Note:  Derrick Benitez presents today for port lasb.    Patient seen by provider today: Yes: De   present during visit today: Not Applicable.    Note: N/A.    Intravenous Access:  Labs drawn without difficulty.  Implanted Port.    Discharge Plan:   Patient was sent to [facility] for provider appointment.    Archana Hall RN

## 2024-12-04 NOTE — LETTER
12/4/2024      Derrick Benietz  [address]  [facility] [address]      Dear Colleague,    Thank you for referring your patient, Derrick Benitez, to the [facility]. Please see a copy of my visit note below.    Hematology-Oncology Follow-up Note  [facility]      Reason for Visit:  Metastatic nonn-small cell lung carcinoma  Follows with Dr. Larsen  Completed carboplatin Taxol and pembrolizumab x 6 cycle on 03/10/2021  03/31/2021-on maintenance with single agent with pembrolizumab  10/17/2022-PET scan did not reveal evidence of malignancy  Patient is currently getting treatment with pembrolizumab every 6 weeks  04/25/23-CT scan revealed stable skeletal metastasis no new sites of disease  11/08/2023-CT scan revealed stable bony lesions  03/18/2024- PET:  1.  No convincing evidence of progressive metastatic disease.   2.  Mildly FDG avid sclerotic lesions of the right seventh rib are again seen. Slightly increased FDG uptake of the right lateral seventh rib lesion. Findings may reflect posttreatment inflammation or indolent metastases  -3/36/2024 Day1 Cycle 30 pembrolizumab  -4/19/24 received Zometa 4 mg      Interval History:  Overall patient reports feeling well.  Patient reports he has been tolerating treatment well.  Denies fever chills sweats cough shortness of breath chest pain nausea vomiting diarrhea abdominal pain or bleeding      Review of Systems:  14 point ROS of systems including Constitutional, Eyes, Respiratory, Cardiovascular, Gastroenterology, Genitourinary, Integumentary, Muscularskeletal, Psychiatric were all negative except for pertinent positives noted in my HPI.       MEDICATIONS:  Reviewed       PHYSICAL EXAM:  Vital signs:    GENERAL/CONSTITUTIONAL: Well appearing, well groomed male in no acute distress.  Skin: port placed right supraclavicular region; no rashes or erythema noted  RESPIRATORY: Clear to auscultation bilaterally. No crackles or  wheezing.   CARDIOVASCULAR: Regular rate and rhythm without murmurs, gallops, or rubs. No edema  GASTROINTESTINAL: No hepatosplenomegaly, masses, or tenderness. The patient has normal bowel sounds. No guarding.  No distention.  MUSCULOSKELETAL: Warm and well-perfused, no cyanosis, clubbing, or edema.  LYMPH: No anterior cervical, posterior cervical, supraclavicular, axillary or inguinal adenopathy.   Psych: normal mood and affect, answering questions appropriately.     Labs:  CBC CMP and TSH results reviewed    ASSESSMENT/ PLAN :     metastatic non-small cell lung cancer currently on pembrolizumab.  -pt follows with Dr. Larsen   -status post carboplatinTaxol and pembrolizumab-completed 6 cycles in March 2021  -Currently on maintenance with immunotherapy pembrolizumab 400 mg every 6 weeks  Patient reports overall feeling well  -Labs reviewed okay to proceed with pembrolizumab-side effects reviewed  -Patient is scheduled for PET scan in January and follow-up with Dr. Larsen-after that Dr. Larsen will decide on duration of the treatment    Neuropathy  Stable. Taking Gabapentin BID hs    Bone Mets  Currently receiving Zometa every 6 months-.  Next is due in April 2025      Thrombocytopenia chronic-stable  Continue to monitor   Reviewed with patient red flags of thrombocytopenia  Call clinic with any bleeding events, falls, bruising or injury    Hypocalcemia  Continue to monitor    DARSHANA Meza CNP            Chart documentation with Dragon Voice recognition Software. Although reviewed after completion, some words and grammatical errors may remain.       Again, thank you for allowing me to participate in the care of your patient.        Sincerely,        DARSHANA Meza CNP

## 2024-12-04 NOTE — PROGRESS NOTES
Infusion Nursing Note:  Derrick Benitez presents today for Cycle 36 Day 1 Keytruda.    Patient seen by provider today: Yes: De Frausto NP.    present during visit today: Not Applicable.    Note: N/A.      Intravenous Access:  Implanted Port.    Treatment Conditions:  Lab Results   Component Value Date    HGB 13.6 12/04/2024    WBC 5.1 12/04/2024    ANEU 3.9 10/17/2022    ANEUTAUTO 3.5 12/04/2024     (L) 12/04/2024        Lab Results   Component Value Date     12/04/2024    POTASSIUM 4.5 12/04/2024    MAG 2.1 10/30/2014    CR 1.17 12/04/2024    ANTHONY 8.2 (L) 12/04/2024    BILITOTAL 0.4 12/04/2024    ALBUMIN 3.5 12/04/2024    ALT 14 12/04/2024    AST 25 12/04/2024       Results reviewed, labs MET treatment parameters, ok to proceed with treatment.      Post Infusion Assessment:  Patient tolerated infusion without incident.  Blood return noted pre and post infusion.  Site patent and intact, free from redness, edema or discomfort.  No evidence of extravasations.  Access discontinued per protocol.       Discharge Plan:   Patient declined prescription refills.  Discharge instructions reviewed with: Patient.  Patient verbalized understanding of discharge instructions and all questions answered.  AVS to patient via [employer].  Patient will return 1/15/25 for next appointment.   Patient discharged in stable condition accompanied by: self.  Departure Mode: Ambulatory.      Debra Bocanegra RN

## 2024-12-04 NOTE — NURSING NOTE
"Oncology Rooming Note    December 4, 2024 8:59 AM   Derrick Benitez is a 84 year old male who presents for:    Chief Complaint   Patient presents with    Oncology Clinic Visit     Initial Vitals: /61   Pulse 65   Temp 98  F (36.7  C) (Oral)   Resp 16   Wt 88.8 kg (195 lb 12.8 oz)   SpO2 91%   BMI 30.67 kg/m   Estimated body mass index is 30.67 kg/m  as calculated from the following:    Height as of 7/30/24: 1.702 m (5' 7\").    Weight as of this encounter: 88.8 kg (195 lb 12.8 oz). Body surface area is 2.05 meters squared.  No Pain (0) Comment: Data Unavailable   No LMP for male patient.  Allergies reviewed: Yes  Medications reviewed: Yes    Medications: Medication refills not needed today.  Pharmacy name entered into EPIC:    [place], [place] - [address]  [facility] [address] [place], [place] - [address] [place] [address]    Frailty Screening:   Is the patient here for a new oncology consult visit in cancer care? 2. No      Clinical concerns: follow up        Crystal Rojas            "

## 2025-01-06 ENCOUNTER — HOSPITAL ENCOUNTER (OUTPATIENT)
Dept: PET IMAGING | Facility: CLINIC | Age: 85
Discharge: HOME | End: 2025-01-06
Attending: INTERNAL MEDICINE | Admitting: INTERNAL MEDICINE
Payer: MEDICARE

## 2025-01-06 DIAGNOSIS — C34.31 MALIGNANT NEOPLASM OF LOWER LOBE OF RIGHT LUNG (H): ICD-10-CM

## 2025-01-06 DIAGNOSIS — C79.51 MALIGNANT NEOPLASM METASTATIC TO BONE (H): ICD-10-CM

## 2025-01-06 PROCEDURE — 343N000001 HC RX 343 MED OP 636: Performed by: INTERNAL MEDICINE

## 2025-01-06 PROCEDURE — A9552 F18 FDG: HCPCS | Performed by: INTERNAL MEDICINE

## 2025-01-06 PROCEDURE — 250N000011 HC RX IP 250 OP 636: Performed by: INTERNAL MEDICINE

## 2025-01-06 PROCEDURE — 71260 CT THORAX DX C+: CPT

## 2025-01-06 PROCEDURE — 74177 CT ABD & PELVIS W/CONTRAST: CPT

## 2025-01-06 PROCEDURE — 78816 PET IMAGE W/CT FULL BODY: CPT | Mod: PS

## 2025-01-06 RX ORDER — HEPARIN SODIUM (PORCINE) LOCK FLUSH IV SOLN 100 UNIT/ML 100 UNIT/ML
500 SOLUTION INTRAVENOUS ONCE
Status: COMPLETED | OUTPATIENT
Start: 2025-01-06 | End: 2025-01-06

## 2025-01-06 RX ORDER — IOPAMIDOL 755 MG/ML
10-135 INJECTION, SOLUTION INTRAVASCULAR ONCE
Status: COMPLETED | OUTPATIENT
Start: 2025-01-06 | End: 2025-01-06

## 2025-01-06 RX ORDER — FLUDEOXYGLUCOSE F 18 200 MCI/ML
10-18 INJECTION, SOLUTION INTRAVENOUS ONCE
Status: COMPLETED | OUTPATIENT
Start: 2025-01-06 | End: 2025-01-06

## 2025-01-06 RX ADMIN — HEPARIN SODIUM (PORCINE) LOCK FLUSH IV SOLN 100 UNIT/ML 500 UNITS: 100 SOLUTION at 11:32

## 2025-01-06 RX ADMIN — FLUDEOXYGLUCOSE F 18 12.5 MILLICURIE: 200 INJECTION, SOLUTION INTRAVENOUS at 11:32

## 2025-01-06 RX ADMIN — IOPAMIDOL 119 ML: 755 INJECTION, SOLUTION INTRAVENOUS at 11:31

## 2025-01-08 NOTE — RESULT ENCOUNTER NOTE
Dear Mr. Benitez,    This is the result of PET scan. There is some activity in colon. Could be from colon polyp. We will review it during appointment.    Please, call me with any questions.    Buzz Larsen MD

## 2025-01-15 ENCOUNTER — INFUSION THERAPY VISIT (OUTPATIENT)
Dept: INFUSION THERAPY | Facility: CLINIC | Age: 85
End: 2025-01-15
Attending: INTERNAL MEDICINE
Payer: MEDICARE

## 2025-01-15 ENCOUNTER — ONCOLOGY VISIT (OUTPATIENT)
Dept: ONCOLOGY | Facility: CLINIC | Age: 85
End: 2025-01-15
Attending: INTERNAL MEDICINE
Payer: MEDICARE

## 2025-01-15 VITALS
SYSTOLIC BLOOD PRESSURE: 149 MMHG | OXYGEN SATURATION: 95 % | WEIGHT: 195 LBS | BODY MASS INDEX: 30.61 KG/M2 | HEART RATE: 64 BPM | HEIGHT: 67 IN | TEMPERATURE: 97.6 F | RESPIRATION RATE: 16 BRPM | DIASTOLIC BLOOD PRESSURE: 60 MMHG

## 2025-01-15 DIAGNOSIS — C34.31 MALIGNANT NEOPLASM OF LOWER LOBE OF RIGHT LUNG (H): ICD-10-CM

## 2025-01-15 DIAGNOSIS — C34.31 MALIGNANT NEOPLASM OF LOWER LOBE OF RIGHT LUNG (H): Primary | ICD-10-CM

## 2025-01-15 DIAGNOSIS — Z51.11 ENCOUNTER FOR ANTINEOPLASTIC CHEMOTHERAPY: Primary | ICD-10-CM

## 2025-01-15 DIAGNOSIS — Z51.11 ENCOUNTER FOR ANTINEOPLASTIC CHEMOTHERAPY: ICD-10-CM

## 2025-01-15 DIAGNOSIS — R94.8 ABNORMAL PET SCAN OF COLON: Primary | ICD-10-CM

## 2025-01-15 LAB
ALBUMIN SERPL BCG-MCNC: 3.5 G/DL (ref 3.5–5.2)
ALP SERPL-CCNC: 78 U/L (ref 40–150)
ALT SERPL W P-5'-P-CCNC: 12 U/L (ref 0–70)
ANION GAP SERPL CALCULATED.3IONS-SCNC: 6 MMOL/L (ref 7–15)
AST SERPL W P-5'-P-CCNC: 21 U/L (ref 0–45)
BASOPHILS # BLD AUTO: 0 10E3/UL (ref 0–0.2)
BASOPHILS NFR BLD AUTO: 0 %
BILIRUB SERPL-MCNC: 0.4 MG/DL
BUN SERPL-MCNC: 18.5 MG/DL (ref 8–23)
CALCIUM SERPL-MCNC: 8.4 MG/DL (ref 8.8–10.4)
CHLORIDE SERPL-SCNC: 108 MMOL/L (ref 98–107)
CREAT SERPL-MCNC: 1.06 MG/DL (ref 0.67–1.17)
EGFRCR SERPLBLD CKD-EPI 2021: 69 ML/MIN/1.73M2
EOSINOPHIL # BLD AUTO: 0.2 10E3/UL (ref 0–0.7)
EOSINOPHIL NFR BLD AUTO: 3 %
ERYTHROCYTE [DISTWIDTH] IN BLOOD BY AUTOMATED COUNT: 12.4 % (ref 10–15)
GLUCOSE SERPL-MCNC: 159 MG/DL (ref 70–99)
HCO3 SERPL-SCNC: 24 MMOL/L (ref 22–29)
HCT VFR BLD AUTO: 40.2 % (ref 40–53)
HGB BLD-MCNC: 13.7 G/DL (ref 13.3–17.7)
IMM GRANULOCYTES # BLD: 0 10E3/UL
IMM GRANULOCYTES NFR BLD: 0 %
LYMPHOCYTES # BLD AUTO: 0.9 10E3/UL (ref 0.8–5.3)
LYMPHOCYTES NFR BLD AUTO: 16 %
MCH RBC QN AUTO: 31.7 PG (ref 26.5–33)
MCHC RBC AUTO-ENTMCNC: 34.1 G/DL (ref 31.5–36.5)
MCV RBC AUTO: 93 FL (ref 78–100)
MONOCYTES # BLD AUTO: 0.4 10E3/UL (ref 0–1.3)
MONOCYTES NFR BLD AUTO: 6 %
NEUTROPHILS # BLD AUTO: 4.3 10E3/UL (ref 1.6–8.3)
NEUTROPHILS NFR BLD AUTO: 75 %
NRBC # BLD AUTO: 0 10E3/UL
NRBC BLD AUTO-RTO: 0 /100
PLATELET # BLD AUTO: 138 10E3/UL (ref 150–450)
POTASSIUM SERPL-SCNC: 4.3 MMOL/L (ref 3.4–5.3)
PROT SERPL-MCNC: 6.1 G/DL (ref 6.4–8.3)
RBC # BLD AUTO: 4.32 10E6/UL (ref 4.4–5.9)
SODIUM SERPL-SCNC: 138 MMOL/L (ref 135–145)
TSH SERPL DL<=0.005 MIU/L-ACNC: 1.89 UIU/ML (ref 0.3–4.2)
WBC # BLD AUTO: 5.8 10E3/UL (ref 4–11)

## 2025-01-15 PROCEDURE — 85048 AUTOMATED LEUKOCYTE COUNT: CPT | Performed by: INTERNAL MEDICINE

## 2025-01-15 PROCEDURE — 36591 DRAW BLOOD OFF VENOUS DEVICE: CPT | Performed by: INTERNAL MEDICINE

## 2025-01-15 PROCEDURE — 84460 ALANINE AMINO (ALT) (SGPT): CPT | Performed by: INTERNAL MEDICINE

## 2025-01-15 PROCEDURE — 258N000003 HC RX IP 258 OP 636: Performed by: INTERNAL MEDICINE

## 2025-01-15 PROCEDURE — 250N000011 HC RX IP 250 OP 636: Performed by: INTERNAL MEDICINE

## 2025-01-15 PROCEDURE — 82947 ASSAY GLUCOSE BLOOD QUANT: CPT | Performed by: INTERNAL MEDICINE

## 2025-01-15 PROCEDURE — 85004 AUTOMATED DIFF WBC COUNT: CPT | Performed by: INTERNAL MEDICINE

## 2025-01-15 PROCEDURE — 84443 ASSAY THYROID STIM HORMONE: CPT | Performed by: INTERNAL MEDICINE

## 2025-01-15 PROCEDURE — 84520 ASSAY OF UREA NITROGEN: CPT | Performed by: INTERNAL MEDICINE

## 2025-01-15 PROCEDURE — G0463 HOSPITAL OUTPT CLINIC VISIT: HCPCS | Performed by: INTERNAL MEDICINE

## 2025-01-15 PROCEDURE — G2211 COMPLEX E/M VISIT ADD ON: HCPCS | Performed by: INTERNAL MEDICINE

## 2025-01-15 PROCEDURE — 99215 OFFICE O/P EST HI 40 MIN: CPT | Performed by: INTERNAL MEDICINE

## 2025-01-15 RX ORDER — ALBUTEROL SULFATE 90 UG/1
1-2 INHALANT RESPIRATORY (INHALATION)
Status: CANCELLED
Start: 2025-01-15

## 2025-01-15 RX ORDER — NALOXONE HYDROCHLORIDE 0.4 MG/ML
.1-.4 INJECTION, SOLUTION INTRAMUSCULAR; INTRAVENOUS; SUBCUTANEOUS
Status: CANCELLED | OUTPATIENT
Start: 2025-01-15

## 2025-01-15 RX ORDER — ALBUTEROL SULFATE 0.83 MG/ML
2.5 SOLUTION RESPIRATORY (INHALATION)
Status: CANCELLED | OUTPATIENT
Start: 2025-01-15

## 2025-01-15 RX ORDER — METHYLPREDNISOLONE SODIUM SUCCINATE 125 MG/2ML
125 INJECTION INTRAMUSCULAR; INTRAVENOUS
Status: CANCELLED
Start: 2025-01-15

## 2025-01-15 RX ORDER — HEPARIN SODIUM (PORCINE) LOCK FLUSH IV SOLN 100 UNIT/ML 100 UNIT/ML
5 SOLUTION INTRAVENOUS EVERY 8 HOURS PRN
Status: CANCELLED | OUTPATIENT
Start: 2025-01-15

## 2025-01-15 RX ORDER — SODIUM CHLORIDE 9 MG/ML
1000 INJECTION, SOLUTION INTRAVENOUS CONTINUOUS PRN
Status: CANCELLED
Start: 2025-01-15

## 2025-01-15 RX ORDER — HEPARIN SODIUM,PORCINE 10 UNIT/ML
5 VIAL (ML) INTRAVENOUS
Status: CANCELLED | OUTPATIENT
Start: 2025-01-15

## 2025-01-15 RX ORDER — HEPARIN SODIUM (PORCINE) LOCK FLUSH IV SOLN 100 UNIT/ML 100 UNIT/ML
5 SOLUTION INTRAVENOUS EVERY 8 HOURS PRN
Status: DISCONTINUED | OUTPATIENT
Start: 2025-01-15 | End: 2025-01-15 | Stop reason: HOSPADM

## 2025-01-15 RX ORDER — DIPHENHYDRAMINE HYDROCHLORIDE 50 MG/ML
50 INJECTION INTRAMUSCULAR; INTRAVENOUS
Status: CANCELLED
Start: 2025-01-15

## 2025-01-15 RX ORDER — EPINEPHRINE 1 MG/ML
0.3 INJECTION, SOLUTION INTRAMUSCULAR; SUBCUTANEOUS EVERY 5 MIN PRN
Status: CANCELLED | OUTPATIENT
Start: 2025-01-15

## 2025-01-15 RX ORDER — MEPERIDINE HYDROCHLORIDE 25 MG/ML
25 INJECTION INTRAMUSCULAR; INTRAVENOUS; SUBCUTANEOUS EVERY 30 MIN PRN
Status: CANCELLED | OUTPATIENT
Start: 2025-01-15

## 2025-01-15 RX ORDER — LORAZEPAM 2 MG/ML
0.5 INJECTION INTRAMUSCULAR EVERY 4 HOURS PRN
Status: CANCELLED | OUTPATIENT
Start: 2025-01-15

## 2025-01-15 RX ADMIN — SODIUM CHLORIDE 1000 ML: 9 INJECTION, SOLUTION INTRAVENOUS at 11:27

## 2025-01-15 RX ADMIN — SODIUM CHLORIDE 400 MG: 9 INJECTION, SOLUTION INTRAVENOUS at 11:30

## 2025-01-15 RX ADMIN — Medication 5 ML: at 12:34

## 2025-01-15 ASSESSMENT — PAIN SCALES - GENERAL: PAINLEVEL_OUTOF10: NO PAIN (0)

## 2025-01-15 NOTE — LETTER
added with cycle 2.   6. PET scan on 10/17/2022 reveals FDG avid sclerotic lesions involving the right posterior seventh rib slightly above background levels. Favored to simply represent sequelae of post treatment change.  -PET scan on 03/18/2024 does not reveal any evidence of malignancy.  There is mildly FDG avid sclerotic lesions of the right seventh rib are again seen. Slightly increased FDG uptake of the right lateral seventh rib lesion. Findings may reflect posttreatment inflammation or indolent metastases.      SUBJECTIVE:    Mr. 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 He is tolerating it well.     PET scan on 01/06/2025:  1. Redemonstrated FDG avid lesions involving the right seventh rib, not significantly changed since at least 3/18/2024.   2. Focal FDG uptake in the transverse colon in the right upper quadrant, of which 20% represent benign/malignant polyps. Colonoscopy could be helpful in further evaluation.     He has grade 1 peripheral neuropathy. No worsening.  He gets intermittent numbness in toes. No neuropathy in upper extremity. He is on gabapentin twice a day.      He has mild generalized weakness. He is able to do all his activities.      No headache.  No dizziness.  No chest pain.  No shortness of breath.  No abdominal pain, nausea or vomiting. Appetite is good. No urinary or bowel complaints. No bleeding. All other review of systems is negative.     PHYSICAL EXAMINATION:   GENERAL:  Alert and oriented x 3.   VITAL SIGNS:  Reviewed.  ECOG PS of 1.     Rest of the system not examined.     LABS:  CBC, CMP and TSH reviewed.     ASSESSMENT:    1.  An 84-year-old gentleman with metastatic non-small cell lung cancer on pembrolizumab.  No evidence of disease.  2.  Grade 1 peripheral neuropathy on gabapentin. Stable.  3.  Mild thrombocytopenia, stable.  4.  Aortic aneurysm, being followed by vascular surgery.  5.  Focal uptake in transverse colon.      PLAN:    -Pembrolizumab today. Hold after today's dose.  -Hold zometa.  -Continue gabapentin.  -CT scan in 3 months.  -Colonoscopy  -See me in 3 months with CT scan.     DISCUSSION:  1.  Patient is doing well for his age.  PET scan was reviewed with him.  There is stable FDG avid lesions involving right seventh rib which has not changed since March 2024.  This is treated bone metastasis.  This is not a active cancer.    2.  Discussed regarding metastatic lung cancer.  Explained to the patient that based on the PET scan, I am not suspecting any active malignancy.    We discussed regarding treatment.  He has been on pembrolizumab.  He is tolerating it well.    Patient would like to stop pembrolizumab.  He has been on pembrolizumab for 4 years.  I am okay with stopping the pembrolizumab.  If in future there is progression, it can be resumed again.  He is agreeable for it.    Explained to the patient that we will monitor his lung cancer.  Will get CT scan in 3 months.    3.  He has been getting Zometa every 6 months.  Will hold the Zometa as there is no active bone metastasis.    4.  On PET scan, there is focal uptake in transverse colon.  Patient does not have any GI symptoms.  We will schedule the colonoscopy.    5.  Labs were reviewed with him.  There are few minor abnormalities. We will monitor it.    6.  For neuropathy, patient will continue on gabapentin.  Neuropathy is stable.    7.  He had few questions which were all answered.  I will see him in 3 months time with CT scan.     Total visit time of 40 minutes.  Time spent in today's visit, review of chart/investigations today, monitoring for toxicity of high risk drugs and documentation.         Again, thank you for allowing me to participate in the care of your patient.        Sincerely,        Buzz Larsen MD    Electronically signed

## 2025-01-15 NOTE — PROGRESS NOTES
Nursing Note:  Derrick MARITZA Benitez presents today for labs.    Patient seen by provider today: Yes: SHIELA   present during visit today: Not Applicable.    Note: N/A.    Intravenous Access:  Labs drawn without difficulty.  Implanted Port.    Discharge Plan:   Patient was sent to [facility] for provider appointment.    Archana Hall RN

## 2025-01-15 NOTE — Clinical Note
"1/15/2025      Derrick Benitez  [address]  [facility] [address]      Dear Colleague,    Thank you for referring your patient, Derrick Benitez, to the [facility]. Please see a copy of my visit note below.    Oncology Rooming Note    January 15, 2025 10:52 AM   Derrick Benitez is a 84 year old male who presents for:    Chief Complaint   Patient presents with    Oncology Clinic Visit     Initial Vitals: There were no vitals taken for this visit. Estimated body mass index is 30.67 kg/m  as calculated from the following:    Height as of 7/30/24: 1.702 m (5' 7\").    Weight as of 12/4/24: 88.8 kg (195 lb 12.8 oz). There is no height or weight on file to calculate BSA.  Data Unavailable Comment: Data Unavailable   No LMP for male patient.  Allergies reviewed: Yes  Medications reviewed: Yes    Medications: Medication refills not needed today.  Pharmacy name entered into EPIC:    [place], [place] - [address] [facility] [address] [place], [place] - [address]    Frailty Screening:   Is the patient here for a new oncology consult visit in cancer care? 2. No          Brenda Martines MA                Again, thank you for allowing me to participate in the care of your patient.        Sincerely,        Buzz Larsen MD    Electronically signed"

## 2025-01-15 NOTE — PROGRESS NOTES
"Oncology Rooming Note    January 15, 2025 10:52 AM   Derrick Benitez is a 84 year old male who presents for:    Chief Complaint   Patient presents with    Oncology Clinic Visit     Initial Vitals: There were no vitals taken for this visit. Estimated body mass index is 30.67 kg/m  as calculated from the following:    Height as of 7/30/24: 1.702 m (5' 7\").    Weight as of 12/4/24: 88.8 kg (195 lb 12.8 oz). There is no height or weight on file to calculate BSA.  Data Unavailable Comment: Data Unavailable   No LMP for male patient.  Allergies reviewed: Yes  Medications reviewed: Yes    Medications: Medication refills not needed today.  Pharmacy name entered into EPIC:    [place], [place] - [address]  [facility] [address] [place], [place] - [address] [place] [address] [address]    Frailty Screening:   Is the patient here for a new oncology consult visit in cancer care? 2. No          Brenda Martines MA            "

## 2025-01-15 NOTE — PATIENT INSTRUCTIONS
-Pembrolizumab today. Hold after today's dose.  -Hold zometa.  -Continue gabapentin.  -CT scan in 3 months.  -See me in 3 months with CT scan.

## 2025-01-15 NOTE — PROGRESS NOTES
Infusion Nursing Note:  Derrick Benitez presents today for C37D1 Keytruda+ 1 L bolus of NS.    Patient seen by provider today: Yes: Dr. Larsen   present during visit today: Not Applicable.    Note: Pt was seen and assessed by Dr. Larsen prior to infusion: okay to proceed with treatment today. Zometa on hold at this time.  Keytruda will also be on hold after today, per Dr. Larsen's note on 1/15/25.      Intravenous Access:  Implanted Port.    Treatment Conditions:  Lab Results   Component Value Date    HGB 13.7 01/15/2025    WBC 5.8 01/15/2025    ANEU 3.9 10/17/2022    ANEUTAUTO 4.3 01/15/2025     (L) 01/15/2025        Lab Results   Component Value Date     01/15/2025    POTASSIUM 4.3 01/15/2025    MAG 2.1 10/30/2014    CR 1.06 01/15/2025    ANTHONY 8.4 (L) 01/15/2025    BILITOTAL 0.4 01/15/2025    ALBUMIN 3.5 01/15/2025    ALT 12 01/15/2025    AST 21 01/15/2025   TSH 1.89    Results reviewed, labs MET treatment parameters, ok to proceed with treatment.      Post Infusion Assessment:  Patient tolerated infusion without incident.  Blood return noted pre and post infusion.  Site patent and intact, free from redness, edema or discomfort.  No evidence of extravasations.  Access discontinued per protocol.       Discharge Plan:   Discharge instructions reviewed with: Patient.  Patient and/or family verbalized understanding of discharge instructions and all questions answered.  AVS to patient via [employer].  Patient will return in 3 months for next appointment.   Patient discharged in stable condition accompanied by: self.  Departure Mode: Ambulatory.      Suad Galdamez, RN

## 2025-01-19 NOTE — PROGRESS NOTES
ONCOLOGIC HISTORY:  Mr. Benitez is a gentleman with non-small cell lung cancer, favor squamous cell carcinoma.   -NGS and PDL staining could not be done because of small sample.   -Guardant 360 does not reveal any actionable alteration.     1.  CT chest angiogram on 09/07/2020 revealed right lower lobe mass.   2.  CT-guided biopsy was done on 09/18/2020.  Pathology revealed non-small cell lung cancer, favor squamous cell carcinoma.  Sample size was small.  PDL staining and NGS panel could not be done.   3.  Brain MRI on 09/26/2020 did not reveal any brain metastasis.   4.  PET scan on 10/05/2020 revealed hypermetabolic right lung mass and multiple hypermetabolic bone lesions.   5.  Carboplatin and Taxol x 6 between 11/25/2020 and 03/10/2021.   -Pembrolizumab added with cycle 2.   6. PET scan on 10/17/2022 reveals FDG avid sclerotic lesions involving the right posterior seventh rib slightly above background levels. Favored to simply represent sequelae of post treatment change.  -PET scan on 03/18/2024 does not reveal any evidence of malignancy.  There is mildly FDG avid sclerotic lesions of the right seventh rib are again seen. Slightly increased FDG uptake of the right lateral seventh rib lesion. Findings may reflect posttreatment inflammation or indolent metastases.      SUBJECTIVE:    Mr. 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 He is tolerating it well.     PET scan on 01/06/2025:  1. Redemonstrated FDG avid lesions involving the right seventh rib, not significantly changed since at least 3/18/2024.   2. Focal FDG uptake in the transverse colon in the right upper quadrant, of which 20% represent benign/malignant polyps. Colonoscopy could be helpful in further evaluation.     He has grade 1 peripheral neuropathy. No worsening.  He gets intermittent numbness in toes. No neuropathy in upper extremity. He is on gabapentin twice a day.      He has mild generalized  weakness. He is able to do all his activities.      No headache.  No dizziness.  No chest pain.  No shortness of breath.  No abdominal pain, nausea or vomiting. Appetite is good. No urinary or bowel complaints. No bleeding. All other review of systems is negative.     PHYSICAL EXAMINATION:   GENERAL:  Alert and oriented x 3.   VITAL SIGNS:  Reviewed.  ECOG PS of 1.     Rest of the system not examined.     LABS:  CBC, CMP and TSH reviewed.     ASSESSMENT:    1.  An 84-year-old gentleman with metastatic non-small cell lung cancer on pembrolizumab.  No evidence of disease.  2.  Grade 1 peripheral neuropathy on gabapentin. Stable.  3.  Mild thrombocytopenia, stable.  4.  Aortic aneurysm, being followed by vascular surgery.  5.  Focal uptake in transverse colon.     PLAN:    -Pembrolizumab today. Hold after today's dose.  -Hold zometa.  -Continue gabapentin.  -CT scan in 3 months.  -Colonoscopy  -See me in 3 months with CT scan.     DISCUSSION:  1.  Patient is doing well for his age.  PET scan was reviewed with him.  There is stable FDG avid lesions involving right seventh rib which has not changed since March 2024.  This is treated bone metastasis.  This is not a active cancer.    2.  Discussed regarding metastatic lung cancer.  Explained to the patient that based on the PET scan, I am not suspecting any active malignancy.    We discussed regarding treatment.  He has been on pembrolizumab.  He is tolerating it well.    Patient would like to stop pembrolizumab.  He has been on pembrolizumab for 4 years.  I am okay with stopping the pembrolizumab.  If in future there is progression, it can be resumed again.  He is agreeable for it.    Explained to the patient that we will monitor his lung cancer.  Will get CT scan in 3 months.    3.  He has been getting Zometa every 6 months.  Will hold the Zometa as there is no active bone metastasis.    4.  On PET scan, there is focal uptake in transverse colon.  Patient does not have  any GI symptoms.  We will schedule the colonoscopy.    5.  Labs were reviewed with him.  There are few minor abnormalities. We will monitor it.    6.  For neuropathy, patient will continue on gabapentin.  Neuropathy is stable.    7.  He had few questions which were all answered.  I will see him in 3 months time with CT scan.     Total visit time of 40 minutes.  Time spent in today's visit, review of chart/investigations today, monitoring for toxicity of high risk drugs and documentation.

## 2025-01-25 ENCOUNTER — HEALTH MAINTENANCE LETTER (OUTPATIENT)
Age: 85
End: 2025-01-25

## 2025-03-22 ENCOUNTER — HEALTH MAINTENANCE LETTER (OUTPATIENT)
Age: 85
End: 2025-03-22

## 2025-03-27 ENCOUNTER — TELEPHONE (OUTPATIENT)
Dept: ONCOLOGY | Facility: CLINIC | Age: 85
End: 2025-03-27
Payer: MEDICARE

## 2025-03-27 NOTE — TELEPHONE ENCOUNTER
called patient to schedule, pt is dealing with spouse health issues and will call later to schedule.

## 2025-04-29 DIAGNOSIS — G62.0 PERIPHERAL NEUROPATHY DUE TO CHEMOTHERAPY: ICD-10-CM

## 2025-04-29 DIAGNOSIS — T45.1X5A PERIPHERAL NEUROPATHY DUE TO CHEMOTHERAPY: ICD-10-CM

## 2025-04-29 RX ORDER — GABAPENTIN 300 MG/1
300 CAPSULE ORAL 3 TIMES DAILY
Qty: 270 CAPSULE | Refills: 1 | Status: SHIPPED | OUTPATIENT
Start: 2025-04-29

## 2025-04-29 NOTE — TELEPHONE ENCOUNTER
Pending Prescriptions:                       Disp   Refills    gabapentin (NEURONTIN) 300 MG capsule     270 ca*1            Sig: Take 1 capsule (300 mg) by mouth 3 times daily.          Last Written Prescription Date:  2/13/2024  Last Fill Quantity: 270,   # refills: 1  Last Office Visit: 1/15/2025  Future Office visit:   not scheduled yet; pt's call was transferred to scheduling team for assistance     Pt states that most of the time he is only taking 1 capsule BID.     Routing refill request to provider for review/approval.  Shala Aguilar RN on 4/29/2025 at 2:17 PM

## 2025-05-03 ENCOUNTER — HEALTH MAINTENANCE LETTER (OUTPATIENT)
Age: 85
End: 2025-05-03

## 2025-05-05 ENCOUNTER — TELEPHONE (OUTPATIENT)
Dept: MEDSURG UNIT | Facility: CLINIC | Age: 85
End: 2025-05-05
Payer: MEDICARE

## 2025-05-06 ENCOUNTER — HOSPITAL ENCOUNTER (OUTPATIENT)
Facility: CLINIC | Age: 85
Discharge: HOME | End: 2025-05-06
Admitting: RADIOLOGY
Payer: MEDICARE

## 2025-05-06 ENCOUNTER — HOSPITAL ENCOUNTER (OUTPATIENT)
Dept: CT IMAGING | Facility: CLINIC | Age: 85
Discharge: HOME | End: 2025-05-06
Attending: INTERNAL MEDICINE
Payer: MEDICARE

## 2025-05-06 DIAGNOSIS — C34.31 MALIGNANT NEOPLASM OF LOWER LOBE OF RIGHT LUNG (H): ICD-10-CM

## 2025-05-06 DIAGNOSIS — C34.31 MALIGNANT NEOPLASM OF LOWER LOBE OF RIGHT LUNG (H): Primary | ICD-10-CM

## 2025-05-06 LAB
CREAT BLD-MCNC: 1.1 MG/DL (ref 0.7–1.2)
EGFRCR SERPLBLD CKD-EPI 2021: >60 ML/MIN/1.73M2

## 2025-05-06 PROCEDURE — 999N000154 HC STATISTIC RADIOLOGY XRAY, US, CT, MAR, NM

## 2025-05-06 PROCEDURE — 250N000011 HC RX IP 250 OP 636: Performed by: INTERNAL MEDICINE

## 2025-05-06 PROCEDURE — 250N000009 HC RX 250: Performed by: INTERNAL MEDICINE

## 2025-05-06 PROCEDURE — 71260 CT THORAX DX C+: CPT

## 2025-05-06 PROCEDURE — 82565 ASSAY OF CREATININE: CPT

## 2025-05-06 RX ORDER — HEPARIN SODIUM,PORCINE 10 UNIT/ML
5-10 VIAL (ML) INTRAVENOUS EVERY 24 HOURS
Status: DISCONTINUED | OUTPATIENT
Start: 2025-05-06 | End: 2025-05-06 | Stop reason: HOSPADM

## 2025-05-06 RX ORDER — HEPARIN SODIUM (PORCINE) LOCK FLUSH IV SOLN 100 UNIT/ML 100 UNIT/ML
5 SOLUTION INTRAVENOUS
Status: DISCONTINUED | OUTPATIENT
Start: 2025-05-06 | End: 2025-05-06 | Stop reason: HOSPADM

## 2025-05-06 RX ORDER — HEPARIN SODIUM,PORCINE 10 UNIT/ML
5 VIAL (ML) INTRAVENOUS
Status: DISCONTINUED | OUTPATIENT
Start: 2025-05-06 | End: 2025-05-06 | Stop reason: HOSPADM

## 2025-05-06 RX ORDER — IOPAMIDOL 755 MG/ML
71 INJECTION, SOLUTION INTRAVASCULAR ONCE
Status: COMPLETED | OUTPATIENT
Start: 2025-05-06 | End: 2025-05-06

## 2025-05-06 RX ORDER — HEPARIN SODIUM,PORCINE 10 UNIT/ML
5-10 VIAL (ML) INTRAVENOUS
Status: DISCONTINUED | OUTPATIENT
Start: 2025-05-06 | End: 2025-05-06 | Stop reason: HOSPADM

## 2025-05-06 RX ORDER — HEPARIN SODIUM (PORCINE) LOCK FLUSH IV SOLN 100 UNIT/ML 100 UNIT/ML
5-10 SOLUTION INTRAVENOUS
Status: DISCONTINUED | OUTPATIENT
Start: 2025-05-06 | End: 2025-05-06 | Stop reason: HOSPADM

## 2025-05-06 RX ADMIN — IOPAMIDOL 95 ML: 755 INJECTION, SOLUTION INTRAVENOUS at 09:08

## 2025-05-06 RX ADMIN — HEPARIN SODIUM (PORCINE) LOCK FLUSH IV SOLN 100 UNIT/ML 5 ML: 100 SOLUTION at 09:27

## 2025-05-06 RX ADMIN — SODIUM CHLORIDE 95 ML: 9 INJECTION, SOLUTION INTRAVENOUS at 09:08

## 2025-05-06 ASSESSMENT — ACTIVITIES OF DAILY LIVING (ADL): ADLS_ACUITY_SCORE: 41

## 2025-05-08 ENCOUNTER — RESULTS FOLLOW-UP (OUTPATIENT)
Dept: ONCOLOGY | Facility: CLINIC | Age: 85
End: 2025-05-08

## 2025-05-09 NOTE — RESULT ENCOUNTER NOTE
Dear Mr. Benitez,    CT scan is stable. No evidence of cancer. Please, schedule a follow up appointment with me.    Please, call me with any questions.    Buzz Larsen MD

## 2025-05-12 ENCOUNTER — ONCOLOGY VISIT (OUTPATIENT)
Dept: ONCOLOGY | Facility: CLINIC | Age: 85
End: 2025-05-12
Attending: INTERNAL MEDICINE
Payer: MEDICARE

## 2025-05-12 ENCOUNTER — RESULTS FOLLOW-UP (OUTPATIENT)
Dept: ONCOLOGY | Facility: CLINIC | Age: 85
End: 2025-05-12

## 2025-05-12 VITALS
TEMPERATURE: 98.5 F | BODY MASS INDEX: 29.6 KG/M2 | SYSTOLIC BLOOD PRESSURE: 131 MMHG | RESPIRATION RATE: 16 BRPM | WEIGHT: 189 LBS | HEART RATE: 64 BPM | OXYGEN SATURATION: 94 % | DIASTOLIC BLOOD PRESSURE: 65 MMHG

## 2025-05-12 DIAGNOSIS — C79.51 MALIGNANT NEOPLASM METASTATIC TO BONE (H): Primary | ICD-10-CM

## 2025-05-12 DIAGNOSIS — C34.31 MALIGNANT NEOPLASM OF LOWER LOBE OF RIGHT LUNG (H): ICD-10-CM

## 2025-05-12 DIAGNOSIS — I71.43 ANEURYSM OF INFRARENAL ABDOMINAL AORTA, UNSPECIFIED WHETHER RUPTURED: ICD-10-CM

## 2025-05-12 LAB
ALBUMIN SERPL BCG-MCNC: 3.6 G/DL (ref 3.5–5.2)
ALP SERPL-CCNC: 80 U/L (ref 40–150)
ALT SERPL W P-5'-P-CCNC: 14 U/L (ref 0–70)
ANION GAP SERPL CALCULATED.3IONS-SCNC: 8 MMOL/L (ref 7–15)
AST SERPL W P-5'-P-CCNC: 24 U/L (ref 0–45)
BASOPHILS # BLD AUTO: 0 10E3/UL (ref 0–0.2)
BASOPHILS NFR BLD AUTO: 0 %
BILIRUB SERPL-MCNC: 0.4 MG/DL
BUN SERPL-MCNC: 13 MG/DL (ref 8–23)
CALCIUM SERPL-MCNC: 8.8 MG/DL (ref 8.8–10.4)
CHLORIDE SERPL-SCNC: 104 MMOL/L (ref 98–107)
CREAT SERPL-MCNC: 1.02 MG/DL (ref 0.67–1.17)
EGFRCR SERPLBLD CKD-EPI 2021: 72 ML/MIN/1.73M2
EOSINOPHIL # BLD AUTO: 0.2 10E3/UL (ref 0–0.7)
EOSINOPHIL NFR BLD AUTO: 3 %
ERYTHROCYTE [DISTWIDTH] IN BLOOD BY AUTOMATED COUNT: 12.6 % (ref 10–15)
GLUCOSE SERPL-MCNC: 123 MG/DL (ref 70–99)
HCO3 SERPL-SCNC: 25 MMOL/L (ref 22–29)
HCT VFR BLD AUTO: 41 % (ref 40–53)
HGB BLD-MCNC: 13.8 G/DL (ref 13.3–17.7)
IMM GRANULOCYTES # BLD: 0 10E3/UL
IMM GRANULOCYTES NFR BLD: 1 %
LYMPHOCYTES # BLD AUTO: 1 10E3/UL (ref 0.8–5.3)
LYMPHOCYTES NFR BLD AUTO: 16 %
MCH RBC QN AUTO: 31.1 PG (ref 26.5–33)
MCHC RBC AUTO-ENTMCNC: 33.7 G/DL (ref 31.5–36.5)
MCV RBC AUTO: 92 FL (ref 78–100)
MONOCYTES # BLD AUTO: 0.4 10E3/UL (ref 0–1.3)
MONOCYTES NFR BLD AUTO: 6 %
NEUTROPHILS # BLD AUTO: 4.6 10E3/UL (ref 1.6–8.3)
NEUTROPHILS NFR BLD AUTO: 74 %
NRBC # BLD AUTO: 0 10E3/UL
NRBC BLD AUTO-RTO: 0 /100
PLATELET # BLD AUTO: 126 10E3/UL (ref 150–450)
POTASSIUM SERPL-SCNC: 4.4 MMOL/L (ref 3.4–5.3)
PROT SERPL-MCNC: 6.4 G/DL (ref 6.4–8.3)
RBC # BLD AUTO: 4.44 10E6/UL (ref 4.4–5.9)
SODIUM SERPL-SCNC: 137 MMOL/L (ref 135–145)
WBC # BLD AUTO: 6.2 10E3/UL (ref 4–11)

## 2025-05-12 PROCEDURE — G0463 HOSPITAL OUTPT CLINIC VISIT: HCPCS | Performed by: INTERNAL MEDICINE

## 2025-05-12 PROCEDURE — 36591 DRAW BLOOD OFF VENOUS DEVICE: CPT | Performed by: INTERNAL MEDICINE

## 2025-05-12 PROCEDURE — 250N000011 HC RX IP 250 OP 636: Performed by: INTERNAL MEDICINE

## 2025-05-12 PROCEDURE — 99214 OFFICE O/P EST MOD 30 MIN: CPT | Performed by: INTERNAL MEDICINE

## 2025-05-12 PROCEDURE — 85025 COMPLETE CBC W/AUTO DIFF WBC: CPT | Performed by: INTERNAL MEDICINE

## 2025-05-12 PROCEDURE — G2211 COMPLEX E/M VISIT ADD ON: HCPCS | Performed by: INTERNAL MEDICINE

## 2025-05-12 PROCEDURE — 82247 BILIRUBIN TOTAL: CPT | Performed by: INTERNAL MEDICINE

## 2025-05-12 RX ORDER — HEPARIN SODIUM (PORCINE) LOCK FLUSH IV SOLN 100 UNIT/ML 100 UNIT/ML
5 SOLUTION INTRAVENOUS
Status: DISCONTINUED | OUTPATIENT
Start: 2025-05-12 | End: 2025-05-12 | Stop reason: HOSPADM

## 2025-05-12 RX ADMIN — Medication 5 ML: at 12:38

## 2025-05-12 ASSESSMENT — PAIN SCALES - GENERAL: PAINLEVEL_OUTOF10: NO PAIN (0)

## 2025-05-12 NOTE — PROGRESS NOTES
Nursing Note:  Derrick Benitez presents today for port labs.    Patient seen by provider today: Yes: Chava   present during visit today: Not Applicable.    Note: N/A.    Intravenous Access:  Labs drawn without difficulty.  Implanted Port.    Discharge Plan:   Patient was sent home.    Musa Taylor RN           

## 2025-05-12 NOTE — PROGRESS NOTES
ONCOLOGIC HISTORY:  Mr. Benitez is a gentleman with non-small cell lung cancer, favor squamous cell carcinoma.   -NGS and PDL staining could not be done because of small sample.   -Guardant 360 does not reveal any actionable alteration.     1.  CT chest angiogram on 09/07/2020 revealed right lower lobe mass.   2.  CT-guided biopsy was done on 09/18/2020.  Pathology revealed non-small cell lung cancer, favor squamous cell carcinoma.  Sample size was small.  PDL staining and NGS panel could not be done.   3.  Brain MRI on 09/26/2020 did not reveal any brain metastasis.   4.  PET scan on 10/05/2020 revealed hypermetabolic right lung mass and multiple hypermetabolic bone lesions.   5.  Carboplatin and Taxol x 6 between 11/25/2020 and 03/10/2021.   -Pembrolizumab added with cycle 2.   6. PET scan on 10/17/2022 reveals FDG avid sclerotic lesions involving the right posterior seventh rib slightly above background levels. Favored to simply represent sequelae of post treatment change.  -PET scan on 03/18/2024 does not reveal any evidence of malignancy.  There is mildly FDG avid sclerotic lesions of the right seventh rib are again seen. Slightly increased FDG uptake of the right lateral seventh rib lesion. Findings may reflect posttreatment inflammation or indolent metastases.   7. Pembrolizumab stopped after 01/15/2025.     SUBJECTIVE:    Mr. Benitez is an 84-year-old gentleman with metastatic lung squamous cell carcinoma. Last pembrolizumab was in 01/2025.     CT scan on 05/06/2025:  1.  No new disease in the chest, abdomen and pelvis.  2.  Stable right seventh rib sclerotic lesion.   3.  Few stable small pulmonary nodules.  4.  Stable abdominal aortic aneurysm measuring 5.9 x 5.4 cm.    He has grade 1 peripheral neuropathy in toes. He has numbness in toes. No neuropathy in upper extremity. He is on gabapentin twice a day. Neuropathy is stable.     He is doing well for his age. He has mild generalized weakness. He is able to do  all his activities.      No headache.  No dizziness.  No chest pain.  No shortness of breath.  No abdominal pain, nausea or vomiting. Appetite is good. No urinary or bowel complaints. No bleeding. All other review of systems is negative.     PHYSICAL EXAMINATION:   GENERAL:  Alert and oriented x 3.   VITAL SIGNS:  Reviewed.  ECOG PS of 1.     Rest of the system not examined.     ASSESSMENT:    1.  An 84-year-old gentleman with metastatic non-small cell lung cancer diagnosed in 09/2020. Not on any treatment. No evidence of progression.  2.  Grade 1 peripheral neuropathy on gabapentin. Stable.  3.  Mild thrombocytopenia.  4.  Abdominal aortic aneurysm.  5.  Focal uptake in transverse colon on last PET. Patient does not want colonoscopy.     PLAN:    -Labs today  -Continue gabapentin.  -Wants to keep the port. Port-flush every 4-6 weeks.  -Vascular surgery appointment.  -See me in 3 months.     DISCUSSION:  1.  Patient is overall doing well.  CT scan was reviewed with him.  Explained to him that there is no evidence of malignancy.  There are few tiny lung nodules which are  stable.  There is right seventh rib sclerotic lesion which is stable.    Explained to the patient that he is doing very well from metastatic lung squamous cell carcinoma.  Patient is off any treatment.  Plan at this time is to monitor him.  Only when there is radiological evidence of disease, we will plan on treating him.    Explained to the patient that we will consider next CT scan in 6 months unless he has any symptoms.    2.  Patient has abdominal aortic aneurysm.  He has not seen vascular surgeon for quite some time.  Will schedule him to see vascular surgery as aneurysm is 5.9 cm.    3.  Discussed regarding port removal.  For now he wants to keep it.  Advised him to have it flushed every 4 to 6 weeks.    4.  CBC and CMP done today were reviewed with him.  He has mild chronic thrombocytopenia which is stable.  CMP is normal except mildly  elevated blood glucose.  This was nonfasting sample.    5.  He will continue on gabapentin for neuropathy.  Neuropathy has been remaining stable.    6.  He had few questions which were all answered.  I will see him in 3 months time.     Total visit time of 30 minutes.  Time spent in today's visit, review of chart/investigations today and documentation today.

## 2025-05-12 NOTE — LETTER
5/12/2025      Derrick Benitez  [address]  [facility] [address]      Dear Colleague,    Thank you for referring your patient, Derrick Benitez, to the [facility]. Please see a copy of my visit note below.    ONCOLOGIC HISTORY:  Mr. Benitez is a gentleman with non-small cell lung cancer, favor squamous cell carcinoma.   -NGS and PDL staining could not be done because of small sample.   -Guardant 360 does not reveal any actionable alteration.     1.  CT chest angiogram on 09/07/2020 revealed right lower lobe mass.   2.  CT-guided biopsy was done on 09/18/2020.  Pathology revealed non-small cell lung cancer, favor squamous cell carcinoma.  Sample size was small.  PDL staining and NGS panel could not be done.   3.  Brain MRI on 09/26/2020 did not reveal any brain metastasis.   4.  PET scan on 10/05/2020 revealed hypermetabolic right lung mass and multiple hypermetabolic bone lesions.   5.  Carboplatin and Taxol x 6 between 11/25/2020 and 03/10/2021.   -Pembrolizumab added with cycle 2.   6. PET scan on 10/17/2022 reveals FDG avid sclerotic lesions involving the right posterior seventh rib slightly above background levels. Favored to simply represent sequelae of post treatment change.  -PET scan on 03/18/2024 does not reveal any evidence of malignancy.  There is mildly FDG avid sclerotic lesions of the right seventh rib are again seen. Slightly increased FDG uptake of the right lateral seventh rib lesion. Findings may reflect posttreatment inflammation or indolent metastases.   7. Pembrolizumab stopped after 01/15/2025.     SUBJECTIVE:    Mr. Benitez is an 84-year-old gentleman with metastatic lung squamous cell carcinoma. Last pembrolizumab was in 01/2025.     CT scan on 05/06/2025:  1.  No new disease in the chest, abdomen and pelvis.  2.  Stable right seventh rib sclerotic lesion.   3.  Few stable small pulmonary nodules.  4.  Stable abdominal aortic aneurysm measuring 5.9 x 5.4 cm.    He has  grade 1 peripheral neuropathy in toes. He has numbness in toes. No neuropathy in upper extremity. He is on gabapentin twice a day. Neuropathy is stable.     He is doing well for his age. He has mild generalized weakness. He is able to do all his activities.      No headache.  No dizziness.  No chest pain.  No shortness of breath.  No abdominal pain, nausea or vomiting. Appetite is good. No urinary or bowel complaints. No bleeding. All other review of systems is negative.     PHYSICAL EXAMINATION:   GENERAL:  Alert and oriented x 3.   VITAL SIGNS:  Reviewed.  ECOG PS of 1.     Rest of the system not examined.     ASSESSMENT:    1.  An 84-year-old gentleman with metastatic non-small cell lung cancer diagnosed in 09/2020. Not on any treatment. No evidence of progression.  2.  Grade 1 peripheral neuropathy on gabapentin. Stable.  3.  Mild thrombocytopenia.  4.  Abdominal aortic aneurysm.  5.  Focal uptake in transverse colon on last PET. Patient does not want colonoscopy.     PLAN:    -Labs today  -Continue gabapentin.  -Wants to keep the port. Port-flush every 4-6 weeks.  -Vascular surgery appointment.  -See me in 3 months.     DISCUSSION:  1.  Patient is overall doing well.  CT scan was reviewed with him.  Explained to him that there is no evidence of malignancy.  There are few tiny lung nodules which are  stable.  There is right seventh rib sclerotic lesion which is stable.    Explained to the patient that he is doing very well from metastatic lung squamous cell carcinoma.  Patient is off any treatment.  Plan at this time is to monitor him.  Only when there is radiological evidence of disease, we will plan on treating him.    Explained to the patient that we will consider next CT scan in 6 months unless he has any symptoms.    2.  Patient has abdominal aortic aneurysm.  He has not seen vascular surgeon for quite some time.  Will schedule him to see vascular surgery as aneurysm is 5.9 cm.    3.  Discussed regarding  port removal.  For now he wants to keep it.  Advised him to have it flushed every 4 to 6 weeks.    4.  CBC and CMP done today were reviewed with him.  He has mild chronic thrombocytopenia which is stable.  CMP is normal except mildly elevated blood glucose.  This was nonfasting sample.    5.  He will continue on gabapentin for neuropathy.  Neuropathy has been remaining stable.    6.  He had few questions which were all answered.  I will see him in 3 months time.     Total visit time of 30 minutes.  Time spent in today's visit, review of chart/investigations today and documentation today.        Nursing Note:  Derrick Benitez presents today for port labs.    Patient seen by provider today: Yes: Chava   present during visit today: Not Applicable.    Note: N/A.    Intravenous Access:  Labs drawn without difficulty.  Implanted Port.    Discharge Plan:   Patient was sent home.    Musa Taylor RN            Again, thank you for allowing me to participate in the care of your patient.        Sincerely,        Buzz Larsen MD    Electronically signed

## 2025-05-12 NOTE — NURSING NOTE
"Oncology Rooming Note    May 12, 2025 11:47 AM   Derrick Benitez is a 84 year old male who presents for:    Chief Complaint   Patient presents with    Oncology Clinic Visit     Initial Vitals: /65   Pulse 64   Temp 98.5  F (36.9  C) (Oral)   Resp 16   Wt 85.7 kg (189 lb)   SpO2 94%   BMI 29.60 kg/m   Estimated body mass index is 29.6 kg/m  as calculated from the following:    Height as of 1/15/25: 1.702 m (5' 7\").    Weight as of this encounter: 85.7 kg (189 lb). Body surface area is 2.01 meters squared.  No Pain (0) Comment: Data Unavailable   No LMP for male patient.  Allergies reviewed: Yes  Medications reviewed: Yes    Medications: Medication refills not needed today.  Pharmacy name entered into [employer]: [id number] [place] [address]    Frailty Screening:   Is the patient here for a new oncology consult visit in cancer care? 2. No    PHQ9:  Did this patient require a PHQ9?: No      Clinical concerns: follow up        Crystal Rojas            "

## 2025-05-12 NOTE — PATIENT INSTRUCTIONS
-Labs today  -Continue gabapentin.  -Wants to keep the port. Port-flush every 4-6 weeks.  -Vascular surgery appointment.  -See me in 3 months.

## 2025-06-23 ENCOUNTER — INFUSION THERAPY VISIT (OUTPATIENT)
Dept: INFUSION THERAPY | Facility: CLINIC | Age: 85
End: 2025-06-23
Attending: NURSE PRACTITIONER
Payer: MEDICARE

## 2025-06-23 DIAGNOSIS — C34.31 MALIGNANT NEOPLASM OF LOWER LOBE OF RIGHT LUNG (H): Primary | ICD-10-CM

## 2025-06-23 PROCEDURE — 250N000011 HC RX IP 250 OP 636: Performed by: NURSE PRACTITIONER

## 2025-06-23 PROCEDURE — 96523 IRRIG DRUG DELIVERY DEVICE: CPT

## 2025-06-23 RX ORDER — HEPARIN SODIUM (PORCINE) LOCK FLUSH IV SOLN 100 UNIT/ML 100 UNIT/ML
5 SOLUTION INTRAVENOUS
Status: DISCONTINUED | OUTPATIENT
Start: 2025-06-23 | End: 2025-06-23 | Stop reason: HOSPADM

## 2025-06-23 RX ADMIN — Medication 5 ML: at 10:59

## 2025-06-23 NOTE — PROGRESS NOTES
Nursing Note:  Derrick Benitez presents today for port flush.    Patient seen by provider today: No   present during visit today: Not Applicable.    Note: Patient to stop as desk and get a port flush scheduled in August on the day he sees Dr. Larsen.    Intravenous Access:  Implanted Port.    Discharge Plan:   Patient was sent to [facility] for discharge appointment.    Alberto Merida RN

## 2025-08-16 ENCOUNTER — HEALTH MAINTENANCE LETTER (OUTPATIENT)
Age: 85
End: 2025-08-16

## 2025-08-18 ENCOUNTER — INFUSION THERAPY VISIT (OUTPATIENT)
Dept: INFUSION THERAPY | Facility: CLINIC | Age: 85
End: 2025-08-18
Attending: INTERNAL MEDICINE
Payer: MEDICARE

## 2025-08-18 ENCOUNTER — ONCOLOGY VISIT (OUTPATIENT)
Dept: ONCOLOGY | Facility: CLINIC | Age: 85
End: 2025-08-18
Attending: INTERNAL MEDICINE
Payer: MEDICARE

## 2025-08-18 VITALS
BODY MASS INDEX: 29.66 KG/M2 | HEART RATE: 66 BPM | DIASTOLIC BLOOD PRESSURE: 62 MMHG | OXYGEN SATURATION: 93 % | RESPIRATION RATE: 16 BRPM | SYSTOLIC BLOOD PRESSURE: 148 MMHG | HEIGHT: 67 IN | WEIGHT: 189 LBS

## 2025-08-18 DIAGNOSIS — T45.1X5A PERIPHERAL NEUROPATHY DUE TO CHEMOTHERAPY: ICD-10-CM

## 2025-08-18 DIAGNOSIS — C34.31 MALIGNANT NEOPLASM OF LOWER LOBE OF RIGHT LUNG (H): Primary | ICD-10-CM

## 2025-08-18 DIAGNOSIS — G62.0 PERIPHERAL NEUROPATHY DUE TO CHEMOTHERAPY: ICD-10-CM

## 2025-08-18 PROCEDURE — G0463 HOSPITAL OUTPT CLINIC VISIT: HCPCS | Performed by: INTERNAL MEDICINE

## 2025-08-18 PROCEDURE — G2211 COMPLEX E/M VISIT ADD ON: HCPCS | Performed by: INTERNAL MEDICINE

## 2025-08-18 PROCEDURE — 99214 OFFICE O/P EST MOD 30 MIN: CPT | Performed by: INTERNAL MEDICINE

## 2025-08-18 PROCEDURE — 250N000011 HC RX IP 250 OP 636: Performed by: INTERNAL MEDICINE

## 2025-08-18 RX ORDER — GABAPENTIN 300 MG/1
300 CAPSULE ORAL 2 TIMES DAILY
COMMUNITY
Start: 2025-08-18

## 2025-08-18 RX ORDER — HEPARIN SODIUM (PORCINE) LOCK FLUSH IV SOLN 100 UNIT/ML 100 UNIT/ML
5 SOLUTION INTRAVENOUS
Status: DISCONTINUED | OUTPATIENT
Start: 2025-08-18 | End: 2025-08-18 | Stop reason: HOSPADM

## 2025-08-18 RX ADMIN — Medication 5 ML: at 10:23

## 2025-08-18 ASSESSMENT — PAIN SCALES - GENERAL: PAINLEVEL_OUTOF10: NO PAIN (0)

## 2025-08-28 ENCOUNTER — OFFICE VISIT (OUTPATIENT)
Dept: ONCOLOGY | Facility: CLINIC | Age: 85
End: 2025-08-28
Attending: STUDENT IN AN ORGANIZED HEALTH CARE EDUCATION/TRAINING PROGRAM
Payer: MEDICARE

## 2025-08-28 VITALS
HEIGHT: 67 IN | RESPIRATION RATE: 16 BRPM | HEART RATE: 73 BPM | BODY MASS INDEX: 30.29 KG/M2 | WEIGHT: 193 LBS | DIASTOLIC BLOOD PRESSURE: 68 MMHG | SYSTOLIC BLOOD PRESSURE: 156 MMHG | OXYGEN SATURATION: 94 %

## 2025-08-28 DIAGNOSIS — C79.51 MALIGNANT NEOPLASM METASTATIC TO BONE (H): ICD-10-CM

## 2025-08-28 DIAGNOSIS — Z51.5 ENCOUNTER FOR PALLIATIVE CARE: ICD-10-CM

## 2025-08-28 DIAGNOSIS — C34.31 MALIGNANT NEOPLASM OF LOWER LOBE OF RIGHT LUNG (H): Primary | ICD-10-CM

## 2025-08-28 DIAGNOSIS — T45.1X5A PERIPHERAL NEUROPATHY DUE TO CHEMOTHERAPY: ICD-10-CM

## 2025-08-28 DIAGNOSIS — Z71.89 ADVANCED CARE PLANNING/COUNSELING DISCUSSION: ICD-10-CM

## 2025-08-28 DIAGNOSIS — G62.0 PERIPHERAL NEUROPATHY DUE TO CHEMOTHERAPY: ICD-10-CM

## 2025-08-28 PROCEDURE — G0463 HOSPITAL OUTPT CLINIC VISIT: HCPCS | Performed by: STUDENT IN AN ORGANIZED HEALTH CARE EDUCATION/TRAINING PROGRAM

## 2025-08-28 ASSESSMENT — PAIN SCALES - GENERAL: PAINLEVEL_OUTOF10: NO PAIN (0)

## (undated) RX ORDER — HEPARIN SODIUM (PORCINE) LOCK FLUSH IV SOLN 100 UNIT/ML 100 UNIT/ML
SOLUTION INTRAVENOUS
Status: DISPENSED
Start: 2023-11-08

## (undated) RX ORDER — HEPARIN SODIUM (PORCINE) LOCK FLUSH IV SOLN 100 UNIT/ML 100 UNIT/ML
SOLUTION INTRAVENOUS
Status: DISPENSED
Start: 2020-11-24

## (undated) RX ORDER — FENTANYL CITRATE 50 UG/ML
INJECTION, SOLUTION INTRAMUSCULAR; INTRAVENOUS
Status: DISPENSED
Start: 2020-11-24

## (undated) RX ORDER — LIDOCAINE HYDROCHLORIDE 10 MG/ML
INJECTION, SOLUTION INFILTRATION; PERINEURAL
Status: DISPENSED
Start: 2020-11-24

## (undated) RX ORDER — NALOXONE HYDROCHLORIDE 0.4 MG/ML
INJECTION, SOLUTION INTRAMUSCULAR; INTRAVENOUS; SUBCUTANEOUS
Status: DISPENSED
Start: 2020-09-18

## (undated) RX ORDER — HEPARIN SODIUM (PORCINE) LOCK FLUSH IV SOLN 100 UNIT/ML 100 UNIT/ML
SOLUTION INTRAVENOUS
Status: DISPENSED
Start: 2022-06-13

## (undated) RX ORDER — HEPARIN SODIUM (PORCINE) LOCK FLUSH IV SOLN 100 UNIT/ML 100 UNIT/ML
SOLUTION INTRAVENOUS
Status: DISPENSED
Start: 2024-07-15

## (undated) RX ORDER — FENTANYL CITRATE 50 UG/ML
INJECTION, SOLUTION INTRAMUSCULAR; INTRAVENOUS
Status: DISPENSED
Start: 2020-09-18

## (undated) RX ORDER — HEPARIN SODIUM (PORCINE) LOCK FLUSH IV SOLN 100 UNIT/ML 100 UNIT/ML
SOLUTION INTRAVENOUS
Status: DISPENSED
Start: 2021-11-15

## (undated) RX ORDER — HEPARIN SODIUM (PORCINE) LOCK FLUSH IV SOLN 100 UNIT/ML 100 UNIT/ML
SOLUTION INTRAVENOUS
Status: DISPENSED
Start: 2022-02-07

## (undated) RX ORDER — CEFAZOLIN SODIUM 2 G/100ML
INJECTION, SOLUTION INTRAVENOUS
Status: DISPENSED
Start: 2020-11-24

## (undated) RX ORDER — HEPARIN SODIUM (PORCINE) LOCK FLUSH IV SOLN 100 UNIT/ML 100 UNIT/ML
SOLUTION INTRAVENOUS
Status: DISPENSED
Start: 2025-05-06

## (undated) RX ORDER — FLUMAZENIL 0.1 MG/ML
INJECTION, SOLUTION INTRAVENOUS
Status: DISPENSED
Start: 2020-09-18

## (undated) RX ORDER — HEPARIN SODIUM (PORCINE) LOCK FLUSH IV SOLN 100 UNIT/ML 100 UNIT/ML
SOLUTION INTRAVENOUS
Status: DISPENSED
Start: 2021-08-18

## (undated) RX ORDER — HEPARIN SODIUM (PORCINE) LOCK FLUSH IV SOLN 100 UNIT/ML 100 UNIT/ML
SOLUTION INTRAVENOUS
Status: DISPENSED
Start: 2021-02-11

## (undated) RX ORDER — HEPARIN SODIUM (PORCINE) LOCK FLUSH IV SOLN 100 UNIT/ML 100 UNIT/ML
SOLUTION INTRAVENOUS
Status: DISPENSED
Start: 2023-04-25

## (undated) RX ORDER — HEPARIN SODIUM (PORCINE) LOCK FLUSH IV SOLN 100 UNIT/ML 100 UNIT/ML
SOLUTION INTRAVENOUS
Status: DISPENSED
Start: 2021-05-10